# Patient Record
Sex: FEMALE | Race: WHITE | URBAN - METROPOLITAN AREA
[De-identification: names, ages, dates, MRNs, and addresses within clinical notes are randomized per-mention and may not be internally consistent; named-entity substitution may affect disease eponyms.]

---

## 2017-05-25 ENCOUNTER — APPOINTMENT (OUTPATIENT)
Dept: URBAN - METROPOLITAN AREA SURGERY 9 | Age: 62
Setting detail: DERMATOLOGY
End: 2017-05-25

## 2017-05-25 DIAGNOSIS — L21.8 OTHER SEBORRHEIC DERMATITIS: ICD-10-CM

## 2017-05-25 PROBLEM — E78.5 HYPERLIPIDEMIA, UNSPECIFIED: Status: ACTIVE | Noted: 2017-05-25

## 2017-05-25 PROBLEM — D48.5 NEOPLASM OF UNCERTAIN BEHAVIOR OF SKIN: Status: ACTIVE | Noted: 2017-05-25

## 2017-05-25 PROBLEM — I10 ESSENTIAL (PRIMARY) HYPERTENSION: Status: ACTIVE | Noted: 2017-05-25

## 2017-05-25 PROBLEM — K21.9 GASTRO-ESOPHAGEAL REFLUX DISEASE WITHOUT ESOPHAGITIS: Status: ACTIVE | Noted: 2017-05-25

## 2017-05-25 PROCEDURE — OTHER PRESCRIPTION: OTHER

## 2017-05-25 PROCEDURE — 99202 OFFICE O/P NEW SF 15 MIN: CPT | Mod: 25

## 2017-05-25 PROCEDURE — 88305 TISSUE EXAM BY PATHOLOGIST: CPT | Mod: TC

## 2017-05-25 PROCEDURE — OTHER COUNSELING: OTHER

## 2017-05-25 PROCEDURE — 11100: CPT

## 2017-05-25 PROCEDURE — OTHER TREATMENT REGIMEN: OTHER

## 2017-05-25 PROCEDURE — OTHER BIOPSY BY SHAVE METHOD: OTHER

## 2017-05-25 PROCEDURE — OTHER PATHOLOGY BILLING: OTHER

## 2017-05-25 PROCEDURE — OTHER MIPS QUALITY: OTHER

## 2017-05-25 RX ORDER — KETOCONAZOLE 20 MG/G
CREAM TOPICAL
Qty: 1 | Refills: 11 | Status: ERX | COMMUNITY
Start: 2017-05-25

## 2017-05-25 ASSESSMENT — LOCATION SIMPLE DESCRIPTION DERM
LOCATION SIMPLE: RIGHT EYEBROW
LOCATION SIMPLE: LEFT EYEBROW

## 2017-05-25 ASSESSMENT — LOCATION ZONE DERM: LOCATION ZONE: FACE

## 2017-05-25 ASSESSMENT — LOCATION DETAILED DESCRIPTION DERM
LOCATION DETAILED: LEFT CENTRAL EYEBROW
LOCATION DETAILED: RIGHT CENTRAL EYEBROW

## 2017-05-25 NOTE — PROCEDURE: TREATMENT REGIMEN
Initiate Treatment: Ketoconazole cream twice daily
Plan: If ketoconazole ineffective may consider a topical steroid to reduce the redness
Continue Regimen: Protopic as directed
Detail Level: Simple

## 2017-06-16 ENCOUNTER — APPOINTMENT (OUTPATIENT)
Dept: URBAN - METROPOLITAN AREA SURGERY 9 | Age: 62
Setting detail: DERMATOLOGY
End: 2017-06-16

## 2017-06-16 DIAGNOSIS — I78.8 OTHER DISEASES OF CAPILLARIES: ICD-10-CM

## 2017-06-16 DIAGNOSIS — Q826 OTHER SPECIFIED ANOMALIES OF SKIN: ICD-10-CM

## 2017-06-16 DIAGNOSIS — Q828 OTHER SPECIFIED ANOMALIES OF SKIN: ICD-10-CM

## 2017-06-16 DIAGNOSIS — Q819 OTHER SPECIFIED ANOMALIES OF SKIN: ICD-10-CM

## 2017-06-16 DIAGNOSIS — L23.1 ALLERGIC CONTACT DERMATITIS DUE TO ADHESIVES: ICD-10-CM

## 2017-06-16 DIAGNOSIS — L57.0 ACTINIC KERATOSIS: ICD-10-CM

## 2017-06-16 PROBLEM — Q82.8 OTHER SPECIFIED CONGENITAL MALFORMATIONS OF SKIN: Status: ACTIVE | Noted: 2017-06-16

## 2017-06-16 PROCEDURE — OTHER LIQUID NITROGEN: OTHER

## 2017-06-16 PROCEDURE — OTHER REASSURANCE: OTHER

## 2017-06-16 PROCEDURE — 17000 DESTRUCT PREMALG LESION: CPT

## 2017-06-16 PROCEDURE — OTHER COUNSELING: OTHER

## 2017-06-16 PROCEDURE — OTHER TREATMENT REGIMEN: OTHER

## 2017-06-16 PROCEDURE — 99213 OFFICE O/P EST LOW 20 MIN: CPT | Mod: 25

## 2017-06-16 ASSESSMENT — LOCATION ZONE DERM
LOCATION ZONE: ARM
LOCATION ZONE: TRUNK
LOCATION ZONE: FACE

## 2017-06-16 ASSESSMENT — LOCATION DETAILED DESCRIPTION DERM
LOCATION DETAILED: MIDDLE STERNUM
LOCATION DETAILED: LEFT PROXIMAL POSTERIOR UPPER ARM
LOCATION DETAILED: RIGHT PROXIMAL POSTERIOR UPPER ARM
LOCATION DETAILED: LEFT INFERIOR CENTRAL MALAR CHEEK

## 2017-06-16 ASSESSMENT — SEVERITY ASSESSMENT: SEVERITY: MILD

## 2017-06-16 ASSESSMENT — LOCATION SIMPLE DESCRIPTION DERM
LOCATION SIMPLE: LEFT POSTERIOR UPPER ARM
LOCATION SIMPLE: RIGHT POSTERIOR UPPER ARM
LOCATION SIMPLE: LEFT CHEEK
LOCATION SIMPLE: CHEST

## 2017-09-08 ENCOUNTER — APPOINTMENT (OUTPATIENT)
Dept: URBAN - METROPOLITAN AREA SURGERY 9 | Age: 62
Setting detail: DERMATOLOGY
End: 2017-09-08

## 2017-09-08 DIAGNOSIS — L91.0 HYPERTROPHIC SCAR: ICD-10-CM

## 2017-09-08 PROCEDURE — OTHER TREATMENT REGIMEN: OTHER

## 2017-09-08 PROCEDURE — 11900 INJECT SKIN LESIONS </W 7: CPT

## 2017-09-08 PROCEDURE — OTHER INTRALESIONAL KENALOG: OTHER

## 2017-09-08 PROCEDURE — OTHER COUNSELING: OTHER

## 2017-09-08 ASSESSMENT — LOCATION DETAILED DESCRIPTION DERM: LOCATION DETAILED: MIDDLE STERNUM

## 2017-09-08 ASSESSMENT — LOCATION ZONE DERM: LOCATION ZONE: TRUNK

## 2017-09-08 ASSESSMENT — LOCATION SIMPLE DESCRIPTION DERM: LOCATION SIMPLE: CHEST

## 2017-10-09 ENCOUNTER — APPOINTMENT (OUTPATIENT)
Dept: URBAN - METROPOLITAN AREA SURGERY 9 | Age: 62
Setting detail: DERMATOLOGY
End: 2017-10-11

## 2017-10-09 DIAGNOSIS — L91.0 HYPERTROPHIC SCAR: ICD-10-CM

## 2017-10-09 DIAGNOSIS — L82.0 INFLAMED SEBORRHEIC KERATOSIS: ICD-10-CM

## 2017-10-09 DIAGNOSIS — I78.8 OTHER DISEASES OF CAPILLARIES: ICD-10-CM

## 2017-10-09 PROCEDURE — OTHER REASSURANCE: OTHER

## 2017-10-09 PROCEDURE — OTHER LIQUID NITROGEN: OTHER

## 2017-10-09 PROCEDURE — OTHER OTHER: OTHER

## 2017-10-09 PROCEDURE — 11900 INJECT SKIN LESIONS </W 7: CPT | Mod: 59

## 2017-10-09 PROCEDURE — OTHER INTRALESIONAL KENALOG: OTHER

## 2017-10-09 PROCEDURE — OTHER TREATMENT REGIMEN: OTHER

## 2017-10-09 PROCEDURE — OTHER COUNSELING: OTHER

## 2017-10-09 PROCEDURE — 99213 OFFICE O/P EST LOW 20 MIN: CPT | Mod: 25

## 2017-10-09 PROCEDURE — 17110 DESTRUCT B9 LESION 1-14: CPT

## 2017-10-09 ASSESSMENT — LOCATION DETAILED DESCRIPTION DERM
LOCATION DETAILED: RIGHT MEDIAL SUPERIOR CHEST
LOCATION DETAILED: MIDDLE STERNUM

## 2017-10-09 ASSESSMENT — LOCATION SIMPLE DESCRIPTION DERM: LOCATION SIMPLE: CHEST

## 2017-10-09 ASSESSMENT — LOCATION ZONE DERM: LOCATION ZONE: TRUNK

## 2017-10-09 NOTE — PROCEDURE: OTHER
Detail Level: Zone
Other (Free Text): Lesion of concern.
Note Text (......Xxx Chief Complaint.): This diagnosis correlates with the

## 2017-10-09 NOTE — PROCEDURE: INTRALESIONAL KENALOG
- BP: (118-147)/(71-90) 118/78  - Vision blurring, no scotomata. Pre-E ROS negative.  - Will continue to monitor vitals q6h  - [facility] showed transaminitis (AST/ALT 81/73)  - Pre-E labs drawn - pending  - Magnesium sulfate started. Will assess for toxicity q4h.    
Include Z78.9 (Other Specified Conditions Influencing Health Status) As An Associated Diagnosis?: No
X Size Of Lesion In Cm (Optional): 0
Concentration Of Solution Injected (Mg/Ml): 5.0
Total Volume Injected (Ccs- Only Use Numbers And Decimals): 0.5
Administered By (Optional): Leatha
Treatment Number (Optional): 2
Detail Level: Detailed
Consent: The risks of atrophy were reviewed with the patient.
Medical Necessity Clause: This procedure was medically necessary because the lesions that were treated were:
Kenalog Preparation: Kenalog

## 2017-10-09 NOTE — PROCEDURE: REASSURANCE
Additional Notes (Optional): Telangiectasia adjacent to milia. No scaling or atypical features noted at this time. Will monitor in 6 weeks. \\nLesion of concern.
Detail Level: Simple

## 2017-10-09 NOTE — PROCEDURE: LIQUID NITROGEN
Consent: The patient's consent was obtained including but not limited to risks of crusting, scabbing, blistering, scarring, darker or lighter pigmentary change, recurrence, incomplete removal and infection.
Post-Care Instructions: I reviewed with the patient in detail post-care instructions. Patient is to wear sunprotection, and avoid picking at any of the treated lesions. Pt may apply Vaseline to crusted or scabbing areas.
Number Of Freeze-Thaw Cycles: 2 freeze-thaw cycles
Detail Level: Simple
Medical Necessity Clause: This procedure was medically necessary because the lesions that were treated were:
Medical Necessity Information: It is in your best interest to select a reason for this procedure from the list below. All of these items fulfill various CMS LCD requirements except the new and changing color options.
Include Z78.9 (Other Specified Conditions Influencing Health Status) As An Associated Diagnosis?: No
Duration Of Freeze Thaw-Cycle (Seconds): 5-10
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
## 2024-07-03 ENCOUNTER — TELEPHONE (OUTPATIENT)
Age: 69
End: 2024-07-03

## 2024-07-03 ENCOUNTER — OFFICE VISIT (OUTPATIENT)
Age: 69
End: 2024-07-03
Payer: COMMERCIAL

## 2024-07-03 VITALS
RESPIRATION RATE: 14 BRPM | HEART RATE: 75 BPM | WEIGHT: 270.8 LBS | DIASTOLIC BLOOD PRESSURE: 76 MMHG | HEIGHT: 63 IN | SYSTOLIC BLOOD PRESSURE: 131 MMHG | BODY MASS INDEX: 47.98 KG/M2

## 2024-07-03 DIAGNOSIS — K21.9 GASTRO-ESOPHAGEAL REFLUX DISEASE WITHOUT ESOPHAGITIS: ICD-10-CM

## 2024-07-03 DIAGNOSIS — R73.01 ELEVATED FASTING GLUCOSE: ICD-10-CM

## 2024-07-03 DIAGNOSIS — E21.0 PRIMARY HYPERPARATHYROIDISM (HCC): ICD-10-CM

## 2024-07-03 DIAGNOSIS — E55.9 VITAMIN D INSUFFICIENCY: ICD-10-CM

## 2024-07-03 DIAGNOSIS — I10 ESSENTIAL HYPERTENSION: Primary | Chronic | ICD-10-CM

## 2024-07-03 DIAGNOSIS — E78.2 MIXED DYSLIPIDEMIA: ICD-10-CM

## 2024-07-03 PROBLEM — Z98.890 HISTORY OF PARATHYROIDECTOMY: Status: ACTIVE | Noted: 2023-02-24

## 2024-07-03 PROBLEM — L76.82 INCISIONAL PAIN: Status: ACTIVE | Noted: 2023-05-15

## 2024-07-03 PROBLEM — J30.2 SEASONAL ALLERGIC RHINITIS: Status: ACTIVE | Noted: 2022-06-22

## 2024-07-03 PROBLEM — E66.01 MORBID (SEVERE) OBESITY DUE TO EXCESS CALORIES (HCC): Status: ACTIVE | Noted: 2022-04-21

## 2024-07-03 PROBLEM — T84.84XA PAIN IN BONE FIXATION DEVICE (HCC): Status: ACTIVE | Noted: 2020-10-23

## 2024-07-03 PROBLEM — K46.9 ABDOMINAL HERNIA WITHOUT OBSTRUCTION AND WITHOUT GANGRENE: Status: ACTIVE | Noted: 2024-04-09

## 2024-07-03 PROBLEM — F41.9 ANXIETY: Status: ACTIVE | Noted: 2023-02-24

## 2024-07-03 PROBLEM — Z90.89 HISTORY OF PARATHYROIDECTOMY: Status: ACTIVE | Noted: 2023-02-24

## 2024-07-03 PROBLEM — R20.3 SENSITIVE SKIN: Status: ACTIVE | Noted: 2022-08-08

## 2024-07-03 PROBLEM — K43.2 INCISIONAL HERNIA, WITHOUT OBSTRUCTION OR GANGRENE: Status: ACTIVE | Noted: 2024-01-08

## 2024-07-03 PROCEDURE — 3078F DIAST BP <80 MM HG: CPT | Performed by: FAMILY MEDICINE

## 2024-07-03 PROCEDURE — 3075F SYST BP GE 130 - 139MM HG: CPT | Performed by: FAMILY MEDICINE

## 2024-07-03 PROCEDURE — 99212 OFFICE O/P EST SF 10 MIN: CPT | Performed by: FAMILY MEDICINE

## 2024-07-03 PROCEDURE — 1123F ACP DISCUSS/DSCN MKR DOCD: CPT | Performed by: FAMILY MEDICINE

## 2024-07-03 PROCEDURE — 99204 OFFICE O/P NEW MOD 45 MIN: CPT | Performed by: FAMILY MEDICINE

## 2024-07-03 RX ORDER — ACETAMINOPHEN AND CODEINE PHOSPHATE 300; 30 MG/1; MG/1
1 TABLET ORAL EVERY 6 HOURS PRN
COMMUNITY

## 2024-07-03 RX ORDER — ACETAMINOPHEN AND CODEINE PHOSPHATE 300; 30 MG/1; MG/1
1 TABLET ORAL EVERY 4 HOURS PRN
COMMUNITY
End: 2024-07-03

## 2024-07-03 RX ORDER — ZOLPIDEM TARTRATE 5 MG/1
5 TABLET ORAL NIGHTLY PRN
COMMUNITY
Start: 2024-06-04 | End: 2024-09-02

## 2024-07-03 RX ORDER — LORATADINE 10 MG/1
10 CAPSULE, LIQUID FILLED ORAL PRN
COMMUNITY

## 2024-07-03 RX ORDER — DIPHENHYDRAMINE HCL 25 MG
25 CAPSULE ORAL NIGHTLY PRN
COMMUNITY
End: 2024-07-03

## 2024-07-03 RX ORDER — IRBESARTAN 150 MG/1
150 TABLET ORAL NIGHTLY
COMMUNITY

## 2024-07-03 RX ORDER — TACROLIMUS 1 MG/G
1 OINTMENT TOPICAL EVERY EVENING
COMMUNITY

## 2024-07-03 SDOH — ECONOMIC STABILITY: TRANSPORTATION INSECURITY
IN THE PAST 12 MONTHS, HAS THE LACK OF TRANSPORTATION KEPT YOU FROM MEDICAL APPOINTMENTS OR FROM GETTING MEDICATIONS?: NO

## 2024-07-03 SDOH — SOCIAL STABILITY: SOCIAL INSECURITY: WITHIN THE LAST YEAR, HAVE YOU BEEN AFRAID OF YOUR PARTNER OR EX-PARTNER?: NO

## 2024-07-03 SDOH — ECONOMIC STABILITY: FOOD INSECURITY: WITHIN THE PAST 12 MONTHS, YOU WORRIED THAT YOUR FOOD WOULD RUN OUT BEFORE YOU GOT MONEY TO BUY MORE.: NEVER TRUE

## 2024-07-03 SDOH — SOCIAL STABILITY: SOCIAL INSECURITY
WITHIN THE LAST YEAR, HAVE YOU BEEN KICKED, HIT, SLAPPED, OR OTHERWISE PHYSICALLY HURT BY YOUR PARTNER OR EX-PARTNER?: NO

## 2024-07-03 SDOH — ECONOMIC STABILITY: HOUSING INSECURITY: IN THE LAST 12 MONTHS, HOW MANY PLACES HAVE YOU LIVED?: 2

## 2024-07-03 SDOH — ECONOMIC STABILITY: INCOME INSECURITY: IN THE LAST 12 MONTHS, WAS THERE A TIME WHEN YOU WERE NOT ABLE TO PAY THE MORTGAGE OR RENT ON TIME?: NO

## 2024-07-03 SDOH — SOCIAL STABILITY: SOCIAL NETWORK
IN A TYPICAL WEEK, HOW MANY TIMES DO YOU TALK ON THE PHONE WITH FAMILY, FRIENDS, OR NEIGHBORS?: MORE THAN THREE TIMES A WEEK

## 2024-07-03 SDOH — HEALTH STABILITY: PHYSICAL HEALTH: ON AVERAGE, HOW MANY DAYS PER WEEK DO YOU ENGAGE IN MODERATE TO STRENUOUS EXERCISE (LIKE A BRISK WALK)?: 3 DAYS

## 2024-07-03 SDOH — ECONOMIC STABILITY: HOUSING INSECURITY
IN THE LAST 12 MONTHS, WAS THERE A TIME WHEN YOU DID NOT HAVE A STEADY PLACE TO SLEEP OR SLEPT IN A SHELTER (INCLUDING NOW)?: NO

## 2024-07-03 SDOH — HEALTH STABILITY: MENTAL HEALTH
STRESS IS WHEN SOMEONE FEELS TENSE, NERVOUS, ANXIOUS, OR CAN'T SLEEP AT NIGHT BECAUSE THEIR MIND IS TROUBLED. HOW STRESSED ARE YOU?: NOT AT ALL

## 2024-07-03 SDOH — ECONOMIC STABILITY: FOOD INSECURITY: WITHIN THE PAST 12 MONTHS, THE FOOD YOU BOUGHT JUST DIDN'T LAST AND YOU DIDN'T HAVE MONEY TO GET MORE.: NEVER TRUE

## 2024-07-03 SDOH — SOCIAL STABILITY: SOCIAL NETWORK
DO YOU BELONG TO ANY CLUBS OR ORGANIZATIONS SUCH AS CHURCH GROUPS UNIONS, FRATERNAL OR ATHLETIC GROUPS, OR SCHOOL GROUPS?: NO

## 2024-07-03 SDOH — SOCIAL STABILITY: SOCIAL INSECURITY: WITHIN THE LAST YEAR, HAVE YOU BEEN HUMILIATED OR EMOTIONALLY ABUSED IN OTHER WAYS BY YOUR PARTNER OR EX-PARTNER?: NO

## 2024-07-03 SDOH — SOCIAL STABILITY: SOCIAL NETWORK: ARE YOU MARRIED, WIDOWED, DIVORCED, SEPARATED, NEVER MARRIED, OR LIVING WITH A PARTNER?: MARRIED

## 2024-07-03 SDOH — SOCIAL STABILITY: SOCIAL NETWORK: HOW OFTEN DO YOU GET TOGETHER WITH FRIENDS OR RELATIVES?: NEVER

## 2024-07-03 SDOH — HEALTH STABILITY: MENTAL HEALTH: HOW MANY STANDARD DRINKS CONTAINING ALCOHOL DO YOU HAVE ON A TYPICAL DAY?: 1 OR 2

## 2024-07-03 SDOH — HEALTH STABILITY: MENTAL HEALTH: HOW OFTEN DO YOU HAVE A DRINK CONTAINING ALCOHOL?: 2-3 TIMES A WEEK

## 2024-07-03 SDOH — SOCIAL STABILITY: SOCIAL NETWORK: HOW OFTEN DO YOU ATTEND CHURCH OR RELIGIOUS SERVICES?: MORE THAN 4 TIMES PER YEAR

## 2024-07-03 SDOH — SOCIAL STABILITY: SOCIAL INSECURITY
WITHIN THE LAST YEAR, HAVE TO BEEN RAPED OR FORCED TO HAVE ANY KIND OF SEXUAL ACTIVITY BY YOUR PARTNER OR EX-PARTNER?: NO

## 2024-07-03 SDOH — HEALTH STABILITY: PHYSICAL HEALTH: ON AVERAGE, HOW MANY MINUTES DO YOU ENGAGE IN EXERCISE AT THIS LEVEL?: 10 MIN

## 2024-07-03 SDOH — SOCIAL STABILITY: SOCIAL NETWORK: HOW OFTEN DO YOU ATTENT MEETINGS OF THE CLUB OR ORGANIZATION YOU BELONG TO?: NEVER

## 2024-07-03 SDOH — ECONOMIC STABILITY: INCOME INSECURITY: HOW HARD IS IT FOR YOU TO PAY FOR THE VERY BASICS LIKE FOOD, HOUSING, MEDICAL CARE, AND HEATING?: NOT HARD AT ALL

## 2024-07-03 SDOH — ECONOMIC STABILITY: TRANSPORTATION INSECURITY
IN THE PAST 12 MONTHS, HAS LACK OF TRANSPORTATION KEPT YOU FROM MEETINGS, WORK, OR FROM GETTING THINGS NEEDED FOR DAILY LIVING?: NO

## 2024-07-03 ASSESSMENT — PATIENT HEALTH QUESTIONNAIRE - PHQ9
SUM OF ALL RESPONSES TO PHQ QUESTIONS 1-9: 0
1. LITTLE INTEREST OR PLEASURE IN DOING THINGS: NOT AT ALL
SUM OF ALL RESPONSES TO PHQ QUESTIONS 1-9: 0
2. FEELING DOWN, DEPRESSED OR HOPELESS: NOT AT ALL
SUM OF ALL RESPONSES TO PHQ QUESTIONS 1-9: 0
SUM OF ALL RESPONSES TO PHQ QUESTIONS 1-9: 0
SUM OF ALL RESPONSES TO PHQ9 QUESTIONS 1 & 2: 0

## 2024-07-03 NOTE — TELEPHONE ENCOUNTER
Patient called and wanted to know given her history being allergic to several things would she be a good candidate for a hernia mesh?

## 2024-07-03 NOTE — PATIENT INSTRUCTIONS
Lets address the issues with regards to your abdominal wall hernia.  Will [facility] back and review your cancer screening as well as some baseline blood work at your Medicare wellness exam.    It was nice seeing you today.  Remember some of the things we talked about.  Most importantly be gentle to yourself.      Remember the 4 components of wellness:  1.  Restoration: This includes time to restore ones energy.  This includes a minimum of 7-8 quality hours of sleep at night.  Passing out because you are exhausted is not followed by good sleep.  Falling asleep as part of preparing for sleep provides the best sleep.  Poor quality cannot be made up by an increase in quantity.  2.  Nutrition: Mediterranean diet or plant strong diet that involves as much food as you can that is fresh without being predigested to extend shelf life or flavor enhanced in general is the best.  My plate.gov is another resource.  Make sure you are getting an adequate amount of protein in your diet.  In addition sufficient enough water.  3.  Activity or Motion: You need a minimum of 150 to 180 minutes/week at target heart rate.  Maximum heart rate is 220 - age.  Target heart rate is 65 to 80% of maximum heart rate.  This is to maintain your present level of fitness.  To lose weight you need up to 300 minutes/week to maintain your metabolism if and negative caloric balance.  Counting your steps is great, but just activity is not as good as heart rate in the target range.  If there is a change in how you feel at a given heart rate that is a reason to be seen.  4.  Connection with others or purpose driven life: Being connected with other people and having a purpose extends your life expectancy and provides meaning to your life.  Helping others always provides benefit to the person providing that help.    These are the 4 major quadrants of wellness.  The Bull's-Eye however is Mindfulness.  This is where you make a point every day to look at where you

## 2024-07-03 NOTE — PROGRESS NOTES
Organizations: No     Attends Club or Organization Meetings: Never     Marital Status:    Intimate Partner Violence: Not At Risk (7/3/2024)    Humiliation, Afraid, Rape, and Kick questionnaire     Fear of Current or Ex-Partner: No     Emotionally Abused: No     Physically Abused: No     Sexually Abused: No   Housing Stability: Low Risk  (7/3/2024)    Housing Stability Vital Sign     Unable to Pay for Housing in the Last Year: No     Number of Places Lived in the Last Year: 2     Unstable Housing in the Last Year: No        PHYSICAL EXAM   /76   Pulse 75   Resp 14   Ht 1.6 m (5' 3\")   Wt 122.8 kg (270 lb 12.8 oz)   BMI 47.97 kg/m²    Physical Exam  Constitutional:       General: She is not in acute distress.     Appearance: Normal appearance. She is obese. She is not ill-appearing.   HENT:      Head: Normocephalic and atraumatic.      Nose: Nose normal. No congestion or rhinorrhea.      Mouth/Throat:      Mouth: Mucous membranes are dry.   Eyes:      General: No scleral icterus.     Extraocular Movements: Extraocular movements intact.      Conjunctiva/sclera: Conjunctivae normal.      Pupils: Pupils are equal, round, and reactive to light.   Pulmonary:      Effort: Pulmonary effort is normal.      Breath sounds: Normal breath sounds. No wheezing, rhonchi or rales.   Chest:      Chest wall: No tenderness.   Skin:     General: Skin is warm and dry.      Coloration: Skin is not jaundiced or pale.      Findings: No erythema or rash.   Neurological:      General: No focal deficit present.      Mental Status: She is alert and oriented to person, place, and time.   Psychiatric:         Mood and Affect: Mood normal.         Behavior: Behavior normal.         Thought Content: Thought content normal.         Judgment: Judgment normal.           Recent labwork:  No results found for any previous visit.        ASSESSMENT/PLAN      1. Essential hypertension  Assessment & Plan:  Blood pressure controlled today's

## 2024-07-15 PROBLEM — R73.01 ELEVATED FASTING GLUCOSE: Status: ACTIVE | Noted: 2024-07-15

## 2024-07-16 NOTE — ASSESSMENT & PLAN NOTE
Blood pressure controlled today's date we will consider adjustments based on further testing and lab work.

## 2024-09-09 SDOH — HEALTH STABILITY: PHYSICAL HEALTH
ON AVERAGE, HOW MANY DAYS PER WEEK DO YOU ENGAGE IN MODERATE TO STRENUOUS EXERCISE (LIKE A BRISK WALK)?: PATIENT DECLINED

## 2024-09-09 SDOH — HEALTH STABILITY: PHYSICAL HEALTH: ON AVERAGE, HOW MANY MINUTES DO YOU ENGAGE IN EXERCISE AT THIS LEVEL?: 0 MIN

## 2024-09-10 ENCOUNTER — OFFICE VISIT (OUTPATIENT)
Dept: PRIMARY CARE CLINIC | Age: 69
End: 2024-09-10

## 2024-09-10 VITALS
OXYGEN SATURATION: 99 % | SYSTOLIC BLOOD PRESSURE: 134 MMHG | BODY MASS INDEX: 48.37 KG/M2 | DIASTOLIC BLOOD PRESSURE: 74 MMHG | WEIGHT: 273 LBS | HEIGHT: 63 IN | HEART RATE: 86 BPM

## 2024-09-10 DIAGNOSIS — R73.9 HYPERGLYCEMIA: ICD-10-CM

## 2024-09-10 DIAGNOSIS — Z76.89 ENCOUNTER TO ESTABLISH CARE WITH NEW DOCTOR: Primary | ICD-10-CM

## 2024-09-10 DIAGNOSIS — E21.0 PRIMARY HYPERPARATHYROIDISM (HCC): ICD-10-CM

## 2024-09-10 DIAGNOSIS — E66.01 MORBID (SEVERE) OBESITY DUE TO EXCESS CALORIES (HCC): ICD-10-CM

## 2024-09-10 RX ORDER — ZOLPIDEM TARTRATE 5 MG/1
TABLET ORAL
COMMUNITY
Start: 2023-09-01

## 2024-10-01 ENCOUNTER — HOSPITAL ENCOUNTER (OUTPATIENT)
Age: 69
Setting detail: OUTPATIENT SURGERY
Discharge: HOME | End: 2024-10-01
Attending: SURGERY | Admitting: SURGERY
Payer: COMMERCIAL

## 2024-10-01 ENCOUNTER — ANESTHESIA EVENT (OUTPATIENT)
Dept: OPERATING ROOM | Age: 69
End: 2024-10-01
Payer: COMMERCIAL

## 2024-10-01 ENCOUNTER — ANESTHESIA (OUTPATIENT)
Dept: OPERATING ROOM | Age: 69
End: 2024-10-01
Payer: COMMERCIAL

## 2024-10-01 VITALS
RESPIRATION RATE: 25 BRPM | HEART RATE: 75 BPM | TEMPERATURE: 97.3 F | BODY MASS INDEX: 48.02 KG/M2 | OXYGEN SATURATION: 94 % | WEIGHT: 271 LBS | SYSTOLIC BLOOD PRESSURE: 166 MMHG | DIASTOLIC BLOOD PRESSURE: 79 MMHG | HEIGHT: 63 IN

## 2024-10-01 DIAGNOSIS — K43.2 INCISIONAL HERNIA, WITHOUT OBSTRUCTION OR GANGRENE: Primary | ICD-10-CM

## 2024-10-01 LAB
ANION GAP SERPL CALCULATED.3IONS-SCNC: 15 MMOL/L (ref 9–17)
BUN SERPL-MCNC: 13 MG/DL (ref 8–23)
BUN/CREAT SERPL: 14 (ref 9–20)
CALCIUM SERPL-MCNC: 8.8 MG/DL (ref 8.6–10.4)
CHLORIDE SERPL-SCNC: 106 MMOL/L (ref 98–107)
CO2 SERPL-SCNC: 20 MMOL/L (ref 20–31)
CREAT SERPL-MCNC: 0.9 MG/DL (ref 0.5–0.9)
EKG ATRIAL RATE: 78 BPM
EKG P AXIS: 90 DEGREES
EKG P-R INTERVAL: 192 MS
EKG Q-T INTERVAL: 384 MS
EKG QRS DURATION: 82 MS
EKG QTC CALCULATION (BAZETT): 437 MS
EKG R AXIS: 90 DEGREES
EKG T AXIS: 90 DEGREES
EKG VENTRICULAR RATE: 78 BPM
ERYTHROCYTE [DISTWIDTH] IN BLOOD BY AUTOMATED COUNT: 12.4 % (ref 11.8–14.4)
GFR, ESTIMATED: 70 ML/MIN/1.73M2
GLUCOSE SERPL-MCNC: 151 MG/DL (ref 70–99)
HCT VFR BLD AUTO: 43.6 % (ref 36.3–47.1)
HGB BLD-MCNC: 14.7 G/DL (ref 11.9–15.1)
MCH RBC QN AUTO: 29.7 PG (ref 25.2–33.5)
MCHC RBC AUTO-ENTMCNC: 33.7 G/DL (ref 28.4–34.8)
MCV RBC AUTO: 88.1 FL (ref 82.6–102.9)
NRBC BLD-RTO: 0 PER 100 WBC
PLATELET # BLD AUTO: 333 K/UL (ref 138–453)
PMV BLD AUTO: 9.6 FL (ref 8.1–13.5)
POTASSIUM SERPL-SCNC: 4.2 MMOL/L (ref 3.7–5.3)
RBC # BLD AUTO: 4.95 M/UL (ref 3.95–5.11)
SODIUM SERPL-SCNC: 141 MMOL/L (ref 135–144)
WBC OTHER # BLD: 9.3 K/UL (ref 3.5–11.3)

## 2024-10-01 PROCEDURE — 93005 ELECTROCARDIOGRAM TRACING: CPT

## 2024-10-01 PROCEDURE — 2500000003 HC RX 250 WO HCPCS: Performed by: NURSE ANESTHETIST, CERTIFIED REGISTERED

## 2024-10-01 PROCEDURE — 6370000000 HC RX 637 (ALT 250 FOR IP): Performed by: SURGERY

## 2024-10-01 PROCEDURE — 80048 BASIC METABOLIC PNL TOTAL CA: CPT

## 2024-10-01 PROCEDURE — 2500000003 HC RX 250 WO HCPCS: Performed by: SURGERY

## 2024-10-01 PROCEDURE — 2580000003 HC RX 258

## 2024-10-01 PROCEDURE — 2580000003 HC RX 258: Performed by: SURGERY

## 2024-10-01 PROCEDURE — 6360000002 HC RX W HCPCS: Performed by: ANESTHESIOLOGY

## 2024-10-01 PROCEDURE — 85027 COMPLETE CBC AUTOMATED: CPT

## 2024-10-01 PROCEDURE — 6360000002 HC RX W HCPCS: Performed by: SURGERY

## 2024-10-01 PROCEDURE — 6360000002 HC RX W HCPCS: Performed by: NURSE ANESTHETIST, CERTIFIED REGISTERED

## 2024-10-01 RX ORDER — EPHEDRINE SULFATE/0.9% NACL/PF 25 MG/5 ML
SYRINGE (ML) INTRAVENOUS
Status: DISCONTINUED | OUTPATIENT
Start: 2024-10-01 | End: 2024-10-01 | Stop reason: SDUPTHER

## 2024-10-01 RX ORDER — SODIUM CHLORIDE 9 MG/ML
INJECTION, SOLUTION INTRAVENOUS CONTINUOUS
Status: DISCONTINUED | OUTPATIENT
Start: 2024-10-01 | End: 2024-10-01 | Stop reason: HOSPADM

## 2024-10-01 RX ORDER — ONDANSETRON 2 MG/ML
4 INJECTION INTRAMUSCULAR; INTRAVENOUS
Status: DISCONTINUED | OUTPATIENT
Start: 2024-10-01 | End: 2024-10-01 | Stop reason: HOSPADM

## 2024-10-01 RX ORDER — PROCHLORPERAZINE EDISYLATE 5 MG/ML
5 INJECTION INTRAMUSCULAR; INTRAVENOUS
Status: DISCONTINUED | OUTPATIENT
Start: 2024-10-01 | End: 2024-10-01 | Stop reason: HOSPADM

## 2024-10-01 RX ORDER — ONDANSETRON 2 MG/ML
INJECTION INTRAMUSCULAR; INTRAVENOUS
Status: DISCONTINUED | OUTPATIENT
Start: 2024-10-01 | End: 2024-10-01 | Stop reason: SDUPTHER

## 2024-10-01 RX ORDER — LIDOCAINE HYDROCHLORIDE 10 MG/ML
1 INJECTION, SOLUTION EPIDURAL; INFILTRATION; INTRACAUDAL; PERINEURAL
Status: DISCONTINUED | OUTPATIENT
Start: 2024-10-02 | End: 2024-10-01 | Stop reason: HOSPADM

## 2024-10-01 RX ORDER — ACETAMINOPHEN AND CODEINE PHOSPHATE 300; 30 MG/1; MG/1
1 TABLET ORAL EVERY 6 HOURS PRN
Qty: 25 TABLET | Refills: 0 | Status: SHIPPED | OUTPATIENT
Start: 2024-10-01 | End: 2024-10-08

## 2024-10-01 RX ORDER — ROCURONIUM BROMIDE 10 MG/ML
INJECTION, SOLUTION INTRAVENOUS
Status: DISCONTINUED | OUTPATIENT
Start: 2024-10-01 | End: 2024-10-01 | Stop reason: SDUPTHER

## 2024-10-01 RX ORDER — DOCUSATE SODIUM 100 MG/1
100 CAPSULE, LIQUID FILLED ORAL 2 TIMES DAILY
Qty: 60 CAPSULE | Refills: 0 | Status: SHIPPED | OUTPATIENT
Start: 2024-10-01 | End: 2024-10-31

## 2024-10-01 RX ORDER — KETOROLAC TROMETHAMINE 30 MG/ML
INJECTION, SOLUTION INTRAMUSCULAR; INTRAVENOUS
Status: DISCONTINUED | OUTPATIENT
Start: 2024-10-01 | End: 2024-10-01 | Stop reason: SDUPTHER

## 2024-10-01 RX ORDER — MIDAZOLAM HYDROCHLORIDE 1 MG/ML
INJECTION INTRAMUSCULAR; INTRAVENOUS
Status: DISCONTINUED | OUTPATIENT
Start: 2024-10-01 | End: 2024-10-01 | Stop reason: SDUPTHER

## 2024-10-01 RX ORDER — SODIUM CHLORIDE, SODIUM LACTATE, POTASSIUM CHLORIDE, CALCIUM CHLORIDE 600; 310; 30; 20 MG/100ML; MG/100ML; MG/100ML; MG/100ML
INJECTION, SOLUTION INTRAVENOUS CONTINUOUS
Status: DISCONTINUED | OUTPATIENT
Start: 2024-10-01 | End: 2024-10-01 | Stop reason: HOSPADM

## 2024-10-01 RX ORDER — FENTANYL CITRATE 50 UG/ML
INJECTION, SOLUTION INTRAMUSCULAR; INTRAVENOUS
Status: DISCONTINUED | OUTPATIENT
Start: 2024-10-01 | End: 2024-10-01 | Stop reason: SDUPTHER

## 2024-10-01 RX ORDER — SODIUM CHLORIDE 0.9 % (FLUSH) 0.9 %
5-40 SYRINGE (ML) INJECTION PRN
Status: DISCONTINUED | OUTPATIENT
Start: 2024-10-01 | End: 2024-10-01 | Stop reason: HOSPADM

## 2024-10-01 RX ORDER — HYDROMORPHONE HYDROCHLORIDE 1 MG/ML
0.5 INJECTION, SOLUTION INTRAMUSCULAR; INTRAVENOUS; SUBCUTANEOUS EVERY 5 MIN PRN
Status: DISCONTINUED | OUTPATIENT
Start: 2024-10-01 | End: 2024-10-01 | Stop reason: HOSPADM

## 2024-10-01 RX ORDER — SODIUM CHLORIDE 9 MG/ML
INJECTION, SOLUTION INTRAVENOUS PRN
Status: DISCONTINUED | OUTPATIENT
Start: 2024-10-01 | End: 2024-10-01 | Stop reason: HOSPADM

## 2024-10-01 RX ORDER — SODIUM CHLORIDE 0.9 % (FLUSH) 0.9 %
5-40 SYRINGE (ML) INJECTION EVERY 12 HOURS SCHEDULED
Status: DISCONTINUED | OUTPATIENT
Start: 2024-10-01 | End: 2024-10-01 | Stop reason: HOSPADM

## 2024-10-01 RX ORDER — PROPOFOL 10 MG/ML
INJECTION, EMULSION INTRAVENOUS
Status: DISCONTINUED | OUTPATIENT
Start: 2024-10-01 | End: 2024-10-01 | Stop reason: SDUPTHER

## 2024-10-01 RX ORDER — HYDROMORPHONE HYDROCHLORIDE 1 MG/ML
0.25 INJECTION, SOLUTION INTRAMUSCULAR; INTRAVENOUS; SUBCUTANEOUS EVERY 5 MIN PRN
Status: DISCONTINUED | OUTPATIENT
Start: 2024-10-01 | End: 2024-10-01 | Stop reason: HOSPADM

## 2024-10-01 RX ORDER — ONDANSETRON 4 MG/1
4 TABLET, ORALLY DISINTEGRATING ORAL 3 TIMES DAILY PRN
Qty: 28 TABLET | Refills: 0 | Status: SHIPPED | OUTPATIENT
Start: 2024-10-01

## 2024-10-01 RX ORDER — DEXAMETHASONE SODIUM PHOSPHATE 10 MG/ML
INJECTION, SOLUTION INTRAMUSCULAR; INTRAVENOUS
Status: DISCONTINUED | OUTPATIENT
Start: 2024-10-01 | End: 2024-10-01 | Stop reason: SDUPTHER

## 2024-10-01 RX ORDER — CYCLOBENZAPRINE HCL 10 MG
5 TABLET ORAL ONCE
Status: COMPLETED | OUTPATIENT
Start: 2024-10-01 | End: 2024-10-01

## 2024-10-01 RX ORDER — LIDOCAINE HYDROCHLORIDE 20 MG/ML
INJECTION, SOLUTION EPIDURAL; INFILTRATION; INTRACAUDAL; PERINEURAL
Status: DISCONTINUED | OUTPATIENT
Start: 2024-10-01 | End: 2024-10-01 | Stop reason: SDUPTHER

## 2024-10-01 RX ORDER — HYDROMORPHONE HYDROCHLORIDE 2 MG/ML
INJECTION, SOLUTION INTRAMUSCULAR; INTRAVENOUS; SUBCUTANEOUS
Status: DISCONTINUED | OUTPATIENT
Start: 2024-10-01 | End: 2024-10-01 | Stop reason: SDUPTHER

## 2024-10-01 RX ORDER — NALOXONE HYDROCHLORIDE 0.4 MG/ML
INJECTION, SOLUTION INTRAMUSCULAR; INTRAVENOUS; SUBCUTANEOUS PRN
Status: DISCONTINUED | OUTPATIENT
Start: 2024-10-01 | End: 2024-10-01 | Stop reason: HOSPADM

## 2024-10-01 RX ADMIN — SODIUM CHLORIDE, POTASSIUM CHLORIDE, SODIUM LACTATE AND CALCIUM CHLORIDE: 600; 310; 30; 20 INJECTION, SOLUTION INTRAVENOUS at 06:55

## 2024-10-01 RX ADMIN — FENTANYL CITRATE 100 MCG: 50 INJECTION INTRAMUSCULAR; INTRAVENOUS at 07:35

## 2024-10-01 RX ADMIN — SODIUM CHLORIDE, POTASSIUM CHLORIDE, SODIUM LACTATE AND CALCIUM CHLORIDE: 600; 310; 30; 20 INJECTION, SOLUTION INTRAVENOUS at 07:08

## 2024-10-01 RX ADMIN — HYDROMORPHONE HYDROCHLORIDE 0.5 MG: 2 INJECTION, SOLUTION INTRAMUSCULAR; INTRAVENOUS; SUBCUTANEOUS at 09:07

## 2024-10-01 RX ADMIN — DEXAMETHASONE SODIUM PHOSPHATE 10 MG: 10 INJECTION INTRAMUSCULAR; INTRAVENOUS at 07:39

## 2024-10-01 RX ADMIN — HYDROMORPHONE HYDROCHLORIDE 0.5 MG: 1 INJECTION, SOLUTION INTRAMUSCULAR; INTRAVENOUS; SUBCUTANEOUS at 09:27

## 2024-10-01 RX ADMIN — EPHEDRINE SULFATE 5 MG: 5 INJECTION INTRAVENOUS at 08:19

## 2024-10-01 RX ADMIN — HYDROMORPHONE HYDROCHLORIDE 0.5 MG: 1 INJECTION, SOLUTION INTRAMUSCULAR; INTRAVENOUS; SUBCUTANEOUS at 09:38

## 2024-10-01 RX ADMIN — HYDROMORPHONE HYDROCHLORIDE 0.5 MG: 2 INJECTION, SOLUTION INTRAMUSCULAR; INTRAVENOUS; SUBCUTANEOUS at 09:02

## 2024-10-01 RX ADMIN — SODIUM CHLORIDE 3000 MG: 9 INJECTION, SOLUTION INTRAVENOUS at 07:40

## 2024-10-01 RX ADMIN — LIDOCAINE HYDROCHLORIDE 100 MG: 20 INJECTION, SOLUTION EPIDURAL; INFILTRATION; INTRACAUDAL; PERINEURAL at 07:35

## 2024-10-01 RX ADMIN — MIDAZOLAM 1 MG: 1 INJECTION INTRAMUSCULAR; INTRAVENOUS at 07:29

## 2024-10-01 RX ADMIN — PROPOFOL 200 MG: 10 INJECTION, EMULSION INTRAVENOUS at 07:35

## 2024-10-01 RX ADMIN — SODIUM CHLORIDE, POTASSIUM CHLORIDE, SODIUM LACTATE AND CALCIUM CHLORIDE: 600; 310; 30; 20 INJECTION, SOLUTION INTRAVENOUS at 08:21

## 2024-10-01 RX ADMIN — HYDROMORPHONE HYDROCHLORIDE 0.5 MG: 2 INJECTION, SOLUTION INTRAMUSCULAR; INTRAVENOUS; SUBCUTANEOUS at 09:12

## 2024-10-01 RX ADMIN — ONDANSETRON 4 MG: 2 INJECTION, SOLUTION INTRAMUSCULAR; INTRAVENOUS at 08:52

## 2024-10-01 RX ADMIN — ROCURONIUM BROMIDE 50 MG: 10 INJECTION, SOLUTION INTRAVENOUS at 07:35

## 2024-10-01 RX ADMIN — CYCLOBENZAPRINE HYDROCHLORIDE 5 MG: 10 TABLET, FILM COATED ORAL at 10:22

## 2024-10-01 RX ADMIN — SUGAMMADEX 400 MG: 100 INJECTION, SOLUTION INTRAVENOUS at 09:01

## 2024-10-01 RX ADMIN — ROCURONIUM BROMIDE 10 MG: 10 INJECTION, SOLUTION INTRAVENOUS at 08:32

## 2024-10-01 RX ADMIN — KETOROLAC TROMETHAMINE 30 MG: 30 INJECTION, SOLUTION INTRAMUSCULAR at 08:54

## 2024-10-01 RX ADMIN — MIDAZOLAM 1 MG: 1 INJECTION INTRAMUSCULAR; INTRAVENOUS at 07:32

## 2024-10-01 RX ADMIN — HYDROMORPHONE HYDROCHLORIDE 0.5 MG: 2 INJECTION, SOLUTION INTRAMUSCULAR; INTRAVENOUS; SUBCUTANEOUS at 09:16

## 2024-10-01 RX ADMIN — PROPOFOL 25 MCG/KG/MIN: 10 INJECTION, EMULSION INTRAVENOUS at 07:41

## 2024-10-01 ASSESSMENT — PAIN SCALES - GENERAL
PAINLEVEL_OUTOF10: 9
PAINLEVEL_OUTOF10: 5

## 2024-10-01 ASSESSMENT — ENCOUNTER SYMPTOMS: SHORTNESS OF BREATH: 0

## 2024-10-01 ASSESSMENT — PAIN DESCRIPTION - DESCRIPTORS
DESCRIPTORS: ACHING
DESCRIPTORS: CRAMPING
DESCRIPTORS: SHARP
DESCRIPTORS: PATIENT UNABLE TO DESCRIBE

## 2024-10-01 ASSESSMENT — PAIN - FUNCTIONAL ASSESSMENT
PAIN_FUNCTIONAL_ASSESSMENT: 0-10
PAIN_FUNCTIONAL_ASSESSMENT: 0-10

## 2024-10-01 ASSESSMENT — PAIN DESCRIPTION - LOCATION: LOCATION: ABDOMEN

## 2024-10-01 ASSESSMENT — PAIN DESCRIPTION - ORIENTATION: ORIENTATION: LOWER

## 2024-10-01 NOTE — BRIEF OP NOTE
Brief Postoperative Note      Patient: Mandie Bustamante  YOB: 1955  MRN: [id number]    Date of Procedure: 10/1/2024    Pre-Op Diagnosis Codes:      * Recurrent incisional hernia [K43.2]    Post-Op Diagnosis: Same       Procedure(s):  ROBOTIC RECURRENT INCISIONAL HERNIA REPAIR WITH MESH, INCARCERATED, 4.5 cm    Surgeon(s):  Gm Cummings MD    Assistant:  Resident: Hao Stewart MD    Anesthesia: General    Estimated Blood Loss (mL): less than 50     Complications: None    Specimens:   * No specimens in log *    Implants:  Implant Name Type Inv. Item Serial No.  Lot No. LRB No. Used Action   PATCH JASS JENA W4.3XL5.5IN UNCOATED MFIL PROPYLENE OVL SELF - [id number]  PATCH JASS M W4.3XL5.5IN UNCOATED MFIL PROPYLENE OVL SELF  BARD DAVOL-WD [id number] N/A 1 Implanted         Drains: * No LDAs found *    Findings:  Infection Present At Time Of Surgery (PATOS) (choose all levels that have infection present):  No infection present  Other Findings: Recurrent incisional hernia containing colon and small bowel    Electronically signed by Gm Cummings MD on 10/1/2024 at 8:53 AM

## 2024-10-01 NOTE — H&P
components found for: \"LDLCHOLESTEROL\", \"LDLCALC\"    (goal LDL is <100)   No results found for: \"AST\", \"ALT\", \"BUN\", \"CR\"  BP Readings from Last 3 Encounters:   09/10/24 134/74   07/03/24 131/76          (goal 120/80)    Past Medical History:   Diagnosis Date    Allergic rhinitis     Anemia     Arthritis     Autoimmune disorder (HCC)     Rheumatologic    Cataracts, bilateral     GERD (gastroesophageal reflux disease)     Headache     Hypercalcemia     Hyperparathyroidism (HCC)     Hypertension     Obesity     Osteoarthritis       Past Surgical History:   Procedure Laterality Date    APPENDECTOMY      BUNIONECTOMY Left 07/2016    Hardware removed Sept 2022    CHOLECYSTECTOMY      FOOT SURGERY Left 1989    cyst removal    FOOT SURGERY Left 03/2010    cyst removal cheilectomy    FOOT SURGERY Left 07/2017    nerve removal in surgical area    HERNIA REPAIR  08/2022    Umbilical    HYSTERECTOMY, TOTAL ABDOMINAL (CERVIX REMOVED)  09/1986    HYSTERECTOMY, VAGINAL  09/1986    PARATHYROID GLAND SURGERY  05/03/2023    Ectopic Parathyroid gland from sternum Mini sternotomy    PARATHYROIDECTOMY  05/2022    SHOULDER ARTHROSCOPY Right 08/2011    SHOULDER ARTHROSCOPY Right 04/2012    SHOULDER ARTHROSCOPY Left 05/2012    SHOULDER SURGERY Right 10/2000    arthroscopy/bone spur    TOTAL VAGINAL HYSTERECTOMY  1986    Partial 1981    UMBILICAL HERNIA REPAIR      UPPER GASTROINTESTINAL ENDOSCOPY  2014    URETHRA SURGERY  1986    Repair     Family History   Problem Relation Age of Onset    High Cholesterol Mother     Colon Cancer Mother     Osteoporosis Mother     Alzheimer's Disease Mother     Migraines Mother     Neuropathy Mother     Cancer Mother     Obesity Mother     High Cholesterol Father     High Blood Pressure Father     Heart Disease Father     Migraines Father     Diabetes Father     Arthritis Father     Hearing Loss Father     Stroke Father     Heart Attack Father     Rheum Arthritis Father     Migraines Sister     Diabetes

## 2024-10-01 NOTE — DISCHARGE INSTRUCTIONS
Patient Discharge Instructions  Discharge Date:  10/1/2024    Discharged To:  Home    Home with Home Health Care: No    RESUME ACTIVITY:      BATHING: May shower 10/2.  Wash gently with soap and water.  No creams/lotions/ointments over incisions.  Dry dressing as needed.    DRIVING: No driving on narcotics    WALKING:  Yes    STAIRS:  Yes    LIFTING: Less than 15 pounds until instructed otherwise    DIET: common adult    SPECIAL INSTRUCTIONS:     May use ice pack for pain/swelling    May use Motrin or Aleve for breakthrough pain    May take Milk of Magnesia as needed for constipation      Call [phone] for follow up appointment with Dr. Cummings in:  7-14 days

## 2024-10-01 NOTE — ANESTHESIA POSTPROCEDURE EVALUATION
Department of Anesthesiology  Postprocedure Note    Patient: Mandie Bustamante  MRN: [id number]  YOB: 1955  Date of evaluation: 10/1/2024    Procedure Summary       Date: 10/01/24 Room / Location: [address]    Anesthesia Start: 0729 Anesthesia Stop: 0917    Procedure: ROBOTIC RECURRENT INCISIONAL HERNIA REPAIR with mesh (Abdomen) Diagnosis:       Recurrent incisional hernia      (Recurrent incisional hernia [K43.2])    Surgeons: Gm Cummings MD Responsible Provider: Marlen Nicolas MD    Anesthesia Type: General ASA Status: 3            Anesthesia Type: General    Rosa Phase I: Rosa Score: 10    Rosa Phase II:      Anesthesia Post Evaluation    Airway patency: patent  Cardiovascular status: hemodynamically stable  Respiratory status: acceptable    No notable events documented.

## 2024-10-01 NOTE — OP NOTE
Operative Note      Patient: Mandie Bustamante  YOB: 1955  MRN: [id number]    Date of Procedure: 10/1/2024    Pre-Op Diagnosis Codes:      * Recurrent incisional hernia [K43.2]    Post-Op Diagnosis:  Recurrent incarcerated incisional hernia containing small bowel and transverse colon       Procedure: Robotic assisted lap scopic recurrent incarcerated incisional hernia containing small bowel and transverse colon with mesh, 4.5 cm    Surgeon(s):  Gm Cummings MD    Assistant:   Resident: Hao Stewart MD    Anesthesia: General    Estimated Blood Loss (mL): less than 50     Complications: None    Specimens:   * No specimens in log *    Implants:  Implant Name Type Inv. Item Serial No.  Lot No. LRB No. Used Action   PATCH JASS JEAN W4.3XL5.5IN UNCOATED MFIL PROPYLENE OVL SELF - [id number]  PATCH JASS JEAN W4.3XL5.5IN UNCOATED MFIL PROPYLENE OVL SELF  BARD DAVOL-WD [id number] N/A 1 Implanted         Drains: * No LDAs found *    Findings:  Infection Present At Time Of Surgery (PATOS) (choose all levels that have infection present):  No infection present  Other Findings: Recurrent incarcerated incisional hernia containing transverse colon and small bowel repaired with mesh    Indications for procedure: This is a very pleasant 69-year-old female with a history of multiple previous abdominal surgeries.  The patient had a previous laparoscopic cholecystectomy.  She subsidy developed an incisional hernia which was repaired primarily secondary to patient's refusal of mesh.  She developed a recurrence continue loops of small bowel and transverse colon.  The risks and benefits of robotic assisted laparoscopic recurrent incarcerated incisional hernia repair with possible use of mesh were discussed with the patient and verbal and written consent was obtained.  This includes the risk of bleeding, infection, bowel injury, bladder injury, intra-abdominal adhesions c    Detailed Description of Procedure:   After

## 2024-10-01 NOTE — ANESTHESIA PRE PROCEDURE
Department of Anesthesiology  Preprocedure Note       Name:  Mandie Bustamante   Age:  69 y.o.  :  1955                                          MRN:  [id number]         Date:  10/1/2024      Surgeon: Surgeon(s):  Gm Cummings MD    Procedure: Procedure(s):  ROBOTIC RECURRENT INCISIONAL HERNIA REPAIR POSSIBLE OPEN    Medications prior to admission:   Prior to Admission medications    Medication Sig Start Date End Date Taking? Authorizing Provider   zolpidem (AMBIEN) 5 MG tablet  23  Yes Shaina Cardozo MD   tacrolimus (PROTOPIC) 0.1 % ointment Apply 1 Application topically every evening   Yes ProviderShaina MD   loratadine (CLARITIN) 10 MG capsule Take 1 capsule by mouth as needed   Yes Shaina Cardozo MD   irbesartan (AVAPRO) 150 MG tablet Take 1 tablet by mouth nightly Takes 1/4 tablet 2-3 time a week.   Yes Shaina Cardozo MD   hydrocortisone 2.5 % ointment Apply 1 Application topically as needed   Yes Shaina Cardozo MD   acetaminophen-codeine (TYLENOL #3) 300-30 MG per tablet Take 1 tablet by mouth every 6 hours as needed for Pain.   Yes ProviderShaina MD       Current medications:    Current Facility-Administered Medications   Medication Dose Route Frequency Provider Last Rate Last Admin   • [START ON 10/2/2024] lidocaine PF 1 % injection 1 mL  1 mL IntraDERmal Once PRN Petrona Rod,        • 0.9 % sodium chloride infusion   IntraVENous Continuous Petrona Rod, DO       • lactated ringers IV soln infusion   IntraVENous Continuous Petrona Rod  mL/hr at 10/01/24 0712 NoRateChange at 10/01/24 0712   • sodium chloride flush 0.9 % injection 5-40 mL  5-40 mL IntraVENous 2 times per day Petrona Rod, DO       • sodium chloride flush 0.9 % injection 5-40 mL  5-40 mL IntraVENous PRN Petrona Rod DO       • 0.9 % sodium chloride infusion   IntraVENous PRN Petrona Rod DO       • ceFAZolin (ANCEF) 3000 mg in sodium chloride 0.9% 100 mL IVPB  3,000 mg

## 2024-10-10 ENCOUNTER — APPOINTMENT (OUTPATIENT)
Dept: CT IMAGING | Age: 69
End: 2024-10-10
Payer: COMMERCIAL

## 2024-10-10 ENCOUNTER — HOSPITAL ENCOUNTER (INPATIENT)
Age: 69
LOS: 29 days | Discharge: HOME | End: 2024-11-08
Attending: STUDENT IN AN ORGANIZED HEALTH CARE EDUCATION/TRAINING PROGRAM | Admitting: STUDENT IN AN ORGANIZED HEALTH CARE EDUCATION/TRAINING PROGRAM
Payer: COMMERCIAL

## 2024-10-10 ENCOUNTER — HOSPITAL ENCOUNTER (EMERGENCY)
Age: 69
Discharge: ANOTHER HOSPITAL | End: 2024-10-10
Attending: EMERGENCY MEDICINE
Payer: COMMERCIAL

## 2024-10-10 ENCOUNTER — APPOINTMENT (OUTPATIENT)
Dept: GENERAL RADIOLOGY | Age: 69
End: 2024-10-10
Payer: COMMERCIAL

## 2024-10-10 VITALS
TEMPERATURE: 98 F | BODY MASS INDEX: 47.84 KG/M2 | HEIGHT: 63 IN | SYSTOLIC BLOOD PRESSURE: 133 MMHG | HEART RATE: 77 BPM | RESPIRATION RATE: 21 BRPM | OXYGEN SATURATION: 96 % | DIASTOLIC BLOOD PRESSURE: 58 MMHG | WEIGHT: 270 LBS

## 2024-10-10 DIAGNOSIS — R60.9 EDEMA, UNSPECIFIED TYPE: Primary | ICD-10-CM

## 2024-10-10 DIAGNOSIS — E87.1 HYPONATREMIA: Primary | ICD-10-CM

## 2024-10-10 DIAGNOSIS — K63.2 COLOCUTANEOUS FISTULA: ICD-10-CM

## 2024-10-10 DIAGNOSIS — R53.1 GENERALIZED WEAKNESS: ICD-10-CM

## 2024-10-10 DIAGNOSIS — K65.1 INTRA-ABDOMINAL ABSCESS (HCC): ICD-10-CM

## 2024-10-10 PROBLEM — R79.89 ELEVATED BRAIN NATRIURETIC PEPTIDE (BNP) LEVEL: Status: ACTIVE | Noted: 2024-10-10

## 2024-10-10 PROBLEM — R73.03 PREDIABETES: Chronic | Status: ACTIVE | Noted: 2021-09-06

## 2024-10-10 PROBLEM — E87.6 HYPOKALEMIA: Status: ACTIVE | Noted: 2024-10-10

## 2024-10-10 PROBLEM — D72.829 LEUKOCYTOSIS: Status: ACTIVE | Noted: 2024-10-10

## 2024-10-10 PROBLEM — M19.071 DJD (DEGENERATIVE JOINT DISEASE), ANKLE AND FOOT, RIGHT: Status: ACTIVE | Noted: 2020-07-15

## 2024-10-10 LAB
ALBUMIN SERPL-MCNC: 3.1 G/DL (ref 3.5–5.2)
ALBUMIN/GLOB SERPL: 1 {RATIO} (ref 1–2.5)
ALP SERPL-CCNC: 113 U/L (ref 35–104)
ALT SERPL-CCNC: 25 U/L (ref 5–33)
ANION GAP SERPL CALCULATED.3IONS-SCNC: 12 MMOL/L (ref 9–17)
ANION GAP SERPL CALCULATED.3IONS-SCNC: 12 MMOL/L (ref 9–17)
ANION GAP SERPL CALCULATED.3IONS-SCNC: 15 MMOL/L (ref 9–17)
ANION GAP SERPL CALCULATED.3IONS-SCNC: 15 MMOL/L (ref 9–17)
AST SERPL-CCNC: 17 U/L
ATYPICAL LYMPHOCYTE ABSOLUTE COUNT: 0.22 K/UL
ATYPICAL LYMPHOCYTES: 1 %
BASOPHILS # BLD: 0 K/UL (ref 0–0.2)
BASOPHILS NFR BLD: 0 % (ref 0–2)
BILIRUB SERPL-MCNC: 0.6 MG/DL (ref 0.3–1.2)
BILIRUB UR QL STRIP: NEGATIVE
BNP SERPL-MCNC: 1017 PG/ML
BUN SERPL-MCNC: 15 MG/DL (ref 8–23)
BUN SERPL-MCNC: 16 MG/DL (ref 8–23)
BUN SERPL-MCNC: 17 MG/DL (ref 8–23)
BUN SERPL-MCNC: 19 MG/DL (ref 8–23)
BUN/CREAT SERPL: 25 (ref 9–20)
BUN/CREAT SERPL: 27 (ref 9–20)
CALCIUM SERPL-MCNC: 6.9 MG/DL (ref 8.6–10.4)
CALCIUM SERPL-MCNC: 7 MG/DL (ref 8.6–10.4)
CALCIUM SERPL-MCNC: 7.2 MG/DL (ref 8.6–10.4)
CALCIUM SERPL-MCNC: 7.2 MG/DL (ref 8.6–10.4)
CHLORIDE SERPL-SCNC: 70 MMOL/L (ref 98–107)
CHLORIDE SERPL-SCNC: 70 MMOL/L (ref 98–107)
CHLORIDE SERPL-SCNC: 72 MMOL/L (ref 98–107)
CHLORIDE SERPL-SCNC: 73 MMOL/L (ref 98–107)
CLARITY UR: CLEAR
CO2 SERPL-SCNC: 23 MMOL/L (ref 20–31)
CO2 SERPL-SCNC: 23 MMOL/L (ref 20–31)
CO2 SERPL-SCNC: 24 MMOL/L (ref 20–31)
CO2 SERPL-SCNC: 25 MMOL/L (ref 20–31)
COLOR UR: YELLOW
COMMENT: NORMAL
CREAT SERPL-MCNC: 0.6 MG/DL (ref 0.5–0.9)
CREAT SERPL-MCNC: 0.6 MG/DL (ref 0.5–0.9)
CREAT SERPL-MCNC: 0.7 MG/DL (ref 0.5–0.9)
CREAT SERPL-MCNC: 0.8 MG/DL (ref 0.5–0.9)
EKG ATRIAL RATE: 79 BPM
EKG P AXIS: 57 DEGREES
EKG P-R INTERVAL: 176 MS
EKG Q-T INTERVAL: 480 MS
EKG QRS DURATION: 104 MS
EKG QTC CALCULATION (BAZETT): 550 MS
EKG R AXIS: 19 DEGREES
EKG T AXIS: 31 DEGREES
EKG VENTRICULAR RATE: 79 BPM
EOSINOPHIL # BLD: 0 K/UL (ref 0–0.4)
EOSINOPHILS RELATIVE PERCENT: 0 % (ref 1–4)
ERYTHROCYTE [DISTWIDTH] IN BLOOD BY AUTOMATED COUNT: 12.9 % (ref 12.5–15.4)
GFR, ESTIMATED: 80 ML/MIN/1.73M2
GFR, ESTIMATED: >90 ML/MIN/1.73M2
GLUCOSE SERPL-MCNC: 130 MG/DL (ref 70–99)
GLUCOSE SERPL-MCNC: 138 MG/DL (ref 70–99)
GLUCOSE SERPL-MCNC: 159 MG/DL (ref 70–99)
GLUCOSE SERPL-MCNC: 162 MG/DL (ref 70–99)
GLUCOSE UR STRIP-MCNC: NEGATIVE MG/DL
HCT VFR BLD AUTO: 34.8 % (ref 36–46)
HGB BLD-MCNC: 12.5 G/DL (ref 12–16)
HGB UR QL STRIP.AUTO: NEGATIVE
KETONES UR STRIP-MCNC: NEGATIVE MG/DL
LACTATE BLDV-SCNC: 1.5 MMOL/L (ref 0.5–2.2)
LEUKOCYTE ESTERASE UR QL STRIP: NEGATIVE
LIPASE SERPL-CCNC: 46 U/L (ref 13–60)
LYMPHOCYTES NFR BLD: 1.1 K/UL (ref 1–4.8)
LYMPHOCYTES RELATIVE PERCENT: 5 % (ref 24–44)
MAGNESIUM SERPL-MCNC: 1.7 MG/DL (ref 1.6–2.6)
MAGNESIUM SERPL-MCNC: 1.7 MG/DL (ref 1.6–2.6)
MCH RBC QN AUTO: 29.2 PG (ref 26–34)
MCHC RBC AUTO-ENTMCNC: 35.8 G/DL (ref 31–37)
MCV RBC AUTO: 81.4 FL (ref 80–100)
METAMYELOCYTES ABSOLUTE COUNT: 0.22 K/UL
METAMYELOCYTES: 1 %
MONOCYTES NFR BLD: 1.75 K/UL (ref 0.1–0.8)
MONOCYTES NFR BLD: 8 % (ref 1–7)
MORPHOLOGY: ABNORMAL
MYELOCYTES ABSOLUTE COUNT: 0.22 K/UL
MYELOCYTES: 1 %
NEUTROPHILS NFR BLD: 84 % (ref 36–66)
NEUTS SEG NFR BLD: 18.39 K/UL (ref 1.8–7.7)
NITRITE UR QL STRIP: NEGATIVE
OSMOLALITY SERPL: 230 MOSM/KG (ref 275–295)
OSMOLALITY UR: 437 MOSM/KG (ref 80–1300)
PH UR STRIP: 6 [PH] (ref 5–8)
PLATELET # BLD AUTO: 427 K/UL (ref 140–450)
PMV BLD AUTO: 7.2 FL (ref 6–12)
POTASSIUM SERPL-SCNC: 2.6 MMOL/L (ref 3.7–5.3)
POTASSIUM SERPL-SCNC: 2.6 MMOL/L (ref 3.7–5.3)
POTASSIUM SERPL-SCNC: 2.8 MMOL/L (ref 3.7–5.3)
POTASSIUM SERPL-SCNC: 3.4 MMOL/L (ref 3.7–5.3)
PROT SERPL-MCNC: 6.1 G/DL (ref 6.4–8.3)
PROT UR STRIP-MCNC: NEGATIVE MG/DL
RBC # BLD AUTO: 4.28 M/UL (ref 4–5.2)
SODIUM SERPL-SCNC: 107 MMOL/L (ref 135–144)
SODIUM SERPL-SCNC: 108 MMOL/L (ref 135–144)
SODIUM SERPL-SCNC: 109 MMOL/L (ref 135–144)
SODIUM SERPL-SCNC: 110 MMOL/L (ref 135–144)
SODIUM UR-SCNC: 20 MMOL/L
SP GR UR STRIP: 1.01 (ref 1–1.03)
T4 FREE SERPL-MCNC: 1.7 NG/DL (ref 0.92–1.68)
TROPONIN I SERPL HS-MCNC: 13 NG/L (ref 0–14)
TSH SERPL DL<=0.05 MIU/L-ACNC: 1.79 UIU/ML (ref 0.3–5)
UROBILINOGEN UR STRIP-ACNC: NORMAL EU/DL (ref 0–1)
WBC OTHER # BLD: 21.9 K/UL (ref 3.5–11)

## 2024-10-10 PROCEDURE — 99223 1ST HOSP IP/OBS HIGH 75: CPT | Performed by: STUDENT IN AN ORGANIZED HEALTH CARE EDUCATION/TRAINING PROGRAM

## 2024-10-10 PROCEDURE — 96365 THER/PROPH/DIAG IV INF INIT: CPT

## 2024-10-10 PROCEDURE — 94761 N-INVAS EAR/PLS OXIMETRY MLT: CPT

## 2024-10-10 PROCEDURE — 71045 X-RAY EXAM CHEST 1 VIEW: CPT

## 2024-10-10 PROCEDURE — 93010 ELECTROCARDIOGRAM REPORT: CPT | Performed by: INTERNAL MEDICINE

## 2024-10-10 PROCEDURE — 85025 COMPLETE CBC W/AUTO DIFF WBC: CPT

## 2024-10-10 PROCEDURE — 83690 ASSAY OF LIPASE: CPT

## 2024-10-10 PROCEDURE — 83935 ASSAY OF URINE OSMOLALITY: CPT

## 2024-10-10 PROCEDURE — 96366 THER/PROPH/DIAG IV INF ADDON: CPT

## 2024-10-10 PROCEDURE — 93005 ELECTROCARDIOGRAM TRACING: CPT | Performed by: NURSE PRACTITIONER

## 2024-10-10 PROCEDURE — 6360000002 HC RX W HCPCS: Performed by: NURSE PRACTITIONER

## 2024-10-10 PROCEDURE — 36415 COLL VENOUS BLD VENIPUNCTURE: CPT

## 2024-10-10 PROCEDURE — 83880 ASSAY OF NATRIURETIC PEPTIDE: CPT

## 2024-10-10 PROCEDURE — 80053 COMPREHEN METABOLIC PANEL: CPT

## 2024-10-10 PROCEDURE — 84443 ASSAY THYROID STIM HORMONE: CPT

## 2024-10-10 PROCEDURE — 2700000000 HC OXYGEN THERAPY PER DAY

## 2024-10-10 PROCEDURE — 6370000000 HC RX 637 (ALT 250 FOR IP): Performed by: STUDENT IN AN ORGANIZED HEALTH CARE EDUCATION/TRAINING PROGRAM

## 2024-10-10 PROCEDURE — 83735 ASSAY OF MAGNESIUM: CPT

## 2024-10-10 PROCEDURE — 6360000002 HC RX W HCPCS: Performed by: STUDENT IN AN ORGANIZED HEALTH CARE EDUCATION/TRAINING PROGRAM

## 2024-10-10 PROCEDURE — 2500000003 HC RX 250 WO HCPCS: Performed by: INTERNAL MEDICINE

## 2024-10-10 PROCEDURE — 96368 THER/DIAG CONCURRENT INF: CPT

## 2024-10-10 PROCEDURE — 80048 BASIC METABOLIC PNL TOTAL CA: CPT

## 2024-10-10 PROCEDURE — 2000000000 HC ICU R&B

## 2024-10-10 PROCEDURE — 99285 EMERGENCY DEPT VISIT HI MDM: CPT

## 2024-10-10 PROCEDURE — 2580000003 HC RX 258: Performed by: INTERNAL MEDICINE

## 2024-10-10 PROCEDURE — 99223 1ST HOSP IP/OBS HIGH 75: CPT | Performed by: INTERNAL MEDICINE

## 2024-10-10 PROCEDURE — 2580000003 HC RX 258: Performed by: NURSE PRACTITIONER

## 2024-10-10 PROCEDURE — 2580000003 HC RX 258: Performed by: STUDENT IN AN ORGANIZED HEALTH CARE EDUCATION/TRAINING PROGRAM

## 2024-10-10 PROCEDURE — 87040 BLOOD CULTURE FOR BACTERIA: CPT

## 2024-10-10 PROCEDURE — 84484 ASSAY OF TROPONIN QUANT: CPT

## 2024-10-10 PROCEDURE — 84439 ASSAY OF FREE THYROXINE: CPT

## 2024-10-10 PROCEDURE — 83605 ASSAY OF LACTIC ACID: CPT

## 2024-10-10 PROCEDURE — 84300 ASSAY OF URINE SODIUM: CPT

## 2024-10-10 PROCEDURE — 82533 TOTAL CORTISOL: CPT

## 2024-10-10 PROCEDURE — 74176 CT ABD & PELVIS W/O CONTRAST: CPT

## 2024-10-10 PROCEDURE — 83930 ASSAY OF BLOOD OSMOLALITY: CPT

## 2024-10-10 PROCEDURE — 81003 URINALYSIS AUTO W/O SCOPE: CPT

## 2024-10-10 RX ORDER — ACETAMINOPHEN 325 MG/1
650 TABLET ORAL EVERY 6 HOURS PRN
Status: DISCONTINUED | OUTPATIENT
Start: 2024-10-10 | End: 2024-10-16

## 2024-10-10 RX ORDER — POLYETHYLENE GLYCOL 3350 17 G/17G
17 POWDER, FOR SOLUTION ORAL DAILY PRN
Status: DISCONTINUED | OUTPATIENT
Start: 2024-10-10 | End: 2024-10-16

## 2024-10-10 RX ORDER — DEXTROSE MONOHYDRATE AND SODIUM CHLORIDE 5; .45 G/100ML; G/100ML
INJECTION, SOLUTION INTRAVENOUS CONTINUOUS
Status: DISCONTINUED | OUTPATIENT
Start: 2024-10-10 | End: 2024-10-11

## 2024-10-10 RX ORDER — ONDANSETRON 4 MG/1
4 TABLET, ORALLY DISINTEGRATING ORAL EVERY 8 HOURS PRN
Status: DISCONTINUED | OUTPATIENT
Start: 2024-10-10 | End: 2024-10-16

## 2024-10-10 RX ORDER — ENOXAPARIN SODIUM 100 MG/ML
30 INJECTION SUBCUTANEOUS 2 TIMES DAILY
Status: DISCONTINUED | OUTPATIENT
Start: 2024-10-11 | End: 2024-10-20

## 2024-10-10 RX ORDER — ONDANSETRON 2 MG/ML
4 INJECTION INTRAMUSCULAR; INTRAVENOUS EVERY 6 HOURS PRN
Status: DISCONTINUED | OUTPATIENT
Start: 2024-10-10 | End: 2024-11-08 | Stop reason: HOSPADM

## 2024-10-10 RX ORDER — SODIUM CHLORIDE 0.9 % (FLUSH) 0.9 %
10 SYRINGE (ML) INJECTION PRN
Status: DISCONTINUED | OUTPATIENT
Start: 2024-10-10 | End: 2024-11-08 | Stop reason: HOSPADM

## 2024-10-10 RX ORDER — MAGNESIUM SULFATE 1 G/100ML
1000 INJECTION INTRAVENOUS PRN
Status: DISCONTINUED | OUTPATIENT
Start: 2024-10-10 | End: 2024-11-08 | Stop reason: HOSPADM

## 2024-10-10 RX ORDER — DEXTROSE MONOHYDRATE, SODIUM CHLORIDE, AND POTASSIUM CHLORIDE 50; 1.49; 4.5 G/1000ML; G/1000ML; G/1000ML
INJECTION, SOLUTION INTRAVENOUS CONTINUOUS
Status: DISCONTINUED | OUTPATIENT
Start: 2024-10-10 | End: 2024-10-10 | Stop reason: HOSPADM

## 2024-10-10 RX ORDER — POTASSIUM CHLORIDE 7.45 MG/ML
10 INJECTION INTRAVENOUS PRN
Status: DISCONTINUED | OUTPATIENT
Start: 2024-10-10 | End: 2024-11-08 | Stop reason: HOSPADM

## 2024-10-10 RX ORDER — ZOLPIDEM TARTRATE 5 MG/1
5 TABLET ORAL NIGHTLY PRN
Status: DISCONTINUED | OUTPATIENT
Start: 2024-10-10 | End: 2024-10-16

## 2024-10-10 RX ORDER — ENOXAPARIN SODIUM 100 MG/ML
40 INJECTION SUBCUTANEOUS DAILY
Status: DISCONTINUED | OUTPATIENT
Start: 2024-10-10 | End: 2024-10-10

## 2024-10-10 RX ORDER — POTASSIUM CHLORIDE 7.45 MG/ML
10 INJECTION INTRAVENOUS
Status: DISCONTINUED | OUTPATIENT
Start: 2024-10-10 | End: 2024-10-10 | Stop reason: HOSPADM

## 2024-10-10 RX ORDER — ACETAMINOPHEN 650 MG/1
650 SUPPOSITORY RECTAL EVERY 6 HOURS PRN
Status: DISCONTINUED | OUTPATIENT
Start: 2024-10-10 | End: 2024-10-16

## 2024-10-10 RX ORDER — SODIUM CHLORIDE 0.9 % (FLUSH) 0.9 %
5-40 SYRINGE (ML) INJECTION EVERY 12 HOURS SCHEDULED
Status: DISCONTINUED | OUTPATIENT
Start: 2024-10-10 | End: 2024-11-08 | Stop reason: HOSPADM

## 2024-10-10 RX ORDER — SODIUM CHLORIDE 9 MG/ML
INJECTION, SOLUTION INTRAVENOUS PRN
Status: DISCONTINUED | OUTPATIENT
Start: 2024-10-10 | End: 2024-11-08 | Stop reason: HOSPADM

## 2024-10-10 RX ORDER — POTASSIUM CHLORIDE 1500 MG/1
40 TABLET, EXTENDED RELEASE ORAL PRN
Status: DISCONTINUED | OUTPATIENT
Start: 2024-10-10 | End: 2024-11-08 | Stop reason: HOSPADM

## 2024-10-10 RX ADMIN — Medication 10 MEQ: at 17:14

## 2024-10-10 RX ADMIN — Medication 10 MEQ: at 16:08

## 2024-10-10 RX ADMIN — ZOLPIDEM TARTRATE 5 MG: 5 TABLET ORAL at 23:19

## 2024-10-10 RX ADMIN — SODIUM CHLORIDE, PRESERVATIVE FREE 10 ML: 5 INJECTION INTRAVENOUS at 19:57

## 2024-10-10 RX ADMIN — POTASSIUM CHLORIDE 10 MEQ: 7.46 INJECTION, SOLUTION INTRAVENOUS at 18:40

## 2024-10-10 RX ADMIN — POTASSIUM CHLORIDE 10 MEQ: 7.46 INJECTION, SOLUTION INTRAVENOUS at 19:55

## 2024-10-10 RX ADMIN — POTASSIUM CHLORIDE, DEXTROSE MONOHYDRATE AND SODIUM CHLORIDE: 150; 5; 450 INJECTION, SOLUTION INTRAVENOUS at 15:06

## 2024-10-10 RX ADMIN — PIPERACILLIN AND TAZOBACTAM 4500 MG: 4; .5 INJECTION, POWDER, FOR SOLUTION INTRAVENOUS at 23:36

## 2024-10-10 RX ADMIN — DEXTROSE AND SODIUM CHLORIDE: 5; 450 INJECTION, SOLUTION INTRAVENOUS at 19:47

## 2024-10-10 RX ADMIN — PIPERACILLIN AND TAZOBACTAM 4500 MG: 4; .5 INJECTION, POWDER, FOR SOLUTION INTRAVENOUS at 14:51

## 2024-10-10 RX ADMIN — VANCOMYCIN HYDROCHLORIDE 2500 MG: 1 INJECTION, POWDER, LYOPHILIZED, FOR SOLUTION INTRAVENOUS at 15:30

## 2024-10-10 RX ADMIN — POTASSIUM CHLORIDE 10 MEQ: 7.46 INJECTION, SOLUTION INTRAVENOUS at 22:38

## 2024-10-10 ASSESSMENT — PAIN DESCRIPTION - DESCRIPTORS: DESCRIPTORS: SORE;TENDER

## 2024-10-10 ASSESSMENT — PAIN DESCRIPTION - FREQUENCY: FREQUENCY: CONTINUOUS

## 2024-10-10 ASSESSMENT — PAIN DESCRIPTION - LOCATION
LOCATION: ABDOMEN
LOCATION: ABDOMEN

## 2024-10-10 ASSESSMENT — PAIN SCALES - GENERAL
PAINLEVEL_OUTOF10: 10
PAINLEVEL_OUTOF10: 8

## 2024-10-10 ASSESSMENT — PAIN DESCRIPTION - ONSET: ONSET: ON-GOING

## 2024-10-10 ASSESSMENT — PAIN DESCRIPTION - PAIN TYPE: TYPE: SURGICAL PAIN

## 2024-10-10 ASSESSMENT — PAIN - FUNCTIONAL ASSESSMENT: PAIN_FUNCTIONAL_ASSESSMENT: PREVENTS OR INTERFERES SOME ACTIVE ACTIVITIES AND ADLS

## 2024-10-10 NOTE — CONSULTS
Renal Consult Note    Patient :  Mandie Bustamante; 69 y.o. MRN# [id number]  Location:  [facility]  Attending:  Olivia garcia. providers found  Admit Date:  10/10/2024   Hospital Day: 0    Reason for Consult:     Asked by Dr Olivia garcia. providers found to see for SHAI/Elevated Creatinine.    History Obtained From:     Patient/chart/staff    History of Present Illness:     Mandie Bustamante; 69 y.o. female with past medical history as mentioned below.  Known history of morbid obesity, hypertension, history of hyperparathyroidism and hypercalcemia, status post parathyroidectomy of ectopic gland in May 2023.  She is also had multiple abdominal surgeries including cholecystectomy, hysterectomy, repair of incisional hernias in the past.  Her baseline creatinine is 0.7.  It seems patient was developing incarcerated incisional hernia.  She was seen by Dr. Cummings and underwent robotic assisted laparoscopic incisional hernia repair with mesh on 10/1/2024.  Postprocedure patient has not been feeling well.  She started noticing onset of abdominal pain  about 2-3 days ago.  She also noticed that her appetite was poor, she was having abdominal distention, diarrhea and dry mouth.  In the last day or so she started noticing her legs were swollen as well.  As symptoms continue to get worse abdominal pain was getting worse she came into the ER for evaluation.  Lab work on presentation showed a sodium of 109 which is a drop from 1 4110 days earlier.  She also had a potassium of 3.4, chloride of 70, bicarbonate of 24, creatinine of 0.8 and albumin of 3.1.  Her calcium was 7.2 as well.  CT scan of the abdominal was done which showed presence of fluid collection consistent with abscess along the anterior peritoneal wall, extending to the hernia site.  She also was in retention when she came in Bar was placed and 500 mL of urine was drained.  Nephrology has been consulted for hyponatremia.  At the time evaluation patient tells me she feels somewhat  pulses sluggish, no calf tenderness.    Labs:       Recent Labs     10/10/24  1146   WBC 21.9*   RBC 4.28   HGB 12.5   HCT 34.8*   MCV 81.4   MCH 29.2   MCHC 35.8   RDW 12.9      MPV 7.2      BMP:   Recent Labs     10/10/24  1146   *   K 3.4*   CL 70*   CO2 24   BUN 19   CREATININE 0.8   GLUCOSE 162*   CALCIUM 7.2*      Phosphorus:   No results for input(s): \"PHOS\" in the last 72 hours.  Magnesium:  No results for input(s): \"MG\" in the last 72 hours.  Albumin:  No results for input(s): \"LABALBU\" in the last 72 hours.  BNP:    No results found for: \"BNP\"  IDA:    No results found for: \"IDA\"  SPEP:No results found for: \"ALBCAL\", \"ALBPCT\", \"LABALPH\", \"A1PCT\", \"A2PCT\", \"LABBETA\", \"BETAPCT\", \"GAMGLOB\", \"GGPCT\", \"PATH\"  UPEP:   No results found for: \"LABPE\"  C3:   No results found for: \"C3\"  C4:   No results found for: \"C4\"  MPO ANCA:   No results found for: \"MPO\"  PR3 ANCA:   No results found for: \"PR3\"  Anti-GBM:   No results found for: \"GBMABIGG\"  Hep BsAg:       No results found for: \"HEPBSAG\"  Hep C AB:        No results found for: \"HEPCAB\"    Urinalysis/Chemistries:      Lab Results   Component Value Date/Time    NITRU NEGATIVE 10/10/2024 12:42 PM    COLORU Yellow 10/10/2024 12:42 PM    PHUR 6.0 10/10/2024 12:42 PM    LEUKOCYTESUR NEGATIVE 10/10/2024 12:42 PM    UROBILINOGEN Normal 10/10/2024 12:42 PM    BILIRUBINUR NEGATIVE 10/10/2024 12:42 PM    GLUCOSEU NEGATIVE 10/10/2024 12:42 PM    KETUA NEGATIVE 10/10/2024 12:42 PM     Urine Sodium:   No components found for: \"KOFFI\"  Urine Potassium:  No results found for: \"KUR\"  Urine Chloride:  No results found for: \"CLUR\"  Urine Osmolarity: No results found for: \"OSMOU\"  Urine Protein:   No results found for: \"TPU\"  Urine Creatinine:   No results found for: \"LABCREA\"  UPC:     Urine Eosinophils:  No components found for: \"UEOS\"    Radiology:     CXR:     Assessment:     1. Severe acute hypoosmolar hyponatremia likely from poor solute load and excessive free

## 2024-10-10 NOTE — ED NOTES
[facility] transport to [facility] ETA 30 minutes. Per [facility] may go to ED while awaiting inpatient room. [facility] s/w RN supervisor Juan A

## 2024-10-10 NOTE — ED PROVIDER NOTES
[facility] EMERGENCY DEPARTMENT  EMERGENCY DEPARTMENT ENCOUNTER      Pt Name: Mandie Bustamante  MRN: [id number]  Birthdate 1955  Date of evaluation: 10/10/2024  Provider: COSTA Vincent CNP  11:37 AM    CHIEF COMPLAINT       Chief Complaint   Patient presents with    Abdominal Pain     Recently hernia repair surgery. Having abdominal pain since surgery but getting worse     Shortness of Breath     SOB started a couple days after surgery. EMS gave dubneb treatment. Bilateral lower leg swelling          HISTORY OF PRESENT ILLNESS    Mandie Bustamante is a 69 y.o. female who presents to the emergency department for evaluation of shortness of breath and abdominal pain.  Patient states she had abdominal surgery, robotic hernia repair on 10/1 at [facility] with Dr. Cummings.  Ever since then, she has been in pain.  She has been drinking water but not eating.  She really has not been urinating much.  She reports fluid retention in the legs and she feels short of breath she is felt short of breath since she left but it is worsened over the past 2 days.  EMS did give DuoNeb and she states this did not help her.  She has no history of lung disease no history of CHF.  She is quite distended but abdominal incisions appear intact.  No fevers.  No diarrhea    HPI    Nursing Notes were reviewed.    REVIEW OF SYSTEMS       Review of Systems   All other systems reviewed and are negative.      Except as noted above the remainder of the review of systems was reviewed and negative.       PAST MEDICAL HISTORY     Past Medical History:   Diagnosis Date    Allergic rhinitis     Anemia     Arthritis     Autoimmune disorder (HCC)     Rheumatologic    Cataracts, bilateral     GERD (gastroesophageal reflux disease)     Headache     Hypercalcemia     Hyperparathyroidism (HCC)     Hypertension     Obesity     Osteoarthritis          SURGICAL HISTORY       Past Surgical History:   Procedure Laterality Date    APPENDECTOMY   Absolute 1.75 (*)     Metamyelocytes Absolute 0.22 (*)     Myelocytes Absolute 0.22 (*)     All other components within normal limits   COMPREHENSIVE METABOLIC PANEL - Abnormal; Notable for the following components:    Sodium 109 (*)     Potassium 3.4 (*)     Chloride 70 (*)     Glucose 162 (*)     Calcium 7.2 (*)     Total Protein 6.1 (*)     Albumin 3.1 (*)     Alkaline Phosphatase 113 (*)     All other components within normal limits   BRAIN NATRIURETIC PEPTIDE - Abnormal; Notable for the following components:    NT Pro-BNP 1,017 (*)     All other components within normal limits   CULTURE, BLOOD 2   CULTURE, BLOOD 1   LACTIC ACID   LIPASE   TROPONIN   URINALYSIS WITH REFLEX TO CULTURE   TSH   SODIUM, URINE, RANDOM   OSMOLALITY, URINE   OSMOLALITY   T4, FREE       All other labs were within normal range or not returned as of this dictation.    EMERGENCY DEPARTMENT COURSE and DIFFERENTIAL DIAGNOSIS/MDM:   Vitals:    Vitals:    10/10/24 1136 10/10/24 1140 10/10/24 1142 10/10/24 1202   BP:   (!) 165/65 (!) 161/78   Pulse: 80   79   Resp: 22   25   Temp: 98 °F (36.7 °C)      TempSrc: Oral      SpO2: 96%  97% 96%   Weight:  122.5 kg (270 lb)     Height:  1.6 m (5' 3\")             Medical Decision Making  Amount and/or Complexity of Data Reviewed  Labs: ordered. Decision-making details documented in ED Course.  Radiology: ordered.  ECG/medicine tests: ordered.      Patient presents for evaluation of shortness of breath and abdominal pain ever since having surgery on 10/1.  No improvement with DuoNeb.  She is not hypoxic she just reports that it is difficult to take deep breath then.   at bedside provides additional history states that she has difficulty with most narcotic medicines and she will get flushed and just generally does not feel well so she is not really been taking any pain medication since she got home but the pain is not controlled.  She has been drinking water but not eating she is urinating but not

## 2024-10-10 NOTE — ED PROVIDER NOTES
lead EKG interpreted by myself:  A 12 lead EKG done at 1146, interpreted by myself, showed a regular rhythm at a rate of 79bpm.  The CA interval was normal.  The QRS complex was normal.  There was no ST segment elevation or depression, T wave inversion not present.  QRS progression through precordial leads was abnormal.  Interpretation: Slightly abnormal EKG but there is a normal sinus rhythm, poor R wave progression, no ST elevation or depression and just a little bit of artifact.    Laboratory results showed leukocytosis and significant hyponatremia.  Found to have an intra-abdominal abscess on CT.  A lot of fecal stasis.  Some gas.  Clearly critically ill.  Contacted nephrology, trying to get in touch with general surgery, she is going to be admitted to the ICU for further evaluation and treatment    Critical Care: The high probability of sudden, clinically significant deterioration in the patient's condition required the highest level of my preparedness to intervene urgently.    The services I provided to this patient were to treat and/or prevent clinically significant deterioration. Services included the following: chart data review, reviewing nursing notes and/or old charts, documentation time, consultant collaboration regarding findings and treatment options, medication orders and management, direct patient care, vital sign assessments and ordering, interpreting and reviewing diagnostic studies/lab tests.    Aggregate critical care time includes only time during which I was engaged in work directly related to the patient's care, as described above, whether at the bedside or elsewhere in the Emergency Department.  It did not include time spent performing other reported procedures or the services of residents, students, nurses or physician assistants.    Critical Care:   40 minutes      (Please note that portions of this note were completed with a voice recognition program.  Efforts were made to edit the  dictations but occasionally words are mis-transcribed.)    Jose L Foster DO  Attending Emergency Medicine Physician        Jose L Foster, DO  10/10/24 [id number]

## 2024-10-11 ENCOUNTER — APPOINTMENT (OUTPATIENT)
Dept: GENERAL RADIOLOGY | Age: 69
End: 2024-10-11
Attending: STUDENT IN AN ORGANIZED HEALTH CARE EDUCATION/TRAINING PROGRAM
Payer: COMMERCIAL

## 2024-10-11 ENCOUNTER — APPOINTMENT (OUTPATIENT)
Age: 69
End: 2024-10-11
Attending: STUDENT IN AN ORGANIZED HEALTH CARE EDUCATION/TRAINING PROGRAM
Payer: COMMERCIAL

## 2024-10-11 LAB
ALBUMIN SERPL-MCNC: 2.8 G/DL (ref 3.5–5.2)
ALP SERPL-CCNC: 105 U/L (ref 35–104)
ALT SERPL-CCNC: 22 U/L (ref 5–33)
ANION GAP SERPL CALCULATED.3IONS-SCNC: 12 MMOL/L (ref 9–17)
ANION GAP SERPL CALCULATED.3IONS-SCNC: 13 MMOL/L (ref 9–17)
ANION GAP SERPL CALCULATED.3IONS-SCNC: 14 MMOL/L (ref 9–17)
ANION GAP SERPL CALCULATED.3IONS-SCNC: 15 MMOL/L (ref 9–17)
ANION GAP SERPL CALCULATED.3IONS-SCNC: 15 MMOL/L (ref 9–17)
AST SERPL-CCNC: 20 U/L
BASOPHILS # BLD: 0 K/UL (ref 0–0.2)
BASOPHILS NFR BLD: 0 %
BILIRUB SERPL-MCNC: 0.6 MG/DL (ref 0.3–1.2)
BUN SERPL-MCNC: 16 MG/DL (ref 8–23)
BUN/CREAT SERPL: 23 (ref 9–20)
CA-I BLD-SCNC: 0.83 MMOL/L (ref 1.15–1.33)
CALCIUM SERPL-MCNC: 6.9 MG/DL (ref 8.6–10.4)
CALCIUM SERPL-MCNC: 7 MG/DL (ref 8.6–10.4)
CALCIUM SERPL-MCNC: 7 MG/DL (ref 8.6–10.4)
CHLORIDE SERPL-SCNC: 70 MMOL/L (ref 98–107)
CHLORIDE SERPL-SCNC: 71 MMOL/L (ref 98–107)
CHLORIDE SERPL-SCNC: 73 MMOL/L (ref 98–107)
CHLORIDE SERPL-SCNC: 73 MMOL/L (ref 98–107)
CHLORIDE SERPL-SCNC: 74 MMOL/L (ref 98–107)
CHLORIDE SERPL-SCNC: 75 MMOL/L (ref 98–107)
CHLORIDE SERPL-SCNC: 75 MMOL/L (ref 98–107)
CHLORIDE SERPL-SCNC: 77 MMOL/L (ref 98–107)
CO2 SERPL-SCNC: 20 MMOL/L (ref 20–31)
CO2 SERPL-SCNC: 21 MMOL/L (ref 20–31)
CO2 SERPL-SCNC: 22 MMOL/L (ref 20–31)
CREAT SERPL-MCNC: 0.7 MG/DL (ref 0.5–0.9)
ECHO AO ROOT DIAM: 3 CM
ECHO AO ROOT INDEX: 1.32 CM/M2
ECHO AV AREA PEAK VELOCITY: 2.2 CM2
ECHO AV AREA VTI: 2.9 CM2
ECHO AV AREA/BSA PEAK VELOCITY: 1 CM2/M2
ECHO AV AREA/BSA VTI: 1.3 CM2/M2
ECHO AV MEAN GRADIENT: 19 MMHG
ECHO AV MEAN VELOCITY: 2 M/S
ECHO AV PEAK GRADIENT: 40 MMHG
ECHO AV PEAK VELOCITY: 3.2 M/S
ECHO AV VELOCITY RATIO: 0.59
ECHO AV VTI: 50.7 CM
ECHO BSA: 2.33 M2
ECHO LA DIAMETER INDEX: 1.75 CM/M2
ECHO LA DIAMETER: 4 CM
ECHO LA TO AORTIC ROOT RATIO: 1.33
ECHO LV EDV A2C: 71 ML
ECHO LV EDV A4C: 44 ML
ECHO LV EDV INDEX A4C: 19 ML/M2
ECHO LV EDV NDEX A2C: 31 ML/M2
ECHO LV EJECTION FRACTION A2C: 61 %
ECHO LV EJECTION FRACTION A4C: 55 %
ECHO LV EJECTION FRACTION BIPLANE: 58 % (ref 55–100)
ECHO LV ESV A2C: 28 ML
ECHO LV ESV A4C: 20 ML
ECHO LV ESV INDEX A2C: 12 ML/M2
ECHO LV ESV INDEX A4C: 9 ML/M2
ECHO LV FRACTIONAL SHORTENING: 26 % (ref 28–44)
ECHO LV INTERNAL DIMENSION DIASTOLE INDEX: 2.19 CM/M2
ECHO LV INTERNAL DIMENSION DIASTOLIC: 5 CM (ref 3.9–5.3)
ECHO LV INTERNAL DIMENSION SYSTOLIC INDEX: 1.62 CM/M2
ECHO LV INTERNAL DIMENSION SYSTOLIC: 3.7 CM
ECHO LV IVSD: 1.3 CM (ref 0.6–0.9)
ECHO LV MASS 2D: 247.6 G (ref 67–162)
ECHO LV MASS INDEX 2D: 108.6 G/M2 (ref 43–95)
ECHO LV POSTERIOR WALL DIASTOLIC: 1.2 CM (ref 0.6–0.9)
ECHO LV RELATIVE WALL THICKNESS RATIO: 0.48
ECHO LVOT AREA: 3.8 CM2
ECHO LVOT AV VTI INDEX: 0.76
ECHO LVOT DIAM: 2.2 CM
ECHO LVOT MEAN GRADIENT: 9 MMHG
ECHO LVOT PEAK GRADIENT: 14 MMHG
ECHO LVOT PEAK VELOCITY: 1.9 M/S
ECHO LVOT STROKE VOLUME INDEX: 64 ML/M2
ECHO LVOT SV: 145.9 ML
ECHO LVOT VTI: 38.4 CM
ECHO MV A VELOCITY: 0.87 M/S
ECHO MV E DECELERATION TIME (DT): 225 MS
ECHO MV E VELOCITY: 0.74 M/S
ECHO MV E/A RATIO: 0.85
EOSINOPHIL # BLD: 0 K/UL (ref 0–0.4)
EOSINOPHILS RELATIVE PERCENT: 0 % (ref 1–4)
ERYTHROCYTE [DISTWIDTH] IN BLOOD BY AUTOMATED COUNT: 11.6 % (ref 11.8–14.4)
GFR, ESTIMATED: >90 ML/MIN/1.73M2
GLUCOSE SERPL-MCNC: 130 MG/DL (ref 70–99)
GLUCOSE SERPL-MCNC: 166 MG/DL (ref 70–99)
GLUCOSE SERPL-MCNC: 168 MG/DL (ref 70–99)
HCT VFR BLD AUTO: 33.1 % (ref 36.3–47.1)
HGB BLD-MCNC: 12.4 G/DL (ref 11.9–15.1)
IMM GRANULOCYTES # BLD AUTO: 0.55 K/UL (ref 0–0.3)
IMM GRANULOCYTES NFR BLD: 3 %
INR PPP: 1.1
LACTATE BLDV-SCNC: 0.9 MMOL/L (ref 0.5–2.2)
LYMPHOCYTES NFR BLD: 1.1 K/UL (ref 1–4.8)
LYMPHOCYTES RELATIVE PERCENT: 6 % (ref 24–44)
MAGNESIUM SERPL-MCNC: 1.6 MG/DL (ref 1.6–2.6)
MAGNESIUM SERPL-MCNC: 1.8 MG/DL (ref 1.6–2.6)
MCH RBC QN AUTO: 29.8 PG (ref 25.2–33.5)
MCHC RBC AUTO-ENTMCNC: 37.5 G/DL (ref 28.4–34.8)
MCV RBC AUTO: 79.6 FL (ref 82.6–102.9)
MONOCYTES NFR BLD: 1.65 K/UL (ref 0.2–0.8)
MONOCYTES NFR BLD: 9 % (ref 1–7)
MORPHOLOGY: ABNORMAL
MORPHOLOGY: ABNORMAL
NEUTROPHILS NFR BLD: 82 % (ref 36–66)
NEUTS SEG NFR BLD: 15 K/UL (ref 1.8–7.7)
PLATELET # BLD AUTO: 379 K/UL (ref 138–453)
PMV BLD AUTO: 9 FL (ref 8.1–13.5)
POTASSIUM SERPL-SCNC: 2.9 MMOL/L (ref 3.7–5.3)
POTASSIUM SERPL-SCNC: 3 MMOL/L (ref 3.7–5.3)
POTASSIUM SERPL-SCNC: 3.2 MMOL/L (ref 3.7–5.3)
POTASSIUM SERPL-SCNC: 3.2 MMOL/L (ref 3.7–5.3)
POTASSIUM SERPL-SCNC: 3.3 MMOL/L (ref 3.7–5.3)
POTASSIUM SERPL-SCNC: 3.6 MMOL/L (ref 3.7–5.3)
PROT SERPL-MCNC: 5.7 G/DL (ref 6.4–8.3)
PROTHROMBIN TIME: 14.6 SEC (ref 11.5–14.2)
RBC # BLD AUTO: 4.16 M/UL (ref 3.95–5.11)
SODIUM SERPL-SCNC: 105 MMOL/L (ref 135–144)
SODIUM SERPL-SCNC: 106 MMOL/L (ref 135–144)
SODIUM SERPL-SCNC: 108 MMOL/L (ref 135–144)
SODIUM SERPL-SCNC: 108 MMOL/L (ref 135–144)
SODIUM SERPL-SCNC: 109 MMOL/L (ref 135–144)
SODIUM SERPL-SCNC: 110 MMOL/L (ref 135–144)
SODIUM SERPL-SCNC: 111 MMOL/L (ref 135–144)
WBC OTHER # BLD: 18.3 K/UL (ref 3.5–11.3)

## 2024-10-11 PROCEDURE — 80051 ELECTROLYTE PANEL: CPT

## 2024-10-11 PROCEDURE — 85025 COMPLETE CBC W/AUTO DIFF WBC: CPT

## 2024-10-11 PROCEDURE — 2700000000 HC OXYGEN THERAPY PER DAY

## 2024-10-11 PROCEDURE — 6360000002 HC RX W HCPCS: Performed by: NURSE PRACTITIONER

## 2024-10-11 PROCEDURE — 2580000003 HC RX 258

## 2024-10-11 PROCEDURE — 02HV33Z INSERTION OF INFUSION DEVICE INTO SUPERIOR VENA CAVA, PERCUTANEOUS APPROACH: ICD-10-PCS | Performed by: INTERNAL MEDICINE

## 2024-10-11 PROCEDURE — 83735 ASSAY OF MAGNESIUM: CPT

## 2024-10-11 PROCEDURE — 87205 SMEAR GRAM STAIN: CPT

## 2024-10-11 PROCEDURE — 94660 CPAP INITIATION&MGMT: CPT

## 2024-10-11 PROCEDURE — 99232 SBSQ HOSP IP/OBS MODERATE 35: CPT | Performed by: STUDENT IN AN ORGANIZED HEALTH CARE EDUCATION/TRAINING PROGRAM

## 2024-10-11 PROCEDURE — 80053 COMPREHEN METABOLIC PANEL: CPT

## 2024-10-11 PROCEDURE — 87075 CULTR BACTERIA EXCEPT BLOOD: CPT

## 2024-10-11 PROCEDURE — 85610 PROTHROMBIN TIME: CPT

## 2024-10-11 PROCEDURE — 2580000003 HC RX 258: Performed by: NURSE PRACTITIONER

## 2024-10-11 PROCEDURE — 94761 N-INVAS EAR/PLS OXIMETRY MLT: CPT

## 2024-10-11 PROCEDURE — 93306 TTE W/DOPPLER COMPLETE: CPT | Performed by: INTERNAL MEDICINE

## 2024-10-11 PROCEDURE — 80048 BASIC METABOLIC PNL TOTAL CA: CPT

## 2024-10-11 PROCEDURE — 2580000003 HC RX 258: Performed by: STUDENT IN AN ORGANIZED HEALTH CARE EDUCATION/TRAINING PROGRAM

## 2024-10-11 PROCEDURE — 87070 CULTURE OTHR SPECIMN AEROBIC: CPT

## 2024-10-11 PROCEDURE — 83605 ASSAY OF LACTIC ACID: CPT

## 2024-10-11 PROCEDURE — 0J983ZZ DRAINAGE OF ABDOMEN SUBCUTANEOUS TISSUE AND FASCIA, PERCUTANEOUS APPROACH: ICD-10-PCS | Performed by: SURGERY

## 2024-10-11 PROCEDURE — 6360000002 HC RX W HCPCS: Performed by: STUDENT IN AN ORGANIZED HEALTH CARE EDUCATION/TRAINING PROGRAM

## 2024-10-11 PROCEDURE — 2000000000 HC ICU R&B

## 2024-10-11 PROCEDURE — 6370000000 HC RX 637 (ALT 250 FOR IP): Performed by: STUDENT IN AN ORGANIZED HEALTH CARE EDUCATION/TRAINING PROGRAM

## 2024-10-11 PROCEDURE — 82330 ASSAY OF CALCIUM: CPT

## 2024-10-11 PROCEDURE — 36415 COLL VENOUS BLD VENIPUNCTURE: CPT

## 2024-10-11 PROCEDURE — 2500000003 HC RX 250 WO HCPCS

## 2024-10-11 PROCEDURE — 2580000003 HC RX 258: Performed by: INTERNAL MEDICINE

## 2024-10-11 PROCEDURE — 36592 COLLECT BLOOD FROM PICC: CPT

## 2024-10-11 PROCEDURE — 71045 X-RAY EXAM CHEST 1 VIEW: CPT

## 2024-10-11 PROCEDURE — 93306 TTE W/DOPPLER COMPLETE: CPT

## 2024-10-11 RX ORDER — 3% SODIUM CHLORIDE 3 G/100ML
30 INJECTION, SOLUTION INTRAVENOUS CONTINUOUS
Status: DISCONTINUED | OUTPATIENT
Start: 2024-10-11 | End: 2024-10-11

## 2024-10-11 RX ORDER — 3% SODIUM CHLORIDE 3 G/100ML
30 INJECTION, SOLUTION INTRAVENOUS CONTINUOUS
Status: DISPENSED | OUTPATIENT
Start: 2024-10-11 | End: 2024-10-11

## 2024-10-11 RX ORDER — LABETALOL HYDROCHLORIDE 5 MG/ML
10 INJECTION, SOLUTION INTRAVENOUS EVERY 6 HOURS PRN
Status: DISCONTINUED | OUTPATIENT
Start: 2024-10-11 | End: 2024-11-08 | Stop reason: HOSPADM

## 2024-10-11 RX ORDER — MORPHINE SULFATE 2 MG/ML
2 INJECTION, SOLUTION INTRAMUSCULAR; INTRAVENOUS ONCE
Status: COMPLETED | OUTPATIENT
Start: 2024-10-11 | End: 2024-10-11

## 2024-10-11 RX ORDER — LIDOCAINE HYDROCHLORIDE 10 MG/ML
5 INJECTION, SOLUTION EPIDURAL; INFILTRATION; INTRACAUDAL; PERINEURAL ONCE
Status: DISCONTINUED | OUTPATIENT
Start: 2024-10-11 | End: 2024-11-08 | Stop reason: HOSPADM

## 2024-10-11 RX ORDER — DEXTROSE MONOHYDRATE, SODIUM CHLORIDE, AND POTASSIUM CHLORIDE 50; 2.98; 4.5 G/1000ML; G/1000ML; G/1000ML
INJECTION, SOLUTION INTRAVENOUS CONTINUOUS
Status: DISCONTINUED | OUTPATIENT
Start: 2024-10-11 | End: 2024-10-13

## 2024-10-11 RX ORDER — DEXTROSE MONOHYDRATE AND SODIUM CHLORIDE 5; .45 G/100ML; G/100ML
INJECTION, SOLUTION INTRAVENOUS CONTINUOUS
Status: DISCONTINUED | OUTPATIENT
Start: 2024-10-11 | End: 2024-10-11

## 2024-10-11 RX ADMIN — ENOXAPARIN SODIUM 30 MG: 100 INJECTION SUBCUTANEOUS at 09:42

## 2024-10-11 RX ADMIN — SODIUM CHLORIDE 30 ML/HR: 3 INJECTION, SOLUTION INTRAVENOUS at 15:28

## 2024-10-11 RX ADMIN — ZOLPIDEM TARTRATE 5 MG: 5 TABLET ORAL at 23:18

## 2024-10-11 RX ADMIN — PANTOPRAZOLE SODIUM 40 MG: 40 INJECTION, POWDER, FOR SOLUTION INTRAVENOUS at 09:41

## 2024-10-11 RX ADMIN — POTASSIUM CHLORIDE 10 MEQ: 7.46 INJECTION, SOLUTION INTRAVENOUS at 09:00

## 2024-10-11 RX ADMIN — SODIUM CHLORIDE, PRESERVATIVE FREE 10 ML: 5 INJECTION INTRAVENOUS at 20:06

## 2024-10-11 RX ADMIN — PIPERACILLIN AND TAZOBACTAM 4500 MG: 4; .5 INJECTION, POWDER, LYOPHILIZED, FOR SOLUTION INTRAVENOUS at 18:43

## 2024-10-11 RX ADMIN — POTASSIUM CHLORIDE, DEXTROSE MONOHYDRATE AND SODIUM CHLORIDE: 300; 5; 450 INJECTION, SOLUTION INTRAVENOUS at 21:11

## 2024-10-11 RX ADMIN — POTASSIUM CHLORIDE 10 MEQ: 7.46 INJECTION, SOLUTION INTRAVENOUS at 12:54

## 2024-10-11 RX ADMIN — POTASSIUM CHLORIDE 10 MEQ: 7.46 INJECTION, SOLUTION INTRAVENOUS at 01:34

## 2024-10-11 RX ADMIN — POTASSIUM CHLORIDE 10 MEQ: 7.46 INJECTION, SOLUTION INTRAVENOUS at 16:53

## 2024-10-11 RX ADMIN — SODIUM CHLORIDE 30 ML/HR: 3 INJECTION, SOLUTION INTRAVENOUS at 18:46

## 2024-10-11 RX ADMIN — POTASSIUM CHLORIDE 10 MEQ: 7.46 INJECTION, SOLUTION INTRAVENOUS at 06:03

## 2024-10-11 RX ADMIN — POTASSIUM CHLORIDE 10 MEQ: 7.46 INJECTION, SOLUTION INTRAVENOUS at 14:01

## 2024-10-11 RX ADMIN — POTASSIUM CHLORIDE 10 MEQ: 7.46 INJECTION, SOLUTION INTRAVENOUS at 18:45

## 2024-10-11 RX ADMIN — POTASSIUM CHLORIDE 10 MEQ: 7.46 INJECTION, SOLUTION INTRAVENOUS at 20:10

## 2024-10-11 RX ADMIN — SODIUM CHLORIDE 30 ML/HR: 3 INJECTION, SOLUTION INTRAVENOUS at 01:49

## 2024-10-11 RX ADMIN — POTASSIUM CHLORIDE 10 MEQ: 7.46 INJECTION, SOLUTION INTRAVENOUS at 02:37

## 2024-10-11 RX ADMIN — POTASSIUM CHLORIDE 10 MEQ: 7.46 INJECTION, SOLUTION INTRAVENOUS at 04:28

## 2024-10-11 RX ADMIN — POTASSIUM CHLORIDE 10 MEQ: 7.46 INJECTION, SOLUTION INTRAVENOUS at 07:14

## 2024-10-11 RX ADMIN — MORPHINE SULFATE 2 MG: 2 INJECTION, SOLUTION INTRAMUSCULAR; INTRAVENOUS at 01:29

## 2024-10-11 RX ADMIN — ENOXAPARIN SODIUM 30 MG: 100 INJECTION SUBCUTANEOUS at 20:06

## 2024-10-11 RX ADMIN — SODIUM CHLORIDE, PRESERVATIVE FREE 10 ML: 5 INJECTION INTRAVENOUS at 09:39

## 2024-10-11 RX ADMIN — POTASSIUM CHLORIDE 10 MEQ: 7.46 INJECTION, SOLUTION INTRAVENOUS at 15:08

## 2024-10-11 RX ADMIN — SODIUM CHLORIDE 30 ML/HR: 3 INJECTION, SOLUTION INTRAVENOUS at 08:55

## 2024-10-11 RX ADMIN — LABETALOL HYDROCHLORIDE 10 MG: 5 INJECTION, SOLUTION INTRAVENOUS at 20:05

## 2024-10-11 RX ADMIN — POTASSIUM CHLORIDE 10 MEQ: 7.46 INJECTION, SOLUTION INTRAVENOUS at 00:20

## 2024-10-11 RX ADMIN — PIPERACILLIN AND TAZOBACTAM 4500 MG: 4; .5 INJECTION, POWDER, LYOPHILIZED, FOR SOLUTION INTRAVENOUS at 10:04

## 2024-10-11 ASSESSMENT — PAIN DESCRIPTION - LOCATION
LOCATION: ABDOMEN

## 2024-10-11 ASSESSMENT — PAIN SCALES - GENERAL
PAINLEVEL_OUTOF10: 4
PAINLEVEL_OUTOF10: 2
PAINLEVEL_OUTOF10: 8

## 2024-10-11 ASSESSMENT — PAIN DESCRIPTION - DESCRIPTORS: DESCRIPTORS: SORE;TENDER

## 2024-10-11 NOTE — CONSULTS
[facility]  General Surgery  Initial Consult      PATIENT NAME: Mandie Bustamante   YOB: 1955  10/11/2024  5:17 AM    ADMISSION DATE: 10/10/2024  6:11 PM      TODAY'S DATE: 10/11/2024    CHIEF COMPLAINT: Hyponatremia with abdominal air-fluid collection at the previous operative site      HISTORY OF PRESENT ILLNESS:  The patient is a 69 y.o. female with medical history of arthritis, autoimmune disorder, GERD, hyperparathyroidism status post parathyroid gland surgery, appendectomy, cholecystectomy, incisional hernia s/p robotic assisted laparoscopic mesh hernioplasty on 10/1/2024 who presents with complaint of abdominal pain, decreased appetite for past several days due to which she presented to ED.  No complaint of fever, chills, shortness of breath, chest pain.  Patient did endorse having bloating/abdominal distention however still passing flatus and motion.    Imaging study was done which showed air-fluid collection below the rectus muscle.  Hyponatremia, leukocytosis were also present.  General surgery was consulted for evaluation and management of abdominal abscess    Past Medical History:        Diagnosis Date    Allergic rhinitis     Anemia     Arthritis     Autoimmune disorder (HCC)     Rheumatologic    Cataracts, bilateral     GERD (gastroesophageal reflux disease)     Headache     Hypercalcemia     Hyperparathyroidism (HCC)     Hypertension     Obesity     Osteoarthritis        Past Surgical History:        Procedure Laterality Date    APPENDECTOMY      BUNIONECTOMY Left 07/2016    Hardware removed Sept 2022    CHOLECYSTECTOMY      FOOT SURGERY Left 1989    cyst removal    FOOT SURGERY Left 03/2010    cyst removal cheilectomy    FOOT SURGERY Left 07/2017    nerve removal in surgical area    HERNIA REPAIR  08/2022    Umbilical    HYSTERECTOMY, TOTAL ABDOMINAL (CERVIX REMOVED)  09/1986    HYSTERECTOMY, VAGINAL  09/1986    PARATHYROID GLAND SURGERY  05/03/2023    Ectopic Parathyroid  gland from sternum Mini sternotomy    PARATHYROIDECTOMY  05/2022    SHOULDER ARTHROSCOPY Right 08/2011    SHOULDER ARTHROSCOPY Right 04/2012    SHOULDER ARTHROSCOPY Left 05/2012    SHOULDER SURGERY Right 10/2000    arthroscopy/bone spur    TOTAL VAGINAL HYSTERECTOMY  1986    Partial 1981    UMBILICAL HERNIA REPAIR      UPPER GASTROINTESTINAL ENDOSCOPY  2014    URETHRA SURGERY  1986    Repair    VENTRAL HERNIA REPAIR N/A 10/1/2024    ROBOTIC RECURRENT INCISIONAL HERNIA REPAIR with mesh performed by Gm Cummings MD at [facility] OR       Medications Prior to Admission:   Medications Prior to Admission: docusate sodium (COLACE) 100 MG capsule, Take 1 capsule by mouth 2 times daily  ondansetron (ZOFRAN-ODT) 4 MG disintegrating tablet, Take 1 tablet by mouth 3 times daily as needed for Nausea or Vomiting  zolpidem (AMBIEN) 5 MG tablet,   tacrolimus (PROTOPIC) 0.1 % ointment, Apply 1 Application topically every evening  loratadine (CLARITIN) 10 MG capsule, Take 1 capsule by mouth as needed  irbesartan (AVAPRO) 150 MG tablet, Take 1 tablet by mouth nightly Takes 1/4 tablet 2-3 time a week.  hydrocortisone 2.5 % ointment, Apply 1 Application topically as needed    Allergies:  Latex, Fentanyl, Povidone iodine, Clindamycin, Sulfa antibiotics, Iodinated contrast media, Minocycline, Kenalog [triamcinolone], Oxycodone-acetaminophen, and Statins    Social History:   Social History     Socioeconomic History    Marital status:      Spouse name: Not on file    Number of children: Not on file    Years of education: Not on file    Highest education level: Not on file   Occupational History    Not on file   Tobacco Use    Smoking status: Never    Smokeless tobacco: Never   Vaping Use    Vaping status: Never Used   Substance and Sexual Activity    Alcohol use: Yes     Alcohol/week: 1.0 standard drink of alcohol     Types: 1 Shots of liquor per week     Comment: social    Drug use: Never    Sexual activity: Yes     Partners: Male

## 2024-10-11 NOTE — PROGRESS NOTES
Respiratory therapist determined bipap is contraindicated based on Patient's presentation (distention, tenderness, recent abdominal surgery) and imaging results. Writer agreeable to not place patient on bipap. Appropriate provider notified.     CT A+P reads:  \"IMPRESSION:  1. Large abscess with air-fluid collection seen along the peritoneum just  below the rectus muscle.  Additional infiltration with gas and fluid seen  extending in previous site of hernia suggesting recurrent hernia and or  phlegmon.\"

## 2024-10-11 NOTE — H&P
[facility]  Office: [phone]  Keenan Foster DO, Kevin Siegel DO, Carlos A Orta DO, Taye Morales DO, Maxx Madison MD, Meena Eckert MD, Efren Vo MD, Makayla Last MD,  Jerman Christian MD, Gina Bourgeois MD, Rashaad Aden DO, Gladys Fry MD,  Anu Murillo DO, Jennifer Blanca MD, Oral Duncan MD, Harlan Foster DO, Katya Simmons MD,  Filiberto Slade DO, Elizabeth Alves MD, Samantha Pinto MD, Arlene Bryant MD, Bandar Barrios MD,  Kulwant Rodrigez MD, Michelle Dennison MD, Jinny Steel MD, Juan Wolf DO, Corin Moran MD,  Benjie Alvarenga MD, Shirley Waterhouse, CNP,  Georgette Villarreal, CNP, Sheri Jones, CNP, eDmetri Toure, CNP,  Silvina Castano, DNP, Tatiana Mckeon, CNP, Angelina Harper, CNP, Ewelina Toribio, CNP, Alma Rosa Peng, CNP, Rashmi Mccartney, CNP, Nuno Zafar PA-C, Dominga Graham, CNS, Vaishnavi Rome, CNP, Nadiya Rick, CNP         [facility]   IN-PATIENT SERVICE   [facility]    HISTORY AND PHYSICAL EXAMINATION            Date:   10/10/2024  Patient name:  Mandie Bustamante  Date of admission:  10/10/2024  6:11 PM  MRN:   [id number]  Account:  [id number]  YOB: 1955  PCP:    None, None  Room:   2042/2042-01  Code Status:    Full Code      History Obtained From:     patient, electronic medical record    History of Present Illness:     Mandie Bustamante is a 69 y.o. Non- / non  female who presents with No chief complaint on file.   and is admitted to the hospital for the management of Hyponatremia.    Patient with past medical history of hypertension, osteoarthritis, GERD, hypercalcemia and hyperparathyroidism s/p parathyroidectomy, autoimmune disorder and morbid obesity, who recently underwent robotic assisted laparoscopic incisional hernia repair with mesh on 10/1/2024 with surgery, now presented from outlying facility for management of hyponatremia and abdominal abscess.    At [facility] patient presented

## 2024-10-11 NOTE — PROGRESS NOTES
End Of Shift Note  [facility]  Summary of shift: Transferred from [facility] to [facility]. Hyponatremic. Q2 hour electrolyte panel. Had D5 1/2 NS infusing. Nephrology notified of sodium maintaining < 110. Dr. Singh ordered hypertonic 3% saline 30ml/hr over 4 hours (120ml). After hypertonic infusion, sodium had gone up from 105 to 106, K 3.3, Cl 71. Dr. Cummings rounded at bedside and will perform bedside aspiration. Internal medicine had ordered bipap for suspected sleep apnea, however, RT determined it to be contraindicated due to Patient's presentation.    Vitals:    Vitals:    10/11/24 0400 10/11/24 0500 10/11/24 0528 10/11/24 0600   BP: (!) 156/86 (!) 158/79  (!) 147/80   Pulse: 77 76 78 79   Resp: 19 20 23 22   Temp: 96.8 °F (36 °C)      TempSrc: Temporal      SpO2: 95%  96%    Weight: 134 kg (295 lb 8 oz)      Height:            I&O:   Intake/Output Summary (Last 24 hours) at 10/11/2024 0734  Last data filed at 10/11/2024 0715  Gross per 24 hour   Intake 1499.47 ml   Output 375 ml   Net 1124.47 ml       Resp Status: 2LNC, new    Ventilator Settings:     / / /     Critical Care IV infusions:   sodium chloride      dextrose 5 % and 0.45 % NaCl Stopped (10/11/24 0144)        LDA:   Peripheral IV 10/10/24 Left Antecubital (Active)   Number of days: 0       Peripheral IV 10/10/24 Right Antecubital (Active)   Number of days: 0       Urinary Catheter 10/10/24 Bar (Active)   Number of days: 0       Incision 10/01/24 Abdomen Medial;Upper (Active)   Number of days: 9

## 2024-10-11 NOTE — FLOWSHEET NOTE
10/11/24 1807   Treatment Team Notification   Reason for Communication Critical results   Type of Critical Result Laboratory   Critical Lab Information    Person Result Received From lab   Critical Lab Result Type Electrolytes   Name of Team Member Notified Elzbieta Paris NP   Treatment Team Role Advanced Practice Nurse   Method of Communication Call   Response See orders

## 2024-10-11 NOTE — FLOWSHEET NOTE
10/11/24 1101   Treatment Team Notification   Reason for Communication Critical results   Type of Critical Result Laboratory   Critical Lab Information    Person Result Received From Lab   Critical Lab Result Type Electrolytes   Name of Team Member Notified Dr. Van   Treatment Team Role Consulting Provider   Method of Communication Face to face   Response In department   Notification Time 1101     Will hold off on further intervention at this time and repeat NA at noon.

## 2024-10-11 NOTE — PLAN OF CARE
Problem: Discharge Planning  Goal: Discharge to home or other facility with appropriate resources  Outcome: Progressing  Flowsheets (Taken 10/10/2024 2000)  Discharge to home or other facility with appropriate resources:   Identify barriers to discharge with patient and caregiver   Arrange for needed discharge resources and transportation as appropriate   Identify discharge learning needs (meds, wound care, etc)   Refer to discharge planning if patient needs post-hospital services based on physician order or complex needs related to functional status, cognitive ability or social support system     Problem: Skin/Tissue Integrity  Goal: Absence of new skin breakdown  Description: 1.  Monitor for areas of redness and/or skin breakdown  2.  Assess vascular access sites hourly  3.  Every 4-6 hours minimum:  Change oxygen saturation probe site  4.  Every 4-6 hours:  If on nasal continuous positive airway pressure, respiratory therapy assess nares and determine need for appliance change or resting period.  Outcome: Progressing     Problem: ABCDS Injury Assessment  Goal: Absence of physical injury  Outcome: Progressing  Flowsheets (Taken 10/11/2024 0441)  Absence of Physical Injury: Implement safety measures based on patient assessment     Problem: Safety - Adult  Goal: Free from fall injury  Outcome: Progressing  Flowsheets (Taken 10/11/2024 0441)  Free From Fall Injury:   Instruct family/caregiver on patient safety   Based on caregiver fall risk screen, instruct family/caregiver to ask for assistance with transferring infant if caregiver noted to have fall risk factors     Problem: Metabolic/Fluid and Electrolytes - Adult  Goal: Electrolytes maintained within normal limits  Outcome: Progressing  Flowsheets (Taken 10/10/2024 2000)  Electrolytes maintained within normal limits:   Administer electrolyte replacement as ordered   Monitor labs and assess patient for signs and symptoms of electrolyte imbalances   Monitor

## 2024-10-11 NOTE — PROGRESS NOTES
[facility]  Office: [phone]  Keenan Foster DO, Kevin Siegel DO, Carlos A Orta DO, Taye Morales DO, Maxx Madison MD, Meena Eckert MD, Efren Vo MD, Makayla Last MD,  Jerman Christian MD, Gina Bourgeois MD, Rashaad Aden DO, Gladys Fry MD,  Anu Murillo DO, Jennifer Blanca MD, Oral Duncan MD, Harlan Foster DO, Katya Simmons MD,  Filiberto Slade DO, Elizabeth Alves MD, Samantha Pinto MD, Arlene Bryant MD, Bandar Barrios MD,  Kulwant Rodrigez MD, Michelle Dennison MD, Jinny Steel MD, Juan Wolf DO, Corin Moran MD,  Benjie Alvarenga MD, Shirley Waterhouse, CNP,  Georgette Villarreal, CNP, Sheri Jones, CNP, Demetri Toure, CNP,  Silvina Castano, DNP, Tatiana Mckeon, CNP, Angelina Harper, CNP, Ewelina Toribio, CNP, Alma Rosa Peng, CNP, Rashmi Mccartney, CNP, Nuno Zafar PA-C, Dominga Graham, CNS, Vaishnavi Rome, CNP, Nadiya Rick, CNP         [facility]   IN-PATIENT SERVICE   [facility]    Progress Note    10/11/2024    6:08 PM    Name:   Mandie Bustamante  MRN:     [id number]     Acct:      [id number]   Room:   2042/2042-01   Day:  1  Admit Date:  10/10/2024  6:11 PM    PCP:   None, None  Code Status:  Full Code    Subjective:     Patient seen and examined this morning.  Remained afebrile, blood pressure on the higher side.  Lab work reviewed, sodium 106, potassium 3.3, nephrology on board.  Getting fluids as per their recommendations.  Glucose 130.  White count trending down 18.3, on IV Zosyn.  Hemoglobin stable.  Will be getting echocardiogram today.  Blood cultures negative so far.  General Surgery on board, as per them imaging are consistent with postoperative course after admission W-plasty.  No evidence of infection/abscess clinically.  They are planning to aspirate the fluid collection bedside this afternoon.  As part of his general surgery, patient does have abdominal distention at baseline.  General surgery okay with resuming  \"DDT1TUB\", \"HCO3\", \"NBEA\", \"PBEA\", \"BEART\", \"BE\", \"THGBART\", \"THB\", \"GFR9YGO\", \"KZCR7TVR\", \"F9FUREXI\", \"O2SAT\", \"FIO2\"  Lab Results   Component Value Date/Time    SPECIAL 5CC RIGHT ARM 10/10/2024 01:03 PM     Lab Results   Component Value Date/Time    CULTURE NO GROWTH 12 HOURS 10/10/2024 02:04 PM       Radiology:  CT ABDOMEN PELVIS WO CONTRAST Additional Contrast? None    Result Date: 10/10/2024  1. Large abscess with air-fluid collection seen along the peritoneum just below the rectus muscle.  Additional infiltration with gas and fluid seen extending in previous site of hernia suggesting recurrent hernia and or phlegmon.     XR CHEST PORTABLE    Result Date: 10/10/2024  Shallow lung volumes without acute consolidation.       Physical Examination:     General appearance:  alert, cooperative and no distress.  On supplemental oxygen.  Mental Status:  oriented to person, place and time and normal affect  Lungs:  clear to auscultation bilaterally, normal effort  Heart:  regular rate and rhythm, no murmur  Abdomen:  soft, tender, nondistended, normal bowel sounds, no masses, hepatomegaly, splenomegaly  Extremities:  no edema, redness, tenderness in the calves  Skin:  no gross lesions, rashes, induration    Assessment:     Hospital Problems             Last Modified POA    * (Principal) Hyponatremia 10/10/2024 Yes    Anxiety 10/10/2024 Yes    Gastro-esophageal reflux disease without esophagitis 10/10/2024 Yes    Essential hypertension (Chronic) 10/10/2024 Yes    Hypokalemia 10/10/2024 Yes    Elevated brain natriuretic peptide (BNP) level 10/10/2024 Yes    Leukocytosis 10/10/2024 Yes    Prediabetes (Chronic) 10/10/2024 Yes    Overview Signed 10/10/2024  8:47 PM by Gerardo Alvarez MD     Patient reportedly was in the prediabetic range according to labs in 2019 per chart review, however I am unable to see those labs.  But on recent blood work performed this month hemoglobin A1c was 6.3%.  Patient does complain of urinary

## 2024-10-11 NOTE — PROGRESS NOTES
Nephrology Progress Note      SUBJECTIVE      Patient seen and examined.  Bedside discussion with patient's  conducted as well.  Patient transferred from [facility] to [facility].  She was evaluated by Dr. Cabello over there for severe hyponatremia with a sodium level of 109.    Patient is status post robotic assisted laparoscopic incisional hernia repair with mesh on 10/1/2024 by Dr. Cummings.  She started noticing some abdominal pain 2 to 3 days prior to admission, had poor appetite, did feel like she was developing more abdominal distention and also got some diarrhea.  In an attempt to keep herself hydrated she started drinking a fair amount of free water.  Symptoms did not get better, she presented to the ER and had the following labs:  Sodium 109 which was down from 140 about 10 days earlier, potassium of 3.4, bicarb 24, creatinine 0.8 and an albumin of 3.1.  There was some issue with urinary retention, Bar was placed and we got about 500 mL of urine out.    Initial CT scan of the abdomen and pelvis showed a large air-fluid collection seen along midline consistent with an abscess measuring 13.8 x 7 cm  Urine studies showed urine sodium of 20, urine osmolarity of greater than 425.    Overnight following transfer her sodium dropped down to 105.  She was started on 3% saline by Dr. Singh and her last sodium level was 106 at around 7 AM today.  3% saline has been discontinued and we are repeating a set of electrolytes currently.    Additional history is noted positive for chronic hypertension, history of hyperparathyroidism and mild hypercalcemia status post parathyroidectomy of ectopic gland in May 2023.  She additionally has had history of cholecystectomy, hysterectomy, incisional hernia in the past and surgical repair of incarcerated incisional hernia as of 10/1/2024      OBJECTIVE      CURRENT TEMPERATURE:  Temp: 96.8 °F (36 °C)  MAXIMUM TEMPERATURE OVER 24HRS:  Temp (24hrs), Av.2

## 2024-10-11 NOTE — CARE COORDINATION
Case Management Assessment  Initial Evaluation    Date/Time of Evaluation: 10/11/2024 3:36 PM  Assessment Completed by: Olga Melendez    If patient is discharged prior to next notation, then this note serves as note for discharge by case management.    Patient Name: Mandie Bustamante                   YOB: 1955  Diagnosis: Hyponatremia [E87.1]                   Date / Time: 10/10/2024  6:11 PM    Patient Admission Status: Inpatient   Readmission Risk (Low < 19, Mod (19-27), High > 27): Readmission Risk Score: 13.8    Current PCP: None, None  PCP verified by CM? (P) No (Declining PCP list.  Working on PCP through [facility])    Chart Reviewed: Yes      History Provided by: (P) Significant Other  Patient Orientation: (P) Alert and Oriented    Patient Cognition: (P) Alert    Hospitalization in the last 30 days (Readmission):  No    If yes, Readmission Assessment in CM Navigator will be completed.    Advance Directives:      Code Status: Full Code   Patient's Primary Decision Maker is: (P) Legal Next of Kin      Discharge Planning:    Patient lives with: (P) Spouse/Significant Other Type of Home: (P) House  Primary Care Giver: (P) Self  Patient Support Systems include: (P) Spouse/Significant Other   Current Financial resources: (P) Medicare  Current community resources: (P) None  Current services prior to admission: (P) None            Current DME:              Type of Home Care services:  (P) None    ADLS  Prior functional level: (P) Independent in ADLs/IADLs  Current functional level: (P) Independent in ADLs/IADLs    PT AM-PAC:   /24  OT AM-PAC:   /24    Family can provide assistance at DC: (P) Yes  Would you like Case Management to discuss the discharge plan with any other family members/significant others, and if so, who? (P) Yes ()  Plans to Return to Present Housing: (P) Yes  Other Identified Issues/Barriers to RETURNING to current housing: medical complications  Potential Assistance needed at

## 2024-10-11 NOTE — OP NOTE
Operative Note      Patient: Mandie Bustamante  YOB: 1955  MRN: [id number]    Date of Procedure:  10/11/2024    Preoperative diagnosis: Abdominal wall seroma    Post-Op Diagnosis: Same       Procedure: Ultrasound-guided aspiration of abdominal wall fluid collection/seroma    Surgeon: Gm Cummings MD        Anesthesia: 5 mL 1% lidocaine plain    Estimated Blood Loss (mL): Minimal    Complications: None    Specimens:   * Cannot find log *    Implants:  * No surgical log found *      Drains:   Urinary Catheter 10/10/24 Bar (Active)   $ Urethral catheter insertion $ Not inserted for procedure 10/10/24 1249   Catheter Indications Need for fluid volume management of the critically ill patient in a critical care setting 10/11/24 1200   Site Assessment No urethral drainage 10/11/24 1200   Urine Color Yellow 10/11/24 1200   Urine Appearance Clear 10/11/24 1200   Collection Container Standard 10/11/24 1200   Catheter Best Practices  Catheter secured to thigh;Drainage tube clipped to bed;Tamper seal intact;Bag below bladder;Lack of dependent loop in tubing;Bag not on floor;Drainage bag less than half full 10/11/24 1200   Output (mL) 175 mL 10/11/24 0715       Findings:  Infection Present At Time Of Surgery (PATOS) (choose all levels that have infection present):  No infection present  Other Findings: Light serosanguineous fluid with no evidence of purulence clinically consistent with an expected abdominal wall seroma following laparoscopic hernia repair    Detailed Description of Procedure:   After verbal and written consent, the patient was positioned supine.  An ultrasound was used to identify the fluid collection which was just below the abdominal fascia.  A sterile prep and drape of her abdomen was performed.  Local anesthesia was infiltrated into the skin and subcutaneous tissue as a field block.  A guide needle was used to enter the fluid collection just below the fascia.  Aspiration of serous fluid to  lightly serosanguineous fluid was completed without complication.  The fluid was sent for aerobic and anaerobic cultures.  A moderate amount of air was also aspirated.  A dry dressing was applied.  The patient tolerated the procedure well and there were no immediate complications.    Electronically signed by Gm Cummings MD on 10/11/2024 at 4:31 PM

## 2024-10-11 NOTE — FLOWSHEET NOTE
10/11/24 0733   Treatment Team Notification   Reason for Communication Critical results   Type of Critical Result Laboratory   Critical Lab Information Na 106   Person Result Received From Lab   Critical Lab Result Type Electrolytes   Name of Team Member Notified Dr. Van   Treatment Team Role Consulting Provider   Method of Communication Secure Message   Notification Time 0735     Physician notified. Orders received for repeat 3% NS. Check lab at 0900.

## 2024-10-11 NOTE — PROGRESS NOTES
Physical Therapy  DATE: 10/11/2024    NAME: Mandie Bustamante  MRN: [id number]   : 1955    Patient not seen this date for Physical Therapy due to:      [] Cancel by RN or physician due to:    [] Hemodialysis    [x] Critical Lab Value Level   Sodium rechecked at 9:58am and continues to be critically low at 108.  PT continue to follow.     [] Blood transfusion in progress    [] Acute or unstable cardiovascular status   _MAP < 55 or more than >115  _HR < 40 or > 130    [] Acute or unstable pulmonary status   -FiO2 > 60%   _RR < 5 or >40    _O2 sats < 85%    [] Strict Bedrest    [] Off Unit for surgery or procedure    [] Off Unit for testing       [] Pending imaging to R/O fracture    [] Refusal by Patient      [] Other      [] PT being discontinued at this time. Patient independent. No further needs.     [] PT being discontinued at this time as the patient has been transferred to hospice care. No further needs.      Ruby Hannah, PT

## 2024-10-11 NOTE — PLAN OF CARE
Problem: Discharge Planning  Goal: Discharge to home or other facility with appropriate resources  10/11/2024 0938 by Moraima Bae RN  Outcome: Progressing  10/11/2024 0441 by Negra Luis RN  Outcome: Progressing  Flowsheets (Taken 10/10/2024 2000)  Discharge to home or other facility with appropriate resources:   Identify barriers to discharge with patient and caregiver   Arrange for needed discharge resources and transportation as appropriate   Identify discharge learning needs (meds, wound care, etc)   Refer to discharge planning if patient needs post-hospital services based on physician order or complex needs related to functional status, cognitive ability or social support system     Problem: Skin/Tissue Integrity  Goal: Absence of new skin breakdown  Description: 1.  Monitor for areas of redness and/or skin breakdown  2.  Assess vascular access sites hourly  3.  Every 4-6 hours minimum:  Change oxygen saturation probe site  4.  Every 4-6 hours:  If on nasal continuous positive airway pressure, respiratory therapy assess nares and determine need for appliance change or resting period.  10/11/2024 0938 by Moraima Bae RN  Outcome: Progressing  10/11/2024 0441 by Negra Luis RN  Outcome: Progressing     Problem: ABCDS Injury Assessment  Goal: Absence of physical injury  10/11/2024 0938 by Moraima Bae RN  Outcome: Progressing  10/11/2024 0441 by Negra Luis RN  Outcome: Progressing  Flowsheets (Taken 10/11/2024 0441)  Absence of Physical Injury: Implement safety measures based on patient assessment     Problem: Safety - Adult  Goal: Free from fall injury  10/11/2024 0938 by Moraima Bae RN  Outcome: Progressing  10/11/2024 0441 by Negra Luis RN  Outcome: Progressing  Flowsheets (Taken 10/11/2024 0441)  Free From Fall Injury:   Instruct family/caregiver on patient safety   Based on caregiver fall risk screen, instruct family/caregiver to ask for assistance

## 2024-10-11 NOTE — FLOWSHEET NOTE
10/11/24 0516   Treatment Team Notification   Reason for Communication Evaluate;Review case   Name of Team Member Notified Dr. Cummings   Treatment Team Role Consulting Provider   Method of Communication Face to face   Response At bedside   Notification Time 0516     Rounded with Dr. Cummings and surgical resident at bedside. Examined Patient's abdomen and will perform aspiration at bedside today.

## 2024-10-11 NOTE — PROGRESS NOTES
Physical Therapy  DATE: 10/11/2024    NAME: Mandie Bustamante  MRN: [id number]   : 1955    Patient not seen this date for Physical Therapy due to:      [] Cancel by RN or physician due to:    [] Hemodialysis    [x] Critical Lab Value Level   SODIUM 106    [] Blood transfusion in progress    [] Acute or unstable cardiovascular status   _MAP < 55 or more than >115  _HR < 40 or > 130    [] Acute or unstable pulmonary status   -FiO2 > 60%   _RR < 5 or >40    _O2 sats < 85%    [] Strict Bedrest    [] Off Unit for surgery or procedure    [] Off Unit for testing       [] Pending imaging to R/O fracture    [] Refusal by Patient      [x] Other  DOMINGO Prabhakar okayed patient for therapy. On therapist arrival, Patient lethargic and states \"Lucia just been confused and very tired\".  Sodium critically low at 106. Therapist attempted orientation questions and patient eyes closed during social functional history. Patient not appropriate for PT this morning.   PT continue to follow and will check back as able.  DOMINGO Prabhakar notified.     [] PT being discontinued at this time. Patient independent. No further needs.     [] PT being discontinued at this time as the patient has been transferred to hospice care. No further needs.      Ruby Hannah, PT

## 2024-10-11 NOTE — PROGRESS NOTES
Spoke to RN about placing patient on Bipap. At this time I don't feel comfortable follow this order based on Abdominal CT results and patient belly distension. Patient is on 2L Nasal cannula with O2 saturation at 95% without noticeable respiratory distress or periods of Sleep apnea. Will continue to monitor.

## 2024-10-11 NOTE — FLOWSHEET NOTE
10/11/24 1244   Treatment Team Notification   Reason for Communication Critical results   Type of Critical Result Laboratory   Critical Lab Information Na 108, K 2.9   Person Result Received From Lab   Critical Lab Result Type Electrolytes   Name of Team Member Notified Dr. Van   Treatment Team Role Consulting Provider   Method of Communication Secure Message   Notification Time 1244     Dr. Van notified of critical results. x6 doses of K to be given. Recheck in 2 hours.

## 2024-10-11 NOTE — PROGRESS NOTES
Dc instructions reviewed, questions answered.  Gtts returned and lens card given.  BGM 66 pt wanted OJ and muffin.  BGM now 74.  Pt declines any more meds or carbs at this time.     Occupational Therapy  [facility]  Occupational Therapy Not Seen Note    Patient not available for Occupational Therapy due to:    [] Testing:    [] Hemodialysis    [] Cancelled by RN:    []Refusal by Patient:    [] Surgery:     [] Intubation:     [] Pain Medication:    [] Sedation:     [] Spine Precautions :    [] Medical Instability:    [x] Other:HOLD eval as pt has Critical Lab Value Level :SODIUM 106/not therapeutic

## 2024-10-12 ENCOUNTER — APPOINTMENT (OUTPATIENT)
Dept: GENERAL RADIOLOGY | Age: 69
End: 2024-10-12
Attending: STUDENT IN AN ORGANIZED HEALTH CARE EDUCATION/TRAINING PROGRAM
Payer: COMMERCIAL

## 2024-10-12 LAB
ANION GAP SERPL CALCULATED.3IONS-SCNC: 11 MMOL/L (ref 9–17)
ANION GAP SERPL CALCULATED.3IONS-SCNC: 11 MMOL/L (ref 9–17)
ANION GAP SERPL CALCULATED.3IONS-SCNC: 12 MMOL/L (ref 9–17)
ANION GAP SERPL CALCULATED.3IONS-SCNC: 12 MMOL/L (ref 9–17)
ANION GAP SERPL CALCULATED.3IONS-SCNC: 13 MMOL/L (ref 9–17)
BASOPHILS # BLD: 0 K/UL (ref 0–0.2)
BASOPHILS NFR BLD: 0 %
BUN SERPL-MCNC: 12 MG/DL (ref 8–23)
BUN/CREAT SERPL: 17 (ref 9–20)
CA-I BLD-SCNC: 0.89 MMOL/L (ref 1.15–1.33)
CALCIUM SERPL-MCNC: 7 MG/DL (ref 8.6–10.4)
CHLORIDE SERPL-SCNC: 77 MMOL/L (ref 98–107)
CHLORIDE SERPL-SCNC: 78 MMOL/L (ref 98–107)
CHLORIDE SERPL-SCNC: 79 MMOL/L (ref 98–107)
CHLORIDE SERPL-SCNC: 82 MMOL/L (ref 98–107)
CO2 SERPL-SCNC: 22 MMOL/L (ref 20–31)
CO2 SERPL-SCNC: 23 MMOL/L (ref 20–31)
CO2 SERPL-SCNC: 24 MMOL/L (ref 20–31)
CORTIS SERPL-MCNC: 26.5 UG/DL (ref 2.5–19.5)
CORTISOL COLLECTION INFO: ABNORMAL
CREAT SERPL-MCNC: 0.7 MG/DL (ref 0.5–0.9)
CRP SERPL HS-MCNC: 126.8 MG/L (ref 0–5)
EOSINOPHIL # BLD: 0 K/UL (ref 0–0.4)
EOSINOPHILS RELATIVE PERCENT: 0 % (ref 1–4)
ERYTHROCYTE [DISTWIDTH] IN BLOOD BY AUTOMATED COUNT: 11.7 % (ref 11.8–14.4)
ERYTHROCYTE [SEDIMENTATION RATE] IN BLOOD BY PHOTOMETRIC METHOD: 27 MM/HR (ref 0–30)
GFR, ESTIMATED: >90 ML/MIN/1.73M2
GLUCOSE SERPL-MCNC: 145 MG/DL (ref 70–99)
HCT VFR BLD AUTO: 32.9 % (ref 36.3–47.1)
HGB BLD-MCNC: 12.1 G/DL (ref 11.9–15.1)
IMM GRANULOCYTES # BLD AUTO: 0.8 K/UL (ref 0–0.3)
IMM GRANULOCYTES NFR BLD: 4 %
LYMPHOCYTES NFR BLD: 0.6 K/UL (ref 1–4.8)
LYMPHOCYTES RELATIVE PERCENT: 3 % (ref 24–44)
MAGNESIUM SERPL-MCNC: 1.7 MG/DL (ref 1.6–2.6)
MCH RBC QN AUTO: 29.1 PG (ref 25.2–33.5)
MCHC RBC AUTO-ENTMCNC: 36.8 G/DL (ref 28.4–34.8)
MCV RBC AUTO: 79.1 FL (ref 82.6–102.9)
MONOCYTES NFR BLD: 13 % (ref 1–7)
MONOCYTES NFR BLD: 2.6 K/UL (ref 0.2–0.8)
MORPHOLOGY: ABNORMAL
MORPHOLOGY: ABNORMAL
NEUTROPHILS NFR BLD: 80 % (ref 36–66)
NEUTS SEG NFR BLD: 16 K/UL (ref 1.8–7.7)
PLATELET # BLD AUTO: 412 K/UL (ref 138–453)
PMV BLD AUTO: 8.7 FL (ref 8.1–13.5)
POTASSIUM SERPL-SCNC: 3.1 MMOL/L (ref 3.7–5.3)
POTASSIUM SERPL-SCNC: 3.3 MMOL/L (ref 3.7–5.3)
POTASSIUM SERPL-SCNC: 3.4 MMOL/L (ref 3.7–5.3)
POTASSIUM SERPL-SCNC: 3.5 MMOL/L (ref 3.7–5.3)
POTASSIUM SERPL-SCNC: 3.8 MMOL/L (ref 3.7–5.3)
RBC # BLD AUTO: 4.16 M/UL (ref 3.95–5.11)
SODIUM SERPL-SCNC: 110 MMOL/L (ref 135–144)
SODIUM SERPL-SCNC: 112 MMOL/L (ref 135–144)
SODIUM SERPL-SCNC: 113 MMOL/L (ref 135–144)
SODIUM SERPL-SCNC: 113 MMOL/L (ref 135–144)
SODIUM SERPL-SCNC: 114 MMOL/L (ref 135–144)
SODIUM SERPL-SCNC: 117 MMOL/L (ref 135–144)
WBC OTHER # BLD: 20 K/UL (ref 3.5–11.3)

## 2024-10-12 PROCEDURE — 99232 SBSQ HOSP IP/OBS MODERATE 35: CPT | Performed by: STUDENT IN AN ORGANIZED HEALTH CARE EDUCATION/TRAINING PROGRAM

## 2024-10-12 PROCEDURE — 6360000002 HC RX W HCPCS: Performed by: STUDENT IN AN ORGANIZED HEALTH CARE EDUCATION/TRAINING PROGRAM

## 2024-10-12 PROCEDURE — 6370000000 HC RX 637 (ALT 250 FOR IP)

## 2024-10-12 PROCEDURE — 6360000002 HC RX W HCPCS: Performed by: NURSE PRACTITIONER

## 2024-10-12 PROCEDURE — 86140 C-REACTIVE PROTEIN: CPT

## 2024-10-12 PROCEDURE — 2580000003 HC RX 258: Performed by: NURSE PRACTITIONER

## 2024-10-12 PROCEDURE — 80051 ELECTROLYTE PANEL: CPT

## 2024-10-12 PROCEDURE — 6360000002 HC RX W HCPCS: Performed by: INTERNAL MEDICINE

## 2024-10-12 PROCEDURE — 2580000003 HC RX 258: Performed by: STUDENT IN AN ORGANIZED HEALTH CARE EDUCATION/TRAINING PROGRAM

## 2024-10-12 PROCEDURE — 2000000000 HC ICU R&B

## 2024-10-12 PROCEDURE — 71045 X-RAY EXAM CHEST 1 VIEW: CPT

## 2024-10-12 PROCEDURE — 6370000000 HC RX 637 (ALT 250 FOR IP): Performed by: STUDENT IN AN ORGANIZED HEALTH CARE EDUCATION/TRAINING PROGRAM

## 2024-10-12 PROCEDURE — 85025 COMPLETE CBC W/AUTO DIFF WBC: CPT

## 2024-10-12 PROCEDURE — 6360000002 HC RX W HCPCS

## 2024-10-12 PROCEDURE — 83735 ASSAY OF MAGNESIUM: CPT

## 2024-10-12 PROCEDURE — 80048 BASIC METABOLIC PNL TOTAL CA: CPT

## 2024-10-12 PROCEDURE — 82330 ASSAY OF CALCIUM: CPT

## 2024-10-12 PROCEDURE — 85652 RBC SED RATE AUTOMATED: CPT

## 2024-10-12 PROCEDURE — 6370000000 HC RX 637 (ALT 250 FOR IP): Performed by: INTERNAL MEDICINE

## 2024-10-12 PROCEDURE — 2500000003 HC RX 250 WO HCPCS: Performed by: INTERNAL MEDICINE

## 2024-10-12 RX ORDER — CALCIUM GLUCONATE 20 MG/ML
2000 INJECTION, SOLUTION INTRAVENOUS ONCE
Status: COMPLETED | OUTPATIENT
Start: 2024-10-12 | End: 2024-10-12

## 2024-10-12 RX ORDER — DICYCLOMINE HYDROCHLORIDE 10 MG/1
20 CAPSULE ORAL
Status: DISCONTINUED | OUTPATIENT
Start: 2024-10-12 | End: 2024-10-17

## 2024-10-12 RX ORDER — POTASSIUM CHLORIDE 29.8 MG/ML
20 INJECTION INTRAVENOUS PRN
Status: DISCONTINUED | OUTPATIENT
Start: 2024-10-12 | End: 2024-11-08 | Stop reason: HOSPADM

## 2024-10-12 RX ORDER — DIPHENHYDRAMINE HYDROCHLORIDE 50 MG/ML
25 INJECTION INTRAMUSCULAR; INTRAVENOUS ONCE
Status: COMPLETED | OUTPATIENT
Start: 2024-10-12 | End: 2024-10-12

## 2024-10-12 RX ORDER — POTASSIUM CHLORIDE 29.8 MG/ML
20 INJECTION INTRAVENOUS ONCE
Status: DISCONTINUED | OUTPATIENT
Start: 2024-10-12 | End: 2024-10-12

## 2024-10-12 RX ORDER — POTASSIUM CHLORIDE 7.45 MG/ML
10 INJECTION INTRAVENOUS
Status: DISCONTINUED | OUTPATIENT
Start: 2024-10-12 | End: 2024-10-12

## 2024-10-12 RX ORDER — BISACODYL 10 MG
10 SUPPOSITORY, RECTAL RECTAL ONCE
Status: COMPLETED | OUTPATIENT
Start: 2024-10-12 | End: 2024-10-12

## 2024-10-12 RX ORDER — SODIUM CHLORIDE 1 G/1
2 TABLET ORAL ONCE
Status: COMPLETED | OUTPATIENT
Start: 2024-10-12 | End: 2024-10-12

## 2024-10-12 RX ORDER — FUROSEMIDE 10 MG/ML
20 INJECTION INTRAMUSCULAR; INTRAVENOUS ONCE
Status: COMPLETED | OUTPATIENT
Start: 2024-10-12 | End: 2024-10-12

## 2024-10-12 RX ORDER — CALCIUM GLUCONATE 94 MG/ML
2000 INJECTION, SOLUTION INTRAVENOUS ONCE
Status: DISCONTINUED | OUTPATIENT
Start: 2024-10-12 | End: 2024-10-12

## 2024-10-12 RX ORDER — FUROSEMIDE 10 MG/ML
40 INJECTION INTRAMUSCULAR; INTRAVENOUS ONCE
Status: COMPLETED | OUTPATIENT
Start: 2024-10-12 | End: 2024-10-12

## 2024-10-12 RX ADMIN — POTASSIUM CHLORIDE 10 MEQ: 7.46 INJECTION, SOLUTION INTRAVENOUS at 07:27

## 2024-10-12 RX ADMIN — ENOXAPARIN SODIUM 30 MG: 100 INJECTION SUBCUTANEOUS at 20:03

## 2024-10-12 RX ADMIN — POTASSIUM CHLORIDE 20 MEQ: 29.8 INJECTION, SOLUTION INTRAVENOUS at 15:42

## 2024-10-12 RX ADMIN — POTASSIUM CHLORIDE 20 MEQ: 29.8 INJECTION, SOLUTION INTRAVENOUS at 13:28

## 2024-10-12 RX ADMIN — ENOXAPARIN SODIUM 30 MG: 100 INJECTION SUBCUTANEOUS at 08:30

## 2024-10-12 RX ADMIN — POTASSIUM CHLORIDE, DEXTROSE MONOHYDRATE AND SODIUM CHLORIDE: 300; 5; 450 INJECTION, SOLUTION INTRAVENOUS at 18:21

## 2024-10-12 RX ADMIN — CALCIUM GLUCONATE 2000 MG: 20 INJECTION, SOLUTION INTRAVENOUS at 17:10

## 2024-10-12 RX ADMIN — BISACODYL 10 MG: 10 SUPPOSITORY RECTAL at 08:04

## 2024-10-12 RX ADMIN — SODIUM CHLORIDE, PRESERVATIVE FREE 10 ML: 5 INJECTION INTRAVENOUS at 08:31

## 2024-10-12 RX ADMIN — PIPERACILLIN AND TAZOBACTAM 4500 MG: 4; .5 INJECTION, POWDER, LYOPHILIZED, FOR SOLUTION INTRAVENOUS at 18:02

## 2024-10-12 RX ADMIN — PIPERACILLIN AND TAZOBACTAM 4500 MG: 4; .5 INJECTION, POWDER, LYOPHILIZED, FOR SOLUTION INTRAVENOUS at 10:48

## 2024-10-12 RX ADMIN — POTASSIUM CHLORIDE 10 MEQ: 7.46 INJECTION, SOLUTION INTRAVENOUS at 08:26

## 2024-10-12 RX ADMIN — PANTOPRAZOLE SODIUM 40 MG: 40 INJECTION, POWDER, FOR SOLUTION INTRAVENOUS at 08:30

## 2024-10-12 RX ADMIN — FUROSEMIDE 40 MG: 10 INJECTION, SOLUTION INTRAMUSCULAR; INTRAVENOUS at 02:55

## 2024-10-12 RX ADMIN — FUROSEMIDE 20 MG: 10 INJECTION, SOLUTION INTRAMUSCULAR; INTRAVENOUS at 21:40

## 2024-10-12 RX ADMIN — Medication 2 G: at 21:40

## 2024-10-12 RX ADMIN — DIPHENHYDRAMINE HYDROCHLORIDE 25 MG: 50 INJECTION INTRAMUSCULAR; INTRAVENOUS at 23:47

## 2024-10-12 RX ADMIN — DICYCLOMINE HYDROCHLORIDE 20 MG: 10 CAPSULE ORAL at 20:03

## 2024-10-12 RX ADMIN — POTASSIUM CHLORIDE 20 MEQ: 29.8 INJECTION, SOLUTION INTRAVENOUS at 11:48

## 2024-10-12 RX ADMIN — POTASSIUM CHLORIDE 10 MEQ: 7.46 INJECTION, SOLUTION INTRAVENOUS at 06:13

## 2024-10-12 RX ADMIN — PIPERACILLIN AND TAZOBACTAM 4500 MG: 4; .5 INJECTION, POWDER, LYOPHILIZED, FOR SOLUTION INTRAVENOUS at 02:02

## 2024-10-12 RX ADMIN — POTASSIUM CHLORIDE 10 MEQ: 7.46 INJECTION, SOLUTION INTRAVENOUS at 09:47

## 2024-10-12 ASSESSMENT — PAIN SCALES - GENERAL: PAINLEVEL_OUTOF10: 0

## 2024-10-12 NOTE — FLOWSHEET NOTE
10/12/24 0238   Treatment Team Notification   Reason for Communication Review case   Name of Team Member Notified Bello MARIE   Treatment Team Role Advanced Practice Nurse   Method of Communication Secure Message   Response No new orders     Chest Xray indicates that new PICC tip placed in the R atrium. House supervisor aware. Also Xray indicates possible ileus. NP aware. No new orders.

## 2024-10-12 NOTE — PLAN OF CARE
Patient complaining of extreme thirst, have ordered a 500cc fluid restriction. Na is correcting at an appropriate rate from 105 to 111 after being around 105-108 during the day today after IV Kcl, approximately 240cc of 3% saline and D5 1/2 NS to prevent overcorrection.     Now that Na is 111 will stop the 3% saline infusion and restart D5 1/2NS and add 40 mEq Kcl to the bag at 50cc/h.     If the sodium remains <113 on the next two measurements around 10pm and midnight then please give 40mg IV lasix.     Annemarie Reyes MD  [facility]

## 2024-10-12 NOTE — PROGRESS NOTES
End Of Shift Note  [facility]  Summary of shift: Pt had an uneventful night. Current Na 112. D5 1/2 NS with 40 meq K continues. One time dose of lasix given per nephrology. K 3.1 this morning. 40meq IV K ordered. Bag 1 out of 4 complete. Pt received PICC line but placement needs to be readjusted for use. Chest Xray showed possible ileus. Gen Surg aware and recommends suppository. One PRN dose of labetalol for HTN. Otherwise VSS. No needs expressed at time of writing.    Vitals:    Vitals:    10/12/24 0200 10/12/24 0400 10/12/24 0500 10/12/24 0600   BP: (!) 143/70 (!) 134/57 (!) 124/56 137/65   Pulse: 81 89 80 86   Resp: 19 20 16 23   Temp:  96.9 °F (36.1 °C)     TempSrc:  Temporal     SpO2: 95% 95% 96% 95%   Weight:  132 kg (291 lb)     Height:            I&O:   Intake/Output Summary (Last 24 hours) at 10/12/2024 0723  Last data filed at 10/12/2024 0602  Gross per 24 hour   Intake 1079.68 ml   Output 1750 ml   Net -670.32 ml       Resp Status: RA    Ventilator Settings:     / / /FiO2 : 30 %    Critical Care IV infusions:   dextrose 5% and 0.45% NaCl with KCl 40 mEq 50 mL/hr at 10/11/24 2111    sodium chloride          LDA:   PICC 10/11/24 Right Brachial (Active)   Number of days: 0       Peripheral IV 10/10/24 Right Antecubital (Active)   Number of days: 1       Urinary Catheter 10/10/24 Bar (Active)   Number of days: 1       Incision 10/01/24 Abdomen Medial;Upper (Active)   Number of days: 10

## 2024-10-12 NOTE — FLOWSHEET NOTE
10/11/24 2047   Treatment Team Notification   Reason for Communication Critical results   Type of Critical Result Laboratory   Critical Lab Information Na 111   Critical Lab Result Type Electrolytes   Name of Team Member Notified Elzbieta Paris NP   Treatment Team Role Advanced Practice Nurse   Method of Communication Call   Response See orders     Notified NP of critical sodium of 111. Orders to start D5 1/2 NS with 40 meq K @ 50ml/hr, recheck Na in 2 hours.

## 2024-10-12 NOTE — CONSULTS
Benjamin Almazan MD  Urology Consultation    Patient:  Mandie Bustamante  MRN: [id number]  YOB: 1955    CHIEF COMPLAINT: Urinary retention    HISTORY OF PRESENT ILLNESS:   The patient is a 69 y.o. female who presents with Abdominal pain and shortness of breath the patient is status post hernia repair the patient continued having pain after surgery it was admitted with the symptoms of shortness of breath lower leg swelling   The patient was found to be hyponatremic, the patient had recent robotic assisted laparoscopic incisional hernia repair with mesh  Patient's old records, notes and chart reviewed and summarized above.    Past Medical History:    Past Medical History:   Diagnosis Date    Allergic rhinitis     Anemia     Arthritis     Autoimmune disorder (HCC)     Rheumatologic    Cataracts, bilateral     GERD (gastroesophageal reflux disease)     Headache     Hypercalcemia     Hyperparathyroidism (HCC)     Hypertension     Obesity     Osteoarthritis        Past Surgical History:    Past Surgical History:   Procedure Laterality Date    APPENDECTOMY      BUNIONECTOMY Left 07/2016    Hardware removed Sept 2022    CHOLECYSTECTOMY      FOOT SURGERY Left 1989    cyst removal    FOOT SURGERY Left 03/2010    cyst removal cheilectomy    FOOT SURGERY Left 07/2017    nerve removal in surgical area    HERNIA REPAIR  08/2022    Umbilical    HYSTERECTOMY, TOTAL ABDOMINAL (CERVIX REMOVED)  09/1986    HYSTERECTOMY, VAGINAL  09/1986    PARATHYROID GLAND SURGERY  05/03/2023    Ectopic Parathyroid gland from sternum Mini sternotomy    PARATHYROIDECTOMY  05/2022    SHOULDER ARTHROSCOPY Right 08/2011    SHOULDER ARTHROSCOPY Right 04/2012    SHOULDER ARTHROSCOPY Left 05/2012    SHOULDER SURGERY Right 10/2000    arthroscopy/bone spur    TOTAL VAGINAL HYSTERECTOMY  1986    Partial 1981    UMBILICAL HERNIA REPAIR      UPPER GASTROINTESTINAL ENDOSCOPY  2014    URETHRA SURGERY  1986    Repair    VENTRAL HERNIA REPAIR N/A 10/1/2024

## 2024-10-12 NOTE — PROGRESS NOTES
Renal Progress Note    Patient :  Mandie Bustamante; 69 y.o. MRN# [id number]  Location:  2042/2042-01  Attending:  Gerardo Alvarez MD  Admit Date:  10/10/2024   Hospital Day: 2      Subjective:     Following for hyponatremia with sodium level 109 on admission and dropped 105, transferred from [place] to [place].  She is status post robotic laparoscopic incisional hernia repair and mesh on October 1.  After discharge from surgery, she started noticing abdominal pain with poor appetite along with abdominal distention and diarrhea.  In order to stay hydrated she kept drinking a fair amount of free water.    Yesterday patient received 3% saline which was discontinued early in the morning.  She was then started on D5 and a half with 40 mill equivalent potassium last night.  This morning she was given Lasix 40 mg IV x 1.  Lab work this morning: Sodium 112, potassium 3.1 which is being covered currently, chloride 77, CO2 23, BUN 12, creatinine 0.7.  Repeat sodium 113-114.  Urine output following the Lasix has been 1125.  She is on 500 cc fluid restriction in a 24-hour period  She has had to receive labetalol as needed for elevated blood pressures.  Patient was noted to have an ileus and given 1 suppository this morning that has helped with some of the abdominal discomfort  Patient is lethargic later this morning compared to earlier per nursing.  Patient has obstructive sleep apnea and is to be on BiPAP when sleeping however she has been refusing.      Outpatient Medications:     Medications Prior to Admission: docusate sodium (COLACE) 100 MG capsule, Take 1 capsule by mouth 2 times daily  ondansetron (ZOFRAN-ODT) 4 MG disintegrating tablet, Take 1 tablet by mouth 3 times daily as needed for Nausea or Vomiting  zolpidem (AMBIEN) 5 MG tablet,   tacrolimus (PROTOPIC) 0.1 % ointment, Apply 1 Application topically every evening  loratadine (CLARITIN) 10 MG capsule, Take 1 capsule by mouth as needed  irbesartan (AVAPRO) 150  sodium was 106 around 7:30 AM today.  2.  Mild hypokalemia : Likely from decreased oral intake, being replaced.  3.  Lower extremity edema likely secondary to hypoalbuminemia and third spacing  4.  History of parathyroidectomy secondary to hyperparathyroidism from an ectopic gland.  5.  Fluid collection/abscess noted along the peritoneum just below the incision site.  General surgery following.    Plan:   1.  Target sodium level to be 119-120 over the next 24 hours  2.  Check sodium levels every 3 hours.  Call if less than 112 or greater than 118  3.  Continue D5 and a half with 40 of KCl at 50 cc/h  4.  Continue to cover electrolytes/potassium  5.  Change PO fluid restriction to 1000 /24 hour perior  6.  Will discuss with     Nutrition   Please ensure that patient is on a renal diet/TF. Avoid nephrotoxic drugs/contrast exposure.    We will continue to follow along with you.     Yadi Bergeron CNP

## 2024-10-12 NOTE — PROGRESS NOTES
End Of Shift Note  [facility]  Summary of shift: Patient had an eventful shift. Continuing our electrolyte checks and replacement, patient also had bedside aspiration of abdominal fluid that was sent to lab. Patient was hypertensive and prn BP medications ordered. Discussed with physician multiple times throughout day about electrolytes, fluid status and vitals. Current plan is for the 109 NA to check electrolytes at 8 pm (after she received 30 ml's of 3% NACL) If less than 110 continue 3% at 30 ml/hr if 110 leave fluids off if greater than 110 give D5 1/2 NS at 50 ml/hr. Patient was approved for some clear liquids but only 500 ml's per 24 hours. Patient did wear bipap for 1 hour but then refused to continue. Patient's IV access is poor and she has been a difficult stick and requires at least 2 IV's and frequent lab draws so a PICC line was ordered. Bedside shift report completed and both RN and patient updated on this plan.     Vitals:    Vitals:    10/11/24 1700 10/11/24 1800 10/11/24 1821 10/11/24 1900   BP: (!) 174/73 (!) 143/72  (!) 167/70   Pulse: 82 86 88 80   Resp: 22 21 25 18   Temp:       TempSrc:       SpO2: 98% 97% 98%    Weight:       Height:            I&O:   Intake/Output Summary (Last 24 hours) at 10/11/2024 2003  Last data filed at 10/11/2024 1821  Gross per 24 hour   Intake 2454.15 ml   Output 1000 ml   Net 1454.15 ml       Resp Status: 2L NC    Ventilator Settings:     / / /FiO2 : 30 %    Critical Care IV infusions:   sodium chloride      dextrose 5 % and 0.45 % NaCl Stopped (10/11/24 0144)        LDA:   Peripheral IV 10/10/24 Left Antecubital (Active)   Number of days: 1       Peripheral IV 10/10/24 Right Antecubital (Active)   Number of days: 1       Urinary Catheter 10/10/24 Bar (Active)   Number of days: 1       Incision 10/01/24 Abdomen Medial;Upper (Active)   Number of days: 10

## 2024-10-12 NOTE — FLOWSHEET NOTE
10/12/24 0000   Treatment Team Notification   Reason for Communication Critical results   Type of Critical Result Laboratory   Critical Lab Information Na 110   Critical Lab Result Type Electrolytes   Name of Team Member Notified Elzbieta Paris NP   Treatment Team Role Advanced Practice Nurse   Method of Communication Call   Response See orders     Spoke with Nephology NP regarding Na 110. Orders to follow recommendation per Dr. Reyes as follows:    Continue D5 1/2 NS with 40mEq K. On next Na check if Na is <113, give 40mg IV lasix. If Na is >113 or <106, notify provider..

## 2024-10-12 NOTE — PROGRESS NOTES
End Of Shift Note  [facility]  Summary of shift: Patient continues on D5 1/2 NS with 40 MEQ K however rate was just decreased from 50ml/hr to 10 ml/hr per nephrology. NA has been 114 for the last 3 electrolyte checks. Patient did receive a suppository and had 2 loose watery stools. Patient is lethargic and intermittently confused conversations. Patient's spouse at bedside throughout most of the shift. All questions answered.     Vitals:    Vitals:    10/12/24 1500 10/12/24 1600 10/12/24 1700 10/12/24 1800   BP: 132/82 (!) 145/64 (!) 140/71 (!) 142/82   Pulse: 94 92 95 98   Resp: 29 28 24 23   Temp:  96.8 °F (36 °C)     TempSrc:  Temporal     SpO2: 97% 98% 97% 97%   Weight:       Height:            I&O:   Intake/Output Summary (Last 24 hours) at 10/12/2024 1825  Last data filed at 10/12/2024 1809  Gross per 24 hour   Intake 2652.52 ml   Output 3175 ml   Net -522.48 ml       Resp Status: RA    Ventilator Settings:     / / /FiO2 : 30 %    Critical Care IV infusions:   dextrose 5% and 0.45% NaCl with KCl 40 mEq 10 mL/hr at 10/12/24 1821    sodium chloride          LDA:   PICC 10/11/24 Right Brachial (Active)   Number of days: 0       Peripheral IV 10/10/24 Right Antecubital (Active)   Number of days: 1       Urinary Catheter 10/10/24 Bar (Active)   Number of days: 2       Incision 10/01/24 Abdomen Medial;Upper (Active)   Number of days: 11

## 2024-10-12 NOTE — PROGRESS NOTES
[facility]  General Surgery  Consult Progress Note    Service date: 10/12/2024, 5:56 AM  Room : 2042/2042-01   Name: Mandie Bustamante  MRN: [id number]    Length of stay: 2 day(s)    OR date: 10/1/2024: Robotic assisted laparoscopic mesh hernioplasty    POD #11      Subjective   Interval update:   -Overall, patient is doing well  -No significant overnight issues noted.  -Abdominal pain well controlled  -Endorses not having bowel movement.  -Patient is currently kept n.p.o.  -Patient reports no nausea/ vomiting, fever/chills, chest pain, shortness of breath, or other symptoms.    Ultrasound-guided bedside aspiration of seroma done on 10/12/2024.  Specimen sent for microbiological analysis         Objective   Physical exam: BP (!) 124/56   Pulse 80   Temp 96.9 °F (36.1 °C) (Temporal)   Resp 16   Ht 1.6 m (5' 2.99\")   Wt 132 kg (291 lb)   SpO2 96%   BMI 51.56 kg/m²     General: Patient is resting comfortably on exam, not in any distress and is pleasant and cooperative.   Neuro: alert and oriented x3   Abdomen: soft, appropriately tender , non distended. Incision: Clean, dry, intact. No erythema noted.    Chest: Non-labored, symmetrical respirations.   CVS: Pulse RRR       Date 10/12/24 0000 - 10/12/24 2359   Shift 4269-2133 2191-4587 5314-7437 24 Hour Total   INTAKE   Shift Total(mL/kg)       OUTPUT   Urine(mL/kg/hr) 1300   1300   Shift Total(mL/kg) 1300(9.8)   1300(9.8)   Weight (kg) 132 132 132 132         Medications:    Current Facility-Administered Medications   Medication Dose Route Frequency Provider Last Rate Last Admin    lidocaine PF 1 % injection 5 mL  5 mL Tracheal Tube Once Gm Cummings MD        labetalol (NORMODYNE;TRANDATE) injection 10 mg  10 mg IntraVENous Q6H PRN Gerardo Alvarez MD   10 mg at 10/11/24 2005    dextrose 5 % and 0.45 % NaCl with KCl 40 mEq infusion   IntraVENous Continuous Elzbieta Paris APRN - NP 50 mL/hr at 10/11/24 2111 New Bag at 10/11/24 2111    sodium

## 2024-10-12 NOTE — FLOWSHEET NOTE
10/12/24 1310   Treatment Team Notification   Reason for Communication Review case   Name of Team Member Notified Dr. Alvarez   Treatment Team Role Attending Provider   Method of Communication Face to face   Response At bedside     Rounded with Dr. Alvarez. Updated on patient status and current plan of care. Diet orders updated, medications reviewed, family at bedside and all questions answered.

## 2024-10-12 NOTE — PROCEDURES
PROCEDURE NOTE  Date: 10/11/2024   Name: Mandie Bustamante  YOB: 1955    Procedures  Picc placement note:  Dynamic Access RN procedure    Consent signed and obtained by proceduralist, from Mandie Bustamante patient/DPOA. See consent form in paper chart.    Prescribed IV Therapy = 3% saline, Zosyn and D5 1/2  Peripheral ultrasound assessment done. Plan for right brachial vein insertion.   CVR measurement = 28 % (Linear CVR is preferred to be less than 45%).  Product type: Bard 5 fr triple lumen Power PICC.  History/Labs/Allergies Reviewed  Placed By: Marek Wheeler RN (Dynamic Access)  Time out Performed using Two Identifiers  Lot # [id number]  Expiration date = 3/31/25  Trimmed at 43 cm total  External catheter length 0 cm  Number of attempts 1  Special equipment used- Bard 3cg tip confirmation system, ultrasound, and micro-introducer (MST) technique   Catheter securement = adhesive 3M securement device  Dressing applied= Tegaderm CHG  Lidocaine administered intradermally conc.1%, approx 1 ml (Lidocaine Lot# - [id number] and Exp date - 7/30/26 )    Omi RN aware picc placed with ECG technology and is confirmed in the distal 1/3 SVC. Picc is immediately released for use. Rn aware new iv tubing required.         PICC education:     [ X ] Discussed with patient/Family or POA prior to procedure.  Risks and Benefits along with reason for procedure were discussed and teaching was reinforced with an education handout on line  insertion. [facility] [place] Catheter Associated Blood Stream Infections and [facility] [id number] REV. 7/13 Nursing and Booklet left at bedside or in chart. Patient (Family or POA) acknowledged understanding of information taught and agreed to procedure.      [  ] Was not discussed with patient/family or POA due to pts medical status at time of procedure. pts family or POA not available to discuss line education. [facility] [place] [facility] Catheter Associated Blood Stream Infections and [facility] [id number] REV. 7/13 Nursing and Booklet left at

## 2024-10-12 NOTE — PROGRESS NOTES
[facility]  Office: [phone]  Keenan Foster DO, Kevin Siegel DO, Carlos A Orta DO, Taye Morales DO, Maxx Madison MD, Meena Eckert MD, Efren Vo MD, Makayla Last MD,  Jerman Christian MD, Gina Bourgeois MD, Rashaad Aden DO, Gladys Fry MD,  Anu Murillo DO, Jennifer Blanca MD, Oral Duncan MD, Harlan Foster DO, Katya Simmons MD,  Filiberto Slade DO, Elizabeth Alves MD, Samantha Pinto MD, Arlene Bryant MD, Bandar Barrios MD,  Kulwant Rodrigez MD, Michelle Dennison MD, Jinny Steel MD, Juan Wolf DO, Corin Moran MD,  Benjie Alvarenga MD, Shirley Waterhouse, CNP,  Georgette Villarreal, CNP, Sheri Jones, CNP, Demetri Toure, CNP,  Silvina Castano, DNP, Tatiana Mckeon, CNP, Angelina Harper, CNP, Ewelina Toribio, CNP, Alma Rosa Peng, CNP, Rashmi Mccartney, CNP, Nuno Zafar PA-C, Dominga Graham, CNS, Vaishnavi Rome, CNP, Nadiya Rick, CNP         [facility]   IN-PATIENT SERVICE   [facility]    Progress Note    10/12/2024    1:06 PM    Name:   Mandie Bustamante  MRN:     [id number]     Acct:      [id number]   Room:   2042/2042-01   Day:  2  Admit Date:  10/10/2024  6:11 PM    PCP:   None, None  Code Status:  Full Code    Subjective:     Patient seen and examined this morning.   bedside.  Remained afebrile, blood pressure on the higher side, currently off oxygen.  Overnight she briefly tried BiPAP.  Patient is very lethargic this morning.  Mentating okay.    Lab work reviewed, sodium slowly trending up, 114 this morning, she received 3% normal saline as per nephro recommendation.  Currently on dextrose and half-normal with KCl.  Potassium 3.4, getting replaced.    Patient got PICC line placed last evening.  Initially the tip was in the right atrium, repeat chest x-ray done this morning showed appropriate position.    General surgery, urology and nephrology on board.  She has a Bar catheter, urine output okay.    Medications:     Allergies:

## 2024-10-12 NOTE — CONSULTS
PICC LINE    DYNAMIC ACCESS    CALLED TO FACILITY AFTER A CHEST XRAY WAS PERFORMED ON PATIENT AND SHOWS EXISTING PICC LINE IN RIGHT ATRIUM PER READ OF THE XRAY.    PICC WITHREW 3CM, AND STAFF INFORMED TO OBTAIN NEW XRAY FOR PLACEMENT VERIFICATION.    ILIA BIGGS RN, [facility]

## 2024-10-13 LAB
ANION GAP SERPL CALCULATED.3IONS-SCNC: 11 MMOL/L (ref 9–17)
ANION GAP SERPL CALCULATED.3IONS-SCNC: 11 MMOL/L (ref 9–17)
ANION GAP SERPL CALCULATED.3IONS-SCNC: 12 MMOL/L (ref 9–17)
ANION GAP SERPL CALCULATED.3IONS-SCNC: 13 MMOL/L (ref 9–17)
ANION GAP SERPL CALCULATED.3IONS-SCNC: 15 MMOL/L (ref 9–17)
BASOPHILS # BLD: 0.24 K/UL (ref 0–0.2)
BASOPHILS NFR BLD: 1 % (ref 0–2)
BUN SERPL-MCNC: 11 MG/DL (ref 8–23)
BUN/CREAT SERPL: 16 (ref 9–20)
CA-I BLD-SCNC: 0.97 MMOL/L (ref 1.15–1.33)
CALCIUM SERPL-MCNC: 7.3 MG/DL (ref 8.6–10.4)
CHLORIDE SERPL-SCNC: 80 MMOL/L (ref 98–107)
CHLORIDE SERPL-SCNC: 81 MMOL/L (ref 98–107)
CHLORIDE SERPL-SCNC: 82 MMOL/L (ref 98–107)
CHLORIDE SERPL-SCNC: 82 MMOL/L (ref 98–107)
CHLORIDE SERPL-SCNC: 83 MMOL/L (ref 98–107)
CHLORIDE SERPL-SCNC: 84 MMOL/L (ref 98–107)
CHLORIDE SERPL-SCNC: 84 MMOL/L (ref 98–107)
CHLORIDE SERPL-SCNC: 85 MMOL/L (ref 98–107)
CO2 SERPL-SCNC: 21 MMOL/L (ref 20–31)
CO2 SERPL-SCNC: 22 MMOL/L (ref 20–31)
CO2 SERPL-SCNC: 22 MMOL/L (ref 20–31)
CO2 SERPL-SCNC: 23 MMOL/L (ref 20–31)
CO2 SERPL-SCNC: 24 MMOL/L (ref 20–31)
CO2 SERPL-SCNC: 24 MMOL/L (ref 20–31)
CREAT SERPL-MCNC: 0.7 MG/DL (ref 0.5–0.9)
EOSINOPHIL # BLD: 0 K/UL (ref 0–0.44)
EOSINOPHILS RELATIVE PERCENT: 0 % (ref 1–4)
ERYTHROCYTE [DISTWIDTH] IN BLOOD BY AUTOMATED COUNT: 11.9 % (ref 11.8–14.4)
GFR, ESTIMATED: >90 ML/MIN/1.73M2
GLUCOSE BLD-MCNC: 142 MG/DL (ref 65–105)
GLUCOSE SERPL-MCNC: 130 MG/DL (ref 70–99)
HCT VFR BLD AUTO: 31.5 % (ref 36.3–47.1)
HGB BLD-MCNC: 11.7 G/DL (ref 11.9–15.1)
IMM GRANULOCYTES # BLD AUTO: 1.89 K/UL (ref 0–0.3)
IMM GRANULOCYTES NFR BLD: 8 %
LYMPHOCYTES NFR BLD: 1.42 K/UL (ref 1.1–3.7)
LYMPHOCYTES RELATIVE PERCENT: 6 % (ref 24–43)
MCH RBC QN AUTO: 29.8 PG (ref 25.2–33.5)
MCHC RBC AUTO-ENTMCNC: 37.1 G/DL (ref 28.4–34.8)
MCV RBC AUTO: 80.2 FL (ref 82.6–102.9)
MICROORGANISM SPEC CULT: NORMAL
MICROORGANISM SPEC CULT: NORMAL
MONOCYTES NFR BLD: 11 % (ref 3–12)
MONOCYTES NFR BLD: 2.6 K/UL (ref 0.1–1.2)
MORPHOLOGY: ABNORMAL
MORPHOLOGY: ABNORMAL
NEUTROPHILS NFR BLD: 74 % (ref 36–65)
NEUTS SEG NFR BLD: 17.45 K/UL (ref 1.5–8.1)
NRBC BLD-RTO: 0 PER 100 WBC
PLATELET # BLD AUTO: 381 K/UL (ref 138–453)
PMV BLD AUTO: 8.8 FL (ref 8.1–13.5)
POTASSIUM SERPL-SCNC: 3.1 MMOL/L (ref 3.7–5.3)
POTASSIUM SERPL-SCNC: 3.4 MMOL/L (ref 3.7–5.3)
POTASSIUM SERPL-SCNC: 3.5 MMOL/L (ref 3.7–5.3)
POTASSIUM SERPL-SCNC: 3.5 MMOL/L (ref 3.7–5.3)
POTASSIUM SERPL-SCNC: 3.7 MMOL/L (ref 3.7–5.3)
POTASSIUM SERPL-SCNC: 3.7 MMOL/L (ref 3.7–5.3)
POTASSIUM SERPL-SCNC: 4.3 MMOL/L (ref 3.7–5.3)
POTASSIUM SERPL-SCNC: 4.3 MMOL/L (ref 3.7–5.3)
RBC # BLD AUTO: 3.93 M/UL (ref 3.95–5.11)
SERVICE CMNT-IMP: NORMAL
SERVICE CMNT-IMP: NORMAL
SODIUM SERPL-SCNC: 115 MMOL/L (ref 135–144)
SODIUM SERPL-SCNC: 116 MMOL/L (ref 135–144)
SODIUM SERPL-SCNC: 116 MMOL/L (ref 135–144)
SODIUM SERPL-SCNC: 117 MMOL/L (ref 135–144)
SODIUM SERPL-SCNC: 117 MMOL/L (ref 135–144)
SODIUM SERPL-SCNC: 119 MMOL/L (ref 135–144)
SODIUM SERPL-SCNC: 120 MMOL/L (ref 135–144)
SODIUM SERPL-SCNC: 121 MMOL/L (ref 135–144)
SPECIMEN DESCRIPTION: NORMAL
SPECIMEN DESCRIPTION: NORMAL
WBC OTHER # BLD: 23.6 K/UL (ref 3.5–11.3)

## 2024-10-13 PROCEDURE — 82330 ASSAY OF CALCIUM: CPT

## 2024-10-13 PROCEDURE — 80048 BASIC METABOLIC PNL TOTAL CA: CPT

## 2024-10-13 PROCEDURE — 94761 N-INVAS EAR/PLS OXIMETRY MLT: CPT

## 2024-10-13 PROCEDURE — 2700000000 HC OXYGEN THERAPY PER DAY

## 2024-10-13 PROCEDURE — 2580000003 HC RX 258: Performed by: STUDENT IN AN ORGANIZED HEALTH CARE EDUCATION/TRAINING PROGRAM

## 2024-10-13 PROCEDURE — 6360000002 HC RX W HCPCS: Performed by: STUDENT IN AN ORGANIZED HEALTH CARE EDUCATION/TRAINING PROGRAM

## 2024-10-13 PROCEDURE — 80051 ELECTROLYTE PANEL: CPT

## 2024-10-13 PROCEDURE — 6360000002 HC RX W HCPCS: Performed by: NURSE PRACTITIONER

## 2024-10-13 PROCEDURE — 2580000003 HC RX 258: Performed by: NURSE PRACTITIONER

## 2024-10-13 PROCEDURE — 6370000000 HC RX 637 (ALT 250 FOR IP): Performed by: STUDENT IN AN ORGANIZED HEALTH CARE EDUCATION/TRAINING PROGRAM

## 2024-10-13 PROCEDURE — 85025 COMPLETE CBC W/AUTO DIFF WBC: CPT

## 2024-10-13 PROCEDURE — 99232 SBSQ HOSP IP/OBS MODERATE 35: CPT | Performed by: STUDENT IN AN ORGANIZED HEALTH CARE EDUCATION/TRAINING PROGRAM

## 2024-10-13 PROCEDURE — 2000000000 HC ICU R&B

## 2024-10-13 PROCEDURE — 82947 ASSAY GLUCOSE BLOOD QUANT: CPT

## 2024-10-13 RX ORDER — CYCLOBENZAPRINE HCL 10 MG
10 TABLET ORAL 3 TIMES DAILY PRN
Status: DISCONTINUED | OUTPATIENT
Start: 2024-10-13 | End: 2024-10-14

## 2024-10-13 RX ORDER — BENZOCAINE/MENTHOL 6 MG-10 MG
LOZENGE MUCOUS MEMBRANE 2 TIMES DAILY
Status: DISCONTINUED | OUTPATIENT
Start: 2024-10-13 | End: 2024-11-08 | Stop reason: HOSPADM

## 2024-10-13 RX ORDER — FUROSEMIDE 10 MG/ML
20 INJECTION INTRAMUSCULAR; INTRAVENOUS ONCE
Status: COMPLETED | OUTPATIENT
Start: 2024-10-13 | End: 2024-10-13

## 2024-10-13 RX ORDER — DIPHENHYDRAMINE HYDROCHLORIDE 50 MG/ML
25 INJECTION INTRAMUSCULAR; INTRAVENOUS ONCE
Status: COMPLETED | OUTPATIENT
Start: 2024-10-13 | End: 2024-10-13

## 2024-10-13 RX ORDER — FUROSEMIDE 10 MG/ML
20 INJECTION INTRAMUSCULAR; INTRAVENOUS ONCE
Status: DISCONTINUED | OUTPATIENT
Start: 2024-10-13 | End: 2024-10-13

## 2024-10-13 RX ADMIN — PANTOPRAZOLE SODIUM 40 MG: 40 INJECTION, POWDER, FOR SOLUTION INTRAVENOUS at 09:49

## 2024-10-13 RX ADMIN — ENOXAPARIN SODIUM 30 MG: 100 INJECTION SUBCUTANEOUS at 09:49

## 2024-10-13 RX ADMIN — FUROSEMIDE 20 MG: 10 INJECTION, SOLUTION INTRAMUSCULAR; INTRAVENOUS at 13:58

## 2024-10-13 RX ADMIN — CYCLOBENZAPRINE 10 MG: 10 TABLET, FILM COATED ORAL at 19:27

## 2024-10-13 RX ADMIN — POTASSIUM CHLORIDE 20 MEQ: 29.8 INJECTION, SOLUTION INTRAVENOUS at 05:22

## 2024-10-13 RX ADMIN — PIPERACILLIN AND TAZOBACTAM 4500 MG: 4; .5 INJECTION, POWDER, LYOPHILIZED, FOR SOLUTION INTRAVENOUS at 17:43

## 2024-10-13 RX ADMIN — PIPERACILLIN AND TAZOBACTAM 4500 MG: 4; .5 INJECTION, POWDER, LYOPHILIZED, FOR SOLUTION INTRAVENOUS at 02:09

## 2024-10-13 RX ADMIN — PIPERACILLIN AND TAZOBACTAM 4500 MG: 4; .5 INJECTION, POWDER, LYOPHILIZED, FOR SOLUTION INTRAVENOUS at 09:54

## 2024-10-13 RX ADMIN — SODIUM CHLORIDE, PRESERVATIVE FREE 10 ML: 5 INJECTION INTRAVENOUS at 21:06

## 2024-10-13 RX ADMIN — DIPHENHYDRAMINE HYDROCHLORIDE 25 MG: 50 INJECTION, SOLUTION INTRAMUSCULAR; INTRAVENOUS at 21:05

## 2024-10-13 RX ADMIN — POTASSIUM CHLORIDE 20 MEQ: 29.8 INJECTION, SOLUTION INTRAVENOUS at 03:16

## 2024-10-13 RX ADMIN — DICYCLOMINE HYDROCHLORIDE 20 MG: 10 CAPSULE ORAL at 07:20

## 2024-10-13 RX ADMIN — SODIUM CHLORIDE, PRESERVATIVE FREE 10 ML: 5 INJECTION INTRAVENOUS at 09:50

## 2024-10-13 ASSESSMENT — PAIN SCALES - GENERAL
PAINLEVEL_OUTOF10: 0
PAINLEVEL_OUTOF10: 10

## 2024-10-13 ASSESSMENT — PAIN - FUNCTIONAL ASSESSMENT: PAIN_FUNCTIONAL_ASSESSMENT: PREVENTS OR INTERFERES SOME ACTIVE ACTIVITIES AND ADLS

## 2024-10-13 ASSESSMENT — PAIN DESCRIPTION - LOCATION: LOCATION: ABDOMEN

## 2024-10-13 ASSESSMENT — PAIN DESCRIPTION - ORIENTATION: ORIENTATION: ANTERIOR;MID

## 2024-10-13 NOTE — FLOWSHEET NOTE
10/12/24 3853   Treatment Team Notification   Reason for Communication Critical results   Type of Critical Result Laboratory   Critical Lab Information Na 117   Critical Lab Result Type Electrolytes   Name of Team Member Notified Dr. Jackson   Treatment Team Role Consulting Provider   Method of Communication Secure Message   Response No new orders     Notified MD of Na 117. No new orders.

## 2024-10-13 NOTE — PLAN OF CARE
Problem: Discharge Planning  Goal: Discharge to home or other facility with appropriate resources  Outcome: Progressing     Problem: Skin/Tissue Integrity  Goal: Absence of new skin breakdown  Description: 1.  Monitor for areas of redness and/or skin breakdown  2.  Assess vascular access sites hourly  3.  Every 4-6 hours minimum:  Change oxygen saturation probe site  4.  Every 4-6 hours:  If on nasal continuous positive airway pressure, respiratory therapy assess nares and determine need for appliance change or resting period.  Outcome: Progressing     Problem: ABCDS Injury Assessment  Goal: Absence of physical injury  Outcome: Progressing     Problem: Safety - Adult  Goal: Free from fall injury  Outcome: Progressing     Problem: Metabolic/Fluid and Electrolytes - Adult  Goal: Electrolytes maintained within normal limits  Outcome: Progressing     Problem: Metabolic/Fluid and Electrolytes - Adult  Goal: Hemodynamic stability and optimal renal function maintained  Outcome: Progressing     Problem: Metabolic/Fluid and Electrolytes - Adult  Goal: Glucose maintained within prescribed range  Outcome: Progressing     Problem: Neurosensory - Adult  Goal: Achieves stable or improved neurological status  Outcome: Progressing     Problem: Neurosensory - Adult  Goal: Absence of seizures  Outcome: Progressing     Problem: Neurosensory - Adult  Goal: Remains free of injury related to seizures activity  Outcome: Progressing     Problem: Cardiovascular - Adult  Goal: Maintains optimal cardiac output and hemodynamic stability  Outcome: Progressing     Problem: Cardiovascular - Adult  Goal: Absence of cardiac dysrhythmias or at baseline  Outcome: Progressing     Problem: Respiratory - Adult  Goal: Achieves optimal ventilation and oxygenation  10/13/2024 0734 by Ligia Brown RN  Outcome: Progressing     Problem: Gastrointestinal - Adult  Goal: Maintains or returns to baseline bowel function  Outcome: Progressing     Problem:

## 2024-10-13 NOTE — PROGRESS NOTES
Benjamin Almazan MD   Urology Progress Note            Subjective:   follow-up  urinary retention    Patient Vitals for the past 24 hrs:   BP Temp Temp src Pulse Resp SpO2 Weight   10/12/24 1800 (!) 142/82 -- -- 98 23 97 % --   10/12/24 1700 (!) 140/71 -- -- 95 24 97 % --   10/12/24 1600 (!) 145/64 96.8 °F (36 °C) Temporal 92 28 98 % --   10/12/24 1500 132/82 -- -- 94 29 97 % --   10/12/24 1400 128/81 -- -- 90 19 96 % --   10/12/24 1300 (!) 106/58 -- -- 88 24 98 % --   10/12/24 1200 (!) 140/64 96.9 °F (36.1 °C) Temporal 83 23 96 % --   10/12/24 1100 (!) 160/83 -- -- 86 18 96 % --   10/12/24 1000 (!) 158/93 -- -- 90 26 97 % --   10/12/24 0900 (!) 140/80 -- -- 90 26 96 % --   10/12/24 0800 124/61 96.9 °F (36.1 °C) Temporal 89 22 96 % --   10/12/24 0700 (!) 155/82 -- -- 93 22 96 % --   10/12/24 0600 137/65 -- -- 86 23 95 % --   10/12/24 0500 (!) 124/56 -- -- 80 16 96 % --   10/12/24 0400 (!) 134/57 96.9 °F (36.1 °C) Temporal 89 20 95 % 132 kg (291 lb)   10/12/24 0200 (!) 143/70 -- -- 81 19 95 % --   10/12/24 0100 (!) 127/59 -- -- 75 16 98 % --   10/12/24 0000 (!) 120/54 97 °F (36.1 °C) Temporal 75 16 97 % --   10/11/24 2350 -- -- -- 78 24 98 % --   10/11/24 2300 (!) 179/68 -- -- 80 21 94 % --   10/11/24 2200 (!) 177/61 -- -- 81 21 94 % --   10/11/24 2100 (!) 144/74 -- -- 87 23 96 % --       Intake/Output Summary (Last 24 hours) at 10/12/2024 2014  Last data filed at 10/12/2024 1809  Gross per 24 hour   Intake 2652.52 ml   Output 3175 ml   Net -522.48 ml       Recent Labs     10/10/24  1146 10/11/24  0548 10/12/24  1338   WBC 21.9* 18.3* 20.0*   HGB 12.5 12.4 12.1   HCT 34.8* 33.1* 32.9*   MCV 81.4 79.6* 79.1*    379 412     Recent Labs     10/11/24  0218 10/11/24  0432 10/11/24  0548 10/11/24  0648 10/12/24  0500 10/12/24  0833 10/12/24  1053 10/12/24  1338 10/12/24  1700   *   < > 105*   < > 112*   < > 114* 114* 114*   K 3.0*   < > 3.2*   < > 3.1*   < > 3.4* 3.3* 3.8   CL 70*    < > 71*   < > 77*   < > 78* 79* 79*   CO2 21   < > 21   < > 23   < > 23 24 22   BUN 16  --  16  --  12  --   --   --   --    CREATININE 0.7  --  0.7  --  0.7  --   --   --   --     < > = values in this interval not displayed.       Recent Labs     10/10/24  1242   COLORU Yellow   PHUR 6.0   LEUKOCYTESUR NEGATIVE   UROBILINOGEN Normal   BILIRUBINUR NEGATIVE       Additional Lab/culture results:    Physical Exam:  discussed with the patient the status of the bladder and the suburethral sling void trial in monitoring postvoid residual prior to discharge    Interval Imaging Findings:    Impression:    Patient Active Problem List   Diagnosis    Abdominal hernia without obstruction and without gangrene    Anxiety    BMI 45.0-49.9, adult    Bursitis of right shoulder    Gastro-esophageal reflux disease without esophagitis    History of parathyroidectomy    Essential hypertension    Incisional hernia, without obstruction or gangrene    Incisional pain    Mixed dyslipidemia    Morbid (severe) obesity due to excess calories    Other abnormal glucose    Pain in bone fixation device (HCC)    Primary hyperparathyroidism (HCC)    Primary insomnia    Right shoulder pain    Seasonal allergic rhinitis    Sensitive skin    Vitamin D insufficiency    Elevated fasting glucose    Hyponatremia    Hypokalemia    Elevated brain natriuretic peptide (BNP) level    Leukocytosis    DJD (degenerative joint disease), ankle and foot, right    Prediabetes    Intra-abdominal abscess (HCC)       Plan:  maintain indwelling Bar    Benjamin Almazan MD  8:14 PM 10/12/2024

## 2024-10-13 NOTE — PROGRESS NOTES
End Of Shift Note  [facility]  Summary of shift: Patients NA level is slowly increasing. We are still checking q3h. Currently maintenance fluids have been discontinued per nephrology. Patient is still on intermittent IV antibiotics. Up to chair with richmond lift today for several hours. Patient has complained of abdominal cramping but states the bentyl makes her short of breath and refuses to take it. Notified primary and surgery that patient is complaining of abdominal cramping and that the bentyl does not work for her and they agreed to try a muscle relaxer. Awaiting pharmacy to verify so we can administer.  at bedside for most of day and all of his questions were answered.     Vitals:    Vitals:    10/13/24 1600 10/13/24 1632 10/13/24 1700 10/13/24 1800   BP: 132/63  (!) 147/69 (!) 146/68   Pulse: 84 95 90 94   Resp: 17 25 25 22   Temp: 96.9 °F (36.1 °C)      TempSrc: Temporal      SpO2: 97% 96% 96% 96%   Weight:       Height:            I&O:   Intake/Output Summary (Last 24 hours) at 10/13/2024 1837  Last data filed at 10/13/2024 1836  Gross per 24 hour   Intake 1421.22 ml   Output 2600 ml   Net -1178.78 ml       Resp Status: RA    Ventilator Settings:     / / /FiO2 : 30 %    Critical Care IV infusions:   sodium chloride          LDA:   PICC 10/11/24 Right Brachial (Active)   Number of days: 1       Urinary Catheter 10/10/24 Bar (Active)   Number of days: 3       Incision 10/01/24 Abdomen Medial;Upper (Active)   Number of days: 12

## 2024-10-13 NOTE — PROGRESS NOTES
[facility]  Office: [phone]  Keenan Foster DO, Kevin Siegel DO, Carlos A Orta DO, Taye Morales DO, Maxx Madison MD, Meena Eckert MD, Efren Vo MD, Makayla Last MD,  Jerman Christian MD, Gina Bourgeois MD, Rashaad Aden DO, Gladys Fry MD,  Anu Murillo DO, Jennifer Blanca MD, Oral Duncan MD, Harlan Foster DO, Katya Simmons MD,  Filiberto Slade DO, Elizabeth Alves MD, Samantha Pinto MD, Arlene Bryant MD, Bandar Barrios MD,  Kulwant Rodrigez MD, Michelle Dennison MD, Jinny Steel MD, Juan Wolf DO, Corin Moran MD,  Benjie Alvarenga MD, Shirley Waterhouse, CNP,  Georgette Villarreal, CNP, Sheri Jones, CNP, Demetri Toure, CNP,  Silvina Castano, DNP, Tatiana Mckeon, CNP, Angelina Harper, CNP, Ewelina Toribio, CNP, Alma Rosa Peng, CNP, Rashmi Mccartney, CNP, Nuno Zafar PA-C, Dominga Graham, CNS, Vaishnavi Rome, CNP, Nadiya Rick, CNP         [facility]   IN-PATIENT SERVICE   [facility]    Progress Note    10/13/2024    7:06 PM    Name:   Mandie Bustamante  MRN:     [id number]     Acct:      [id number]   Room:   2030/2030-01   Day:  3  Admit Date:  10/10/2024  6:11 PM    PCP:   None, None  Code Status:  Full Code    Subjective:     Patient seen and examined this morning.  She is getting bath by nursing staff.  Was moved to a different room as the previous room did not have a patient's lift.  Remained afebrile, vitals within normal range.  Lab work reviewed, sodium slowly trending up 116 this morning, nephrology on board.  White count trending up 23.6, patient on Zosyn.  General surgery, urology and nephrology on board.  She has a Bar catheter, urine output okay.    Medications:     Allergies:    Allergies   Allergen Reactions    Latex Itching     Other reaction(s): Other (See Comments)   sensitivity   Added based on information entered during case entry, please review and add reactions, type, and severity as needed   Added based on information

## 2024-10-13 NOTE — PROGRESS NOTES
Benjamin Almazan MD   Urology Progress Note            Subjective: Follow-up urinary retention    Patient Vitals for the past 24 hrs:   BP Temp Temp src Pulse Resp SpO2 Weight   10/13/24 0700 122/79 -- -- 96 19 -- --   10/13/24 0600 139/67 -- -- 96 22 97 % --   10/13/24 0500 119/65 -- -- 95 17 97 % --   10/13/24 0430 -- -- -- 90 17 98 % --   10/13/24 0400 (!) 141/63 97.8 °F (36.6 °C) Oral 96 23 96 % 129.6 kg (285 lb 12.8 oz)   10/13/24 0300 131/67 -- -- 95 22 95 % --   10/13/24 0200 (!) 145/68 -- -- 91 20 96 % --   10/13/24 0100 131/62 -- -- 93 17 96 % --   10/13/24 0000 125/69 98.2 °F (36.8 °C) Oral 95 18 97 % --   10/12/24 2300 109/65 -- -- 100 24 95 % --   10/12/24 2200 109/66 -- -- 97 -- 95 % --   10/12/24 2100 130/72 -- -- 95 22 97 % --   10/12/24 2000 (!) 106/90 96.8 °F (36 °C) Temporal 95 18 97 % --   10/12/24 1900 (!) 152/74 -- -- 97 25 96 % --   10/12/24 1800 (!) 142/82 -- -- 98 23 97 % --   10/12/24 1700 (!) 140/71 -- -- 95 24 97 % --   10/12/24 1600 (!) 145/64 96.8 °F (36 °C) Temporal 92 28 98 % --   10/12/24 1500 132/82 -- -- 94 29 97 % --   10/12/24 1400 128/81 -- -- 90 19 96 % --   10/12/24 1300 (!) 106/58 -- -- 88 24 98 % --   10/12/24 1200 (!) 140/64 96.9 °F (36.1 °C) Temporal 83 23 96 % --   10/12/24 1100 (!) 160/83 -- -- 86 18 96 % --   10/12/24 1000 (!) 158/93 -- -- 90 26 97 % --   10/12/24 0900 (!) 140/80 -- -- 90 26 96 % --   10/12/24 0800 124/61 96.9 °F (36.1 °C) Temporal 89 22 96 % --       Intake/Output Summary (Last 24 hours) at 10/13/2024 0740  Last data filed at 10/13/2024 0600  Gross per 24 hour   Intake 2767.52 ml   Output 2975 ml   Net -207.48 ml       Recent Labs     10/11/24  0548 10/12/24  1338 10/13/24  0500   WBC 18.3* 20.0* 23.6*   HGB 12.4 12.1 11.7*   HCT 33.1* 32.9* 31.5*   MCV 79.6* 79.1* 80.2*    412 381     Recent Labs     10/11/24  0548 10/11/24  0648 10/12/24  0500 10/12/24  0833 10/12/24  2300 10/13/24  0200 10/13/24  0500   *   <

## 2024-10-13 NOTE — PROGRESS NOTES
[facility]  General Surgery  Consult Progress Note    Service date: 10/13/2024, 5:48 AM  Room : 2030/2030-01   Name: Mandie Bustamante  MRN: [id number]    Length of stay: 3 day(s)    OR date: 10/1/2024: Robotic assisted laparoscopic mesh hernioplasty    POD #12      Subjective   Interval update:   -Overall, patient is doing well  -No significant overnight issues noted.  -Abdominal pain well controlled  -Had 2 bowel movements yesterday after suppository  -Patient is currently kept n.p.o.  -Patient reports no nausea/ vomiting, fever/chills, chest pain, shortness of breath, or other symptoms.    No growth in anaerobic or aerobic cultures sent from abdominal wall aspiration    WBC uptrending.  Patient on Zosyn.         Objective   Physical exam: /67   Pulse 90   Temp 97.8 °F (36.6 °C) (Oral)   Resp 17   Ht 1.6 m (5' 2.99\")   Wt 129.6 kg (285 lb 12.8 oz)   SpO2 98%   BMI 50.64 kg/m²     General: Patient is resting comfortably on exam, not in any distress and is pleasant and cooperative.   Neuro: alert and oriented x3   Abdomen: soft, appropriately tender , non distended. Incision: Clean, dry, intact. No erythema noted.    Chest: Non-labored, symmetrical respirations.   CVS: Pulse RRR               Medications:    Current Facility-Administered Medications   Medication Dose Route Frequency Provider Last Rate Last Admin    potassium chloride 20 mEq/50 mL IVPB (Central Line)  20 mEq IntraVENous PRN Yadi Bergeron APN 50 mL/hr at 10/13/24 0522 20 mEq at 10/13/24 0522    dicyclomine (BENTYL) capsule 20 mg  20 mg Oral 4x Daily AC & HS Gerardo Alvarez MD   20 mg at 10/12/24 2003    lidocaine PF 1 % injection 5 mL  5 mL Tracheal Tube Once Gm Cummings MD        labetalol (NORMODYNE;TRANDATE) injection 10 mg  10 mg IntraVENous Q6H PRN Gerardo Alvarez MD   10 mg at 10/11/24 2005    dextrose 5 % and 0.45 % NaCl with KCl 40 mEq infusion   IntraVENous Continuous Florian Jackson MD 10 mL/hr at 10/12/24      10/11/24  0548 10/12/24  1338 10/13/24  0500   WBC 18.3* 20.0* 23.6*   HGB 12.4 12.1 11.7*    412 381     Chemistry:   Recent Labs     10/10/24  1146 10/10/24  1456 10/11/24  0019 10/11/24  0218 10/11/24  0432 10/11/24  0548 10/11/24  0648 10/12/24  0500 10/12/24  0833 10/12/24  2300 10/13/24  0200 10/13/24  0500   *   < > 106* 105*   < > 105*   < > 112*   < > 117* 116* 116*   K 3.4*   < > 3.0* 3.0*   < > 3.2*   < > 3.1*   < > 3.4* 3.5* 3.7   CL 70*   < > 71* 70*   < > 71*   < > 77*   < > 82* 82* 82*   CO2 24   < > 21 21   < > 21   < > 23   < > 22 23 23   GLUCOSE 162*   < > 168* 166*  --  130*  --  145*  --   --   --  130*   BUN 19   < > 16 16  --  16  --  12  --   --   --  11   CREATININE 0.8   < > 0.7 0.7  --  0.7  --  0.7  --   --   --  0.7   MG  --    < > 1.8 1.6  --   --   --  1.7  --   --   --   --    ANIONGAP 15   < > 14 14   < > 13   < > 12   < > 13 11 11   LABGLOM 80   < > >90 >90  --  >90  --  >90  --   --   --  >90   CALCIUM 7.2*   < > 6.9* 7.0*  --  7.0*  --  7.0*  --   --   --  7.3*   PROBNP 1,017*  --   --   --   --   --   --   --   --   --   --   --    TROPHS 13  --   --   --   --   --   --   --   --   --   --   --     < > = values in this interval not displayed.     Hepatic:   Recent Labs     10/10/24  1146 10/11/24  0548   AST 17 20   ALT 25 22   ALKPHOS 113* 105*   BILITOT 0.6 0.6     Coagulation:   Recent Labs     10/11/24  0548   INR 1.1         Assessment   69-year-old female who is POD #11 after robotic assisted laparoscopic mesh hernioplasty for incisional hernia came to hospital with hyponatremia and seroma at the operative site.  Bedside ultrasound-guided aspiration of fluid was done on 10/12/2024.  No growth on culture report from aspirate of the abdominal wall fluid.  Serum sodium levels improving today.    Plan: As Discussed with attending  Neuro/Pain: Continue multimodal pain regime  Correction of hyponatremia per primary team  Encourage incentive spirometer 10 times an

## 2024-10-13 NOTE — PLAN OF CARE
Problem: Respiratory - Adult  Goal: Achieves optimal ventilation and oxygenation  10/12/2024 2002 by Vaishnavi Rendon RCP  Outcome: Progressing         NONINVASIVE VENTILATION    PROVIDE OPTIMAL VENTILATION/ACCEPTABLE SPO2   IMPLEMENT NONINVASIVE VENTILATION PROTOCOL   MAINTAIN ACCEPTABLE SPO2   ASSESS SKIN INTEGRITY/BREAKDOWN SCORE   PATIENT EDUCATION AS NEEDED   BIPAP AS NEEDED

## 2024-10-13 NOTE — FLOWSHEET NOTE
10/12/24 2052   Treatment Team Notification   Reason for Communication Critical results   Type of Critical Result Laboratory   Critical Lab Information Na 113   Critical Lab Result Type Electrolytes   Name of Team Member Notified Dr. Jackson   Treatment Team Role Consulting Provider   Method of Communication Secure Message   Response See orders     Notified MD of Na of 113. New orders for Sodium Chloride tablets 2g and 20mg Lasix IV.

## 2024-10-13 NOTE — PROGRESS NOTES
End Of Shift Note  [facility]  Summary of shift: Pt had an uneventful night. Na this morning 116. D5 1/2 NS with 40 mEq K, abx, and PRN K replacement infusing. Pt received sodium chloride tablets and was unable to tolerate them. Pt vomited and showed signs of aspiration. Pt was able to recover within 5 minutes. RT assessed pt with no concerns. Due to episode of vomiting pt received benadryl IV instead of Ambien for sleep. Pt was NPO for a few hours overnight due to the episode. Currently pt is on a clear liquid diet per Gen Surg. Pt remains intermittently confused. No needs expressed at time of writing.    Vitals:    Vitals:    10/13/24 0400 10/13/24 0430 10/13/24 0500 10/13/24 0600   BP: (!) 141/63  119/65 139/67   Pulse: 96 90 95 96   Resp: 23 17 17 22   Temp: 97.8 °F (36.6 °C)      TempSrc: Oral      SpO2: 96% 98% 97% 97%   Weight: 129.6 kg (285 lb 12.8 oz)      Height:            I&O:   Intake/Output Summary (Last 24 hours) at 10/13/2024 0606  Last data filed at 10/13/2024 0400  Gross per 24 hour   Intake 2527.52 ml   Output 2975 ml   Net -447.48 ml       Resp Status: RA    Ventilator Settings:     / / /FiO2 : 30 %    Critical Care IV infusions:   dextrose 5% and 0.45% NaCl with KCl 40 mEq 10 mL/hr at 10/12/24 1821    sodium chloride          LDA:   PICC 10/11/24 Right Brachial (Active)   Number of days: 1       Peripheral IV 10/10/24 Right Antecubital (Active)   Number of days: 2       Urinary Catheter 10/10/24 Bar (Active)   Number of days: 2       Incision 10/01/24 Abdomen Medial;Upper (Active)   Number of days: 11

## 2024-10-13 NOTE — FLOWSHEET NOTE
10/12/24 3882   Treatment Team Notification   Reason for Communication Review case   Name of Team Member Notified [facility]   Treatment Team Role Advanced Practice Nurse   Method of Communication Secure Message   Response See orders     Notified NP of pt's recent episode of vomiting after taking sodium chloride tablets. Signs of aspiration. RT at bedside to evaluate (see note from RT). Orders for NPO until the morning.     Pt requests Ambien. Orders for Benadryl 25mg IV instead due to recent episode of aspiration.

## 2024-10-13 NOTE — PROGRESS NOTES
Renal Progress Note    Patient :  Mandie Bustamante; 69 y.o. MRN# [id number]  Location:  2030/2030-01  Attending:  Gerardo Alvarez MD  Admit Date:  10/10/2024   Hospital Day: 3      Subjective:     Following for hyponatremia with sodium level 109 on admission and dropped 105, transferred from [place] to [place].  She is status post robotic laparoscopic incisional hernia repair and mesh on October 1.  After discharge from surgery, she started noticing abdominal pain with poor appetite along with abdominal distention and diarrhea.  In order to stay hydrated she kept drinking a fair amount of free water.  Initially on admission patient was placed on 3% saline and also given 1 dose of Lasix 40 mg IV.    She has been on D5 and a half with 40 mill equivalent potassium for 24 hours.  Initially was at 50 cc however this was decreased to 10 cc/h when sodium jumped from 113 to 117.  Orders for 2 g salt tablet was also given however patient unable to keep down and vomited.  She then was given Lasix 20 mg IV x 1  Lab work this morning: Sodium 117, potassium 3.7 chloride 82, CO2 23, BUN 11, creatinine 0.7.  Urine output 2975.  She has minimal positive fluid balance   She is on 1000 cc fluid restriction in a 24-hour period  Patient is on nasal cannula.  She wakens easily.   at bedside  Complaining of abdominal cramping      Outpatient Medications:     Medications Prior to Admission: docusate sodium (COLACE) 100 MG capsule, Take 1 capsule by mouth 2 times daily  ondansetron (ZOFRAN-ODT) 4 MG disintegrating tablet, Take 1 tablet by mouth 3 times daily as needed for Nausea or Vomiting  zolpidem (AMBIEN) 5 MG tablet,   tacrolimus (PROTOPIC) 0.1 % ointment, Apply 1 Application topically every evening  loratadine (CLARITIN) 10 MG capsule, Take 1 capsule by mouth as needed  irbesartan (AVAPRO) 150 MG tablet, Take 1 tablet by mouth nightly Takes 1/4 tablet 2-3 time a week.  hydrocortisone 2.5 % ointment, Apply 1 Application

## 2024-10-13 NOTE — PROGRESS NOTES
Pt adamantly refusing to wear Bipap tonight. RN (Esau) aware. Pt was having some aspirating issues earlier from a salt pill so RT and RN were hesitant on placing Bipap on pt also.

## 2024-10-14 ENCOUNTER — APPOINTMENT (OUTPATIENT)
Dept: GENERAL RADIOLOGY | Age: 69
End: 2024-10-14
Attending: STUDENT IN AN ORGANIZED HEALTH CARE EDUCATION/TRAINING PROGRAM
Payer: COMMERCIAL

## 2024-10-14 LAB
ANION GAP SERPL CALCULATED.3IONS-SCNC: 11 MMOL/L (ref 9–17)
ANION GAP SERPL CALCULATED.3IONS-SCNC: 11 MMOL/L (ref 9–17)
ANION GAP SERPL CALCULATED.3IONS-SCNC: 12 MMOL/L (ref 9–17)
ANION GAP SERPL CALCULATED.3IONS-SCNC: 14 MMOL/L (ref 9–17)
ANION GAP SERPL CALCULATED.3IONS-SCNC: 14 MMOL/L (ref 9–17)
ANION GAP SERPL CALCULATED.3IONS-SCNC: 15 MMOL/L (ref 9–17)
BASOPHILS # BLD: 0.26 K/UL (ref 0–0.2)
BASOPHILS NFR BLD: 1 % (ref 0–2)
BUN SERPL-MCNC: 10 MG/DL (ref 8–23)
BUN/CREAT SERPL: 14 (ref 9–20)
CALCIUM SERPL-MCNC: 7.4 MG/DL (ref 8.6–10.4)
CHLORIDE SERPL-SCNC: 85 MMOL/L (ref 98–107)
CHLORIDE SERPL-SCNC: 85 MMOL/L (ref 98–107)
CHLORIDE SERPL-SCNC: 86 MMOL/L (ref 98–107)
CHLORIDE SERPL-SCNC: 87 MMOL/L (ref 98–107)
CO2 SERPL-SCNC: 23 MMOL/L (ref 20–31)
CO2 SERPL-SCNC: 23 MMOL/L (ref 20–31)
CO2 SERPL-SCNC: 24 MMOL/L (ref 20–31)
CO2 SERPL-SCNC: 25 MMOL/L (ref 20–31)
CREAT SERPL-MCNC: 0.7 MG/DL (ref 0.5–0.9)
EOSINOPHIL # BLD: 0 K/UL (ref 0–0.44)
EOSINOPHILS RELATIVE PERCENT: 0 % (ref 1–4)
ERYTHROCYTE [DISTWIDTH] IN BLOOD BY AUTOMATED COUNT: 12.1 % (ref 11.8–14.4)
GFR, ESTIMATED: >90 ML/MIN/1.73M2
GLUCOSE SERPL-MCNC: 133 MG/DL (ref 70–99)
HCT VFR BLD AUTO: 32.5 % (ref 36.3–47.1)
HGB BLD-MCNC: 12 G/DL (ref 11.9–15.1)
IMM GRANULOCYTES # BLD AUTO: 2.86 K/UL (ref 0–0.3)
IMM GRANULOCYTES NFR BLD: 11 %
LYMPHOCYTES NFR BLD: 1.3 K/UL (ref 1.1–3.7)
LYMPHOCYTES RELATIVE PERCENT: 5 % (ref 24–43)
MCH RBC QN AUTO: 30.2 PG (ref 25.2–33.5)
MCHC RBC AUTO-ENTMCNC: 36.9 G/DL (ref 28.4–34.8)
MCV RBC AUTO: 81.9 FL (ref 82.6–102.9)
MONOCYTES NFR BLD: 12 % (ref 3–12)
MONOCYTES NFR BLD: 3.12 K/UL (ref 0.1–1.2)
MORPHOLOGY: ABNORMAL
MORPHOLOGY: ABNORMAL
NEUTROPHILS NFR BLD: 71 % (ref 36–65)
NEUTS SEG NFR BLD: 18.46 K/UL (ref 1.5–8.1)
NRBC BLD-RTO: 0 PER 100 WBC
PLATELET # BLD AUTO: 382 K/UL (ref 138–453)
PMV BLD AUTO: 8.7 FL (ref 8.1–13.5)
POTASSIUM SERPL-SCNC: 3.5 MMOL/L (ref 3.7–5.3)
POTASSIUM SERPL-SCNC: 3.8 MMOL/L (ref 3.7–5.3)
POTASSIUM SERPL-SCNC: 4 MMOL/L (ref 3.7–5.3)
POTASSIUM SERPL-SCNC: 4.1 MMOL/L (ref 3.7–5.3)
RBC # BLD AUTO: 3.97 M/UL (ref 3.95–5.11)
SODIUM SERPL-SCNC: 120 MMOL/L (ref 135–144)
SODIUM SERPL-SCNC: 121 MMOL/L (ref 135–144)
SODIUM SERPL-SCNC: 121 MMOL/L (ref 135–144)
SODIUM SERPL-SCNC: 123 MMOL/L (ref 135–144)
SODIUM SERPL-SCNC: 124 MMOL/L (ref 135–144)
SODIUM SERPL-SCNC: 126 MMOL/L (ref 135–144)
WBC OTHER # BLD: 26 K/UL (ref 3.5–11.3)

## 2024-10-14 PROCEDURE — 97530 THERAPEUTIC ACTIVITIES: CPT

## 2024-10-14 PROCEDURE — 97535 SELF CARE MNGMENT TRAINING: CPT

## 2024-10-14 PROCEDURE — 6370000000 HC RX 637 (ALT 250 FOR IP): Performed by: NURSE PRACTITIONER

## 2024-10-14 PROCEDURE — 97167 OT EVAL HIGH COMPLEX 60 MIN: CPT

## 2024-10-14 PROCEDURE — 80051 ELECTROLYTE PANEL: CPT

## 2024-10-14 PROCEDURE — 2580000003 HC RX 258: Performed by: NURSE PRACTITIONER

## 2024-10-14 PROCEDURE — 99232 SBSQ HOSP IP/OBS MODERATE 35: CPT | Performed by: STUDENT IN AN ORGANIZED HEALTH CARE EDUCATION/TRAINING PROGRAM

## 2024-10-14 PROCEDURE — 97163 PT EVAL HIGH COMPLEX 45 MIN: CPT

## 2024-10-14 PROCEDURE — 94761 N-INVAS EAR/PLS OXIMETRY MLT: CPT

## 2024-10-14 PROCEDURE — 6360000002 HC RX W HCPCS: Performed by: STUDENT IN AN ORGANIZED HEALTH CARE EDUCATION/TRAINING PROGRAM

## 2024-10-14 PROCEDURE — 36415 COLL VENOUS BLD VENIPUNCTURE: CPT

## 2024-10-14 PROCEDURE — 74250 X-RAY XM SM INT 1CNTRST STD: CPT

## 2024-10-14 PROCEDURE — 85025 COMPLETE CBC W/AUTO DIFF WBC: CPT

## 2024-10-14 PROCEDURE — 6360000004 HC RX CONTRAST MEDICATION: Performed by: SURGERY

## 2024-10-14 PROCEDURE — 6360000002 HC RX W HCPCS: Performed by: INTERNAL MEDICINE

## 2024-10-14 PROCEDURE — 2580000003 HC RX 258: Performed by: STUDENT IN AN ORGANIZED HEALTH CARE EDUCATION/TRAINING PROGRAM

## 2024-10-14 PROCEDURE — 74018 RADEX ABDOMEN 1 VIEW: CPT

## 2024-10-14 PROCEDURE — 6360000002 HC RX W HCPCS: Performed by: NURSE PRACTITIONER

## 2024-10-14 PROCEDURE — 71045 X-RAY EXAM CHEST 1 VIEW: CPT

## 2024-10-14 PROCEDURE — 6370000000 HC RX 637 (ALT 250 FOR IP): Performed by: STUDENT IN AN ORGANIZED HEALTH CARE EDUCATION/TRAINING PROGRAM

## 2024-10-14 PROCEDURE — 80048 BASIC METABOLIC PNL TOTAL CA: CPT

## 2024-10-14 PROCEDURE — 2000000000 HC ICU R&B

## 2024-10-14 RX ORDER — FUROSEMIDE 10 MG/ML
20 INJECTION INTRAMUSCULAR; INTRAVENOUS 2 TIMES DAILY
Status: DISCONTINUED | OUTPATIENT
Start: 2024-10-14 | End: 2024-10-17

## 2024-10-14 RX ORDER — DIATRIZOATE MEGLUMINE AND DIATRIZOATE SODIUM 660; 100 MG/ML; MG/ML
240 SOLUTION ORAL; RECTAL
Status: DISCONTINUED | OUTPATIENT
Start: 2024-10-14 | End: 2024-10-16

## 2024-10-14 RX ORDER — BISACODYL 10 MG
10 SUPPOSITORY, RECTAL RECTAL ONCE
Status: DISCONTINUED | OUTPATIENT
Start: 2024-10-14 | End: 2024-10-16

## 2024-10-14 RX ADMIN — POTASSIUM CHLORIDE 20 MEQ: 29.8 INJECTION, SOLUTION INTRAVENOUS at 03:14

## 2024-10-14 RX ADMIN — SODIUM CHLORIDE, PRESERVATIVE FREE 10 ML: 5 INJECTION INTRAVENOUS at 20:32

## 2024-10-14 RX ADMIN — PIPERACILLIN AND TAZOBACTAM 4500 MG: 4; .5 INJECTION, POWDER, LYOPHILIZED, FOR SOLUTION INTRAVENOUS at 09:41

## 2024-10-14 RX ADMIN — FUROSEMIDE 20 MG: 10 INJECTION, SOLUTION INTRAMUSCULAR; INTRAVENOUS at 11:10

## 2024-10-14 RX ADMIN — ENOXAPARIN SODIUM 30 MG: 100 INJECTION SUBCUTANEOUS at 20:30

## 2024-10-14 RX ADMIN — HYDROCORTISONE: 1 CREAM TOPICAL at 08:51

## 2024-10-14 RX ADMIN — ZOLPIDEM TARTRATE 5 MG: 5 TABLET ORAL at 20:55

## 2024-10-14 RX ADMIN — PIPERACILLIN AND TAZOBACTAM 4500 MG: 4; .5 INJECTION, POWDER, LYOPHILIZED, FOR SOLUTION INTRAVENOUS at 02:09

## 2024-10-14 RX ADMIN — POTASSIUM CHLORIDE 20 MEQ: 29.8 INJECTION, SOLUTION INTRAVENOUS at 02:09

## 2024-10-14 RX ADMIN — PANTOPRAZOLE SODIUM 40 MG: 40 INJECTION, POWDER, FOR SOLUTION INTRAVENOUS at 08:46

## 2024-10-14 RX ADMIN — LABETALOL HYDROCHLORIDE 10 MG: 5 INJECTION, SOLUTION INTRAVENOUS at 18:47

## 2024-10-14 RX ADMIN — ENOXAPARIN SODIUM 30 MG: 100 INJECTION SUBCUTANEOUS at 08:51

## 2024-10-14 RX ADMIN — PIPERACILLIN AND TAZOBACTAM 4500 MG: 4; .5 INJECTION, POWDER, LYOPHILIZED, FOR SOLUTION INTRAVENOUS at 18:37

## 2024-10-14 RX ADMIN — SODIUM CHLORIDE, PRESERVATIVE FREE 10 ML: 5 INJECTION INTRAVENOUS at 08:48

## 2024-10-14 RX ADMIN — DIATRIZOATE MEGLUMINE AND DIATRIZOATE SODIUM 240 ML: 660; 100 LIQUID ORAL; RECTAL at 12:32

## 2024-10-14 RX ADMIN — ZOLPIDEM TARTRATE 5 MG: 5 TABLET ORAL at 03:13

## 2024-10-14 RX ADMIN — FUROSEMIDE 20 MG: 10 INJECTION, SOLUTION INTRAMUSCULAR; INTRAVENOUS at 18:31

## 2024-10-14 RX ADMIN — ACETAMINOPHEN 650 MG: 325 TABLET ORAL at 18:31

## 2024-10-14 ASSESSMENT — PAIN SCALES - GENERAL
PAINLEVEL_OUTOF10: 5
PAINLEVEL_OUTOF10: 5
PAINLEVEL_OUTOF10: 2
PAINLEVEL_OUTOF10: 2
PAINLEVEL_OUTOF10: 7
PAINLEVEL_OUTOF10: 5

## 2024-10-14 ASSESSMENT — PAIN DESCRIPTION - DESCRIPTORS: DESCRIPTORS: ACHING

## 2024-10-14 ASSESSMENT — PAIN DESCRIPTION - ORIENTATION: ORIENTATION: LOWER

## 2024-10-14 ASSESSMENT — PAIN DESCRIPTION - LOCATION: LOCATION: ABDOMEN

## 2024-10-14 NOTE — CARE COORDINATION
DC Planning    CHART REVIEW     Pt , Lasix 20 IV BID, had had US guided abd wall seroma drained 10/11, cont on Zosyn WBC 26. CLD. Pt is very weak PT recs snf.

## 2024-10-14 NOTE — PROGRESS NOTES
[facility]  General Surgery  Consult Progress Note    Service date: 10/14/2024, 5:45 AM  Room : 2030/2030-01   Name: Mandie Bustamante  MRN: [id number]    Length of stay: 4 day(s)    OR date: 10/1/2024: Robotic assisted laparoscopic mesh hernioplasty    POD #13      Subjective   Interval update:   -Overall, patient is doing well  -No significant overnight issues noted.  -Abdominal pain well controlled with meds  -Had 2 bowel movements 2 days ago after suppository  -Patient is currently tolerating CLD.  -Patient reports no nausea/ vomiting, fever/chills, chest pain, shortness of breath, or other symptoms.    No growth in anaerobic or aerobic cultures sent from abdominal wall aspiration    WBC uptrending.  Patient on Zosyn.         Objective   Physical exam: /60   Pulse 96   Temp 96.8 °F (36 °C) (Temporal)   Resp 23   Ht 1.6 m (5' 2.99\")   Wt 129 kg (284 lb 4.8 oz)   SpO2 95%   BMI 50.37 kg/m²     General: Patient is resting comfortably on exam, not in any distress and is pleasant and cooperative.   Neuro: alert and oriented x3   Abdomen: soft, appropriately tender , non distended. Incision: Clean, dry, intact. No erythema noted.    Chest: Non-labored, symmetrical respirations.   CVS: Pulse RRR       Date 10/14/24 0000 - 10/14/24 2359   Shift 8046-0620 9492-7967 2292-0092 24 Hour Total   INTAKE   Shift Total(mL/kg)       OUTPUT   Urine(mL/kg/hr) 600   600   Shift Total(mL/kg) 600(4.7)   600(4.7)   Weight (kg) 129 129 129 129           Medications:    Current Facility-Administered Medications   Medication Dose Route Frequency Provider Last Rate Last Admin    bisacodyl (DULCOLAX) suppository 10 mg  10 mg Rectal Once Hao Stewart MD        polyethyl glycol-propyl glycol 0.4-0.3 % (SYSTANE) ophthalmic solution 1 drop  1 drop Both Eyes PRN Gerardo Alvarez MD        hydrocortisone 1 % cream   Topical BID Rashmi Rodriguez, APRN - CNP        tiZANidine (ZANAFLEX) tablet 4 mg  4 mg Oral Once  Cummings  Neuro/Pain: Continue multimodal pain regime  XR kub ordered for assessment of ileus. CT abdomen and pelvis was done recently.  CXR ordered to rule out pneumonia/atelectasis  Correction of hyponatremia per primary team  Encourage incentive spirometer 10 times an hour every hour while awake.  Continue clear liquid diet.  If patient tolerates clear liquid diet and continues to have flatus or passage of bowel movement, can advance to regular diet.  Will give suppository today  Okay to ambulate and work with PT OT from general surgery standpoint.  Okay to shower  General Surgery will follow.  Dispo: Pending correction of hyponatremia,       --------------------------------------------------------------------------------  Hao Stewart MD  PGY-4 Resident, General Surgery  Dated: 10/14/2024, 5:45 AM     Attending Physician Statement  I have discussed the case, including pertinent history and exam findings with the resident. I agree with the assessment, plan and orders as documented by the resident.        Patient seen and examined.  Agree with the above.  KUB ordered this morning.  I think it would be of more value to perform a small-bowel follow-through if she has evidence of an ileus on x-ray rather than a CT scan.  This could be both diagnostic and therapeutic.  She does need aggressive pulmonary toilet as I do think she has developing a significant respiratory process as well.  Ileus could be related to her electrolyte imbalances as well  as her immobility.

## 2024-10-14 NOTE — PROGRESS NOTES
End Of Shift Note  [facility]  Summary of shift: Pt had an uneventful night. Na this morning 121. PRN K replacement complete and current K is 4.1. Upwards trend in WBCs - currently 26.0. Pt received flexeril and had an associated reaction - itching and redness of limbs. Flexeril has been discontinued and listed as an allergy. Pt had a plan for CT today however that has been changed to a CXR and KUB per Gen Surg for evaluation of progression of ileus and to rule out possible atelectasis/pneumonia. No needs expressed at time of writing.    Vitals:    Vitals:    10/14/24 0300 10/14/24 0400 10/14/24 0500 10/14/24 0600   BP: (!) 149/77 131/60 (!) 142/74 (!) 150/79   Pulse: 90 96 92 94   Resp: 20 23 21 24   Temp:  96.8 °F (36 °C)     TempSrc:  Temporal     SpO2: 95% 95% 95% 94%   Weight:  129 kg (284 lb 4.8 oz)     Height:            I&O:   Intake/Output Summary (Last 24 hours) at 10/14/2024 0617  Last data filed at 10/14/2024 0400  Gross per 24 hour   Intake 1181.22 ml   Output 2500 ml   Net -1318.78 ml       Resp Status: RA    Ventilator Settings:     / / /FiO2 : 30 %    Critical Care IV infusions:   sodium chloride          LDA:   PICC 10/11/24 Right Brachial (Active)   Number of days: 2       Urinary Catheter 10/10/24 Bar (Active)   Number of days: 3       Incision 10/01/24 Abdomen Medial;Upper (Active)   Number of days: 12

## 2024-10-14 NOTE — PROGRESS NOTES
Occupational Therapy  Facility/Department: [facility]  Occupational Therapy Initial Assessment    Name: Mandie Bustamante  : 1955  MRN: [id number]  Date of Service: 10/14/2024    DOMINGO Cabezas reports patient is medically stable for therapy treatment this date.    Chart reviewed prior to treatment and patient is agreeable for therapy.  All lines intact and patient positioned comfortably at end of treatment.  All patient needs addressed prior to ending therapy session.      Discharge Recommendations:  Patient would benefit from continued therapy after discharge  Pt currently functioning below baseline.  Recommend daily inpatient skilled therapy at time of discharge to maximize long term outcomes and prevent re-admission. Please refer to AM-PAC score for current level of function.    OT Equipment Recommendations  Equipment Needed: Yes (CTA)       Patient Diagnosis(es): The encounter diagnosis was Edema, unspecified type.  Past Medical History:  has a past medical history of Allergic rhinitis, Anemia, Arthritis, Autoimmune disorder (HCC), Cataracts, bilateral, GERD (gastroesophageal reflux disease), Headache, Hypercalcemia, Hyperparathyroidism (HCC), Hypertension, Obesity, and Osteoarthritis.  Past Surgical History:  has a past surgical history that includes Parathyroid gland surgery (2023); Total vaginal hysterectomy (); Appendectomy; Urethra surgery (); Foot surgery (Left, ); shoulder surgery (Right, 10/2000); Foot surgery (Left, 2010); Shoulder arthroscopy (Right, 2011); Shoulder arthroscopy (Right, 2012); Shoulder arthroscopy (Left, 2012); Cholecystectomy; Bunionectomy (Left, 2016); Foot surgery (Left, 2017); parathyroidectomy (2022); hernia repair (2022); Umbilical hernia repair; Hysterectomy, total abdominal (1986); Hysterectomy, vaginal (1986); Upper gastrointestinal endoscopy (); and ventral hernia repair (N/A, 10/1/2024).     PER H&P: Mandie Bustamante is

## 2024-10-14 NOTE — PROGRESS NOTES
Benjamin Almazan MD   Urology Progress Note            Subjective: Follow-up urinary retention hypotonic bladder    Patient Vitals for the past 24 hrs:   BP Temp Temp src Pulse Resp SpO2 Weight   10/14/24 0600 (!) 150/79 -- -- 94 24 94 % --   10/14/24 0500 (!) 142/74 -- -- 92 21 95 % --   10/14/24 0400 131/60 96.8 °F (36 °C) Temporal 96 23 95 % 129 kg (284 lb 4.8 oz)   10/14/24 0300 (!) 149/77 -- -- 90 20 95 % --   10/14/24 0200 (!) 155/81 -- -- 92 23 96 % --   10/14/24 0100 (!) 161/87 -- -- 90 25 96 % --   10/14/24 0026 -- -- -- 92 27 95 % --   10/14/24 0000 (!) 140/69 96.8 °F (36 °C) Temporal 93 21 96 % --   10/13/24 2300 126/72 -- -- 93 25 96 % --   10/13/24 2200 (!) 153/74 -- -- 89 25 96 % --   10/13/24 2100 (!) 155/56 -- -- 91 30 97 % --   10/13/24 2000 (!) 147/68 97.3 °F (36.3 °C) Temporal 89 26 97 % --   10/13/24 1800 (!) 146/68 -- -- 94 22 96 % --   10/13/24 1700 (!) 147/69 -- -- 90 25 96 % --   10/13/24 1632 -- -- -- 95 25 96 % --   10/13/24 1600 132/63 96.9 °F (36.1 °C) Temporal 84 17 97 % --   10/13/24 1500 (!) 154/74 -- -- 87 28 97 % --   10/13/24 1400 (!) 152/63 -- -- 88 27 96 % --   10/13/24 1300 (!) 144/72 -- -- 89 28 97 % --   10/13/24 1200 (!) 151/68 97.5 °F (36.4 °C) Temporal 87 30 96 % --   10/13/24 1100 (!) 141/69 -- -- 93 26 97 % --   10/13/24 1000 134/67 -- -- 91 18 96 % --   10/13/24 0900 (!) 150/55 -- -- 92 23 97 % --   10/13/24 0800 (!) 144/64 96.8 °F (36 °C) Temporal 90 23 96 % --   10/13/24 0700 122/79 -- -- 96 19 -- --       Intake/Output Summary (Last 24 hours) at 10/14/2024 0650  Last data filed at 10/14/2024 0400  Gross per 24 hour   Intake 1301.22 ml   Output 2500 ml   Net -1198.78 ml       Recent Labs     10/12/24  1338 10/13/24  0500 10/14/24  0500   WBC 20.0* 23.6* 26.0*   HGB 12.1 11.7* 12.0   HCT 32.9* 31.5* 32.5*   MCV 79.1* 80.2* 81.9*    381 382     Recent Labs     10/12/24  0500 10/12/24  0833 10/13/24  0500 10/13/24  0749 10/13/24  3178

## 2024-10-14 NOTE — FLOWSHEET NOTE
10/13/24 2136   Treatment Team Notification   Reason for Communication Review case   Name of Team Member Notified Dr. Jackson   Treatment Team Role Consulting Provider   Method of Communication Secure Message   Response See orders     Notified MD of Na at 121. Orders to notify if Na is <118 or >124.

## 2024-10-14 NOTE — PROGRESS NOTES
End Of Shift Note  [facility]  Summary of shift: Small bowel follow through completed. KUB and CXR show dilated colon loops. Na trending up; was 120, 123 and 124 throughout day. K stable at 4.0, 3.8, 3.8. Cont to monitor lytes Q4 per nephro. Pt very weak, unable to stand with PT/OT despite being independent at home. Recommending SNF.     Vitals:    Vitals:    10/14/24 1500 10/14/24 1600 10/14/24 1700 10/14/24 1800   BP:  (!) 168/70 (!) 170/78 (!) 175/88   Pulse: 99 (!) 102 (!) 103 (!) 108   Resp: 30 30 (!) 38 (!) 31   Temp:  99 °F (37.2 °C)     TempSrc:  Oral     SpO2: 95% 96% 96% 96%   Weight:       Height:            I&O:   Intake/Output Summary (Last 24 hours) at 10/14/2024 1808  Last data filed at 10/14/2024 1457  Gross per 24 hour   Intake 419.98 ml   Output 2200 ml   Net -1780.02 ml       Resp Status: RA    Ventilator Settings:     / / /FiO2 : 30 %    Critical Care IV infusions:   sodium chloride          LDA:   PICC 10/11/24 Right Brachial (Active)   Number of days: 2       Urinary Catheter 10/10/24 Bar (Active)   Number of days: 4       Incision 10/01/24 Abdomen Medial;Upper (Active)   Number of days: 13

## 2024-10-14 NOTE — PROGRESS NOTES
(05/03/2023); Total vaginal hysterectomy (1986); Appendectomy; Urethra surgery (1986); Foot surgery (Left, 1989); shoulder surgery (Right, 10/2000); Foot surgery (Left, 03/2010); Shoulder arthroscopy (Right, 08/2011); Shoulder arthroscopy (Right, 04/2012); Shoulder arthroscopy (Left, 05/2012); Cholecystectomy; Bunionectomy (Left, 07/2016); Foot surgery (Left, 07/2017); parathyroidectomy (05/2022); hernia repair (08/2022); Umbilical hernia repair; Hysterectomy, total abdominal (09/1986); Hysterectomy, vaginal (09/1986); Upper gastrointestinal endoscopy (2014); and ventral hernia repair (N/A, 10/1/2024).    Assessment  Body Structures, Functions, Activity Limitations Requiring Skilled Therapeutic Intervention: Decreased functional mobility ;Decreased ADL status;Decreased strength;Decreased safe awareness;Decreased endurance;Decreased high-level IADLs;Decreased coordination;Increased pain;Decreased posture;Decreased ROM;Decreased balance;Decreased cognition  Assessment: Pt tolerated PT eval poor.  Pt currently presenting w/ deficits in strength, ROM, sitting balance, endurance, posture, and mobility.  At this time pt requires skilled 2 person assist for safe bed mobility and is not appropriate for OOB activity given current deficits.  Pt is functioning below baseline and would benefit from continued skilled PT to address deficits in order to maximize independence w/ functional mobility, prevent sedentary complications, and return to PLOF as able.  Therapy Prognosis: Fair  Decision Making: High Complexity  Requires PT Follow-Up: Yes  Activity Tolerance  Activity Tolerance: Patient limited by endurance;Treatment limited secondary to decreased cognition;Other (comment)  Activity Tolerance Comments: Pt demonstrating self-limiting behaviors throughout session requiring max encouragement for active participation throughout session.    Plan  Physical Therapy Plan  General Plan: 5-7 times per week  Current Treatment  General AROM: Grossly decreased; minimal ankle DF/PF, Knee flex 0-30 degrees, hip flex limited by body habitus  AROM RUE (degrees)  RUE AROM : WFL  RUE General AROM: See OT assessment for detail  AROM LUE (degrees)  LUE AROM : WFL  LUE General AROM: See OT assessment for detail  Strength RLE  Strength RLE: Exception  Comment: Grossly decreased, non-functional  Strength LLE  Strength LLE: Exception  Comment: Grossly decreased, non-functional  Strength RUE  Comment: See OT assessment for detail  Strength LUE  Comment: See OT assessment for detail          Bed mobility  Rolling to Left: Maximum assistance;2 Person assistance  Rolling to Right: Maximum assistance;2 Person assistance  Supine to Sit: Maximum assistance;2 Person assistance  Sit to Supine: Maximum assistance;Dependent/Total;2 Person assistance  Scooting: Dependent/Total;2 Person assistance  Bed Mobility Comments: Pt w/ significant difficulty throughout bed mobility this date requiring increased time and effort to perform tasks throughout.  Max verbal and tactile cueing required for initiation, progression, and sequencing of BLE & trunk throughout w/ poor return demo.  HOB elevated & pt using bedrails for UE assist throughout.  Once seated at EOB, pt unable to maintain static sitting position w/o MaxA provided from writer despite encouraging pt to use bedrails for UE support.    Transfers  Comment: Not safe nor appropriate to attempt transfers or OOB mobility this date given high levels of assist required for bed mobility.  Pt also w/ poor tolerance to activity this date.          Balance  Posture: Poor (sitting)  Sitting - Static: Poor  Comments: BENNETT standing balance this date          AM-PAC - Mobility  AM-PAC Basic Mobility - Inpatient   AM-PAC Inpatient Mobility Raw Score : 6  AM-PAC Inpatient T-Scale Score : 23.55  Mobility Inpatient CMS 0-100% Score: 100  Mobility Inpatient CMS G-Code Modifier : CN      Goals  Short Term Goals  Time Frame for Short

## 2024-10-14 NOTE — PROGRESS NOTES
Nephrology Progress Note      SUBJECTIVE       Pt was seen and examined. No acute issues overnite. Stable hemodynamics .   Patient is awake, alert.  Discussed with patient's  at bedside.  Sodium level this morning is 120.  Last 24 hours GAUTAM 1300 and, 2500 out.    Brief history:  Patient is status post robotic assisted laparoscopic incisional hernia repair with mesh on 10/1/2024 by Dr. Cummings.  She started noticing some abdominal pain 2 to 3 days prior to admission, had poor appetite, did feel like she was developing more abdominal distention and also got some diarrhea.  In an attempt to keep herself hydrated she started drinking a fair amount of free water.  Symptoms did not get better, she presented to the ER and had the following labs:  Sodium 109 which was down from 140 about 10 days earlier, potassium of 3.4, bicarb 24, creatinine 0.8 and an albumin of 3.1.  There was some issue with urinary retention, Bar was placed and we got about 500 mL of urine out.     Initial CT scan of the abdomen and pelvis showed a large air-fluid collection seen along midline consistent with an abscess measuring 13.8 x 7 cm  Urine studies showed urine sodium of 20, urine osmolarity of greater than 425.   Additional history is noted positive for chronic hypertension, history of hyperparathyroidism and mild hypercalcemia status post parathyroidectomy of ectopic gland in May 2023.  She additionally has had history of cholecystectomy, hysterectomy, incisional hernia in the past and surgical repair of incarcerated incisional hernia as of 10/1/2024    OBJECTIVE      CURRENT TEMPERATURE:  Temp: 99 °F (37.2 °C)  MAXIMUM TEMPERATURE OVER 24HRS:  Temp (24hrs), Av.4 °F (36.3 °C), Min:96.8 °F (36 °C), Max:99 °F (37.2 °C)    CURRENT RESPIRATORY RATE:  Respirations: 23  CURRENT PULSE:  Pulse: 94  CURRENT BLOOD PRESSURE:  BP: (!) 159/72  24HR BLOOD PRESSURE RANGE:  Systolic (24hrs), Av , Min:126 , Max:161   ; Diastolic (24hrs),

## 2024-10-14 NOTE — FLOWSHEET NOTE
10/13/24 1946   Treatment Team Notification   Reason for Communication Review case   Name of Team Member Notified Bib NP   Treatment Team Role Advanced Practice Nurse   Method of Communication Secure Message   Response No new orders     Notified NP that pt is refusing CT scan at this time. Educated pt on importance of imaging and plan of care. Pt states, \"I do not want to do it tonight, can we wait until the morning\".

## 2024-10-14 NOTE — PLAN OF CARE
Problem: Discharge Planning  Goal: Discharge to home or other facility with appropriate resources  Outcome: Progressing     Problem: Skin/Tissue Integrity  Goal: Absence of new skin breakdown  Description: 1.  Monitor for areas of redness and/or skin breakdown  2.  Assess vascular access sites hourly  3.  Every 4-6 hours minimum:  Change oxygen saturation probe site  4.  Every 4-6 hours:  If on nasal continuous positive airway pressure, respiratory therapy assess nares and determine need for appliance change or resting period.  Outcome: Progressing     Problem: ABCDS Injury Assessment  Goal: Absence of physical injury  Outcome: Progressing     Problem: Safety - Adult  Goal: Free from fall injury  Outcome: Progressing     Problem: Metabolic/Fluid and Electrolytes - Adult  Goal: Electrolytes maintained within normal limits  Outcome: Progressing  Goal: Hemodynamic stability and optimal renal function maintained  Outcome: Progressing  Goal: Glucose maintained within prescribed range  Outcome: Progressing     Problem: Neurosensory - Adult  Goal: Achieves stable or improved neurological status  Outcome: Progressing  Goal: Absence of seizures  Outcome: Progressing  Goal: Remains free of injury related to seizures activity  Outcome: Progressing     Problem: Cardiovascular - Adult  Goal: Maintains optimal cardiac output and hemodynamic stability  Outcome: Progressing  Goal: Absence of cardiac dysrhythmias or at baseline  Outcome: Progressing     Problem: Respiratory - Adult  Goal: Achieves optimal ventilation and oxygenation  Outcome: Progressing     Problem: Gastrointestinal - Adult  Goal: Maintains or returns to baseline bowel function  Outcome: Progressing  Goal: Maintains adequate nutritional intake  Outcome: Progressing     Problem: Genitourinary - Adult  Goal: Urinary catheter remains patent  Outcome: Progressing     Problem: Skin/Tissue Integrity - Adult  Goal: Skin integrity remains intact  Outcome:  Progressing  Goal: Incisions, wounds, or drain sites healing without S/S of infection  Outcome: Progressing     Problem: Hematologic - Adult  Goal: Maintains hematologic stability  Outcome: Progressing     Problem: Musculoskeletal - Adult  Goal: Return mobility to safest level of function  Outcome: Progressing     Problem: Nutrition Deficit:  Goal: Optimize nutritional status  Outcome: Progressing     Problem: Pain  Goal: Verbalizes/displays adequate comfort level or baseline comfort level  Outcome: Progressing     Problem: Chronic Conditions and Co-morbidities  Goal: Patient's chronic conditions and co-morbidity symptoms are monitored and maintained or improved  Outcome: Progressing

## 2024-10-15 LAB
ANION GAP SERPL CALCULATED.3IONS-SCNC: 14 MMOL/L (ref 9–17)
ANION GAP SERPL CALCULATED.3IONS-SCNC: 16 MMOL/L (ref 9–17)
ANION GAP SERPL CALCULATED.3IONS-SCNC: 16 MMOL/L (ref 9–17)
BUN SERPL-MCNC: 17 MG/DL (ref 8–23)
BUN/CREAT SERPL: 19 (ref 9–20)
CALCIUM SERPL-MCNC: 8.1 MG/DL (ref 8.6–10.4)
CHLORIDE SERPL-SCNC: 85 MMOL/L (ref 98–107)
CHLORIDE SERPL-SCNC: 86 MMOL/L (ref 98–107)
CHLORIDE SERPL-SCNC: 87 MMOL/L (ref 98–107)
CO2 SERPL-SCNC: 23 MMOL/L (ref 20–31)
CO2 SERPL-SCNC: 23 MMOL/L (ref 20–31)
CO2 SERPL-SCNC: 24 MMOL/L (ref 20–31)
CREAT SERPL-MCNC: 0.9 MG/DL (ref 0.5–0.9)
GFR, ESTIMATED: 70 ML/MIN/1.73M2
GLUCOSE SERPL-MCNC: 141 MG/DL (ref 70–99)
MAGNESIUM SERPL-MCNC: 1.8 MG/DL (ref 1.6–2.6)
MICROORGANISM SPEC CULT: NORMAL
MICROORGANISM SPEC CULT: NORMAL
POTASSIUM SERPL-SCNC: 3 MMOL/L (ref 3.7–5.3)
POTASSIUM SERPL-SCNC: 3.7 MMOL/L (ref 3.7–5.3)
POTASSIUM SERPL-SCNC: 3.7 MMOL/L (ref 3.7–5.3)
SERVICE CMNT-IMP: NORMAL
SERVICE CMNT-IMP: NORMAL
SODIUM SERPL-SCNC: 123 MMOL/L (ref 135–144)
SODIUM SERPL-SCNC: 125 MMOL/L (ref 135–144)
SODIUM SERPL-SCNC: 126 MMOL/L (ref 135–144)
SPECIMEN DESCRIPTION: NORMAL
SPECIMEN DESCRIPTION: NORMAL

## 2024-10-15 PROCEDURE — 97112 NEUROMUSCULAR REEDUCATION: CPT

## 2024-10-15 PROCEDURE — 83735 ASSAY OF MAGNESIUM: CPT

## 2024-10-15 PROCEDURE — 6360000002 HC RX W HCPCS: Performed by: STUDENT IN AN ORGANIZED HEALTH CARE EDUCATION/TRAINING PROGRAM

## 2024-10-15 PROCEDURE — 94761 N-INVAS EAR/PLS OXIMETRY MLT: CPT

## 2024-10-15 PROCEDURE — 6370000000 HC RX 637 (ALT 250 FOR IP): Performed by: NURSE PRACTITIONER

## 2024-10-15 PROCEDURE — 6370000000 HC RX 637 (ALT 250 FOR IP): Performed by: STUDENT IN AN ORGANIZED HEALTH CARE EDUCATION/TRAINING PROGRAM

## 2024-10-15 PROCEDURE — 6370000000 HC RX 637 (ALT 250 FOR IP)

## 2024-10-15 PROCEDURE — 80048 BASIC METABOLIC PNL TOTAL CA: CPT

## 2024-10-15 PROCEDURE — 6360000002 HC RX W HCPCS: Performed by: NURSE PRACTITIONER

## 2024-10-15 PROCEDURE — 2060000000 HC ICU INTERMEDIATE R&B

## 2024-10-15 PROCEDURE — 2580000003 HC RX 258: Performed by: NURSE PRACTITIONER

## 2024-10-15 PROCEDURE — 2500000003 HC RX 250 WO HCPCS: Performed by: STUDENT IN AN ORGANIZED HEALTH CARE EDUCATION/TRAINING PROGRAM

## 2024-10-15 PROCEDURE — 6360000002 HC RX W HCPCS: Performed by: INTERNAL MEDICINE

## 2024-10-15 PROCEDURE — 2580000003 HC RX 258: Performed by: STUDENT IN AN ORGANIZED HEALTH CARE EDUCATION/TRAINING PROGRAM

## 2024-10-15 PROCEDURE — 99232 SBSQ HOSP IP/OBS MODERATE 35: CPT | Performed by: INTERNAL MEDICINE

## 2024-10-15 PROCEDURE — 80051 ELECTROLYTE PANEL: CPT

## 2024-10-15 PROCEDURE — 2700000000 HC OXYGEN THERAPY PER DAY

## 2024-10-15 PROCEDURE — 97530 THERAPEUTIC ACTIVITIES: CPT

## 2024-10-15 PROCEDURE — 97110 THERAPEUTIC EXERCISES: CPT

## 2024-10-15 RX ORDER — ACETAMINOPHEN AND CODEINE PHOSPHATE 300; 30 MG/1; MG/1
1 TABLET ORAL EVERY 8 HOURS PRN
Status: ON HOLD | COMMUNITY
End: 2024-11-07 | Stop reason: HOSPADM

## 2024-10-15 RX ORDER — POLYETHYLENE GLYCOL 400 AND PROPYLENE GLYCOL 4; 3 MG/ML; MG/ML
1 SOLUTION/ DROPS OPHTHALMIC PRN
COMMUNITY

## 2024-10-15 RX ORDER — PROMETHAZINE HYDROCHLORIDE 25 MG/1
25 TABLET ORAL EVERY 6 HOURS PRN
Status: ON HOLD | COMMUNITY
End: 2024-10-15 | Stop reason: ALTCHOICE

## 2024-10-15 RX ORDER — POTASSIUM CHLORIDE 1500 MG/1
20 TABLET, EXTENDED RELEASE ORAL ONCE
Status: COMPLETED | OUTPATIENT
Start: 2024-10-15 | End: 2024-10-15

## 2024-10-15 RX ORDER — CYCLOBENZAPRINE HCL 5 MG
5 TABLET ORAL 3 TIMES DAILY PRN
Status: ON HOLD | COMMUNITY
End: 2024-10-15 | Stop reason: ALTCHOICE

## 2024-10-15 RX ADMIN — SODIUM CHLORIDE, PRESERVATIVE FREE 10 ML: 5 INJECTION INTRAVENOUS at 09:52

## 2024-10-15 RX ADMIN — PIPERACILLIN AND TAZOBACTAM 4500 MG: 4; .5 INJECTION, POWDER, LYOPHILIZED, FOR SOLUTION INTRAVENOUS at 18:04

## 2024-10-15 RX ADMIN — ENOXAPARIN SODIUM 30 MG: 100 INJECTION SUBCUTANEOUS at 19:45

## 2024-10-15 RX ADMIN — ALTEPLASE 1 MG: 2.2 INJECTION, POWDER, LYOPHILIZED, FOR SOLUTION INTRAVENOUS at 05:28

## 2024-10-15 RX ADMIN — FUROSEMIDE 20 MG: 10 INJECTION, SOLUTION INTRAMUSCULAR; INTRAVENOUS at 17:55

## 2024-10-15 RX ADMIN — ENOXAPARIN SODIUM 30 MG: 100 INJECTION SUBCUTANEOUS at 09:12

## 2024-10-15 RX ADMIN — ACETAMINOPHEN 650 MG: 325 TABLET ORAL at 02:07

## 2024-10-15 RX ADMIN — ALTEPLASE 1 MG: 2.2 INJECTION, POWDER, LYOPHILIZED, FOR SOLUTION INTRAVENOUS at 04:40

## 2024-10-15 RX ADMIN — PIPERACILLIN AND TAZOBACTAM 4500 MG: 4; .5 INJECTION, POWDER, LYOPHILIZED, FOR SOLUTION INTRAVENOUS at 01:48

## 2024-10-15 RX ADMIN — FUROSEMIDE 20 MG: 10 INJECTION, SOLUTION INTRAMUSCULAR; INTRAVENOUS at 09:12

## 2024-10-15 RX ADMIN — ANTI-FUNGAL POWDER MICONAZOLE NITRATE TALC FREE: 1.42 POWDER TOPICAL at 09:52

## 2024-10-15 RX ADMIN — POTASSIUM CHLORIDE 40 MEQ: 1500 TABLET, EXTENDED RELEASE ORAL at 22:21

## 2024-10-15 RX ADMIN — ZOLPIDEM TARTRATE 5 MG: 5 TABLET ORAL at 21:02

## 2024-10-15 RX ADMIN — ACETAMINOPHEN 650 MG: 325 TABLET ORAL at 19:40

## 2024-10-15 RX ADMIN — PIPERACILLIN AND TAZOBACTAM 4500 MG: 4; .5 INJECTION, POWDER, LYOPHILIZED, FOR SOLUTION INTRAVENOUS at 09:51

## 2024-10-15 RX ADMIN — ALTEPLASE 1 MG: 2.2 INJECTION, POWDER, LYOPHILIZED, FOR SOLUTION INTRAVENOUS at 04:10

## 2024-10-15 RX ADMIN — SODIUM CHLORIDE, PRESERVATIVE FREE 10 ML: 5 INJECTION INTRAVENOUS at 21:03

## 2024-10-15 RX ADMIN — PANTOPRAZOLE SODIUM 40 MG: 40 INJECTION, POWDER, FOR SOLUTION INTRAVENOUS at 09:12

## 2024-10-15 RX ADMIN — SODIUM CHLORIDE, PRESERVATIVE FREE 10 ML: 5 INJECTION INTRAVENOUS at 22:21

## 2024-10-15 RX ADMIN — POTASSIUM CHLORIDE 20 MEQ: 1500 TABLET, EXTENDED RELEASE ORAL at 22:21

## 2024-10-15 ASSESSMENT — PAIN SCALES - GENERAL
PAINLEVEL_OUTOF10: 7
PAINLEVEL_OUTOF10: 5
PAINLEVEL_OUTOF10: 4
PAINLEVEL_OUTOF10: 2
PAINLEVEL_OUTOF10: 7

## 2024-10-15 ASSESSMENT — PAIN DESCRIPTION - LOCATION
LOCATION: ARM;SHOULDER
LOCATION: ABDOMEN

## 2024-10-15 ASSESSMENT — PAIN DESCRIPTION - ONSET
ONSET: ON-GOING
ONSET: PROGRESSIVE

## 2024-10-15 ASSESSMENT — PAIN DESCRIPTION - PAIN TYPE
TYPE: ACUTE PAIN
TYPE: SURGICAL PAIN

## 2024-10-15 ASSESSMENT — PAIN - FUNCTIONAL ASSESSMENT
PAIN_FUNCTIONAL_ASSESSMENT: PREVENTS OR INTERFERES SOME ACTIVE ACTIVITIES AND ADLS
PAIN_FUNCTIONAL_ASSESSMENT: PREVENTS OR INTERFERES SOME ACTIVE ACTIVITIES AND ADLS

## 2024-10-15 ASSESSMENT — PAIN DESCRIPTION - FREQUENCY
FREQUENCY: INTERMITTENT
FREQUENCY: CONTINUOUS

## 2024-10-15 ASSESSMENT — PAIN DESCRIPTION - DESCRIPTORS
DESCRIPTORS: ACHING;SORE
DESCRIPTORS: ACHING;SORE

## 2024-10-15 ASSESSMENT — PAIN DESCRIPTION - ORIENTATION
ORIENTATION: RIGHT;LEFT
ORIENTATION: MID

## 2024-10-15 NOTE — PROGRESS NOTES
Occupational Therapy  Facility/Department: [facility]  Rehabilitation Occupational Therapy Daily Treatment Note    Date: 10/15/24  Patient Name: Mandie Bustamante       Room:   MRN: [id number]  Account: [id number]   : 1955  (69 y.o.) Gender: female                    Past Medical History:  has a past medical history of Allergic rhinitis, Anemia, Arthritis, Autoimmune disorder (HCC), Cataracts, bilateral, GERD (gastroesophageal reflux disease), Headache, Hypercalcemia, Hyperparathyroidism (HCC), Hypertension, Obesity, and Osteoarthritis.  Past Surgical History:   has a past surgical history that includes Parathyroid gland surgery (2023); Total vaginal hysterectomy (); Appendectomy; Urethra surgery (); Foot surgery (Left, ); shoulder surgery (Right, 10/2000); Foot surgery (Left, 2010); Shoulder arthroscopy (Right, 2011); Shoulder arthroscopy (Right, 2012); Shoulder arthroscopy (Left, 2012); Cholecystectomy; Bunionectomy (Left, 2016); Foot surgery (Left, 2017); parathyroidectomy (2022); hernia repair (2022); Umbilical hernia repair; Hysterectomy, total abdominal (1986); Hysterectomy, vaginal (1986); Upper gastrointestinal endoscopy (); and ventral hernia repair (N/A, 10/1/2024).    Restrictions  Restrictions/Precautions: General Precautions, Fall Risk, Up Ad Josseline  Other position/activity restrictions: Up w/ assist, RUE PICC, simons cath, telemetry, 2L O2, cont SPO2 monitor, BP cuff, MARCIA LIFT  Required Braces or Orthoses?: No    Subjective  Subjective: Pt in bed sleeping with spouse present. RN (Alma Rosa) stated pt was appropriate for therapy. Pt sleepy most of session and passivley participated.  Restrictions/Precautions: General Precautions;Fall Risk;Up Ad Josseline             Objective     Cognition  Overall Cognitive Status: Exceptions  Arousal/Alertness: Delayed responses to stimuli  Following Commands: Follows one step commands with repetition;Follows one

## 2024-10-15 NOTE — PROGRESS NOTES
[facility]  Office: [phone]  Keenan Foster DO, Kevin Siegel DO, Carlos A Orta DO, Taye Morales DO, Maxx Madison MD, Meena Eckert MD, Efren Vo MD, Makayla Last MD,  Jerman Christian MD, Gina Bourgeois MD, Gladys Fry MD,  Anu Murillo DO, Jennifer Blanca MD, Oral Duncan MD, Harlan Foster DO, Katya Simmons MD,  Filiberto Slade DO, Elizabeth Alves MD, Samantha Pinto MD, Arlene Bryant MD, Bandar Barrios MD,  Kulwant Rodrigez MD, Michelle Dennison MD, Jinny Steel MD, Kathia Vargas MD, Gerardo Alvarez MD, Richard Johnson MD, Demetri Ibarra DO, Benjie Alvarenga MD, Shirley Waterhouse, CNP,  Georgette Villarreal, CNP, Demetri Toure, CNP,  Silvina Castano, CARINA, Tatiana Mckeon, CNP, Angelina Harper, CNP, Alma Rosa Peng, CNP, Rashmi Mccartney, CNP, Kimberley Grant PALiamC, IMAN ArmentaC, Stephanie Kaminski, CNP, Tono Yee, CNP,  Chrissie Schwartz, CNP, Sheri Jones, CNP,  Nadiya Rick, CNP, Cindi Lam, CNP         [facility]   IN-PATIENT SERVICE   [facility]    Progress Note    10/15/2024    1:55 PM    Name:   Mandie Bustamante  MRN:     [id number]     Acct:      [id number]   Room:   2030/2030-01  IP Day:  5  Admit Date:  10/10/2024  6:11 PM    PCP:   Kota Sandhu DO  Code Status:  Full Code    Subjective:     C/C: abd pain  Interval History Status: improved.     Does feel somewhat better today  Gets occasional abd cramp  Denies cp/sob/n/v  No stool or flatus today but did have small amount of stool yesterday    Brief History:     Per my partner:  \"Mandie Bustamante is a 69 y.o. Non- / non  female who presents with No chief complaint on file.   and is admitted to the hospital for the management of Hyponatremia.     recently underwent robotic assisted laparoscopic incisional hernia repair with mesh on 10/1/2024 with surgery, now presented from outlying facility for management of hyponatremia and abdominal abscess.     At [facility] patient

## 2024-10-15 NOTE — PROGRESS NOTES
End Of Shift Note  [facility]  Summary of shift: Pt. Continues to experience discomfort in the abdominal area. She has requested Tylenol with little relief, however refuses additional pain medications. Na continues to trend upward with the most recent result 125. Pt. Currently resting but hopeful to get moving with PT today.    Vitals:    Vitals:    10/15/24 0300 10/15/24 0400 10/15/24 0448 10/15/24 0500   BP: 130/77 121/67  (!) 141/73   Pulse: 100 100 98 97   Resp: 28 29 25 25   Temp:  96.9 °F (36.1 °C)     TempSrc:  Temporal     SpO2: 95% 92% 93% 93%   Weight:  126.4 kg (278 lb 11.2 oz)     Height:            I&O:   Intake/Output Summary (Last 24 hours) at 10/15/2024 0524  Last data filed at 10/15/2024 0400  Gross per 24 hour   Intake 908.03 ml   Output 1900 ml   Net -991.97 ml       Resp Status: 2LNC    Critical Care IV infusions:   sodium chloride          LDA:   PICC 10/11/24 Right Brachial (Active)   Number of days: 3       Urinary Catheter 10/10/24 Bar (Active)   Number of days: 4       Incision 10/01/24 Abdomen Medial;Upper (Active)   Number of days: 13

## 2024-10-15 NOTE — FLOWSHEET NOTE
10/14/24 2229   Treatment Team Notification   Reason for Communication Evaluate   Name of Team Member Notified Dr. Roach   Treatment Team Role Consulting Provider   Method of Communication Call   Response No new orders   Notification Time 2227     Contacted provider to notify of Na 126, as orders indicate to notify for Na above 125.  Provider is aware of results and did not place any additional orders at this time.

## 2024-10-15 NOTE — PROGRESS NOTES
Nephrology Progress Note      SUBJECTIVE    Patient was seen and examined.  Patient was in her recliner.  She was alert and awake.  Patient states that she is feeling much better.  She had a bowel movement yesterday and it was all watery.  Patient remains on clear liquids and just consuming small amount of broth and ice chips.  In the last 24-hour her total intake was 1200 mL and output was 1925.  Sodium is 125 this morning.  Increase in WBC count noted up to 26,000.  Patient is afebrile    Brief history:  Patient is status post robotic assisted laparoscopic incisional hernia repair with mesh on 10/1/2024 by Dr. Cummings.  She started noticing some abdominal pain 2 to 3 days prior to admission, had poor appetite, did feel like she was developing more abdominal distention and also got some diarrhea.  In an attempt to keep herself hydrated she started drinking a fair amount of free water.  Symptoms did not get better, she presented to the ER and had the following labs:  Sodium 109 which was down from 140 about 10 days earlier, potassium of 3.4, bicarb 24, creatinine 0.8 and an albumin of 3.1.  There was some issue with urinary retention, Bar was placed and we got about 500 mL of urine out.     Initial CT scan of the abdomen and pelvis showed a large air-fluid collection seen along midline consistent with an abscess measuring 13.8 x 7 cm  Urine studies showed urine sodium of 20, urine osmolarity of greater than 425.   Additional history is noted positive for chronic hypertension, history of hyperparathyroidism and mild hypercalcemia status post parathyroidectomy of ectopic gland in May 2023.  She additionally has had history of cholecystectomy, hysterectomy, incisional hernia in the past and surgical repair of incarcerated incisional hernia as of 10/1/2024    OBJECTIVE      CURRENT TEMPERATURE:  Temp: 97.3 °F (36.3 °C)  MAXIMUM TEMPERATURE OVER 24HRS:  Temp (24hrs), Av.3 °F (36.3 °C), Min:96.8 °F (36 °C), Max:99 °F  aspirated from intra-abdominal fluid collection shows no neutrophils no organisms and no growth      ASSESSMENT    1.  Hypoosmolar hyponatremia likely multifactorial from a combination of poor solute load, increased free water intake and increased ADH secondary to nausea and pain.  Sodium was 109 on admission, dropped down to 105 and sodium corrected now up to 125.  Patient is on Lasix 10 mg twice daily.  She received her morning and has a good urine output.  2.  Hypokalemia: Corrected   3.  Poor solute intake, patient is still on clear liquids   4.  History of parathyroidectomy secondary to hyperparathyroidism from an ectopic gland.  5.  Fluid collection/abscess noted along the peritoneum just below the incision site.  General surgery following    PLAN      1.  Continue Lasix 20 mg IV every 12 hours   2.  Advance diet if it is okay with primary service  3.  Please call with sodium results.  Please do not hesitate to call with questions.    This note is created with the assistance of a speech-recognition program. While intending to generate a document that actually reflects the content of the visit, no guarantees can be provided that every mistake has been identified and corrected by editing    Electronically signed by Florian Jackson MD on 10/15/2024 at 1:35 PM

## 2024-10-15 NOTE — PROGRESS NOTES
10/13/24  1959   POCGLU 142*       I/O (24Hr):    Intake/Output Summary (Last 24 hours) at 10/14/2024 2046  Last data filed at 10/14/2024 2032  Gross per 24 hour   Intake 768.03 ml   Output 2200 ml   Net -1431.97 ml       Labs:  Hematology:  Recent Labs     10/12/24  1053 10/12/24  1338 10/13/24  0500 10/14/24  0500   WBC  --  20.0* 23.6* 26.0*   RBC  --  4.16 3.93* 3.97   HGB  --  12.1 11.7* 12.0   HCT  --  32.9* 31.5* 32.5*   MCV  --  79.1* 80.2* 81.9*   MCH  --  29.1 29.8 30.2   MCHC  --  36.8* 37.1* 36.9*   RDW  --  11.7* 11.9 12.1   PLT  --  412 381 382   MPV  --  8.7 8.8 8.7   SEDRATE  --  27  --   --    .8*  --   --   --      Chemistry:  Recent Labs     10/12/24  0500 10/12/24  0833 10/13/24  0500 10/13/24  0749 10/14/24  0500 10/14/24  0810 10/14/24  1220 10/14/24  1617   *   < > 116*   < > 121* 120* 123* 124*   K 3.1*   < > 3.7   < > 4.1 4.0 3.8 3.8   CL 77*   < > 82*   < > 86* 85* 86* 86*   CO2 23   < > 23   < > 23 24 23 24   GLUCOSE 145*  --  130*  --  133*  --   --   --    BUN 12  --  11  --  10  --   --   --    CREATININE 0.7  --  0.7  --  0.7  --   --   --    MG 1.7  --   --   --   --   --   --   --    ANIONGAP 12   < > 11   < > 12 11 14 14   LABGLOM >90  --  >90  --  >90  --   --   --    CALCIUM 7.0*  --  7.3*  --  7.4*  --   --   --     < > = values in this interval not displayed.     Recent Labs     10/13/24  1958   POCGLU 142*     ABG:No results found for: \"POCPH\", \"PHART\", \"PH\", \"POCPCO2\", \"KCT4DAV\", \"PCO2\", \"POCPO2\", \"PO2ART\", \"PO2\", \"POCHCO3\", \"HCZ6HPT\", \"HCO3\", \"NBEA\", \"PBEA\", \"BEART\", \"BE\", \"THGBART\", \"THB\", \"DAB9EGM\", \"TJGO6HFM\", \"E8TOJCYB\", \"O2SAT\", \"FIO2\"  Lab Results   Component Value Date/Time    SPECIAL Site: Abdomen 10/11/2024 05:49 PM     Lab Results   Component Value Date/Time    CULTURE NO GROWTH 3 DAYS 10/11/2024 05:49 PM       Radiology:  CT ABDOMEN PELVIS WO CONTRAST Additional Contrast? None    Result Date: 10/10/2024  1. Large abscess with air-fluid collection  seen along the peritoneum just below the rectus muscle.  Additional infiltration with gas and fluid seen extending in previous site of hernia suggesting recurrent hernia and or phlegmon.     XR CHEST PORTABLE    Result Date: 10/10/2024  Shallow lung volumes without acute consolidation.       Physical Examination:     General appearance:  alert, cooperative and no distress.  Bar catheter in  Mental Status:  oriented to person, place and time and normal affect  Lungs:  clear to auscultation bilaterally, normal effort  Heart:  regular rate and rhythm, no murmur  Abdomen:  soft, tender, nondistended, normal bowel sounds, no masses, hepatomegaly, splenomegaly  Extremities:  no edema, redness, tenderness in the calves  Skin:  no gross lesions, rashes, induration    Assessment:     Hospital Problems             Last Modified POA    * (Principal) Hyponatremia 10/10/2024 Yes    Anxiety 10/10/2024 Yes    Gastro-esophageal reflux disease without esophagitis 10/10/2024 Yes    Essential hypertension (Chronic) 10/10/2024 Yes    Hypokalemia 10/10/2024 Yes    Elevated brain natriuretic peptide (BNP) level 10/10/2024 Yes    Leukocytosis 10/10/2024 Yes    Prediabetes (Chronic) 10/10/2024 Yes    Overview Signed 10/10/2024  8:47 PM by Gerardo Alvarez MD     Patient reportedly was in the prediabetic range according to labs in 2019 per chart review, however I am unable to see those labs.  But on recent blood work performed this month hemoglobin A1c was 6.3%.  Patient does complain of urinary frequency, urinalysis performed same time was negative for infection or signs of stone so could likely be from her hyperglycemia or potentially hypercalcemia which each were discussed in depth with the patient.  We also discussed cushionoid appearance and how that could relate to hyperglycemia as well.  Patient states we could possibly investigate this at later date.         Intra-abdominal abscess (HCC) 10/10/2024 Yes       Plan:     Ac hypoxic

## 2024-10-15 NOTE — PROGRESS NOTES
Benjamin Almazan MD   Urology Progress Note            Subjective: Follow-up urinary retention    Patient Vitals for the past 24 hrs:   BP Temp Temp src Pulse Resp SpO2 Weight   10/15/24 0600 -- -- -- 98 24 -- --   10/15/24 0500 (!) 141/73 -- -- 97 25 93 % --   10/15/24 0448 -- -- -- 98 25 93 % --   10/15/24 0400 121/67 96.9 °F (36.1 °C) Temporal 100 29 92 % 126.4 kg (278 lb 11.2 oz)   10/15/24 0300 130/77 -- -- 100 28 95 % --   10/15/24 0200 127/79 -- -- (!) 102 28 96 % --   10/15/24 0100 (!) 144/92 -- -- (!) 101 19 96 % --   10/15/24 0000 (!) 151/92 96.8 °F (36 °C) Temporal 99 28 97 % --   10/14/24 2300 120/63 -- -- 99 23 96 % --   10/14/24 2200 (!) 150/84 -- -- (!) 104 30 96 % --   10/14/24 2100 (!) 142/95 -- -- (!) 101 22 97 % --   10/14/24 2025 (!) 144/82 96.8 °F (36 °C) Temporal 94 (!) 32 96 % --   10/14/24 2000 -- -- -- 95 (!) 33 97 % --   10/14/24 1900 129/69 -- -- 89 23 98 % --   10/14/24 1800 (!) 175/88 -- -- (!) 108 (!) 31 96 % --   10/14/24 1700 (!) 170/78 -- -- (!) 103 (!) 38 96 % --   10/14/24 1600 (!) 168/70 99 °F (37.2 °C) Oral (!) 102 30 96 % --   10/14/24 1500 -- -- -- 99 30 95 % --   10/14/24 1400 (!) 149/96 -- -- 100 25 94 % --   10/14/24 1307 (!) 159/82 -- -- 96 27 -- --   10/14/24 1300 (!) 164/75 -- -- 95 28 95 % --   10/14/24 1200 127/64 96.8 °F (36 °C) Temporal 92 20 94 % --   10/14/24 1110 135/69 -- -- -- -- -- --   10/14/24 1100 135/69 -- -- 95 20 94 % --   10/14/24 1000 (!) 160/74 -- -- 98 23 95 % --   10/14/24 0900 (!) 159/72 -- -- 94 23 94 % --   10/14/24 0800 (!) 157/85 99 °F (37.2 °C) Oral 91 23 94 % --   10/14/24 0700 (!) 144/82 -- -- 94 24 94 % --       Intake/Output Summary (Last 24 hours) at 10/15/2024 0625  Last data filed at 10/15/2024 0400  Gross per 24 hour   Intake 908.03 ml   Output 1900 ml   Net -991.97 ml       Recent Labs     10/12/24  1338 10/13/24  0500 10/14/24  0500   WBC 20.0* 23.6* 26.0*   HGB 12.1 11.7* 12.0   HCT 32.9* 31.5* 32.5*   MCV

## 2024-10-15 NOTE — PROGRESS NOTES
Physical Therapy  Facility/Department: [facility]  Daily Treatment Note  NAME: Mandie Bustamante  : 1955  MRN: [id number]    Date of Service: 10/15/2024    Discharge Recommendations:  Patient would benefit from continued therapy after discharge    Pt currently functioning below baseline.  Recommend daily inpatient skilled therapy at time of discharge to maximize long term outcomes and prevent re-admission. Please refer to AM-PAC score for current level of function.     Patient Diagnosis(es): The encounter diagnosis was Edema, unspecified type.    Assessment  Assessment: Patient rolled with MAX x 2 A for repositioning for maxi richmond lift then transferred to recliner. Once in the recliner performed core activiation activity with MOD-MAX x 2 A complete. Performed seated NILS LE A/AROM x 10 reps. Patient is below her baseline and would benefit from continued skilled PT services.  Activity Tolerance: Patient limited by fatigue;Patient limited by endurance;Treatment limited secondary to decreased cognition    Plan  Physical Therapy Plan  General Plan: 5-7 times per week  Current Treatment Recommendations: Strengthening;ROM;Balance training;Functional mobility training;Endurance training;Neuromuscular re-education;Home exercise program;Positioning;Pain management;Patient/Caregiver education & training;Safety education & training;Therapeutic activities;Co-Treatment    Restrictions  Restrictions/Precautions  Restrictions/Precautions: General Precautions, Fall Risk, Up Ad Josseline  Required Braces or Orthoses?: No  Position Activity Restriction  Other position/activity restrictions: Up w/ assist, RUE PICC, simons cath, telemetry, 2L O2, cont SPO2 monitor, BP cuff, RICHMOND LIFT     Subjective   Subjective  Subjective: Patient heavily sleeping upon arrival and unable to keep eyes open. Did wake up more throughout out the session. RN reported patient medically stable for PT treatment.  Orientation  Overall Orientation Status: Within  your arms?: Total  How much help is needed walking in hospital room?: Total  How much help is needed climbing 3-5 steps with a railing?: Total  AM-PAC Inpatient Mobility Raw Score : 6  AM-PAC Inpatient T-Scale Score : 23.55  Mobility Inpatient CMS 0-100% Score: 100  Mobility Inpatient CMS G-Code Modifier : CN         Therapy Time   Individual Concurrent Group Co-treatment   Time In       0849   Time Out       0928   Minutes       39       Co-treatment with OT warranted secondary to decreased safety and independence requiring 2 skilled therapy professionals to address individual discipline's goals. PT addressing pre gait trunk strengthening, weight shifting prior to transfers, transfer training, and postural control in sitting.     Shandra Gresham, PTA

## 2024-10-15 NOTE — PLAN OF CARE
Problem: Discharge Planning  Goal: Discharge to home or other facility with appropriate resources  10/15/2024 1049 by Alma Rosa Pinon RN  Outcome: Progressing  Flowsheets (Taken 10/15/2024 0800)  Discharge to home or other facility with appropriate resources: Identify barriers to discharge with patient and caregiver  10/15/2024 0320 by Chely Harkins RN  Outcome: Progressing  Flowsheets (Taken 10/14/2024 2000)  Discharge to home or other facility with appropriate resources:   Identify barriers to discharge with patient and caregiver   Arrange for needed discharge resources and transportation as appropriate   Identify discharge learning needs (meds, wound care, etc)   Refer to discharge planning if patient needs post-hospital services based on physician order or complex needs related to functional status, cognitive ability or social support system     Problem: Skin/Tissue Integrity  Goal: Absence of new skin breakdown  Description: 1.  Monitor for areas of redness and/or skin breakdown  2.  Assess vascular access sites hourly  3.  Every 4-6 hours minimum:  Change oxygen saturation probe site  4.  Every 4-6 hours:  If on nasal continuous positive airway pressure, respiratory therapy assess nares and determine need for appliance change or resting period.  10/15/2024 1049 by Alma Rosa Pinon RN  Outcome: Progressing  10/15/2024 0320 by Chely Harkins RN  Outcome: Progressing     Problem: ABCDS Injury Assessment  Goal: Absence of physical injury  10/15/2024 1049 by Alma Rosa Pinon RN  Outcome: Progressing  10/15/2024 0320 by Chely Harkins RN  Outcome: Progressing     Problem: Safety - Adult  Goal: Free from fall injury  10/15/2024 1049 by Alma Rosa Pinon RN  Outcome: Progressing  10/15/2024 0320 by Chely Harkins RN  Outcome: Progressing     Problem: Metabolic/Fluid and Electrolytes - Adult  Goal: Electrolytes maintained within normal limits  10/15/2024 1049 by Alma Rosa Pinon RN  Outcome:  evaluate response   Implement non-pharmacological measures as appropriate and evaluate response   Consider cultural and social influences on pain and pain management   Notify Licensed Independent Practitioner if interventions unsuccessful or patient reports new pain     Problem: Chronic Conditions and Co-morbidities  Goal: Patient's chronic conditions and co-morbidity symptoms are monitored and maintained or improved  10/15/2024 1049 by Alma Rosa Pinon, RN  Outcome: Progressing  Flowsheets (Taken 10/15/2024 0800)  Care Plan - Patient's Chronic Conditions and Co-Morbidity Symptoms are Monitored and Maintained or Improved: Monitor and assess patient's chronic conditions and comorbid symptoms for stability, deterioration, or improvement  10/15/2024 0320 by Chely Harkins, RN  Outcome: Progressing  Flowsheets (Taken 10/14/2024 2000)  Care Plan - Patient's Chronic Conditions and Co-Morbidity Symptoms are Monitored and Maintained or Improved:   Monitor and assess patient's chronic conditions and comorbid symptoms for stability, deterioration, or improvement   Collaborate with multidisciplinary team to address chronic and comorbid conditions and prevent exacerbation or deterioration   Update acute care plan with appropriate goals if chronic or comorbid symptoms are exacerbated and prevent overall improvement and discharge

## 2024-10-15 NOTE — PLAN OF CARE
Problem: Discharge Planning  Goal: Discharge to home or other facility with appropriate resources  Outcome: Progressing  Flowsheets (Taken 10/14/2024 2000)  Discharge to home or other facility with appropriate resources:   Identify barriers to discharge with patient and caregiver   Arrange for needed discharge resources and transportation as appropriate   Identify discharge learning needs (meds, wound care, etc)   Refer to discharge planning if patient needs post-hospital services based on physician order or complex needs related to functional status, cognitive ability or social support system     Problem: Skin/Tissue Integrity  Goal: Absence of new skin breakdown  Description: 1.  Monitor for areas of redness and/or skin breakdown  2.  Assess vascular access sites hourly  3.  Every 4-6 hours minimum:  Change oxygen saturation probe site  4.  Every 4-6 hours:  If on nasal continuous positive airway pressure, respiratory therapy assess nares and determine need for appliance change or resting period.  Outcome: Progressing     Problem: ABCDS Injury Assessment  Goal: Absence of physical injury  Outcome: Progressing     Problem: Safety - Adult  Goal: Free from fall injury  Outcome: Progressing     Problem: Metabolic/Fluid and Electrolytes - Adult  Goal: Electrolytes maintained within normal limits  Outcome: Progressing  Flowsheets (Taken 10/14/2024 2000)  Electrolytes maintained within normal limits:   Monitor labs and assess patient for signs and symptoms of electrolyte imbalances   Administer electrolyte replacement as ordered   Monitor response to electrolyte replacements, including repeat lab results as appropriate   Fluid restriction as ordered   Instruct patient on fluid and nutrition restrictions as appropriate  Goal: Hemodynamic stability and optimal renal function maintained  Outcome: Progressing  Flowsheets (Taken 10/14/2024 2000)  Hemodynamic stability and optimal renal function maintained:   Monitor labs  function  Outcome: Progressing  Flowsheets (Taken 10/14/2024 2000)  Return Mobility to Safest Level of Function:   Assess patient stability and activity tolerance for standing, transferring and ambulating with or without assistive devices   Assist with transfers and ambulation using safe patient handling equipment as needed   Ensure adequate protection for wounds/incisions during mobilization   Obtain physical therapy/occupational therapy consults as needed   Apply continuous passive motion per provider or physical therapy orders to increase flexion toward goal   Instruct patient/family in ordered activity level     Problem: Nutrition Deficit:  Goal: Optimize nutritional status  Outcome: Progressing     Problem: Pain  Goal: Verbalizes/displays adequate comfort level or baseline comfort level  Outcome: Progressing  Flowsheets (Taken 10/14/2024 2000)  Verbalizes/displays adequate comfort level or baseline comfort level:   Encourage patient to monitor pain and request assistance   Assess pain using appropriate pain scale   Administer analgesics based on type and severity of pain and evaluate response   Implement non-pharmacological measures as appropriate and evaluate response   Consider cultural and social influences on pain and pain management   Notify Licensed Independent Practitioner if interventions unsuccessful or patient reports new pain     Problem: Chronic Conditions and Co-morbidities  Goal: Patient's chronic conditions and co-morbidity symptoms are monitored and maintained or improved  Outcome: Progressing  Flowsheets (Taken 10/14/2024 2000)  Care Plan - Patient's Chronic Conditions and Co-Morbidity Symptoms are Monitored and Maintained or Improved:   Monitor and assess patient's chronic conditions and comorbid symptoms for stability, deterioration, or improvement   Collaborate with multidisciplinary team to address chronic and comorbid conditions and prevent exacerbation or deterioration   Update acute care

## 2024-10-15 NOTE — FLOWSHEET NOTE
End Of Shift Note  [facility]  Summary of shift: Patient had uneventful, continue with clear liquids, did have 2 watery loose BM's. She did at one point get up to BSC with 2 assist + marlyn Steady and did well with it. Needs much encouragement.    Vitals:    Vitals:    10/15/24 1135 10/15/24 1600 10/15/24 1800 10/15/24 1900   BP: (!) 151/72 (!) 142/102 (!) 152/112 (!) 152/80   Pulse:  99 99 99   Resp:  28 26 26   Temp: 97.3 °F (36.3 °C) 97.1 °F (36.2 °C)     TempSrc: Temporal Temporal     SpO2:  98% 99% 96%   Weight:       Height:            I&O:   Intake/Output Summary (Last 24 hours) at 10/15/2024 1956  Last data filed at 10/15/2024 1800  Gross per 24 hour   Intake 950 ml   Output 1000 ml   Net -50 ml       Resp Status: 2L    Ventilator Settings:     / / /FiO2 : 30 %    Critical Care IV infusions:   sodium chloride          LDA:   PICC 10/11/24 Right Brachial (Active)   Number of days: 3       Urinary Catheter 10/10/24 Bar (Active)   Number of days: 5       Incision 10/01/24 Abdomen Medial;Upper (Active)   Number of days: 14

## 2024-10-15 NOTE — FLOWSHEET NOTE
10/15/24 0039   Treatment Team Notification   Reason for Communication Evaluate   Name of Team Member Notified CARMEN Rick   Treatment Team Role Advanced Practice Nurse   Method of Communication Secure Message   Response Waiting for response   Notification Time 0038     Notified provider of  difficulty flushing and drawing of all 3 ports of pt. PICC line  Orders placed orders for 1 mg. Cathflow per lumen.

## 2024-10-15 NOTE — PROGRESS NOTES
[facility]  General Surgery  Consult Progress Note    Service date: 10/15/2024, 7:35 AM  Room : 2030/2030-01   Name: Mandie Bustamante  MRN: [id number]    Length of stay: 5 day(s)    OR date: 10/1/2024: Robotic assisted laparoscopic mesh hernioplasty    POD #14      Subjective   Interval update:   -Patient not very participatory on exam this morning  -No significant overnight issues noted.  -Abdominal pain well controlled with meds  -Had 1 BM recorded overnight  -Abdomen is still distended and firm  -Patient is currently tolerating CLD.  -Patient reports no nausea/ vomiting, fever/chills, chest pain, shortness of breath, or other symptoms.    No growth in anaerobic or aerobic cultures sent from abdominal wall aspiration    WBC uptrending.  Patient on Zosyn.         Objective   Physical exam: BP (!) 163/81   Pulse 98   Temp 96.9 °F (36.1 °C) (Temporal)   Resp 26   Ht 1.6 m (5' 2.99\")   Wt 126.4 kg (278 lb 11.2 oz)   SpO2 93%   BMI 49.38 kg/m²     General: Patient is resting comfortably on exam, not in any distress and is pleasant and cooperative.   Neuro: alert and oriented x3   Abdomen: soft, appropriately tender , non distended. Incision: Clean, dry, intact. No erythema noted.    Chest: Non-labored, symmetrical respirations.   CVS: Pulse RRR       Date 10/15/24 0000 - 10/15/24 2359   Shift 2093-0555 5020-6102 9704-4677 24 Hour Total   INTAKE   P.O.(mL/kg/hr) 100   100   IV Piggyback(mL/kg) 100(0.8)   100(0.8)   Shift Total(mL/kg) 200(1.6)   200(1.6)   OUTPUT   Urine(mL/kg/hr) 250   250   Shift Total(mL/kg) 250(2)   250(2)   Weight (kg) 126.4 126.4 126.4 126.4           Medications:    Current Facility-Administered Medications   Medication Dose Route Frequency Provider Last Rate Last Admin    bisacodyl (DULCOLAX) suppository 10 mg  10 mg Rectal Once Hao Stewart MD        furosemide (LASIX) injection 20 mg  20 mg IntraVENous BID Artie Van MD   20 mg at 10/14/24 1831    diatrizoate

## 2024-10-15 NOTE — PROGRESS NOTES
Spiritual Health History and Assessment/Progress Note  [facility]    (P) Initial Encounter,  ,  ,      Name: Mandie Bustamante MRN: [id number]    Age: 69 y.o.     Sex: female   Language: English   Yazdanism: Denominational   Hyponatremia     Date: 10/15/2024            Total Time Calculated: (P) 10 min              Spiritual Assessment began in [facility]        Referral/Consult From: (P) Rounding   Encounter Overview/Reason: (P) Initial Encounter  Service Provided For: (P) Patient    Elaine, Belief, Meaning:   Patient unable to assess at this time  Family/Friends identify as spiritual, are connected with a elaine tradition or spiritual practice, and have beliefs or practices that help with coping during difficult times      Importance and Influence:  Patient unable to assess at this time  Family/Friends have spiritual/personal beliefs that influence decisions regarding the patient's health    Community:  Family/Friends feel well-supported. Support system includes: Spouse/Partner, Children, Friends, and Extended family    Assessment and Plan of Care:   Family/Friends Interventions include: Affirmed coping skills/support systems  Family/Friends Plan of Care: Spiritual Care available upon further referral    Electronically signed by Chaplain Benson Jr on 10/15/2024 at 9:43 AM     10/15/24 0939   Encounter Summary   Encounter Overview/Reason Initial Encounter   Encounter Code  Assessment by  services   Service Provided For Patient   Referral/Consult From [facility]   Support System Spouse   Last Encounter  10/15/24   Complexity of Encounter Moderate   Begin Time 0920   End Time  0930   Total Time Calculated 10 min   Spiritual/Emotional needs   Type Spiritual Support   Assessment/Intervention/Outcome   Assessment Unable to assess  (Patient is with Pt/Ot, spoke briefly to spouse)   Intervention Discussed relationship with God;Sustaining Presence/Ministry of presence   Outcome Engaged in

## 2024-10-15 NOTE — PROGRESS NOTES
[facility] Pharmacy Service   Medication Review    The patient's list of current home medications has been reviewed.     Source(s) of information: spoke to patient and sure scripts     Based on information provided by the above source(s), I have updated the patient's home med list as described below.       I changed or updated the following medications on the patient's home medication list:  Removed cyclobenzaprine (FLEXERIL) 5 MG tablet  promethazine (PHENERGAN) 25 MG tablet  docusate sodium (COLACE) 100 MG capsule  ondansetron (ZOFRAN-ODT) 4 MG disintegrating tablet  hydrocortisone 2.5 % ointment     Added acetaminophen-codeine (TYLENOL #3) 300-30 MG per tablet  polyethyl glycol-propyl glycol 0.4-0.3 % (SYSTANE) 0.4-0.3 % ophthalmic solution     Adjusted   zolpidem (AMBIEN) 5 MG tablet  tacrolimus (PROTOPIC) 0.1 % ointment  irbesartan (AVAPRO) 150 MG tablet     Other Notes None            Please feel free to call me with any questions about this encounter. Thank you.    This note will be reviewed and co-signed by the [facility] Pharmacist. The pharmacist will review inpatient orders and contact the physician about any discrepancies.    Sean Fong, pharmacy technician  [facility]itions [facility] Pharmacy Service  Phone:  [phone]  Fax: [phone]      Electronically signed by Sean Fong on 10/15/2024 at 5:12 PM     Prior to Admission medications    Medication Sig   acetaminophen-codeine (TYLENOL #3) 300-30 MG per tablet Take 1 tablet by mouth every 8 hours as needed for Pain. Max Daily Amount: 3 tablets   polyethyl glycol-propyl glycol 0.4-0.3 % (SYSTANE) 0.4-0.3 % ophthalmic solution Place 1 drop into both eyes as needed for Dry Eyes   zolpidem (AMBIEN) 5 MG tablet Take 1 tablet by mouth nightly as needed for Sleep.   tacrolimus (PROTOPIC) 0.1 % ointment Apply 1 Application topically every evening To Eye brows   loratadine (CLARITIN) 10 MG capsule Take 1 capsule by mouth as needed

## 2024-10-16 ENCOUNTER — ANESTHESIA EVENT (OUTPATIENT)
Dept: OPERATING ROOM | Age: 69
End: 2024-10-16
Payer: COMMERCIAL

## 2024-10-16 ENCOUNTER — APPOINTMENT (OUTPATIENT)
Dept: GENERAL RADIOLOGY | Age: 69
End: 2024-10-16
Attending: STUDENT IN AN ORGANIZED HEALTH CARE EDUCATION/TRAINING PROGRAM
Payer: COMMERCIAL

## 2024-10-16 ENCOUNTER — ANESTHESIA (OUTPATIENT)
Dept: OPERATING ROOM | Age: 69
End: 2024-10-16
Payer: COMMERCIAL

## 2024-10-16 ENCOUNTER — APPOINTMENT (OUTPATIENT)
Dept: CT IMAGING | Age: 69
End: 2024-10-16
Attending: STUDENT IN AN ORGANIZED HEALTH CARE EDUCATION/TRAINING PROGRAM
Payer: COMMERCIAL

## 2024-10-16 LAB
ANION GAP SERPL CALCULATED.3IONS-SCNC: 12 MMOL/L (ref 9–17)
ANION GAP SERPL CALCULATED.3IONS-SCNC: 14 MMOL/L (ref 9–17)
ANION GAP SERPL CALCULATED.3IONS-SCNC: 15 MMOL/L (ref 9–17)
BASOPHILS # BLD: 0.27 K/UL (ref 0–0.2)
BASOPHILS NFR BLD: 1 % (ref 0–2)
BUN SERPL-MCNC: 20 MG/DL (ref 8–23)
BUN/CREAT SERPL: 25 (ref 9–20)
CA-I BLD-SCNC: 0.96 MMOL/L (ref 1.15–1.33)
CALCIUM SERPL-MCNC: 7.4 MG/DL (ref 8.6–10.4)
CHLORIDE SERPL-SCNC: 84 MMOL/L (ref 98–107)
CHLORIDE SERPL-SCNC: 87 MMOL/L (ref 98–107)
CHLORIDE SERPL-SCNC: 88 MMOL/L (ref 98–107)
CO2 BLD CALC-SCNC: 25 MMOL/L (ref 22–30)
CO2 SERPL-SCNC: 24 MMOL/L (ref 20–31)
CREAT SERPL-MCNC: 0.8 MG/DL (ref 0.5–0.9)
EOSINOPHIL # BLD: 0 K/UL (ref 0–0.44)
EOSINOPHILS RELATIVE PERCENT: 0 % (ref 1–4)
ERYTHROCYTE [DISTWIDTH] IN BLOOD BY AUTOMATED COUNT: 12.8 % (ref 11.8–14.4)
ERYTHROCYTE [DISTWIDTH] IN BLOOD BY AUTOMATED COUNT: 13 % (ref 11.8–14.4)
FIO2: 50
GFR, ESTIMATED: 80 ML/MIN/1.73M2
GLUCOSE BLD-MCNC: 161 MG/DL (ref 65–105)
GLUCOSE SERPL-MCNC: 147 MG/DL (ref 70–99)
HCT VFR BLD AUTO: 28.8 % (ref 36.3–47.1)
HCT VFR BLD AUTO: 33.4 % (ref 36.3–47.1)
HGB BLD-MCNC: 10.6 G/DL (ref 11.9–15.1)
HGB BLD-MCNC: 11.8 G/DL (ref 11.9–15.1)
IMM GRANULOCYTES # BLD AUTO: 1.37 K/UL (ref 0–0.3)
IMM GRANULOCYTES NFR BLD: 5 %
LYMPHOCYTES NFR BLD: 0.82 K/UL (ref 1.1–3.7)
LYMPHOCYTES RELATIVE PERCENT: 3 % (ref 24–43)
MAGNESIUM SERPL-MCNC: 1.8 MG/DL (ref 1.6–2.6)
MCH RBC QN AUTO: 29.8 PG (ref 25.2–33.5)
MCH RBC QN AUTO: 31.3 PG (ref 25.2–33.5)
MCHC RBC AUTO-ENTMCNC: 35.3 G/DL (ref 28.4–34.8)
MCHC RBC AUTO-ENTMCNC: 36.8 G/DL (ref 28.4–34.8)
MCV RBC AUTO: 84.3 FL (ref 82.6–102.9)
MCV RBC AUTO: 85 FL (ref 82.6–102.9)
MICROORGANISM SPEC CULT: NORMAL
MICROORGANISM/AGENT SPEC: NORMAL
MICROORGANISM/AGENT SPEC: NORMAL
MODE: ABNORMAL
MONOCYTES NFR BLD: 1.64 K/UL (ref 0.1–1.2)
MONOCYTES NFR BLD: 6 % (ref 3–12)
MORPHOLOGY: ABNORMAL
MORPHOLOGY: ABNORMAL
NEUTROPHILS NFR BLD: 85 % (ref 36–65)
NEUTS SEG NFR BLD: 23.3 K/UL (ref 1.5–8.1)
NRBC BLD-RTO: 0 PER 100 WBC
NRBC BLD-RTO: 0 PER 100 WBC
PHOSPHATE SERPL-MCNC: 3.2 MG/DL (ref 2.6–4.5)
PLATELET # BLD AUTO: 374 K/UL (ref 138–453)
PLATELET # BLD AUTO: 418 K/UL (ref 138–453)
PMV BLD AUTO: 8.8 FL (ref 8.1–13.5)
PMV BLD AUTO: 8.9 FL (ref 8.1–13.5)
POC HCO3: 25.1 MMOL/L (ref 21–28)
POC O2 SATURATION: 99.5 % (ref 94–98)
POC PCO2: 35.3 MM HG (ref 35–48)
POC PH: 7.46 (ref 7.35–7.45)
POC PO2: 154 MM HG (ref 83–108)
POSITIVE BASE EXCESS, ART: 1.4 MMOL/L (ref 0–3)
POTASSIUM SERPL-SCNC: 3.6 MMOL/L (ref 3.7–5.3)
POTASSIUM SERPL-SCNC: 3.6 MMOL/L (ref 3.7–5.3)
POTASSIUM SERPL-SCNC: 3.9 MMOL/L (ref 3.7–5.3)
RBC # BLD AUTO: 3.39 M/UL (ref 3.95–5.11)
RBC # BLD AUTO: 3.96 M/UL (ref 3.95–5.11)
SERVICE CMNT-IMP: NORMAL
SODIUM SERPL-SCNC: 123 MMOL/L (ref 135–144)
SODIUM SERPL-SCNC: 124 MMOL/L (ref 135–144)
SODIUM SERPL-SCNC: 125 MMOL/L (ref 135–144)
SPECIMEN DESCRIPTION: NORMAL
WBC OTHER # BLD: 27.4 K/UL (ref 3.5–11.3)
WBC OTHER # BLD: 32.4 K/UL (ref 3.5–11.3)

## 2024-10-16 PROCEDURE — 94761 N-INVAS EAR/PLS OXIMETRY MLT: CPT

## 2024-10-16 PROCEDURE — 6360000002 HC RX W HCPCS

## 2024-10-16 PROCEDURE — 87040 BLOOD CULTURE FOR BACTERIA: CPT

## 2024-10-16 PROCEDURE — 6360000002 HC RX W HCPCS: Performed by: INTERNAL MEDICINE

## 2024-10-16 PROCEDURE — 2060000000 HC ICU INTERMEDIATE R&B

## 2024-10-16 PROCEDURE — 3600000013 HC SURGERY LEVEL 3 ADDTL 15MIN: Performed by: SURGERY

## 2024-10-16 PROCEDURE — 85027 COMPLETE CBC AUTOMATED: CPT

## 2024-10-16 PROCEDURE — 82374 ASSAY BLOOD CARBON DIOXIDE: CPT

## 2024-10-16 PROCEDURE — 74018 RADEX ABDOMEN 1 VIEW: CPT

## 2024-10-16 PROCEDURE — 82330 ASSAY OF CALCIUM: CPT

## 2024-10-16 PROCEDURE — 2580000003 HC RX 258: Performed by: NURSE PRACTITIONER

## 2024-10-16 PROCEDURE — 97112 NEUROMUSCULAR REEDUCATION: CPT

## 2024-10-16 PROCEDURE — 36600 WITHDRAWAL OF ARTERIAL BLOOD: CPT

## 2024-10-16 PROCEDURE — 71045 X-RAY EXAM CHEST 1 VIEW: CPT

## 2024-10-16 PROCEDURE — 99232 SBSQ HOSP IP/OBS MODERATE 35: CPT | Performed by: INTERNAL MEDICINE

## 2024-10-16 PROCEDURE — 0WPF4JZ REMOVAL OF SYNTHETIC SUBSTITUTE FROM ABDOMINAL WALL, PERCUTANEOUS ENDOSCOPIC APPROACH: ICD-10-PCS | Performed by: SURGERY

## 2024-10-16 PROCEDURE — 83721 ASSAY OF BLOOD LIPOPROTEIN: CPT

## 2024-10-16 PROCEDURE — 84478 ASSAY OF TRIGLYCERIDES: CPT

## 2024-10-16 PROCEDURE — 2500000003 HC RX 250 WO HCPCS: Performed by: SURGERY

## 2024-10-16 PROCEDURE — 2500000003 HC RX 250 WO HCPCS: Performed by: NURSE ANESTHETIST, CERTIFIED REGISTERED

## 2024-10-16 PROCEDURE — 6370000000 HC RX 637 (ALT 250 FOR IP): Performed by: NURSE PRACTITIONER

## 2024-10-16 PROCEDURE — 2580000003 HC RX 258: Performed by: STUDENT IN AN ORGANIZED HEALTH CARE EDUCATION/TRAINING PROGRAM

## 2024-10-16 PROCEDURE — 94002 VENT MGMT INPAT INIT DAY: CPT

## 2024-10-16 PROCEDURE — 80051 ELECTROLYTE PANEL: CPT

## 2024-10-16 PROCEDURE — 3700000001 HC ADD 15 MINUTES (ANESTHESIA): Performed by: SURGERY

## 2024-10-16 PROCEDURE — 83735 ASSAY OF MAGNESIUM: CPT

## 2024-10-16 PROCEDURE — 2700000000 HC OXYGEN THERAPY PER DAY

## 2024-10-16 PROCEDURE — 3700000000 HC ANESTHESIA ATTENDED CARE: Performed by: SURGERY

## 2024-10-16 PROCEDURE — 6360000002 HC RX W HCPCS: Performed by: NURSE PRACTITIONER

## 2024-10-16 PROCEDURE — 80048 BASIC METABOLIC PNL TOTAL CA: CPT

## 2024-10-16 PROCEDURE — 2580000003 HC RX 258

## 2024-10-16 PROCEDURE — 2709999900 HC NON-CHARGEABLE SUPPLY: Performed by: SURGERY

## 2024-10-16 PROCEDURE — 82947 ASSAY GLUCOSE BLOOD QUANT: CPT

## 2024-10-16 PROCEDURE — 3600000003 HC SURGERY LEVEL 3 BASE: Performed by: SURGERY

## 2024-10-16 PROCEDURE — 6360000002 HC RX W HCPCS: Performed by: NURSE ANESTHETIST, CERTIFIED REGISTERED

## 2024-10-16 PROCEDURE — 36415 COLL VENOUS BLD VENIPUNCTURE: CPT

## 2024-10-16 PROCEDURE — 84100 ASSAY OF PHOSPHORUS: CPT

## 2024-10-16 PROCEDURE — 74176 CT ABD & PELVIS W/O CONTRAST: CPT

## 2024-10-16 PROCEDURE — 6360000002 HC RX W HCPCS: Performed by: STUDENT IN AN ORGANIZED HEALTH CARE EDUCATION/TRAINING PROGRAM

## 2024-10-16 PROCEDURE — 85025 COMPLETE CBC W/AUTO DIFF WBC: CPT

## 2024-10-16 PROCEDURE — 82803 BLOOD GASES ANY COMBINATION: CPT

## 2024-10-16 PROCEDURE — 97530 THERAPEUTIC ACTIVITIES: CPT

## 2024-10-16 RX ORDER — LIDOCAINE HYDROCHLORIDE 20 MG/ML
INJECTION, SOLUTION EPIDURAL; INFILTRATION; INTRACAUDAL; PERINEURAL
Status: DISCONTINUED | OUTPATIENT
Start: 2024-10-16 | End: 2024-10-16 | Stop reason: SDUPTHER

## 2024-10-16 RX ORDER — POTASSIUM CHLORIDE 29.8 MG/ML
20 INJECTION INTRAVENOUS ONCE
Status: COMPLETED | OUTPATIENT
Start: 2024-10-16 | End: 2024-10-16

## 2024-10-16 RX ORDER — INSULIN LISPRO 100 [IU]/ML
0-16 INJECTION, SOLUTION INTRAVENOUS; SUBCUTANEOUS
Status: DISCONTINUED | OUTPATIENT
Start: 2024-10-16 | End: 2024-10-19

## 2024-10-16 RX ORDER — METOCLOPRAMIDE HYDROCHLORIDE 5 MG/ML
10 INJECTION INTRAMUSCULAR; INTRAVENOUS
Status: DISCONTINUED | OUTPATIENT
Start: 2024-10-16 | End: 2024-10-16 | Stop reason: HOSPADM

## 2024-10-16 RX ORDER — SODIUM CHLORIDE, SODIUM LACTATE, POTASSIUM CHLORIDE, CALCIUM CHLORIDE 600; 310; 30; 20 MG/100ML; MG/100ML; MG/100ML; MG/100ML
INJECTION, SOLUTION INTRAVENOUS CONTINUOUS
Status: DISCONTINUED | OUTPATIENT
Start: 2024-10-16 | End: 2024-10-17

## 2024-10-16 RX ORDER — NALOXONE HYDROCHLORIDE 0.4 MG/ML
INJECTION, SOLUTION INTRAMUSCULAR; INTRAVENOUS; SUBCUTANEOUS PRN
Status: DISCONTINUED | OUTPATIENT
Start: 2024-10-16 | End: 2024-10-16 | Stop reason: HOSPADM

## 2024-10-16 RX ORDER — POTASSIUM CHLORIDE 1500 MG/1
40 TABLET, EXTENDED RELEASE ORAL ONCE
Status: DISCONTINUED | OUTPATIENT
Start: 2024-10-16 | End: 2024-10-17

## 2024-10-16 RX ORDER — CHLORHEXIDINE GLUCONATE ORAL RINSE 1.2 MG/ML
15 SOLUTION DENTAL
Status: DISCONTINUED | OUTPATIENT
Start: 2024-10-16 | End: 2024-10-17

## 2024-10-16 RX ORDER — FENTANYL CITRATE 50 UG/ML
25 INJECTION, SOLUTION INTRAMUSCULAR; INTRAVENOUS EVERY 5 MIN PRN
Status: DISCONTINUED | OUTPATIENT
Start: 2024-10-16 | End: 2024-10-16 | Stop reason: HOSPADM

## 2024-10-16 RX ORDER — PROPOFOL 10 MG/ML
INJECTION, EMULSION INTRAVENOUS
Status: DISCONTINUED | OUTPATIENT
Start: 2024-10-16 | End: 2024-10-16 | Stop reason: SDUPTHER

## 2024-10-16 RX ORDER — POTASSIUM CHLORIDE 7.45 MG/ML
10 INJECTION INTRAVENOUS
Status: DISPENSED | OUTPATIENT
Start: 2024-10-16 | End: 2024-10-16

## 2024-10-16 RX ORDER — ETOMIDATE 2 MG/ML
INJECTION INTRAVENOUS
Status: DISCONTINUED | OUTPATIENT
Start: 2024-10-16 | End: 2024-10-16 | Stop reason: SDUPTHER

## 2024-10-16 RX ORDER — SODIUM CHLORIDE 0.9 % (FLUSH) 0.9 %
5-40 SYRINGE (ML) INJECTION EVERY 12 HOURS SCHEDULED
Status: DISCONTINUED | OUTPATIENT
Start: 2024-10-16 | End: 2024-10-16 | Stop reason: HOSPADM

## 2024-10-16 RX ORDER — ACETAMINOPHEN 325 MG/1
650 TABLET ORAL EVERY 6 HOURS PRN
Status: DISCONTINUED | OUTPATIENT
Start: 2024-10-16 | End: 2024-10-19

## 2024-10-16 RX ORDER — SODIUM CHLORIDE 0.9 % (FLUSH) 0.9 %
5-40 SYRINGE (ML) INJECTION PRN
Status: DISCONTINUED | OUTPATIENT
Start: 2024-10-16 | End: 2024-10-16 | Stop reason: HOSPADM

## 2024-10-16 RX ORDER — DIPHENHYDRAMINE HYDROCHLORIDE 50 MG/ML
12.5 INJECTION INTRAMUSCULAR; INTRAVENOUS
Status: DISCONTINUED | OUTPATIENT
Start: 2024-10-16 | End: 2024-10-16 | Stop reason: HOSPADM

## 2024-10-16 RX ORDER — HYDROMORPHONE HYDROCHLORIDE 1 MG/ML
0.5 INJECTION, SOLUTION INTRAMUSCULAR; INTRAVENOUS; SUBCUTANEOUS EVERY 5 MIN PRN
Status: DISCONTINUED | OUTPATIENT
Start: 2024-10-16 | End: 2024-10-16 | Stop reason: HOSPADM

## 2024-10-16 RX ORDER — SODIUM CHLORIDE 9 MG/ML
INJECTION, SOLUTION INTRAVENOUS ONCE
Status: COMPLETED | OUTPATIENT
Start: 2024-10-17 | End: 2024-10-17

## 2024-10-16 RX ORDER — DEXTROSE MONOHYDRATE 100 MG/ML
INJECTION, SOLUTION INTRAVENOUS CONTINUOUS PRN
Status: DISCONTINUED | OUTPATIENT
Start: 2024-10-16 | End: 2024-11-08 | Stop reason: HOSPADM

## 2024-10-16 RX ORDER — SUCCINYLCHOLINE/SOD CL,ISO/PF 100 MG/5ML
SYRINGE (ML) INTRAVENOUS
Status: DISCONTINUED | OUTPATIENT
Start: 2024-10-16 | End: 2024-10-16 | Stop reason: SDUPTHER

## 2024-10-16 RX ORDER — ROCURONIUM BROMIDE 10 MG/ML
INJECTION, SOLUTION INTRAVENOUS
Status: DISCONTINUED | OUTPATIENT
Start: 2024-10-16 | End: 2024-10-16 | Stop reason: SDUPTHER

## 2024-10-16 RX ORDER — PROPOFOL 10 MG/ML
5-50 INJECTION, EMULSION INTRAVENOUS CONTINUOUS
Status: DISCONTINUED | OUTPATIENT
Start: 2024-10-16 | End: 2024-10-17

## 2024-10-16 RX ORDER — PROCHLORPERAZINE EDISYLATE 5 MG/ML
10 INJECTION INTRAMUSCULAR; INTRAVENOUS
Status: DISCONTINUED | OUTPATIENT
Start: 2024-10-16 | End: 2024-10-16 | Stop reason: HOSPADM

## 2024-10-16 RX ORDER — HYDROMORPHONE HYDROCHLORIDE 2 MG/ML
INJECTION, SOLUTION INTRAMUSCULAR; INTRAVENOUS; SUBCUTANEOUS
Status: DISCONTINUED | OUTPATIENT
Start: 2024-10-16 | End: 2024-10-16 | Stop reason: SDUPTHER

## 2024-10-16 RX ORDER — ACETAMINOPHEN 650 MG/1
650 SUPPOSITORY RECTAL EVERY 6 HOURS PRN
Status: DISCONTINUED | OUTPATIENT
Start: 2024-10-16 | End: 2024-10-19

## 2024-10-16 RX ORDER — SODIUM CHLORIDE 9 MG/ML
INJECTION, SOLUTION INTRAVENOUS PRN
Status: DISCONTINUED | OUTPATIENT
Start: 2024-10-16 | End: 2024-10-16 | Stop reason: HOSPADM

## 2024-10-16 RX ADMIN — POTASSIUM CHLORIDE 10 MEQ: 7.46 INJECTION, SOLUTION INTRAVENOUS at 11:29

## 2024-10-16 RX ADMIN — ENOXAPARIN SODIUM 30 MG: 100 INJECTION SUBCUTANEOUS at 21:31

## 2024-10-16 RX ADMIN — Medication 100 MG: at 16:32

## 2024-10-16 RX ADMIN — ANTI-FUNGAL POWDER MICONAZOLE NITRATE TALC FREE: 1.42 POWDER TOPICAL at 21:31

## 2024-10-16 RX ADMIN — ETOMIDATE 10 MG: 40 INJECTION, SOLUTION INTRAVENOUS at 16:30

## 2024-10-16 RX ADMIN — FUROSEMIDE 20 MG: 10 INJECTION, SOLUTION INTRAMUSCULAR; INTRAVENOUS at 19:01

## 2024-10-16 RX ADMIN — ROCURONIUM BROMIDE 50 MG: 10 INJECTION, SOLUTION INTRAVENOUS at 16:44

## 2024-10-16 RX ADMIN — PANTOPRAZOLE SODIUM 40 MG: 40 INJECTION, POWDER, FOR SOLUTION INTRAVENOUS at 11:26

## 2024-10-16 RX ADMIN — LIDOCAINE HYDROCHLORIDE 80 MG: 20 INJECTION, SOLUTION EPIDURAL; INFILTRATION; INTRACAUDAL; PERINEURAL at 16:30

## 2024-10-16 RX ADMIN — PROPOFOL 50 MCG/KG/MIN: 10 INJECTION, EMULSION INTRAVENOUS at 17:30

## 2024-10-16 RX ADMIN — SODIUM CHLORIDE, PRESERVATIVE FREE 10 ML: 5 INJECTION INTRAVENOUS at 21:30

## 2024-10-16 RX ADMIN — PIPERACILLIN AND TAZOBACTAM 4500 MG: 4; .5 INJECTION, POWDER, LYOPHILIZED, FOR SOLUTION INTRAVENOUS at 18:58

## 2024-10-16 RX ADMIN — POTASSIUM CHLORIDE 20 MEQ: 29.8 INJECTION, SOLUTION INTRAVENOUS at 15:42

## 2024-10-16 RX ADMIN — PROPOFOL 30 MG: 10 INJECTION, EMULSION INTRAVENOUS at 16:30

## 2024-10-16 RX ADMIN — SODIUM CHLORIDE, POTASSIUM CHLORIDE, SODIUM LACTATE AND CALCIUM CHLORIDE: 600; 310; 30; 20 INJECTION, SOLUTION INTRAVENOUS at 19:03

## 2024-10-16 RX ADMIN — SODIUM CHLORIDE: 9 INJECTION, SOLUTION INTRAVENOUS at 16:23

## 2024-10-16 RX ADMIN — HYDROMORPHONE HYDROCHLORIDE 0.4 MG: 2 INJECTION, SOLUTION INTRAMUSCULAR; INTRAVENOUS; SUBCUTANEOUS at 16:46

## 2024-10-16 RX ADMIN — ENOXAPARIN SODIUM 30 MG: 100 INJECTION SUBCUTANEOUS at 11:26

## 2024-10-16 RX ADMIN — PROPOFOL 45 MCG/KG/MIN: 10 INJECTION, EMULSION INTRAVENOUS at 21:36

## 2024-10-16 RX ADMIN — PIPERACILLIN AND TAZOBACTAM 4500 MG: 4; .5 INJECTION, POWDER, LYOPHILIZED, FOR SOLUTION INTRAVENOUS at 11:37

## 2024-10-16 RX ADMIN — POTASSIUM CHLORIDE 10 MEQ: 7.46 INJECTION, SOLUTION INTRAVENOUS at 14:22

## 2024-10-16 RX ADMIN — PIPERACILLIN AND TAZOBACTAM 4500 MG: 4; .5 INJECTION, POWDER, LYOPHILIZED, FOR SOLUTION INTRAVENOUS at 02:02

## 2024-10-16 RX ADMIN — FUROSEMIDE 20 MG: 10 INJECTION, SOLUTION INTRAMUSCULAR; INTRAVENOUS at 11:26

## 2024-10-16 RX ADMIN — PROPOFOL 50 MCG/KG/MIN: 10 INJECTION, EMULSION INTRAVENOUS at 18:58

## 2024-10-16 RX ADMIN — ACETAMINOPHEN 650 MG: 325 TABLET ORAL at 02:13

## 2024-10-16 ASSESSMENT — PULMONARY FUNCTION TESTS
PIF_VALUE: 26
PIF_VALUE: 26
PIF_VALUE: 28
PIF_VALUE: 26
PIF_VALUE: 28
PIF_VALUE: 25
PIF_VALUE: 25
PIF_VALUE: 26
PIF_VALUE: 25
PIF_VALUE: 25
PIF_VALUE: 26
PIF_VALUE: 28
PIF_VALUE: 27
PIF_VALUE: 26

## 2024-10-16 ASSESSMENT — PAIN DESCRIPTION - FREQUENCY: FREQUENCY: INTERMITTENT

## 2024-10-16 ASSESSMENT — PAIN SCALES - GENERAL
PAINLEVEL_OUTOF10: 10
PAINLEVEL_OUTOF10: 0
PAINLEVEL_OUTOF10: 0

## 2024-10-16 ASSESSMENT — PAIN DESCRIPTION - DESCRIPTORS: DESCRIPTORS: ACHING;DISCOMFORT

## 2024-10-16 ASSESSMENT — PAIN DESCRIPTION - PAIN TYPE: TYPE: ACUTE PAIN

## 2024-10-16 ASSESSMENT — PAIN - FUNCTIONAL ASSESSMENT: PAIN_FUNCTIONAL_ASSESSMENT: ACTIVITIES ARE NOT PREVENTED

## 2024-10-16 ASSESSMENT — ENCOUNTER SYMPTOMS: SHORTNESS OF BREATH: 0

## 2024-10-16 ASSESSMENT — PAIN DESCRIPTION - ONSET: ONSET: SUDDEN

## 2024-10-16 ASSESSMENT — PAIN DESCRIPTION - LOCATION: LOCATION: ABDOMEN

## 2024-10-16 ASSESSMENT — PAIN DESCRIPTION - ORIENTATION: ORIENTATION: MID

## 2024-10-16 NOTE — FLOWSHEET NOTE
10/16/24 0245   Treatment Team Notification   Reason for Communication Evaluate   Name of Team Member Notified CARMEN Paris   Treatment Team Role Advanced Practice Nurse   Method of Communication Call   Response No new orders   Notification Time 0245     Notified NP of decrease in Na per nephrology note. Current Na is 123.   NP did not place any additional orders at this time.

## 2024-10-16 NOTE — PROGRESS NOTES
Comprehensive Nutrition Assessment    Type and Reason for Visit:  NPO/Clear Liquid    Nutrition Recommendations/Plan:   NPO diet  Monitor nutrition progression, GI function and labs     Malnutrition Assessment:  Malnutrition Status:  At risk for malnutrition (Comment) (NPO/CLD 5 days) (10/16/24 1530)        Nutrition Assessment:    Patient is status post robotic assisted lap mesh hernioplasty on 10/1/24. Patient admitted on 10/10 for hyponatermia, diarrhea, abdominal pain,  abdominal distention and poor appetite. Patient had ultrasound guided aspiration of abdominal wall fluid collection seroma and aspiration of air (10/11). The next day the diet was advanced to Regular texture but down graded the same day to NPO (10/12). Diet advanced to Clear liquid on 10/13 which continued until today (10/16) when patient was made NPO for abnormal CT scan. CT scan showed interval increase in size of air-fluid/air-contrast level seen within the  midline with gas extending into the subcutaneous soft tissues along with  inflammatory changes concerning for abscess. Patient will be going back to the OR tonight at 4 pm. Advance diet when appropriate. Monitor nutrition progression, GI function and labs.    Nutrition Related Findings:    Edema: 2 pitting BUE, +3 pitting BLE. S/P lap mesh hernioplasty (10/1); US guided aspiration of abdominal wall fluid and air (10/11). Wound Type: Surgical Incision       Current Nutrition Intake & Therapies:    Average Meal Intake: NPO  Average Supplements Intake: NPO  Diet NPO    Anthropometric Measures:  Height: 160 cm (5' 3\")  Ideal Body Weight (IBW): 115 lbs (52 kg)       Current Body Weight: 128 kg (282 lb 3 oz), 245.4 % IBW. Weight Source: Bed Scale  Current BMI (kg/m2): 50                          BMI Categories: Obese Class 3 (BMI 40.0 or greater)    Estimated Daily Nutrient Needs:  Energy Requirements Based On: Kcal/kg  Weight Used for Energy Requirements: Current  Energy (kcal/day): 0851-8004

## 2024-10-16 NOTE — PLAN OF CARE
orders   Initiate isolation precautions as appropriate   Initiate pressure ulcer prevention bundle as indicated  Taken 10/15/2024 1434 by Alma Rosa Pinon RN  Incisions, Wounds, or Drain Sites Healing Without Sign and Symptoms of Infection: ADMISSION and DAILY: Assess and document risk factors for pressure ulcer development     Problem: Hematologic - Adult  Goal: Maintains hematologic stability  Outcome: Progressing  Flowsheets (Taken 10/15/2024 2000)  Maintains hematologic stability:   Assess for signs and symptoms of bleeding or hemorrhage   Monitor labs for bleeding or clotting disorders   Administer blood products/factors as ordered     Problem: Musculoskeletal - Adult  Goal: Return mobility to safest level of function  Outcome: Progressing  Flowsheets (Taken 10/15/2024 2000)  Return Mobility to Safest Level of Function:   Assess patient stability and activity tolerance for standing, transferring and ambulating with or without assistive devices   Assist with transfers and ambulation using safe patient handling equipment as needed   Ensure adequate protection for wounds/incisions during mobilization   Obtain physical therapy/occupational therapy consults as needed   Apply continuous passive motion per provider or physical therapy orders to increase flexion toward goal   Instruct patient/family in ordered activity level     Problem: Nutrition Deficit:  Goal: Optimize nutritional status  Outcome: Progressing     Problem: Pain  Goal: Verbalizes/displays adequate comfort level or baseline comfort level  Outcome: Progressing  Flowsheets (Taken 10/16/2024 0000)  Verbalizes/displays adequate comfort level or baseline comfort level:   Encourage patient to monitor pain and request assistance   Assess pain using appropriate pain scale   Administer analgesics based on type and severity of pain and evaluate response   Implement non-pharmacological measures as appropriate and evaluate response   Consider cultural and social

## 2024-10-16 NOTE — FLOWSHEET NOTE
10/15/24 2054   Treatment Team Notification   Reason for Communication Evaluate   Name of Team Member Notified CARMEN Paris   Treatment Team Role Advanced Practice Nurse   Method of Communication Call   Response See orders   Notification Time 2050     Reached out to provider regarding K level of 3.0.  Provider requested STAT Mag labs to ensure Mag. Level was within normal range.  Mag. 1.8. NP placed orders for 60 mEq replacement PO. One time PO dose of 20 mEq ordered in addition to 40 mEq replacement orders. NP stated that no further notification is needed if K increases.

## 2024-10-16 NOTE — FLOWSHEET NOTE
10/16/24 0551   Treatment Team Notification   Reason for Communication Critical results   Type of Critical Result Laboratory   Critical Lab Information WBC 32.4   Person Result Received From lab   Critical Lab Result Type White blood count   Name of Team Member Notified FREDDIE Lam   Treatment Team Role Advanced Practice Nurse   Method of Communication Secure Message   Response Waiting for response   Notification Time 0551     Notifed NP of increasing WBC count.  No new orders placed at this time- NP would like to defer to day shift.

## 2024-10-16 NOTE — PROGRESS NOTES
End Of Shift Note  [facility]  Summary of shift: Pt reported poor rest and continued pain. Na continues to improve. K dropped to 3.0. 60 mEq PO replacement given to address the decrease. WBC increased with morning labs.    Vitals:    Vitals:    10/16/24 0200 10/16/24 0300 10/16/24 0400 10/16/24 0547   BP:   (!) 150/88    Pulse:   (!) 102    Resp:   20    Temp:   97.5 °F (36.4 °C)    TempSrc:   Temporal    SpO2: 95% 94% 94%    Weight:    128 kg (282 lb 1.6 oz)   Height:            I&O:   Intake/Output Summary (Last 24 hours) at 10/16/2024 0746  Last data filed at 10/16/2024 0625  Gross per 24 hour   Intake 1119.8 ml   Output 850 ml   Net 269.8 ml       Resp Status: 2LNC for comfort    Ventilator Settings:     / / /FiO2 : 30 %    Critical Care IV infusions:   sodium chloride          LDA:   PICC 10/11/24 Right Brachial (Active)   Number of days: 4       Urinary Catheter 10/10/24 Bar (Active)   Number of days: 5       Incision 10/01/24 Abdomen Medial;Upper (Active)   Number of days: 14

## 2024-10-16 NOTE — PROGRESS NOTES
Nephrology Progress Note      SUBJECTIVE    Patient was seen and examined.  Patient is lying in bed with her family in the room she was alert and awake.  She does self has some abdominal cramping that is intermittent today she has had a small amount of oral intake.  She did have oral potassium yesterday.  CT scan performed and etiology and general surgery.  She still complains of significant lower extremity swelling.    She has every 6 hour electrolytes ordered with 9:09 AM labs today showing sodium 125 with potassium 3.6 chloride 87 and CO2 24.  Her sodium is up from 123 last checked at 1:51 AM.  We will be changing the electrolyte checks every 8 hours.  We will get a basic metabolic panel in the morning tomorrow as well.  She continues on IV Lasix twice daily. CBC shows white blood cells 32.4 with hemoglobin 11.8 and platelets 418.    Brief history:  Patient is status post robotic assisted laparoscopic incisional hernia repair with mesh on 10/1/2024 by Dr. Cummings.  She started noticing some abdominal pain 2 to 3 days prior to admission, had poor appetite, did feel like she was developing more abdominal distention and also got some diarrhea.  In an attempt to keep herself hydrated she started drinking a fair amount of free water.  Symptoms did not get better, she presented to the ER and had the following labs:  Sodium 109 which was down from 140 about 10 days earlier, potassium of 3.4, bicarb 24, creatinine 0.8 and an albumin of 3.1.  There was some issue with urinary retention, Bar was placed and we got about 500 mL of urine out.     Initial CT scan of the abdomen and pelvis showed a large air-fluid collection seen along midline consistent with an abscess measuring 13.8 x 7 cm  Urine studies showed urine sodium of 20, urine osmolarity of greater than 425.   Additional history is noted positive for chronic hypertension, history of hyperparathyroidism and mild hypercalcemia status post parathyroidectomy of ectopic  values in this interval not displayed.      MAGNESIUM:   Recent Labs     10/15/24  2015   MG 1.8       URINALYSIS:  U/A:   Lab Results   Component Value Date/Time    NITRU NEGATIVE 10/10/2024 12:42 PM    COLORU Yellow 10/10/2024 12:42 PM    PHUR 6.0 10/10/2024 12:42 PM    LEUKOCYTESUR NEGATIVE 10/10/2024 12:42 PM    UROBILINOGEN Normal 10/10/2024 12:42 PM    BILIRUBINUR NEGATIVE 10/10/2024 12:42 PM    GLUCOSEU NEGATIVE 10/10/2024 12:42 PM    KETUA NEGATIVE 10/10/2024 12:42 PM     Fluid culture as aspirated from intra-abdominal fluid collection shows no neutrophils no organisms and no growth      ASSESSMENT    1.  Hypoosmolar hyponatremia likely multifactorial from a combination of poor solute load, increased free water intake and increased ADH secondary to nausea and pain.  Sodium was 109 on admission, dropped down to 105 and sodium corrected now up to 125.  The patient does have volume overload so she is on Lasix grams IV twice daily   2.  Hypokalemia: Improved to 3.6 but still needs further supplementation, today  3.  Poor solute intake, patient is still on clear liquids   4.  History of parathyroidectomy secondary to hyperparathyroidism from an ectopic gland.  5.  Significant leukocytosis with CT results pending  6.  With the patient on the Lasix    PLAN      1.  Continue Lasix 20 mg IV every 12 hours   2.  Supplement potassium IV  3.  Check electrolytes every 8 hours instead of every 6 hours  4.  Check basic metabolic panel in the a.m.  Please do not hesitate to call with questions.    This note is created with the assistance of a speech-recognition program. While intending to generate a document that actually reflects the content of the visit, no guarantees can be provided that every mistake has been identified and corrected by editing    Electronically signed by Ty Roach MD on 10/16/2024 at 10:28 AM

## 2024-10-16 NOTE — PROGRESS NOTES
Benjamin Almazan MD   Urology Progress Note            Subjective: Follow-up urinary retention neurogenic bladder    Patient Vitals for the past 24 hrs:   BP Temp Temp src Pulse Resp SpO2 Weight   10/16/24 0547 -- -- -- -- -- -- 128 kg (282 lb 1.6 oz)   10/16/24 0400 (!) 150/88 97.5 °F (36.4 °C) Temporal (!) 102 20 94 % --   10/16/24 0300 -- -- -- -- -- 94 % --   10/16/24 0200 -- -- -- -- -- 95 % --   10/16/24 0000 132/87 96.9 °F (36.1 °C) Temporal (!) 106 21 95 % --   10/15/24 2000 (!) 153/76 97.5 °F (36.4 °C) Temporal (!) 102 30 95 % --   10/15/24 1900 (!) 152/80 -- -- 99 26 96 % --   10/15/24 1800 (!) 152/112 -- -- 99 26 99 % --   10/15/24 1600 (!) 142/102 97.1 °F (36.2 °C) Temporal 99 28 98 % --   10/15/24 1135 (!) 151/72 97.3 °F (36.3 °C) Temporal -- -- -- --   10/15/24 0800 (!) 161/107 96.8 °F (36 °C) Temporal -- -- -- --       Intake/Output Summary (Last 24 hours) at 10/16/2024 0704  Last data filed at 10/16/2024 0625  Gross per 24 hour   Intake 1219.8 ml   Output 850 ml   Net 369.8 ml       Recent Labs     10/14/24  0500 10/16/24  0445   WBC 26.0* 32.4*   HGB 12.0 11.8*   HCT 32.5* 33.4*   MCV 81.9* 84.3    418     Recent Labs     10/14/24  0500 10/14/24  0810 10/15/24  0418 10/15/24  2015 10/16/24  0151   *   < > 125* 123* 123*   K 4.1   < > 3.7 3.0* 3.6*   CL 86*   < > 86* 85* 84*   CO2 23   < > 23 24 24   BUN 10  --  17  --   --    CREATININE 0.7  --  0.9  --   --     < > = values in this interval not displayed.       No results for input(s): \"COLORU\", \"PHUR\", \"LABCAST\", \"WBCUA\", \"RBCUA\", \"MUCUS\", \"TRICHOMONAS\", \"YEAST\", \"BACTERIA\", \"CLARITYU\", \"SPECGRAV\", \"LEUKOCYTESUR\", \"UROBILINOGEN\", \"BILIRUBINUR\", \"BLOODU\" in the last 72 hours.    Invalid input(s): \"NITRATE\", \"GLUCOSEUKETONESUAMORPHOUS\"    Additional Lab/culture results:    Physical Exam: Patient in bed,  portable  abdominal imaging being obtained  at this time   Significant abdominal distention  Interval

## 2024-10-16 NOTE — PLAN OF CARE
Problem: Discharge Planning  Goal: Discharge to home or other facility with appropriate resources  10/16/2024 1434 by Kira Alvarado RN  Outcome: [facility] Not Progressing  10/16/2024 0357 by Chely Harkins RN  Outcome: Progressing  Flowsheets (Taken 10/15/2024 2000)  Discharge to home or other facility with appropriate resources:   Identify barriers to discharge with patient and caregiver   Arrange for needed discharge resources and transportation as appropriate   Identify discharge learning needs (meds, wound care, etc)   Refer to discharge planning if patient needs post-hospital services based on physician order or complex needs related to functional status, cognitive ability or social support system     Problem: Skin/Tissue Integrity  Goal: Absence of new skin breakdown  Description: 1.  Monitor for areas of redness and/or skin breakdown  2.  Assess vascular access sites hourly  3.  Every 4-6 hours minimum:  Change oxygen saturation probe site  4.  Every 4-6 hours:  If on nasal continuous positive airway pressure, respiratory therapy assess nares and determine need for appliance change or resting period.  10/16/2024 1434 by Kira Alvarado RN  Outcome: /[facility] Not Progressing  10/16/2024 0357 by Chely Harkins RN  Outcome: Progressing     Problem: ABCDS Injury Assessment  Goal: Absence of physical injury  10/16/2024 1434 by Kira Alvarado RN  Outcome: [facility] Not Progressing  10/16/2024 0357 by Chely Harkins RN  Outcome: Progressing  Flowsheets (Taken 10/15/2024 1434 by Alma Rosa Pinon, RN)  Absence of Physical Injury: Implement safety measures based on patient assessment     Problem: Safety - Adult  Goal: Free from fall injury  10/16/2024 1434 by Kira Alvarado RN  Outcome: /[facility] Not Progressing  10/16/2024 0357 by Chely Harkins, RN  Outcome: Progressing  Flowsheets (Taken 10/15/2024 1434 by Alma Rosa Pinon RN)  Free From Fall Injury: Instruct family/caregiver on patient safety

## 2024-10-16 NOTE — ANESTHESIA POSTPROCEDURE EVALUATION
Department of Anesthesiology  Postprocedure Note    Patient: Mandie Bustamante  MRN: [id number]  YOB: 1955  Date of evaluation: 10/16/2024    Procedure Summary       Date: 10/16/24 Room / Location: [address]    Anesthesia Start: 1623 Anesthesia Stop: 1740    Procedure: DIAGNOSTIC LAPAROSCOPY EXPLORATORY LAPAROTOMY EXPLANTATION OF MESH AND WOUND VAC APPLICATION (Abdomen) Diagnosis:       Abdominal pain, unspecified abdominal location      (Abdominal pain, unspecified abdominal location [R10.9])    Surgeons: Gm Cummings MD Responsible Provider: Mariela Hein MD    Anesthesia Type: General ASA Status: 3            Anesthesia Type: General    Rosa Phase I:      Rosa Phase II:      Anesthesia Post Evaluation    Patient location during evaluation: ICU  Patient participation: complete - patient cannot participate  Level of consciousness: sedated and ventilated  Airway patency: patent  Nausea & Vomiting: no nausea and no vomiting  Cardiovascular status: blood pressure returned to baseline  Respiratory status: ventilator  Hydration status: stable  Pain management: adequate    No notable events documented.

## 2024-10-16 NOTE — ANESTHESIA PRE PROCEDURE
04:45 AM    MCV 84.3 10/16/2024 04:45 AM    RDW 12.8 10/16/2024 04:45 AM     10/16/2024 04:45 AM       CMP:   Lab Results   Component Value Date/Time     10/16/2024 09:09 AM    K 3.6 10/16/2024 09:09 AM    CL 87 10/16/2024 09:09 AM    CO2 24 10/16/2024 09:09 AM    BUN 17 10/15/2024 04:18 AM    CREATININE 0.9 10/15/2024 04:18 AM    LABGLOM 70 10/15/2024 04:18 AM    GLUCOSE 141 10/15/2024 04:18 AM    CALCIUM 8.1 10/15/2024 04:18 AM    BILITOT 0.6 10/11/2024 05:48 AM    ALKPHOS 105 10/11/2024 05:48 AM    AST 20 10/11/2024 05:48 AM    ALT 22 10/11/2024 05:48 AM       POC Tests:   Recent Labs     10/13/24  1959   POCGLU 142*       Coags:   Lab Results   Component Value Date/Time    PROTIME 14.6 10/11/2024 05:48 AM    INR 1.1 10/11/2024 05:48 AM       HCG (If Applicable): No results found for: \"PREGTESTUR\", \"PREGSERUM\", \"HCG\", \"HCGQUANT\"     ABGs: No results found for: \"PHART\", \"PO2ART\", \"XJK2INT\", \"MBD8VDH\", \"BEART\", \"G1TSJDVD\"     Type & Screen (If Applicable):  No results found for: \"ABORH\", \"LABANTI\"    Drug/Infectious Status (If Applicable):  No results found for: \"HIV\", \"HEPCAB\"    COVID-19 Screening (If Applicable): No results found for: \"COVID19\"        Anesthesia Evaluation  Patient summary reviewed   no history of anesthetic complications:   Airway: Mallampati: I  TM distance: >3 FB   Neck ROM: full  Mouth opening: > = 3 FB   Dental:          Pulmonary:       (-) shortness of breath                           Cardiovascular:    (+) hypertension:    (-) dysrhythmias          Echocardiogram reviewed               ROS comment: Echo 10/2024:  ·  Left Ventricle: Normal left ventricular systolic function with a visually estimated EF of 55 - 60%. EF by 2D Simpsons Biplane is 58%. Left ventricle size is normal. Mildly increased wall thickness. Normal wall motion. Abnormal diastolic function.  ·  Aortic Valve: Not well visualized.  Probable mild LVOT obstruction  ·  Pericardium: No pericardial effusion.  ·

## 2024-10-16 NOTE — PROGRESS NOTES
Physical Therapy  Facility/Department: [facility] CVICU  Daily Treatment Note  NAME: Mandie Bustamante  : 1955  MRN: [id number]    Date of Service: 10/16/2024    Discharge Recommendations:  Patient would benefit from continued therapy after discharge    Pt currently functioning below baseline.  Recommend daily inpatient skilled therapy at time of discharge to maximize long term outcomes and prevent re-admission. Please refer to AM-PAC score for current level of function.     Patient Diagnosis(es): The encounter diagnosis was Edema, unspecified type.    Assessment  Assessment: Patient rolled with MAX x 2 A for repositioning for maxi richmond lift then transferred to recliner. Once in the recliner performed core activiation activity with MOD x 2 A complete. Attempted x 2 reps STS from recliner in marlyn stedy with MAX x 2 A but limited by width of marlyn stedy therefore unable to fully stand up. Patient possibly needs re-eval for transfer with RW. Patient is below her baseline and would benefit from continued skilled PT services.  Activity Tolerance: Patient limited by fatigue;Patient limited by pain;Patient limited by endurance;Treatment limited secondary to decreased cognition;Treatment limited secondary to medical complications    Plan  Physical Therapy Plan  General Plan: 5-7 times per week  Current Treatment Recommendations: Strengthening;ROM;Balance training;Functional mobility training;Endurance training;Neuromuscular re-education;Home exercise program;Positioning;Pain management;Patient/Caregiver education & training;Safety education & training;Therapeutic activities;Co-Treatment    Restrictions  Restrictions/Precautions  Restrictions/Precautions: General Precautions, Up as Tolerated, Fall Risk  Required Braces or Orthoses?: No  Position Activity Restriction  Other position/activity restrictions: Up w/ assist, RUE PICC, simons cath, telemetry, 2L O2, cont SPO2 monitor, BP cuff, RICHMOND LIFT     Subjective  on your back to sitting on the side of a flat bed without using bedrails?: Total  How much help is needed moving to and from a bed to a chair?: Total  How much help is needed standing up from a chair using your arms?: Total  How much help is needed walking in hospital room?: Total  How much help is needed climbing 3-5 steps with a railing?: Total  AM-PAC Inpatient Mobility Raw Score : 6  AM-PAC Inpatient T-Scale Score : 23.55  Mobility Inpatient CMS 0-100% Score: 100  Mobility Inpatient CMS G-Code Modifier : CN         Therapy Time   Individual Concurrent Group Co-treatment   Time In 1048         Time Out 1113         Minutes 25             Co-treatment with OT warranted secondary to decreased safety and independence requiring 2 skilled therapy professionals to address individual discipline's goals. PT addressing pre gait trunk strengthening, weight shifting prior to transfers, transfer training, and postural control in sitting.     Shandra Gresham, PTA

## 2024-10-16 NOTE — OP NOTE
Operative Note      Patient: Mandie Bustamante  YOB: 1955  MRN: [id number]    Date of Procedure: 10/16/2024    Pre-Op Diagnosis Codes:      * Abdominal pain, unspecified abdominal location [R10.9]    Post-Op Diagnosis:  Massive colonic distention with multiple serosal tears secondary to distention       Procedure(s):  DIAGNOSTIC LAPAROSCOPY EXPLORATORY LAPAROTOMY EXPLANTATION OF MESH AND WOUND VAC APPLICATION    Surgeon(s):  Gm Cummings MD Kim, Tricia M, DO    Assistant:   First Assistant: Patricio Fong  Resident: Hao Stewart MD    Anesthesia: General    Estimated Blood Loss (mL): less than 100     Complications: None    Specimens:   * No specimens in log *    Implants:  * No implants in log *      Drains:   Urinary Catheter 10/10/24 Bar (Active)   $ Urethral catheter insertion $ Not inserted for procedure 10/10/24 1249   Catheter Indications Need for fluid volume management of the critically ill patient in a critical care setting 10/16/24 0800   Site Assessment No urethral drainage 10/16/24 0800   Urine Color Yellow 10/16/24 0800   Urine Appearance Clear 10/16/24 0800   Urine Odor Other (Comment) 10/16/24 0400   Collection Container Standard 10/16/24 0800   Securement Method Securing device (Describe) 10/16/24 0800   Catheter Care  Soap and water 10/16/24 0045   Catheter Best Practices  Drainage tube clipped to bed;Catheter secured to thigh;Tamper seal intact;Bag below bladder;Bag not on floor;Drainage bag less than half full;Lack of dependent loop in tubing 10/16/24 0800   Status Patent;Draining 10/16/24 0800   Output (mL) 550 mL 10/16/24 0045       Findings:  Infection Present At Time Of Surgery (PATOS) (choose all levels that have infection present):  - Organ Space infection (below fascia) present as evidenced by phlegmon  Other Findings: Extensive bowel distention with inflammatory changes adjacent to the mesh, CT finding of fluid collection represented massively distended colon

## 2024-10-16 NOTE — PROGRESS NOTES
[facility]  General Surgery  Consult Progress Note    Service date: 10/16/2024, 8:40 AM  Room : 2030/2030-01   Name: Mandie Bustamante  MRN: [id number]    Length of stay: 6 day(s)    OR date: 10/1/2024: Robotic assisted laparoscopic mesh hernioplasty    POD #15      Subjective   Interval update:   -Patient not very participatory on exam this morning.  -Increase in white count again noted overnight, 32.4 from 26, 23.6., on antibiotics, afebrile  -Borderline tachycardia over past 24hrs.   -Remains hyponatremic with decrease from 125 to 123  -No other significant overnight issues noted.  -Abdominal pain well controlled   -No erythema, drainage, or fluctuance noted around the surgical incisions, which appear to be healing well with a small amount of surrounding old bruising.   -Had 1 BM recorded over last 24 hours  -Abdomen is still distended and firm, no significant change from previous day  -Patient is currently tolerating CLD, will assess today  -Patient reports no nausea/ vomiting, fever/chills, chest pain, shortness of breath, or other symptoms.    No growth in anaerobic or aerobic cultures sent from abdominal wall aspiration         Objective   Physical exam: BP (!) 150/88   Pulse (!) 102   Temp 97.1 °F (36.2 °C) (Temporal)   Resp 18   Ht 1.6 m (5' 2.99\")   Wt 128 kg (282 lb 1.6 oz)   SpO2 94%   BMI 49.98 kg/m²     General: Patient is resting comfortably on exam, not in any distress and is pleasant and cooperative.   Neuro: alert and oriented x3   Abdomen: mildly firm, non-tender , mildly distended. Incision: Clean, dry, intact. No erythema noted. Lola-incisional healing ecchymosis.    Chest: Non-labored, symmetrical respirations.   CVS: Pulse RRR       Date 10/16/24 0000 - 10/16/24 2359   Shift 7361-6241 5824-2573 0516-6213 24 Hour Total   INTAKE   P.O.(mL/kg/hr) 180(0.2)   180   IV Piggyback(mL/kg) 100(0.8)   100(0.8)   Shift Total(mL/kg) 280(2.2)   280(2.2)   OUTPUT   Urine(mL/kg/hr) 550(0.5)     --   --  1.8  --    ANIONGAP 12   < > 16 14 15   LABGLOM >90  --  70  --   --    CALCIUM 7.4*  --  8.1*  --   --     < > = values in this interval not displayed.     Hepatic:   No results for input(s): \"AST\", \"ALT\", \"ALKPHOS\", \"BILITOT\", \"BILIDIR\" in the last 72 hours.    Invalid input(s): \"ALB\"    Coagulation:   No results for input(s): \"APTT\", \"INR\" in the last 72 hours.    Invalid input(s): \"PROT\"        Assessment   69-year-old female who is POD #15 after robotic assisted laparoscopic mesh hernioplasty for incisional hernia came to hospital with hyponatremia and seroma at the operative site with ileus and leukocytosis of unknown etiology       Plan: As Discussed with attending, Dr. Cummings  Neuro/Pain: Continue multimodal pain regime  Last small bowel follow through consistent with ileus   Correction of hyponatremia per primary team  WBC continues to rise without known source or cause, while afebrile, on IV ABx  Encourage incentive spirometer 10 times an hour every hour while awake.  Not very participatory on morning exam  Continue clear liquid diet.  If patient tolerates clear liquid diet and continues to have flatus or passage of bowel movement, can advance to regular diet.  Will continue with suppository today, possible laxative from above  Okay to ambulate and work with PT OT from general surgery standpoint.  Okay to shower  General Surgery will follow.  Dispo: Pending correction of hyponatremia and leukocytosis        --------------------------------------------------------------------------------  Luis Gabriel, DO  PGY2 Resident, General Surgery  Dated: 10/16/2024, 8:40 AM

## 2024-10-16 NOTE — PROGRESS NOTES
Surgery:  CT reviewed.  None of this patient's postoperative clinical picture seems to really make sense currently.  She has a persistent massively dilated colon.  Her white blood cell count is also up this morning.  She has no fevers and has no peritoneal signs.  I think that what they are seeing on the CAT scan may actually be her colon which is adherent to her abdominal wall rather than a fluid collection, however certainly to rule this out, the options include percutaneous drainage/aspiration which I fear if it is her colon may result in inadvertent fistula or acute surgical abdomen, a persistent abdominal wall seroma, and potential disruption of the mesh.  I think that proceeding with a diagnostic laparoscopy and possible exploratory laparotomy with indicated procedures of the most appropriate course of action to address this issue.  I discussed with the patient and family the risks and benefits of this procedure.  The patient and family agree.  We are proceeding to the operating room for exploration.

## 2024-10-16 NOTE — PROGRESS NOTES
[facility]  Office: [phone]  Keenan Foster DO, Kevin Siegel DO, Carlos A Orta DO, Taye Morales DO, Maxx Madison MD, Meena Eckert MD, Efren Vo MD, Makayla Last MD,  Jerman Christian MD, Gina Bourgeois MD, Gladys Fry MD,  Anu Murillo DO, Jennifer Blanca MD, Oral Duncan MD, Harlan Foster DO, Katya Simmons MD,  Filiberto Slade DO, Elizabeth Alves MD, Samantha Pinto MD, Arlene Bryant MD, Bandar Barrios MD,  Kulwant Rodrigez MD, Michelle Dennison MD, Jinny Steel MD, Kathia Vargas MD, Gerardo Alvarez MD, Richard Johnson MD, Demetri Ibarra DO, Benjie Alvarenga MD, Shirley Waterhouse, CNP,  Georgette Villarreal CNP, Demetri Toure, CNP,  Silvina Castano, CARINA, Tatiana Mckeon, CNP, Angelina Harper, CNP, Alma Rosa Peng, CNP, Rashmi Mccartney, CNP, Kimberley Grant PALiamC, IMAN ArmentaC, Stephanie Kaminski, CNP, Tono Yee, CNP,  Chrissie Schwartz, CNP, Sheri Jones, CNP,  Nadiya Rick, CNP, Cindi Lam, CNP         [facility]   IN-PATIENT SERVICE   [facility]    Progress Note    10/16/2024    8:13 AM    Name:   Mandie Bustamante  MRN:     [id number]     Acct:      [id number]   Room:   2030/2030-01  IP Day:  6  Admit Date:  10/10/2024  6:11 PM    PCP:   Kota Sandhu DO  Code Status:  Full Code    Subjective:     C/C: abd pain  Interval History Status: improved.     Does feel somewhat better today  Gets occasional abd cramp  Denies cp/sob/n/v      Brief History:     Per my partner:  \"Mandie Bustamante is a 69 y.o. Non- / non  female who presents with No chief complaint on file.   and is admitted to the hospital for the management of Hyponatremia.     recently underwent robotic assisted laparoscopic incisional hernia repair with mesh on 10/1/2024 with surgery, now presented from outlying facility for management of hyponatremia and abdominal abscess.     At [facility] patient presented with abdominal pain and not feeling well along with poor  oriented to person, place and time and normal affect  Lungs:  clear to auscultation bilaterally, normal effort  Heart:  regular rate and rhythm, positive murmur  Abdomen:  soft, mildly diffusely ttp, nondistended, normal bowel sounds, no masses, hepatomegaly, splenomegaly; obese  Extremities:  no redness, tenderness in the calves; 2+ ble edema  Skin:  no gross lesions, rashes, induration    Assessment:        Hospital Problems             Last Modified POA    * (Principal) Hyponatremia 10/10/2024 Yes    Intra-abdominal abscess (HCC) 10/10/2024 Yes    Anxiety 10/10/2024 Yes    Gastro-esophageal reflux disease without esophagitis 10/10/2024 Yes    Essential hypertension (Chronic) 10/10/2024 Yes    Hypokalemia 10/10/2024 Yes    Elevated brain natriuretic peptide (BNP) level 10/10/2024 Yes    Leukocytosis 10/10/2024 Yes    Prediabetes (Chronic) 10/10/2024 Yes    Overview Signed 10/10/2024  8:47 PM by Gerardo Alvarez MD     Patient reportedly was in the prediabetic range according to labs in 2019 per chart review, however I am unable to see those labs.  But on recent blood work performed this month hemoglobin A1c was 6.3%.  Patient does complain of urinary frequency, urinalysis performed same time was negative for infection or signs of stone so could likely be from her hyperglycemia or potentially hypercalcemia which each were discussed in depth with the patient.  We also discussed cushionoid appearance and how that could relate to hyperglycemia as well.  Patient states we could possibly investigate this at later date.            Plan:        Given rise in wbc, check ct abdomen today  Monitor na levels  Keep on zosyn postop with persistent leukocytosis and intraabd abscess  On clears per surgery  Pt/ot/mobilize    Taye WALLACE Blood, DO  10/16/2024  8:13 AM

## 2024-10-16 NOTE — PROGRESS NOTES
STSs from recliner using nasra stedy. Even with MAX A x2 pt was only able to lift bottom a few inches off chair and nasra stedy is too narrow for pt to clear hips. Pt having trouble staying awake and on task. Per RN and pt sodium is low. Pt req'd constant verbal/tactile and hand over hand cues for all movements.   Neuromuscular Education  Neuromuscular education: Yes  NDT Treatment: Sitting;Standing     Assessment  Assessment  Assessment: Pt tolerated session poorly due to fatigue, global weakness, decreased activity tolerance and cognitive deficits. Pt DEP x2 using richmond lift to transfer pt from bed to recliner and to position pt properly in center of recliner. Attempted STSs from recliner using NASRA STEDY and even with MAX A x2 pt only able to lift bottom up off seat a few inches and nasra stedy is too narrow for pt to clear hips. Pt is slow to process/follow commands due to fatigue and cognitive deficits requiring constant cues for all movements. Skilled OT indicated to increase safety and IND with all functional tasks to ensure a safe return to PLOF.  Activity Tolerance: Patient limited by fatigue;Patient limited by pain;Patient limited by endurance;Treatment limited secondary to decreased cognition;Treatment limited secondary to medical complications  Discharge Recommendations: Patient would benefit from continued therapy after discharge  Safety Devices  Safety Devices in place: Yes  Type of devices: All fall risk precautions in place;Left in chair;Nurse notified;Call light within reach;Patient at risk for falls    Patient Education  Education  Education Given To: Family  Education Provided: Fall Prevention Strategies;Transfer Training;Energy Conservation;Cognition;Safety;Equipment  Education Method: Verbal;Teach Back  Barriers to Learning: Cognition  Education Outcome: Continued education needed    Plan  Occupational Therapy Plan  Times Per Week: 4-5x/wk 1x/day as pia  Current Treatment Recommendations:  safety/scanning, fall prevention, functional mobility for ADL transfers, ability to sequence and follow directions, bed mobility tech, and functional UE strength.         Esme Valverde JOJO

## 2024-10-17 ENCOUNTER — APPOINTMENT (OUTPATIENT)
Dept: GENERAL RADIOLOGY | Age: 69
End: 2024-10-17
Attending: STUDENT IN AN ORGANIZED HEALTH CARE EDUCATION/TRAINING PROGRAM
Payer: COMMERCIAL

## 2024-10-17 LAB
ANION GAP SERPL CALCULATED.3IONS-SCNC: 11 MMOL/L (ref 9–17)
ANION GAP SERPL CALCULATED.3IONS-SCNC: 12 MMOL/L (ref 9–17)
ANION GAP SERPL CALCULATED.3IONS-SCNC: 12 MMOL/L (ref 9–17)
BASOPHILS # BLD: 0.31 K/UL (ref 0–0.2)
BASOPHILS NFR BLD: 1 % (ref 0–2)
BUN SERPL-MCNC: 14 MG/DL (ref 8–23)
BUN SERPL-MCNC: 16 MG/DL (ref 8–23)
BUN/CREAT SERPL: 20 (ref 9–20)
BUN/CREAT SERPL: 23 (ref 9–20)
CA-I BLD-SCNC: 0.98 MMOL/L (ref 1.15–1.33)
CALCIUM SERPL-MCNC: 7.2 MG/DL (ref 8.6–10.4)
CALCIUM SERPL-MCNC: 7.3 MG/DL (ref 8.6–10.4)
CHLORIDE SERPL-SCNC: 94 MMOL/L (ref 98–107)
CHLORIDE SERPL-SCNC: 95 MMOL/L (ref 98–107)
CHLORIDE SERPL-SCNC: 98 MMOL/L (ref 98–107)
CO2 SERPL-SCNC: 24 MMOL/L (ref 20–31)
CO2 SERPL-SCNC: 25 MMOL/L (ref 20–31)
CO2 SERPL-SCNC: 25 MMOL/L (ref 20–31)
CREAT SERPL-MCNC: 0.7 MG/DL (ref 0.5–0.9)
CREAT SERPL-MCNC: 0.7 MG/DL (ref 0.5–0.9)
EOSINOPHIL # BLD: 0 K/UL (ref 0–0.44)
EOSINOPHILS RELATIVE PERCENT: 0 % (ref 1–4)
ERYTHROCYTE [DISTWIDTH] IN BLOOD BY AUTOMATED COUNT: 12.7 % (ref 11.8–14.4)
FIO2: 40
GFR, ESTIMATED: >90 ML/MIN/1.73M2
GFR, ESTIMATED: >90 ML/MIN/1.73M2
GLUCOSE BLD-MCNC: 107 MG/DL (ref 65–105)
GLUCOSE BLD-MCNC: 127 MG/DL (ref 65–105)
GLUCOSE BLD-MCNC: 149 MG/DL (ref 65–105)
GLUCOSE BLD-MCNC: 97 MG/DL (ref 65–105)
GLUCOSE SERPL-MCNC: 122 MG/DL (ref 70–99)
GLUCOSE SERPL-MCNC: 182 MG/DL (ref 70–99)
HCT VFR BLD AUTO: 27.7 % (ref 36.3–47.1)
HGB BLD-MCNC: 9.7 G/DL (ref 11.9–15.1)
IMM GRANULOCYTES # BLD AUTO: 1.57 K/UL (ref 0–0.3)
IMM GRANULOCYTES NFR BLD: 5 %
LDLC SERPL DIRECT ASSAY-MCNC: 54 MG/DL
LYMPHOCYTES NFR BLD: 0.94 K/UL (ref 1.1–3.7)
LYMPHOCYTES RELATIVE PERCENT: 3 % (ref 24–43)
MAGNESIUM SERPL-MCNC: 1.8 MG/DL (ref 1.6–2.6)
MAGNESIUM SERPL-MCNC: 2 MG/DL (ref 1.6–2.6)
MCH RBC QN AUTO: 29.8 PG (ref 25.2–33.5)
MCHC RBC AUTO-ENTMCNC: 35 G/DL (ref 28.4–34.8)
MCV RBC AUTO: 85 FL (ref 82.6–102.9)
MODE: AC
MONOCYTES NFR BLD: 2.19 K/UL (ref 0.1–1.2)
MONOCYTES NFR BLD: 7 % (ref 3–12)
MORPHOLOGY: ABNORMAL
NEUTROPHILS NFR BLD: 84 % (ref 36–65)
NEUTS SEG NFR BLD: 26.29 K/UL (ref 1.5–8.1)
NRBC BLD-RTO: 0 PER 100 WBC
O2 DELIVERY DEVICE: ABNORMAL
PHOSPHATE SERPL-MCNC: 3.3 MG/DL (ref 2.6–4.5)
PLATELET # BLD AUTO: 348 K/UL (ref 138–453)
PMV BLD AUTO: 8.7 FL (ref 8.1–13.5)
POC HCO3: 24.7 MMOL/L (ref 21–28)
POC O2 SATURATION: 99 % (ref 94–98)
POC PCO2: 32.6 MM HG (ref 35–48)
POC PH: 7.49 (ref 7.35–7.45)
POC PO2: 118.1 MM HG (ref 83–108)
POSITIVE BASE EXCESS, ART: 1.6 MMOL/L (ref 0–3)
POTASSIUM SERPL-SCNC: 3.1 MMOL/L (ref 3.7–5.3)
POTASSIUM SERPL-SCNC: 3.3 MMOL/L (ref 3.7–5.3)
POTASSIUM SERPL-SCNC: 3.7 MMOL/L (ref 3.7–5.3)
RBC # BLD AUTO: 3.26 M/UL (ref 3.95–5.11)
SODIUM SERPL-SCNC: 131 MMOL/L (ref 135–144)
SODIUM SERPL-SCNC: 132 MMOL/L (ref 135–144)
SODIUM SERPL-SCNC: 133 MMOL/L (ref 135–144)
TRIGL SERPL-MCNC: 807 MG/DL
WBC OTHER # BLD: 31.3 K/UL (ref 3.5–11.3)

## 2024-10-17 PROCEDURE — 6360000002 HC RX W HCPCS

## 2024-10-17 PROCEDURE — 71045 X-RAY EXAM CHEST 1 VIEW: CPT

## 2024-10-17 PROCEDURE — 6370000000 HC RX 637 (ALT 250 FOR IP): Performed by: INTERNAL MEDICINE

## 2024-10-17 PROCEDURE — 2580000003 HC RX 258: Performed by: INTERNAL MEDICINE

## 2024-10-17 PROCEDURE — 2000000000 HC ICU R&B

## 2024-10-17 PROCEDURE — 82947 ASSAY GLUCOSE BLOOD QUANT: CPT

## 2024-10-17 PROCEDURE — 94003 VENT MGMT INPAT SUBQ DAY: CPT

## 2024-10-17 PROCEDURE — 6370000000 HC RX 637 (ALT 250 FOR IP)

## 2024-10-17 PROCEDURE — 82803 BLOOD GASES ANY COMBINATION: CPT

## 2024-10-17 PROCEDURE — 36600 WITHDRAWAL OF ARTERIAL BLOOD: CPT

## 2024-10-17 PROCEDURE — 82330 ASSAY OF CALCIUM: CPT

## 2024-10-17 PROCEDURE — 84100 ASSAY OF PHOSPHORUS: CPT

## 2024-10-17 PROCEDURE — 2500000003 HC RX 250 WO HCPCS: Performed by: INTERNAL MEDICINE

## 2024-10-17 PROCEDURE — 2580000003 HC RX 258

## 2024-10-17 PROCEDURE — 6360000002 HC RX W HCPCS: Performed by: INTERNAL MEDICINE

## 2024-10-17 PROCEDURE — 5A1955Z RESPIRATORY VENTILATION, GREATER THAN 96 CONSECUTIVE HOURS: ICD-10-PCS | Performed by: INTERNAL MEDICINE

## 2024-10-17 PROCEDURE — 80048 BASIC METABOLIC PNL TOTAL CA: CPT

## 2024-10-17 PROCEDURE — 83735 ASSAY OF MAGNESIUM: CPT

## 2024-10-17 PROCEDURE — 94761 N-INVAS EAR/PLS OXIMETRY MLT: CPT

## 2024-10-17 PROCEDURE — 85025 COMPLETE CBC W/AUTO DIFF WBC: CPT

## 2024-10-17 PROCEDURE — 2700000000 HC OXYGEN THERAPY PER DAY

## 2024-10-17 PROCEDURE — 80051 ELECTROLYTE PANEL: CPT

## 2024-10-17 PROCEDURE — 99232 SBSQ HOSP IP/OBS MODERATE 35: CPT | Performed by: INTERNAL MEDICINE

## 2024-10-17 RX ORDER — CHLORHEXIDINE GLUCONATE ORAL RINSE 1.2 MG/ML
15 SOLUTION DENTAL 2 TIMES DAILY
Status: DISCONTINUED | OUTPATIENT
Start: 2024-10-17 | End: 2024-10-24

## 2024-10-17 RX ORDER — SODIUM CHLORIDE, SODIUM LACTATE, POTASSIUM CHLORIDE, CALCIUM CHLORIDE 600; 310; 30; 20 MG/100ML; MG/100ML; MG/100ML; MG/100ML
INJECTION, SOLUTION INTRAVENOUS CONTINUOUS
Status: DISCONTINUED | OUTPATIENT
Start: 2024-10-17 | End: 2024-10-20

## 2024-10-17 RX ORDER — NOREPINEPHRINE BITARTRATE 0.06 MG/ML
1-100 INJECTION, SOLUTION INTRAVENOUS CONTINUOUS
Status: DISCONTINUED | OUTPATIENT
Start: 2024-10-17 | End: 2024-10-30

## 2024-10-17 RX ORDER — MIDAZOLAM HYDROCHLORIDE 1 MG/ML
1 INJECTION, SOLUTION INTRAMUSCULAR; INTRAVENOUS EVERY 30 MIN PRN
Status: DISCONTINUED | OUTPATIENT
Start: 2024-10-17 | End: 2024-11-08 | Stop reason: HOSPADM

## 2024-10-17 RX ORDER — PROPOFOL 10 MG/ML
5-50 INJECTION, EMULSION INTRAVENOUS CONTINUOUS
Status: DISCONTINUED | OUTPATIENT
Start: 2024-10-17 | End: 2024-10-24

## 2024-10-17 RX ORDER — METRONIDAZOLE 500 MG/100ML
500 INJECTION, SOLUTION INTRAVENOUS EVERY 8 HOURS
Status: DISCONTINUED | OUTPATIENT
Start: 2024-10-17 | End: 2024-10-26

## 2024-10-17 RX ADMIN — CHLORHEXIDINE GLUCONATE 15 ML: 1.2 RINSE ORAL at 09:37

## 2024-10-17 RX ADMIN — PROPOFOL 40 MCG/KG/MIN: 10 INJECTION, EMULSION INTRAVENOUS at 00:52

## 2024-10-17 RX ADMIN — POTASSIUM CHLORIDE 20 MEQ: 29.8 INJECTION, SOLUTION INTRAVENOUS at 13:38

## 2024-10-17 RX ADMIN — ENOXAPARIN SODIUM 30 MG: 100 INJECTION SUBCUTANEOUS at 21:28

## 2024-10-17 RX ADMIN — METRONIDAZOLE 500 MG: 500 INJECTION, SOLUTION INTRAVENOUS at 20:33

## 2024-10-17 RX ADMIN — PROPOFOL 40 MCG/KG/MIN: 10 INJECTION, EMULSION INTRAVENOUS at 07:29

## 2024-10-17 RX ADMIN — MIDAZOLAM 1 MG: 1 INJECTION INTRAMUSCULAR; INTRAVENOUS at 18:32

## 2024-10-17 RX ADMIN — CEFEPIME 2000 MG: 2 INJECTION, POWDER, FOR SOLUTION INTRAVENOUS at 16:13

## 2024-10-17 RX ADMIN — PROPOFOL 40 MCG/KG/MIN: 10 INJECTION, EMULSION INTRAVENOUS at 04:50

## 2024-10-17 RX ADMIN — ENOXAPARIN SODIUM 30 MG: 100 INJECTION SUBCUTANEOUS at 08:50

## 2024-10-17 RX ADMIN — SODIUM CHLORIDE: 9 INJECTION, SOLUTION INTRAVENOUS at 00:00

## 2024-10-17 RX ADMIN — HYDROCORTISONE: 1 CREAM TOPICAL at 09:38

## 2024-10-17 RX ADMIN — HYDROMORPHONE HYDROCHLORIDE 0.5 MG: 1 INJECTION, SOLUTION INTRAMUSCULAR; INTRAVENOUS; SUBCUTANEOUS at 03:24

## 2024-10-17 RX ADMIN — SODIUM CHLORIDE, POTASSIUM CHLORIDE, SODIUM LACTATE AND CALCIUM CHLORIDE: 600; 310; 30; 20 INJECTION, SOLUTION INTRAVENOUS at 21:45

## 2024-10-17 RX ADMIN — Medication 2 MG/HR: at 10:51

## 2024-10-17 RX ADMIN — ANTI-FUNGAL POWDER MICONAZOLE NITRATE TALC FREE: 1.42 POWDER TOPICAL at 09:39

## 2024-10-17 RX ADMIN — HYDROMORPHONE HYDROCHLORIDE 0.5 MG: 1 INJECTION, SOLUTION INTRAMUSCULAR; INTRAVENOUS; SUBCUTANEOUS at 15:18

## 2024-10-17 RX ADMIN — FUROSEMIDE 20 MG: 10 INJECTION, SOLUTION INTRAMUSCULAR; INTRAVENOUS at 08:50

## 2024-10-17 RX ADMIN — HYDROCORTISONE: 1 CREAM TOPICAL at 21:53

## 2024-10-17 RX ADMIN — NOREPINEPHRINE BITARTRATE 5 MCG/MIN: 0.06 INJECTION, SOLUTION INTRAVENOUS at 01:57

## 2024-10-17 RX ADMIN — SODIUM CHLORIDE, POTASSIUM CHLORIDE, SODIUM LACTATE AND CALCIUM CHLORIDE: 600; 310; 30; 20 INJECTION, SOLUTION INTRAVENOUS at 09:40

## 2024-10-17 RX ADMIN — MIDAZOLAM 1 MG: 1 INJECTION INTRAMUSCULAR; INTRAVENOUS at 13:34

## 2024-10-17 RX ADMIN — PIPERACILLIN AND TAZOBACTAM 4500 MG: 4; .5 INJECTION, POWDER, LYOPHILIZED, FOR SOLUTION INTRAVENOUS at 01:58

## 2024-10-17 RX ADMIN — CEFEPIME 2000 MG: 2 INJECTION, POWDER, FOR SOLUTION INTRAVENOUS at 23:54

## 2024-10-17 RX ADMIN — POTASSIUM CHLORIDE 20 MEQ: 29.8 INJECTION, SOLUTION INTRAVENOUS at 22:46

## 2024-10-17 RX ADMIN — ANTI-FUNGAL POWDER MICONAZOLE NITRATE TALC FREE: 1.42 POWDER TOPICAL at 21:28

## 2024-10-17 RX ADMIN — SODIUM CHLORIDE, POTASSIUM CHLORIDE, SODIUM LACTATE AND CALCIUM CHLORIDE: 600; 310; 30; 20 INJECTION, SOLUTION INTRAVENOUS at 01:43

## 2024-10-17 RX ADMIN — PANTOPRAZOLE SODIUM 40 MG: 40 INJECTION, POWDER, FOR SOLUTION INTRAVENOUS at 09:37

## 2024-10-17 RX ADMIN — PROPOFOL 40 MCG/KG/MIN: 10 INJECTION, EMULSION INTRAVENOUS at 10:41

## 2024-10-17 RX ADMIN — METRONIDAZOLE 500 MG: 500 INJECTION, SOLUTION INTRAVENOUS at 11:58

## 2024-10-17 RX ADMIN — POTASSIUM CHLORIDE 20 MEQ: 29.8 INJECTION, SOLUTION INTRAVENOUS at 21:36

## 2024-10-17 RX ADMIN — CEFEPIME 2000 MG: 2 INJECTION, POWDER, FOR SOLUTION INTRAVENOUS at 08:48

## 2024-10-17 RX ADMIN — INSULIN LISPRO 4 UNITS: 100 INJECTION, SOLUTION INTRAVENOUS; SUBCUTANEOUS at 06:19

## 2024-10-17 RX ADMIN — SODIUM CHLORIDE, PRESERVATIVE FREE 10 ML: 5 INJECTION INTRAVENOUS at 08:50

## 2024-10-17 RX ADMIN — POTASSIUM CHLORIDE 20 MEQ: 29.8 INJECTION, SOLUTION INTRAVENOUS at 15:10

## 2024-10-17 ASSESSMENT — PULMONARY FUNCTION TESTS
PIF_VALUE: 27
PIF_VALUE: 21
PIF_VALUE: 26
PIF_VALUE: 25
PIF_VALUE: 22
PIF_VALUE: 26
PIF_VALUE: 28
PIF_VALUE: 25
PIF_VALUE: 26
PIF_VALUE: 25
PIF_VALUE: 28
PIF_VALUE: 25
PIF_VALUE: 26
PIF_VALUE: 27
PIF_VALUE: 26
PIF_VALUE: 25
PIF_VALUE: 29
PIF_VALUE: 16
PIF_VALUE: 16
PIF_VALUE: 27
PIF_VALUE: 26
PIF_VALUE: 17
PIF_VALUE: 19
PIF_VALUE: 25
PIF_VALUE: 18
PIF_VALUE: 25
PIF_VALUE: 23
PIF_VALUE: 26
PIF_VALUE: 26
PIF_VALUE: 25
PIF_VALUE: 27
PIF_VALUE: 26
PIF_VALUE: 26
PIF_VALUE: 25
PIF_VALUE: 26
PIF_VALUE: 25
PIF_VALUE: 16
PIF_VALUE: 25
PIF_VALUE: 26
PIF_VALUE: 26
PIF_VALUE: 24
PIF_VALUE: 25
PIF_VALUE: 26
PIF_VALUE: 26
PIF_VALUE: 25
PIF_VALUE: 26
PIF_VALUE: 18

## 2024-10-17 ASSESSMENT — PAIN SCALES - GENERAL
PAINLEVEL_OUTOF10: 0
PAINLEVEL_OUTOF10: 0
PAINLEVEL_OUTOF10: 7

## 2024-10-17 NOTE — PROGRESS NOTES
Per Dr. Cummings, plan to return to OR tomorrow at 1500. Also spoke with  and updated him on the plan as well.

## 2024-10-17 NOTE — PROGRESS NOTES
Occupational Therapy  DATE: 10/17/2024    NAME: Mandie Bustamante  MRN: [id number]   : 1955    Patient not seen this date for Occupational Therapy due to:      [] Cancel by RN or physician due to:    [] Hemodialysis    [] Critical Lab Value Level     [] Blood transfusion in progress    [] Acute or unstable cardiovascular status   _MAP < 55 or more than >115  _HR < 40 or > 130    [] Acute or unstable pulmonary status   -FiO2 > 60%   _RR < 5 or >40    _O2 sats < 85%    [] Strict Bedrest    [] Off Unit for surgery or procedure    [] Off Unit for testing       [] Pending imaging to R/O fracture    [] Refusal by Patient      [x] Other: Patient intubated/sedated. Patient not medically appropriate for therapy this date. DOMINGO Myers reports plan is for patient to return to OR on Friday. OT will continue to follow.        [] OT being discontinued at this time. Patient independent. No further needs.     [] OT being discontinued at this time as the patient has been transferred to hospice care. No further needs.      Shelly Vargas, OT

## 2024-10-17 NOTE — PROGRESS NOTES
Nephrology Progress Note      SUBJECTIVE    Patient was seen and examined.  Events from last 24 hours noted.  Patient has CT scan followed by exploratory laparotomy.  CT scan and operative findings noted.  Now patient is on ventilator with 30% FiO2.  She is maintaining her blood pressure without any pressors.  She continues to make good urine output in the last 24 hours she put out 2.5 L  Sodium is now up to 131.  Patient is receiving lactated Ringer's at 50 mL/h  She is not on any diuretics at this point.  Her renal functions remained stable    Brief history:  Patient is status post robotic assisted laparoscopic incisional hernia repair with mesh on 10/1/2024 by Dr. Cummings.  She started noticing some abdominal pain 2 to 3 days prior to admission, had poor appetite, did feel like she was developing more abdominal distention and also got some diarrhea.  In an attempt to keep herself hydrated she started drinking a fair amount of free water.  Symptoms did not get better, she presented to the ER and had the following labs:  Sodium 109 which was down from 140 about 10 days earlier, potassium of 3.4, bicarb 24, creatinine 0.8 and an albumin of 3.1.  There was some issue with urinary retention, Bar was placed and we got about 500 mL of urine out.     Initial CT scan of the abdomen and pelvis showed a large air-fluid collection seen along midline consistent with an abscess measuring 13.8 x 7 cm  Urine studies showed urine sodium of 20, urine osmolarity of greater than 425.   Additional history is noted positive for chronic hypertension, history of hyperparathyroidism and mild hypercalcemia status post parathyroidectomy of ectopic gland in May 2023.  She additionally has had history of cholecystectomy, hysterectomy, incisional hernia in the past and surgical repair of incarcerated incisional hernia as of 10/1/2024    OBJECTIVE      CURRENT TEMPERATURE:  Temp: 97.5 °F (36.4 °C)  MAXIMUM TEMPERATURE OVER 24HRS:  Temp

## 2024-10-17 NOTE — PROGRESS NOTES
End Of Shift Note  [facility]  Summary of shift: Patient started shift with increased abd distention and increased white count. Intermed ordered CT abd. Results relayed to gen surgery who then scheduled ex lap at 1600. Pt back to unit intubated. Abd open with wound vac. Pt to remain intubated for 2 days when sx is planning to return to OR. Critical care consult placed and Dr. Lyn updated on pt case. Orders to keep propofol and prn dilaudid, morning chest xray and morning ABGs. Sodiums still being monitored q8 and nephro would like called with each result.     Vitals:    Vitals:    10/16/24 1759 10/16/24 1908 10/16/24 1909 10/16/24 1924   BP:    101/60   Pulse: 86 80  78   Resp: 19 20  20   Temp:       TempSrc:       SpO2:  99%     Weight:   127.9 kg (282 lb)    Height:   1.6 m (5' 3\")         I&O:   Intake/Output Summary (Last 24 hours) at 10/16/2024 2004  Last data filed at 10/16/2024 0625  Gross per 24 hour   Intake 479.8 ml   Output 550 ml   Net -70.2 ml       Resp Status: Vented    Ventilator Settings:  Vent Mode: AC/PRVC Resp Rate (Set): 20 bpm/Vt (Set, mL): 500 mL/ /FiO2 : 40 %    Critical Care IV infusions:   dextrose      propofol 50 mcg/kg/min (10/16/24 1858)    lactated ringers IV soln 120 mL/hr at 10/16/24 1903    sodium chloride 50 mL/hr at 10/16/24 1623        LDA:   PICC 10/11/24 Right Brachial (Active)   Number of days: 4       Urinary Catheter 10/10/24 Bar (Active)   Number of days: 6       ETT  (Active)   Number of days: 0       Incision 10/01/24 Abdomen Medial;Upper (Active)   Number of days: 15       Incision 10/16/24 Abdomen Lower;Medial (Active)   Number of days: 0

## 2024-10-17 NOTE — PROGRESS NOTES
Benjamin Almazan MD   Urology Progress Note            Subjective:  follow-up urinary retention    Patient Vitals for the past 24 hrs:   BP Temp Temp src Pulse Resp SpO2 Height Weight   10/17/24 0343 -- -- -- 65 20 -- -- --   10/17/24 0338 -- -- -- 66 20 100 % 1.6 m (5' 3\") 127.9 kg (282 lb)   10/17/24 0330 (!) 109/52 -- -- 68 20 -- -- --   10/17/24 0315 (!) 109/53 -- -- 71 -- 97 % -- --   10/17/24 0245 125/64 -- -- 71 26 98 % -- --   10/17/24 0230 107/63 -- -- 71 23 100 % -- --   10/17/24 0215 112/61 -- -- 70 23 100 % -- --   10/17/24 0210 -- -- -- 64 17 98 % -- --   10/17/24 0200 (!) 94/54 -- -- 71 20 99 % -- --   10/17/24 0145 (!) 92/52 -- -- 67 20 100 % -- --   10/17/24 0130 (!) 88/49 -- -- 69 20 98 % -- --   10/17/24 0115 (!) 89/50 -- -- 70 20 98 % -- --   10/17/24 0100 (!) 92/51 -- -- 70 20 98 % -- --   10/17/24 0030 (!) 94/50 -- -- 70 20 98 % -- --   10/17/24 0015 (!) 92/50 -- -- 71 20 98 % -- --   10/17/24 0000 (!) 93/51 96.8 °F (36 °C) Temporal 71 20 98 % -- --   10/16/24 2345 (!) 93/52 -- -- 74 20 98 % -- --   10/16/24 2330 (!) 95/50 -- -- 73 20 98 % -- --   10/16/24 2315 (!) 94/51 -- -- 73 20 98 % -- --   10/16/24 2303 -- -- -- 72 20 98 % 1.6 m (5' 3\") 127.9 kg (282 lb)   10/16/24 2300 (!) 95/50 -- -- 72 20 99 % -- --   10/16/24 2245 (!) 95/51 -- -- 73 20 98 % -- --   10/16/24 2230 (!) 95/52 -- -- 72 20 98 % -- --   10/16/24 2215 (!) 94/51 -- -- 72 20 98 % -- --   10/16/24 2200 (!) 93/51 -- -- 72 20 99 % -- --   10/16/24 2157 (!) 93/51 -- -- 71 20 99 % -- --   10/16/24 2145 (!) 96/51 -- -- 70 20 100 % -- --   10/16/24 2130 (!) 101/54 -- -- 71 20 98 % -- --   10/16/24 2115 (!) 99/57 -- -- 70 20 98 % -- --   10/16/24 2100 (!) 82/59 -- -- 69 20 97 % -- --   10/16/24 2000 (!) 99/57 96.8 °F (36 °C) Temporal 76 20 97 % -- --   10/16/24 1924 101/60 -- -- 78 20 -- -- --   10/16/24 1909 -- -- -- -- -- -- 1.6 m (5' 3\") 127.9 kg (282 lb)   10/16/24 1908 -- -- -- 80 20 99 % -- --

## 2024-10-17 NOTE — PLAN OF CARE
Problem: Discharge Planning  Goal: Discharge to home or other facility with appropriate resources  10/16/2024 2018 by Kira Alvarado RN  Outcome: Progressing  10/16/2024 1434 by Kira Alvarado RN  Outcome: HH/HSPC Not Progressing     Problem: Skin/Tissue Integrity  Goal: Absence of new skin breakdown  Description: 1.  Monitor for areas of redness and/or skin breakdown  2.  Assess vascular access sites hourly  3.  Every 4-6 hours minimum:  Change oxygen saturation probe site  4.  Every 4-6 hours:  If on nasal continuous positive airway pressure, respiratory therapy assess nares and determine need for appliance change or resting period.  10/16/2024 2018 by Kira Alvarado RN  Outcome: Progressing  10/16/2024 1434 by Kira Alvarado RN  Outcome: HH/HSPC Not Progressing     Problem: ABCDS Injury Assessment  Goal: Absence of physical injury  10/16/2024 2018 by Kira Alvarado RN  Outcome: Progressing  10/16/2024 1434 by Kira Alvarado RN  Outcome: HH/HSPC Not Progressing     Problem: Safety - Adult  Goal: Free from fall injury  10/16/2024 2018 by Kira Alvarado RN  Outcome: Progressing  10/16/2024 1434 by Kira Alvarado RN  Outcome: HH/HSPC Not Progressing     Problem: Metabolic/Fluid and Electrolytes - Adult  Goal: Electrolytes maintained within normal limits  10/16/2024 2018 by Kira Alvarado RN  Outcome: Progressing  10/16/2024 1434 by Kira Alvarado RN  Outcome: HH/HSPC Not Progressing  Goal: Hemodynamic stability and optimal renal function maintained  10/16/2024 2018 by Kira Alvarado RN  Outcome: Progressing  10/16/2024 1434 by Kira Alvarado RN  Outcome: HH/HSPC Not Progressing  Goal: Glucose maintained within prescribed range  10/16/2024 2018 by Kira Alvarado RN  Outcome: Progressing  10/16/2024 1434 by Kira Alvarado RN  Outcome: HH/HSPC Not Progressing     Problem: Neurosensory - Adult  Goal: Achieves stable or improved neurological status  10/16/2024 2018 by Jenny

## 2024-10-17 NOTE — FLOWSHEET NOTE
10/17/24 0500   Rhythm Interpretation   Pulse 68   Cardiac Monitor   Telemetry Box Number Hardwire 2030   Telemetry Monitor Alarm Parameters Preset   Anti-Embolism   Anti-Embolism Intervention Medication  (lovenox)   Anti-Embolism Devices Pneumatic compression devices, below knee   Treatment Team Notification   Reason for Communication Critical results   Type of Critical Result Laboratory   Critical Lab Information   (WBC)   Person Result Received From lab   Critical Lab Result Type White blood count   Name of Team Member Notified Dr Cummings   Treatment Team Role Consulting Provider   Method of Communication Face to face   Response No new orders     Reported WBC, no new orders

## 2024-10-17 NOTE — PROGRESS NOTES
Physical Therapy  DATE: 10/17/2024    NAME: Mandie Bustamante  MRN: [id number]   : 1955    Patient not seen this date for Physical Therapy due to:      [] Cancel by RN or physician due to:    [] Hemodialysis    [] Critical Lab Value Level     [] Blood transfusion in progress    [] Acute or unstable cardiovascular status   _MAP < 55 or more than >115  _HR < 40 or > 130    [] Acute or unstable pulmonary status   -FiO2 > 60%   _RR < 5 or >40    _O2 sats < 85%    [] Strict Bedrest    [] Off Unit for surgery or procedure    [] Off Unit for testing       [] Pending imaging to R/O fracture    [] Refusal by Patient      [x] Other Patient intubated. Patient not medically appropriate for therapy this date. DOMINGO Myers reports plan is for patient to return to OR on Friday.  PT continue to follow.      [] PT being discontinued at this time. Patient independent. No further needs.     [] PT being discontinued at this time as the patient has been transferred to hospice care. No further needs.      Ruby Hannah, PT

## 2024-10-17 NOTE — PROGRESS NOTES
[facility]  General Surgery  Consult Progress Note    Service date: 10/17/2024, 6:57 AM  Room : 2030/2030-01   Name: Mandie Bustamante  MRN: [id number]    Length of stay: 7 day(s)    OR dates:   10/1/2024: Robotic assisted laparoscopic mesh hernioplasty  10/17/2024: Diagnostic laparoscopy with conversion to exploratory laparotomy and explantation of mesh with wound VAC application and abdominal wash out.     POD #16 and POD# 1  Procedure(s):  DIAGNOSTIC LAPAROSCOPY EXPLORATORY LAPAROTOMY EXPLANTATION OF MESH AND WOUND VAC APPLICATION    Subjective     Patient seen at bedside, still intubated and sedated.  Patient requiring 5 Levophed for pressure support at this time but otherwise is vital signs stable with minimal vent settings.  Patient has wound VAC in place with good seal.  Abdomen is soft on examination today prior.  Patient not responsive well on sedation    Objective   Physical exam: BP (!) 111/54   Pulse 69   Temp 98.8 °F (37.1 °C) (Axillary)   Resp 16   Ht 1.6 m (5' 3\")   Wt 122.5 kg (270 lb)   SpO2 100%   BMI 47.83 kg/m²     General: Intubated and sedated.    Neuro: Sedated   Abdomen: Soft, no grimace to palpation, wound VAC in place   Chest: Non-labored, symmetrical respirations.   CVS: Pulse RRR, on 5 of Levophed       Date 10/17/24 0000 - 10/17/24 2359   Shift 8076-3151 2732-0405 4050-7523 24 Hour Total   INTAKE   I.V.(mL/kg) 2055.7(16.8)   2055.7(16.8)   IV Piggyback(mL/kg) 631.8(5.2)   631.8(5.2)   Shift Total(mL/kg) 2687.5(21.9)   2687.5(21.9)   OUTPUT   Urine(mL/kg/hr) 1000   1000   Shift Total(mL/kg) 1000(8.2)   1000(8.2)   Weight (kg) 122.5 122.5 122.5 122.5           Medications:    Current Facility-Administered Medications   Medication Dose Route Frequency Provider Last Rate Last Admin    norepinephrine (LEVOPHED) 16 mg in sodium chloride 0.9 % 250 mL infusion  1-100 mcg/min IntraVENous Continuous Kwame Lyn MD 4.7 mL/hr at 10/17/24 0610 5 mcg/min at 10/17/24 0610

## 2024-10-17 NOTE — PROGRESS NOTES
End Of Shift Note  [facility]  Summary of shift: Uneventful shift. Goal to keep patient comfortable & vitals within specified limits until return to surgery tomorrow (@ 1500 per Dr. Cummings). Pt switched from prop to versed gtt for sedation d/t elevated triglycerides. Able to wean levo as tolerated. Plan to start TPN tomorrow per dietician. Cont minimal vent settings. Cont IV diureses. Replaced K per protocol. Plan pending surgery findings tomorrow.     Vitals:    Vitals:    10/17/24 1500 10/17/24 1600 10/17/24 1700 10/17/24 1800   BP: (!) 112/54 (!) 103/54 (!) 111/49 106/68   Pulse: 75 79 80 83   Resp: 19 18 18 22   Temp:  98.5 °F (36.9 °C)     TempSrc:  Axillary     SpO2: 99% 99% 97% 95%   Weight:       Height:            I&O:   Intake/Output Summary (Last 24 hours) at 10/17/2024 1833  Last data filed at 10/17/2024 1830  Gross per 24 hour   Intake 3892.98 ml   Output 3825 ml   Net 67.98 ml       Resp Status: 30% fio2, 14 RR, PEEP 8    Ventilator Settings:  Vent Mode: AC/PRVC Resp Rate (Set): 14 bpm/Vt (Set, mL): 500 mL/ /FiO2 : 30 %    Critical Care IV infusions:   norepinephrine 3 mcg/min (10/17/24 1049)    propofol Stopped (10/17/24 1652)    midazolam 8 mg/hr (10/17/24 1827)    lactated ringers IV soln 50 mL/hr at 10/17/24 0940    dextrose      sodium chloride 50 mL/hr at 10/16/24 1623        LDA:   PICC 10/11/24 Right Brachial (Active)   Number of days: 5       Urinary Catheter 10/10/24 Bar (Active)   Number of days: 7       ETT  (Active)   Number of days: 1       Incision 10/01/24 Abdomen Medial;Upper (Active)   Number of days: 16       Incision 10/16/24 Abdomen Lower;Medial (Active)   Number of days: 1

## 2024-10-17 NOTE — PROGRESS NOTES
@[facility] [email or web address]@    [facility]   IN-PATIENT SERVICE   [facility]    Progress Note    10/17/2024    7:50 AM    Name:   Mandie Bustamante  MRN:     [id number]     Acct:      [id number]   Room:   2030/2030-01   Day:  7  Admit Date:  10/10/2024  6:11 PM    PCP:   No primary care provider on file.  Code Status:  Full Code    Subjective:     C/C: Abdominal pain  Interval History Status: not changed.     Given continued leukocytosis a CT abdomen/pelvis was ordered yesterday which revealed increased air-fluid level concerning for an abscess. General surgery took her for an ex-lap. During surgery massive colonic distention was found with multiple serosal tears and the surgeon left her open with a wound vac placement due to the distension. She is now intubated and sedated with plans to take her back to surgery tomorrow.     Brief History:     10/1 - OP hernia repair  10/10 - to ED for abdominal pain, Na 109, fluid collection found on CT abdomen/pelvis  10/11 - surgeon performed bedside US guided aspiration of fluid collection  10/16 - CT abdomen/pelvis revealed increased air-fluid level, surgery took patient to OR for ex-lap, patient left open with wound vac due to distention, remains intubated after surgery, plans to take back to OR Friday    Review of Systems:     Review of Systems   Unable to perform ROS: Intubated       Medications:     Allergies:    Allergies   Allergen Reactions    Latex Itching     Other reaction(s): Other (See Comments)   sensitivity   Added based on information entered during case entry, please review and add reactions, type, and severity as needed   Added based on information entered during case entry, please review and add reactions, type, and severity as needed    Added based on information entered during case entry, please review and add reactions, type, and severity as needed    Fentanyl Shortness Of Breath     PT STATES\" DIDN'T GIVE HER PAIN RELIEF\"    Povidone Iodine  28.8* 27.7*   MCV 84.3 85.0 85.0   MCH 29.8 31.3 29.8   MCHC 35.3* 36.8* 35.0*   RDW 12.8 13.0 12.7    374 348   MPV 8.8 8.9 8.7     Chemistry:  Recent Labs     10/15/24  0418 10/15/24  2015 10/16/24  0151 10/16/24  0909 10/16/24  1842 10/17/24  0450   * 123*   < > 125* 124* 132*   K 3.7 3.0*   < > 3.6* 3.9 3.7   CL 86* 85*   < > 87* 88* 95*   CO2 23 24   < > 24 24 25   GLUCOSE 141*  --   --   --  147* 182*   BUN 17  --   --   --  20 16   CREATININE 0.9  --   --   --  0.8 0.7   MG  --  1.8  --   --  1.8 2.0   ANIONGAP 16 14   < > 14 12 12   LABGLOM 70  --   --   --  80 >90   CALCIUM 8.1*  --   --   --  7.4* 7.3*   PHOS  --   --   --   --  3.2 3.3    < > = values in this interval not displayed.     Recent Labs     10/16/24  1842 10/16/24  2027   TRIG 807*  --    POCGLU  --  161*     ABG:  Lab Results   Component Value Date/Time    POCPH 7.488 10/17/2024 03:49 AM    POCPCO2 32.6 10/17/2024 03:49 AM    POCPO2 118.1 10/17/2024 03:49 AM    POCHCO3 24.7 10/17/2024 03:49 AM    PBEA 1.6 10/17/2024 03:49 AM    VUOK3JCD 99.0 10/17/2024 03:49 AM    FIO2 40.0 10/17/2024 03:49 AM     Lab Results   Component Value Date/Time    SPECIAL Site: Blood 10/16/2024 09:48 AM     Lab Results   Component Value Date/Time    CULTURE NO GROWTH 12 HOURS 10/16/2024 09:48 AM       Radiology:  XR CHEST PORTABLE    Result Date: 10/17/2024  1. Endotracheal tube and enteric tube in satisfactory position. 2. Small left effusion and dense atelectasis left base similar to prior.     XR ABDOMEN FOR NG/OG/NE TUBE PLACEMENT    Result Date: 10/16/2024  Enteric tube is appropriately positioned.     XR CHEST PORTABLE    Result Date: 10/16/2024  Left basilar atelectasis or developing infiltrate and small left pleural effusion.     CT ABDOMEN PELVIS WO CONTRAST Additional Contrast? Radiologist Recommendation    Result Date: 10/16/2024  1. Interval increase in size of air-fluid/air-contrast level seen within the midline with gas extending into the

## 2024-10-17 NOTE — FLOWSHEET NOTE
10/17/24 0136   Treatment Team Notification   Reason for Communication Abnormal vitals  (BP remains low)   Name of Team Member Notified Dr Lyn   Treatment Team Role Consulting Provider   Method of Communication Page   Response See orders   RASS   Hair Agitation Sedation Scale (RASS) -1     Dr Lyn notified BP continues to remain low, sedation titrated and bolus given with no change, new orders received

## 2024-10-17 NOTE — PLAN OF CARE
Problem: Discharge Planning  Goal: Discharge to home or other facility with appropriate resources  10/17/2024 0013 by Amparo Guzman RN  Outcome: Progressing  10/16/2024 2018 by Kira Alvarado RN  Outcome: Progressing  Flowsheets (Taken 10/16/2024 2000 by Amparo Guzman, RN)  Discharge to home or other facility with appropriate resources:   Identify barriers to discharge with patient and caregiver   Arrange for needed discharge resources and transportation as appropriate   Identify discharge learning needs (meds, wound care, etc)   Refer to discharge planning if patient needs post-hospital services based on physician order or complex needs related to functional status, cognitive ability or social support system  10/16/2024 1434 by Kira Alvarado RN  Outcome: HH/HSPC Not Progressing     Problem: Skin/Tissue Integrity  Goal: Absence of new skin breakdown  Description: 1.  Monitor for areas of redness and/or skin breakdown  2.  Assess vascular access sites hourly  3.  Every 4-6 hours minimum:  Change oxygen saturation probe site  4.  Every 4-6 hours:  If on nasal continuous positive airway pressure, respiratory therapy assess nares and determine need for appliance change or resting period.  10/17/2024 0013 by Amparo Guzman RN  Outcome: Progressing  10/16/2024 2018 by Kira Alvarado RN  Outcome: Progressing  10/16/2024 1434 by Kira Alvarado RN  Outcome: /HSPC Not Progressing     Problem: ABCDS Injury Assessment  Goal: Absence of physical injury  10/17/2024 0013 by Amparo Guzman RN  Outcome: Progressing  10/16/2024 2018 by Kira Alvarado RN  Outcome: Progressing  Flowsheets (Taken 10/16/2024 2000 by Amparo Guzman, RN)  Absence of Physical Injury: Implement safety measures based on patient assessment  10/16/2024 1434 by Kira Alvarado RN  Outcome: HH/HSPC Not Progressing     Problem: Safety - Adult  Goal: Free from fall injury  10/17/2024 0013 by Amparo Guzman

## 2024-10-17 NOTE — PROGRESS NOTES
Comprehensive Nutrition Assessment    Type and Reason for Visit:  Reassess    Nutrition Recommendations/Plan:   Will start parenteral nutrition Friday, will be cyclic MWF for fluid shortage  Will monitor/follow nutrition status closely  Monitor diet progression, POC, weight, labs.      Malnutrition Assessment:  Malnutrition Status:  Moderate malnutrition (10/17/24 1147)    Context:  Acute Illness     Findings of the 6 clinical characteristics of malnutrition:  Energy Intake:  50% or less of estimated energy requirements for 5 or more days  Weight Loss:  Unable to assess     Body Fat Loss:  Unable to assess     Muscle Mass Loss:  Unable to assess    Fluid Accumulation:  Moderate to Severe     Strength:       Nutrition Assessment:    Patient continues on CVICU, intubated and sedated. RN reports they will turn off propofol as TG are 807.  at bedside, unsure of weight loss, reports patient has only had liquids since admitted, x 6 days ago. Consult for parenteral nutrition. RN reports patient will go back to surgery tomorrow as surgeon was unable to close, patient has wound vac in place. Due to TPN shortage, we are providing parenteral nutrition on Monday, Wednesdays and Fridays. Will write order for PN tomorrow. Pt has PICC in place. NGT to suction in place. Labs, meds, PMH reviewed.    Nutrition Related Findings:    LBM 10/15, +2 UE and +3 LE edema; OR on 10/1 and 10/17. Meds include lasix, LR @ 50 ml/hr, versed, levo, propofol is running at 29.4 ml/hr per MAR providing 776 calories. Na 132, glucose 182 Wound Type: Surgical Incision       Current Nutrition Intake & Therapies:    Average Meal Intake: NPO  Average Supplements Intake: NPO  Diet NPO Exceptions are: Sips of Water with Meds    Anthropometric Measures:  Height: 160 cm (5' 3\")  Ideal Body Weight (IBW): 115 lbs (52 kg)       Current Body Weight: 122.5 kg (270 lb), 245.4 % IBW. Weight Source: Bed Scale  Current BMI (kg/m2): 47.8

## 2024-10-17 NOTE — PROGRESS NOTES
Physician Progress Note      PATIENT:               JOSE PERRY  CSN #:                  [id number]  :                       1955  ADMIT DATE:       10/10/2024 6:11 PM  DISCH DATE:  RESPONDING  PROVIDER #:        Taye Morales DO          QUERY TEXT:    Pt admitted with Intra-abdominal abscess. Pt noted to have WBC   21.9,18.3,20,23.6, lactic 1.5, ,103,106, no fever. If possible, please   document in the progress notes and discharge summary if you are evaluating and   /or treating any of the following:    The medical record reflects the following:  Risk Factors: 69 yr old, HTN,  Clinical Indicators:  WBC 21.9,18.3,20,23.6, lactic 1.5, ,103,106, no   fever, intra- abdominal abscess  Treatment: IV ATBs, lab monitoring, imaging,  Options provided:  -- Sepsis, present on admission  -- intra-abdominal abscess without Sepsis  -- Other - I will add my own diagnosis  -- Disagree - Not applicable / Not valid  -- Disagree - Clinically unable to determine / Unknown  -- Refer to Clinical Documentation Reviewer    PROVIDER RESPONSE TEXT:    This patient has sepsis which was present on admission.    Query created by: Elzbieta Kelly on 10/16/2024 9:12 AM      Electronically signed by:  Taye Morales DO 10/17/2024 2:03 PM

## 2024-10-17 NOTE — FLOWSHEET NOTE
10/16/24 8334   Treatment Team Notification   Reason for Communication Abnormal vitals  (Low BP)   Name of Team Member Notified Dr Lyn   Treatment Team Role Consulting Provider   Method of Communication Page   Response See orders     Notified of Low BP, decreased sedation, BP remained low, orders received

## 2024-10-17 NOTE — CONSULTS
Pulmonary Medicine and Critical Care Consult    Patient - Mandie Bustamante   MRN -  [id number]   [id number]   - 1955      Date of Admission -  10/10/2024  6:11 PM  Date of evaluation -  10/17/2024  Room -    Hospital Day - 7  Consulting - Taye Morales DO Primary Care Physician - No primary care provider on file.     Reason for Consult      ICU management  Assessment/recommendations   Acute hypoxic respiratory insufficiency  Continue assist-control ventilator tidal volume 500 rate of 14 PEEP of 8 FiO2 30%  Sedation with Versed RASS -2 wean off propofol for hypertriglyceridemia  Dilaudid as needed for pain    Status post exploratory laparotomy with removal of mesh and wound VAC application/history of cholecystectomy  Surgery following/wound care/explained for second exploration tomorrow  N.p.o.    Sepsis/septic shock/abdominal abscess  Off Zosyn/cefepime Flagyl IV check blood cultures  Wean off Levophed to MAP above 65  ID consulted  IV fluid    Status post SHAI/urinary retention with history of suburethral sling  Nephrology on consult/on Lasix 20 IV twice daily hold for hypotension  Urology on consult    discussed with RN  Peptic ulcer disease prophylaxis  DVT prophylaxis  Problem List      Patient Active Problem List   Diagnosis    Abdominal hernia without obstruction and without gangrene    Anxiety    BMI 45.0-49.9, adult    Bursitis of right shoulder    Gastro-esophageal reflux disease without esophagitis    History of parathyroidectomy    Essential hypertension    Incisional hernia, without obstruction or gangrene    Incisional pain    Mixed dyslipidemia    Morbid (severe) obesity due to excess calories    Other abnormal glucose    Pain in bone fixation device (HCC)    Primary hyperparathyroidism (HCC)    Primary insomnia    Right shoulder pain    Seasonal allergic rhinitis    Sensitive skin    Vitamin D insufficiency    Elevated fasting glucose    Hyponatremia    Hypokalemia     sedated  Skin - No bruising or bleeding  Extremities - No cyanosis, clubbing or edema    Labs  - Old records and notes have been reviewed in [facility]   CBC     Lab Results   Component Value Date/Time    WBC 31.3 10/17/2024 04:50 AM    RBC 3.26 10/17/2024 04:50 AM    HGB 9.7 10/17/2024 04:50 AM    HCT 27.7 10/17/2024 04:50 AM     10/17/2024 04:50 AM    MCV 85.0 10/17/2024 04:50 AM    MCH 29.8 10/17/2024 04:50 AM    MCHC 35.0 10/17/2024 04:50 AM    RDW 12.7 10/17/2024 04:50 AM    METASPCT 1 10/10/2024 11:46 AM    LYMPHOPCT 3 10/17/2024 04:50 AM    MONOPCT 7 10/17/2024 04:50 AM    MYELOPCT 1 10/10/2024 11:46 AM    EOSPCT 0 10/17/2024 04:50 AM    BASOPCT 1 10/17/2024 04:50 AM    MONOSABS 2.19 10/17/2024 04:50 AM    LYMPHSABS 0.94 10/17/2024 04:50 AM    EOSABS 0.00 10/17/2024 04:50 AM    BASOSABS 0.31 10/17/2024 04:50 AM     BMP   Lab Results   Component Value Date/Time     10/17/2024 12:05 PM    K 3.1 10/17/2024 12:05 PM    CL 94 10/17/2024 12:05 PM    CO2 25 10/17/2024 12:05 PM    BUN 16 10/17/2024 04:50 AM    CREATININE 0.7 10/17/2024 04:50 AM    GLUCOSE 182 10/17/2024 04:50 AM    CALCIUM 7.3 10/17/2024 04:50 AM    MG 2.0 10/17/2024 04:50 AM    PHOS 3.3 10/17/2024 04:50 AM     LFTS  Lab Results   Component Value Date/Time    ALKPHOS 105 10/11/2024 05:48 AM    ALT 22 10/11/2024 05:48 AM    AST 20 10/11/2024 05:48 AM    BILITOT 0.6 10/11/2024 05:48 AM       INR   Lab Results   Component Value Date    INR 1.1 10/11/2024    PROTIME 14.6 (H) 10/11/2024      Latest Reference Range & Units 10/17/24 03:49   POC HCO3 21.0 - 28.0 mmol/L 24.7   POC O2 SAT 94.0 - 98.0 % 99.0 (H)   POC pCO2 35.0 - 48.0 mm Hg 32.6 (L)   POC pH 7.350 - 7.450  7.488 (H)   POC PO2 83.0 - 108.0 mm Hg 118.1 (H)   (H): Data is abnormally high  (L): Data is abnormally low  Radiology    CXR  1. Endotracheal tube and enteric tube in satisfactory position.  2. Small left effusion and dense atelectasis left base similar to prior.    CT abdomen

## 2024-10-17 NOTE — PROGRESS NOTES
End Of Shift Note  [facility]  Summary of shift: Pt resting with diprovan gtt, had to start Levophed gtt for BP, following commands. Medicated x1 for pain. Good u/o, not much out the wound vac. Dr Cummings to take Pt back to OR on Friday.    Vitals:    Vitals:    10/17/24 0415 10/17/24 0430 10/17/24 0445 10/17/24 0500   BP: (!) 107/51 (!) 111/54 (!) 109/56 (!) 112/55   Pulse: 68 69 70 68   Resp: 16 17 18 16   Temp:       TempSrc:       SpO2: 100% 100%  100%   Weight:       Height:            I&O:   Intake/Output Summary (Last 24 hours) at 10/17/2024 0522  Last data filed at 10/17/2024 0012  Gross per 24 hour   Intake 1464.65 ml   Output 2600 ml   Net -1135.35 ml       Resp Status: remains on vent    Ventilator Settings:  Vent Mode: AC/PRVC Resp Rate (Set): 14 bpm/Vt (Set, mL): 500 mL/ /FiO2 : 30 %    Critical Care IV infusions:   norepinephrine 5 mcg/min (10/17/24 0157)    dextrose      propofol 40 mcg/kg/min (10/17/24 0450)    lactated ringers IV soln 50 mL/hr at 10/17/24 0143    sodium chloride 50 mL/hr at 10/16/24 1623        LDA:   PICC 10/11/24 Right Brachial (Active)   Number of days: 5       Urinary Catheter 10/10/24 Bar (Active)   Number of days: 6       ETT  (Active)   Number of days: 0       Incision 10/01/24 Abdomen Medial;Upper (Active)   Number of days: 15       Incision 10/16/24 Abdomen Lower;Medial (Active)   Number of days: 0

## 2024-10-18 ENCOUNTER — ANESTHESIA (OUTPATIENT)
Dept: OPERATING ROOM | Age: 69
End: 2024-10-18
Payer: COMMERCIAL

## 2024-10-18 ENCOUNTER — APPOINTMENT (OUTPATIENT)
Dept: VASCULAR LAB | Age: 69
End: 2024-10-18
Attending: INTERNAL MEDICINE
Payer: COMMERCIAL

## 2024-10-18 ENCOUNTER — ANESTHESIA EVENT (OUTPATIENT)
Dept: OPERATING ROOM | Age: 69
End: 2024-10-18
Payer: COMMERCIAL

## 2024-10-18 ENCOUNTER — APPOINTMENT (OUTPATIENT)
Dept: GENERAL RADIOLOGY | Age: 69
End: 2024-10-18
Attending: STUDENT IN AN ORGANIZED HEALTH CARE EDUCATION/TRAINING PROGRAM
Payer: COMMERCIAL

## 2024-10-18 LAB
ABO + RH BLD: NORMAL
ANION GAP SERPL CALCULATED.3IONS-SCNC: 10 MMOL/L (ref 9–17)
ANION GAP SERPL CALCULATED.3IONS-SCNC: 12 MMOL/L (ref 9–17)
ANION GAP SERPL CALCULATED.3IONS-SCNC: 12 MMOL/L (ref 9–17)
ANION GAP SERPL CALCULATED.3IONS-SCNC: 9 MMOL/L (ref 9–17)
ARM BAND NUMBER: NORMAL
BASOPHILS # BLD: 0 K/UL (ref 0–0.2)
BASOPHILS NFR BLD: 0 %
BLOOD BANK SAMPLE EXPIRATION: NORMAL
BLOOD GROUP ANTIBODIES SERPL: NEGATIVE
BUN SERPL-MCNC: 14 MG/DL (ref 8–23)
BUN/CREAT SERPL: 20 (ref 9–20)
CA-I BLD-SCNC: 1.03 MMOL/L (ref 1.15–1.33)
CALCIUM SERPL-MCNC: 7.4 MG/DL (ref 8.6–10.4)
CHLORIDE SERPL-SCNC: 101 MMOL/L (ref 98–107)
CHLORIDE SERPL-SCNC: 103 MMOL/L (ref 98–107)
CHLORIDE SERPL-SCNC: 103 MMOL/L (ref 98–107)
CHLORIDE SERPL-SCNC: 99 MMOL/L (ref 98–107)
CO2 SERPL-SCNC: 22 MMOL/L (ref 20–31)
CO2 SERPL-SCNC: 24 MMOL/L (ref 20–31)
CO2 SERPL-SCNC: 25 MMOL/L (ref 20–31)
CO2 SERPL-SCNC: 25 MMOL/L (ref 20–31)
CREAT SERPL-MCNC: 0.7 MG/DL (ref 0.5–0.9)
EOSINOPHIL # BLD: 0.17 K/UL (ref 0–0.4)
EOSINOPHILS RELATIVE PERCENT: 1 % (ref 1–4)
ERYTHROCYTE [DISTWIDTH] IN BLOOD BY AUTOMATED COUNT: 13.2 % (ref 11.8–14.4)
FIO2: 30
GFR, ESTIMATED: >90 ML/MIN/1.73M2
GLUCOSE BLD-MCNC: 106 MG/DL (ref 65–105)
GLUCOSE BLD-MCNC: 127 MG/DL (ref 65–105)
GLUCOSE BLD-MCNC: 179 MG/DL (ref 65–105)
GLUCOSE BLD-MCNC: 94 MG/DL (ref 65–105)
GLUCOSE SERPL-MCNC: 125 MG/DL (ref 70–99)
HCT VFR BLD AUTO: 27.8 % (ref 36.3–47.1)
HGB BLD-MCNC: 9.4 G/DL (ref 11.9–15.1)
IMM GRANULOCYTES # BLD AUTO: 0.66 K/UL (ref 0–0.3)
IMM GRANULOCYTES NFR BLD: 4 %
LYMPHOCYTES NFR BLD: 1.49 K/UL (ref 1–4.8)
LYMPHOCYTES RELATIVE PERCENT: 9 % (ref 24–44)
MCH RBC QN AUTO: 29.5 PG (ref 25.2–33.5)
MCHC RBC AUTO-ENTMCNC: 33.8 G/DL (ref 28.4–34.8)
MCV RBC AUTO: 87.1 FL (ref 82.6–102.9)
MODE: AC
MONOCYTES NFR BLD: 1.66 K/UL (ref 0.2–0.8)
MONOCYTES NFR BLD: 10 % (ref 1–7)
MORPHOLOGY: ABNORMAL
NEUTROPHILS NFR BLD: 76 % (ref 36–66)
NEUTS SEG NFR BLD: 12.62 K/UL (ref 1.8–7.7)
NRBC BLD-RTO: 0 PER 100 WBC
O2 DELIVERY DEVICE: ABNORMAL
PHOSPHATE SERPL-MCNC: 2.4 MG/DL (ref 2.6–4.5)
PLATELET # BLD AUTO: 317 K/UL (ref 138–453)
PMV BLD AUTO: 8.6 FL (ref 8.1–13.5)
POC HCO3: 24.9 MMOL/L (ref 21–28)
POC O2 SATURATION: 97.5 % (ref 94–98)
POC PCO2: 33.7 MM HG (ref 35–48)
POC PH: 7.48 (ref 7.35–7.45)
POC PO2: 88.2 MM HG (ref 83–108)
POSITIVE BASE EXCESS, ART: 1.5 MMOL/L (ref 0–3)
POTASSIUM SERPL-SCNC: 3.4 MMOL/L (ref 3.7–5.3)
POTASSIUM SERPL-SCNC: 3.5 MMOL/L (ref 3.7–5.3)
POTASSIUM SERPL-SCNC: 3.7 MMOL/L (ref 3.7–5.3)
POTASSIUM SERPL-SCNC: 3.7 MMOL/L (ref 3.7–5.3)
RBC # BLD AUTO: 3.19 M/UL (ref 3.95–5.11)
SODIUM SERPL-SCNC: 135 MMOL/L (ref 135–144)
SODIUM SERPL-SCNC: 136 MMOL/L (ref 135–144)
SODIUM SERPL-SCNC: 137 MMOL/L (ref 135–144)
SODIUM SERPL-SCNC: 137 MMOL/L (ref 135–144)
TRIGL SERPL-MCNC: 162 MG/DL
WBC OTHER # BLD: 16.6 K/UL (ref 3.5–11.3)

## 2024-10-18 PROCEDURE — 2000000000 HC ICU R&B

## 2024-10-18 PROCEDURE — 82947 ASSAY GLUCOSE BLOOD QUANT: CPT

## 2024-10-18 PROCEDURE — 36600 WITHDRAWAL OF ARTERIAL BLOOD: CPT

## 2024-10-18 PROCEDURE — 86901 BLOOD TYPING SEROLOGIC RH(D): CPT

## 2024-10-18 PROCEDURE — 99232 SBSQ HOSP IP/OBS MODERATE 35: CPT | Performed by: INTERNAL MEDICINE

## 2024-10-18 PROCEDURE — 86900 BLOOD TYPING SEROLOGIC ABO: CPT

## 2024-10-18 PROCEDURE — 94761 N-INVAS EAR/PLS OXIMETRY MLT: CPT

## 2024-10-18 PROCEDURE — 6360000002 HC RX W HCPCS

## 2024-10-18 PROCEDURE — 3700000001 HC ADD 15 MINUTES (ANESTHESIA): Performed by: SURGERY

## 2024-10-18 PROCEDURE — 3600000002 HC SURGERY LEVEL 2 BASE: Performed by: SURGERY

## 2024-10-18 PROCEDURE — 80048 BASIC METABOLIC PNL TOTAL CA: CPT

## 2024-10-18 PROCEDURE — 2580000003 HC RX 258

## 2024-10-18 PROCEDURE — 6360000002 HC RX W HCPCS: Performed by: INTERNAL MEDICINE

## 2024-10-18 PROCEDURE — 3700000000 HC ANESTHESIA ATTENDED CARE: Performed by: SURGERY

## 2024-10-18 PROCEDURE — 6360000002 HC RX W HCPCS: Performed by: NURSE ANESTHETIST, CERTIFIED REGISTERED

## 2024-10-18 PROCEDURE — 2500000003 HC RX 250 WO HCPCS: Performed by: NURSE ANESTHETIST, CERTIFIED REGISTERED

## 2024-10-18 PROCEDURE — 36415 COLL VENOUS BLD VENIPUNCTURE: CPT

## 2024-10-18 PROCEDURE — 2500000003 HC RX 250 WO HCPCS: Performed by: INTERNAL MEDICINE

## 2024-10-18 PROCEDURE — 85025 COMPLETE CBC W/AUTO DIFF WBC: CPT

## 2024-10-18 PROCEDURE — 6370000000 HC RX 637 (ALT 250 FOR IP)

## 2024-10-18 PROCEDURE — 84478 ASSAY OF TRIGLYCERIDES: CPT

## 2024-10-18 PROCEDURE — 71045 X-RAY EXAM CHEST 1 VIEW: CPT

## 2024-10-18 PROCEDURE — 82330 ASSAY OF CALCIUM: CPT

## 2024-10-18 PROCEDURE — 2500000003 HC RX 250 WO HCPCS

## 2024-10-18 PROCEDURE — 2580000003 HC RX 258: Performed by: NURSE ANESTHETIST, CERTIFIED REGISTERED

## 2024-10-18 PROCEDURE — 3600000012 HC SURGERY LEVEL 2 ADDTL 15MIN: Performed by: SURGERY

## 2024-10-18 PROCEDURE — 94003 VENT MGMT INPAT SUBQ DAY: CPT

## 2024-10-18 PROCEDURE — 82803 BLOOD GASES ANY COMBINATION: CPT

## 2024-10-18 PROCEDURE — 84100 ASSAY OF PHOSPHORUS: CPT

## 2024-10-18 PROCEDURE — 86850 RBC ANTIBODY SCREEN: CPT

## 2024-10-18 PROCEDURE — 80051 ELECTROLYTE PANEL: CPT

## 2024-10-18 PROCEDURE — 87040 BLOOD CULTURE FOR BACTERIA: CPT

## 2024-10-18 PROCEDURE — 2700000000 HC OXYGEN THERAPY PER DAY

## 2024-10-18 PROCEDURE — 2709999900 HC NON-CHARGEABLE SUPPLY: Performed by: SURGERY

## 2024-10-18 RX ORDER — SODIUM CHLORIDE, SODIUM LACTATE, POTASSIUM CHLORIDE, CALCIUM CHLORIDE 600; 310; 30; 20 MG/100ML; MG/100ML; MG/100ML; MG/100ML
INJECTION, SOLUTION INTRAVENOUS
Status: DISCONTINUED | OUTPATIENT
Start: 2024-10-18 | End: 2024-10-18 | Stop reason: SDUPTHER

## 2024-10-18 RX ORDER — MIDAZOLAM HYDROCHLORIDE 1 MG/ML
INJECTION INTRAMUSCULAR; INTRAVENOUS
Status: DISCONTINUED | OUTPATIENT
Start: 2024-10-18 | End: 2024-10-18 | Stop reason: SDUPTHER

## 2024-10-18 RX ADMIN — CHLORHEXIDINE GLUCONATE 15 ML: 1.2 RINSE ORAL at 09:38

## 2024-10-18 RX ADMIN — PANTOPRAZOLE SODIUM 40 MG: 40 INJECTION, POWDER, FOR SOLUTION INTRAVENOUS at 09:41

## 2024-10-18 RX ADMIN — HYDROMORPHONE HYDROCHLORIDE 0.5 MG: 1 INJECTION, SOLUTION INTRAMUSCULAR; INTRAVENOUS; SUBCUTANEOUS at 12:50

## 2024-10-18 RX ADMIN — METRONIDAZOLE 500 MG: 500 INJECTION, SOLUTION INTRAVENOUS at 20:59

## 2024-10-18 RX ADMIN — Medication 8 MG/HR: at 03:16

## 2024-10-18 RX ADMIN — MIDAZOLAM 1 MG: 1 INJECTION INTRAMUSCULAR; INTRAVENOUS at 06:06

## 2024-10-18 RX ADMIN — SODIUM CHLORIDE, POTASSIUM CHLORIDE, SODIUM LACTATE AND CALCIUM CHLORIDE: 600; 310; 30; 20 INJECTION, SOLUTION INTRAVENOUS at 15:55

## 2024-10-18 RX ADMIN — MIDAZOLAM 2 MG: 1 INJECTION INTRAMUSCULAR; INTRAVENOUS at 16:24

## 2024-10-18 RX ADMIN — HYDROMORPHONE HYDROCHLORIDE 0.5 MG: 1 INJECTION, SOLUTION INTRAMUSCULAR; INTRAVENOUS; SUBCUTANEOUS at 02:13

## 2024-10-18 RX ADMIN — CEFEPIME 2000 MG: 2 INJECTION, POWDER, FOR SOLUTION INTRAVENOUS at 15:26

## 2024-10-18 RX ADMIN — ACETAMINOPHEN 650 MG: 650 SUPPOSITORY RECTAL at 12:51

## 2024-10-18 RX ADMIN — HYDROMORPHONE HYDROCHLORIDE 0.5 MG: 1 INJECTION, SOLUTION INTRAMUSCULAR; INTRAVENOUS; SUBCUTANEOUS at 08:10

## 2024-10-18 RX ADMIN — ENOXAPARIN SODIUM 30 MG: 100 INJECTION SUBCUTANEOUS at 21:14

## 2024-10-18 RX ADMIN — CEFEPIME 2000 MG: 2 INJECTION, POWDER, FOR SOLUTION INTRAVENOUS at 06:33

## 2024-10-18 RX ADMIN — Medication 10 MG/HR: at 18:02

## 2024-10-18 RX ADMIN — NOREPINEPHRINE BITARTRATE 4 MCG/MIN: 1 INJECTION, SOLUTION, CONCENTRATE INTRAVENOUS at 15:55

## 2024-10-18 RX ADMIN — HYDROCORTISONE: 1 CREAM TOPICAL at 21:17

## 2024-10-18 RX ADMIN — NOREPINEPHRINE BITARTRATE 5 MCG/MIN: 0.06 INJECTION, SOLUTION INTRAVENOUS at 13:47

## 2024-10-18 RX ADMIN — METRONIDAZOLE 500 MG: 500 INJECTION, SOLUTION INTRAVENOUS at 12:48

## 2024-10-18 RX ADMIN — ANTI-FUNGAL POWDER MICONAZOLE NITRATE TALC FREE: 1.42 POWDER TOPICAL at 21:17

## 2024-10-18 RX ADMIN — POTASSIUM PHOSPHATE, MONOBASIC POTASSIUM PHOSPHATE, DIBASIC: 224; 236 INJECTION, SOLUTION, CONCENTRATE INTRAVENOUS at 18:11

## 2024-10-18 RX ADMIN — HYDROMORPHONE HYDROCHLORIDE 0.5 MG: 1 INJECTION, SOLUTION INTRAMUSCULAR; INTRAVENOUS; SUBCUTANEOUS at 22:41

## 2024-10-18 RX ADMIN — CEFEPIME 2000 MG: 2 INJECTION, POWDER, FOR SOLUTION INTRAVENOUS at 22:37

## 2024-10-18 RX ADMIN — METRONIDAZOLE 500 MG: 500 INJECTION, SOLUTION INTRAVENOUS at 05:10

## 2024-10-18 RX ADMIN — CHLORHEXIDINE GLUCONATE 15 ML: 1.2 RINSE ORAL at 21:14

## 2024-10-18 ASSESSMENT — PULMONARY FUNCTION TESTS
PIF_VALUE: 27
PIF_VALUE: 26
PIF_VALUE: 22
PIF_VALUE: 25
PIF_VALUE: 24
PIF_VALUE: 30
PIF_VALUE: 24
PIF_VALUE: 29
PIF_VALUE: 24
PIF_VALUE: 26
PIF_VALUE: 25
PIF_VALUE: 33
PIF_VALUE: 25
PIF_VALUE: 24
PIF_VALUE: 25
PIF_VALUE: 29
PIF_VALUE: 26
PIF_VALUE: 26
PIF_VALUE: 28
PIF_VALUE: 30
PIF_VALUE: 28
PIF_VALUE: 27
PIF_VALUE: 29
PIF_VALUE: 24
PIF_VALUE: 26
PIF_VALUE: 27
PIF_VALUE: 34
PIF_VALUE: 22
PIF_VALUE: 27
PIF_VALUE: 26
PIF_VALUE: 26
PIF_VALUE: 25
PIF_VALUE: 27
PIF_VALUE: 33

## 2024-10-18 ASSESSMENT — PAIN SCALES - GENERAL
PAINLEVEL_OUTOF10: 8
PAINLEVEL_OUTOF10: 0
PAINLEVEL_OUTOF10: 8
PAINLEVEL_OUTOF10: 0
PAINLEVEL_OUTOF10: 0

## 2024-10-18 ASSESSMENT — ENCOUNTER SYMPTOMS: SHORTNESS OF BREATH: 0

## 2024-10-18 NOTE — PROGRESS NOTES
Pulmonary Critical Care Progress Note    Patient seen for the follow up of Hyponatremia     Subjective:    She is intubated on Versed 10 mg.  Still requires Levophed at 4 mcg/min.  She had been receiving Dilaudid rarely.  She has been having adequate urine output.  She is been NPO.  Vacuum suction showing some fecal material    Examination:    Vitals: BP (!) 120/47   Pulse 100   Temp (!) 103 °F (39.4 °C) (Axillary)   Resp 23   Ht 1.6 m (5' 3\")   Wt 122.5 kg (270 lb)   SpO2 97%   BMI 47.83 kg/m²   SpO2  Av.3 %  Min: 92 %  Max: 100 %  General appearance: alert and cooperative with exam  Neck: No JVD  Lungs: Decreased breath sound no crackles or wheezes  Heart: regular rate and rhythm, S1, S2 normal, no gallop  Abdomen: Soft, non tender, + BS VAC in place evidence of fecal material in container  Extremities: no cyanosis or clubbing. No significant edema    LABs:    CBC:   Recent Labs     10/17/24  0450 10/18/24  0625   WBC 31.3* 16.6*   HGB 9.7* 9.4*   HCT 27.7* 27.8*    317     BMP:   Recent Labs     10/17/24  1750 10/18/24  0020 10/18/24  0600 10/18/24  1359   *   < > 136 137   K 3.3*   < > 3.7 3.5*   CO2 24   < > 25 22   BUN 14  --  14  --    CREATININE 0.7  --  0.7  --    LABGLOM >90  --  >90  --    GLUCOSE 122*  --  125*  --     < > = values in this interval not displayed.        Latest Reference Range & Units 10/18/24 04:12   POC HCO3 21.0 - 28.0 mmol/L 24.9   POC O2 SAT 94.0 - 98.0 % 97.5   POC pCO2 35.0 - 48.0 mm Hg 33.7 (L)   POC pH 7.350 - 7.450  7.476 (H)   POC PO2 83.0 - 108.0 mm Hg 88.2   (L): Data is abnormally low  (H): Data is abnormally high  Radiology:    Chest x-ray 10/18  ET tube and enteric tube are stable.  Small bibasilar pleural effusions with  bibasilar atelectasis.  Stable appearance of the lungs.  No pneumothorax.  Median sternotomy wires. Cardiomediastinal silhouette and bony thorax are  unchanged.        Impression/recommendations:  Acute hypoxic respiratory  insufficiency  Continue assist-control ventilator tidal volume 500 rate of 14 PEEP of 8 FiO2 30%  Sedation with Versed RASS -2 wean off propofol for hypertriglyceridemia  Dilaudid as needed for pain/history of reaction to fentanyl and side effect of morphine     Status post exploratory laparotomy with removal of mesh and wound VAC application/history of cholecystectomy  Surgery following/wound care  second exploration today  N.p.o./TPN     Sepsis/septic shock/abdominal abscess  Off Zosyn/cefepime Flagyl IV check blood cultures  Wean off Levophed to MAP above 65  ID consulted  IV fluid     Status post SHAI/urinary retention with history of suburethral sling  Nephrology on consult/on Lasix 20 IV twice daily hold for hypotension  Urology on consult     discussed with RN  Discussed with  at bedside  Peptic ulcer disease prophylaxis  DVT prophylaxis        David Coyne MD, MD, [facility]  Pulmonary Critical Care and Sleep Medicine,  [facility]  Cell: [phone]  Office: [phone]

## 2024-10-18 NOTE — PROGRESS NOTES
[facility]  General Surgery  Consult Progress Note    Service date: 10/18/2024, 7:10 AM  Room : 2030/2030-01   Name: Mandie Bustamante  MRN: [id number]    Length of stay: 8 day(s)    OR dates:   10/1/2024: Robotic assisted laparoscopic mesh hernioplasty  10/17/2024: Diagnostic laparoscopy with conversion to exploratory laparotomy and explantation of mesh with wound VAC application and abdominal wash out.     POD #17 and POD# 2  Procedure(s):  DIAGNOSTIC LAPAROSCOPY EXPLORATORY LAPAROTOMY EXPLANTATION OF MESH AND WOUND VAC APPLICATION    Subjective     Patient seen at bedside, still intubated and sedated.  Patient requiring 2 (from 5) Levophed for pressure support at this time but otherwise is vital signs stable with minimal vent settings.  Patient has wound VAC in place with good seal and over 500 cc of dark brown output.  Abdomen is soft on examination today prior.  Patient not responsive on sedation    Objective   Physical exam: /71   Pulse 91   Temp 98.4 °F (36.9 °C) (Temporal)   Resp 23   Ht 1.6 m (5' 3\")   Wt 121.6 kg (268 lb)   SpO2 92%   BMI 47.47 kg/m²     General: Intubated and sedated.    Neuro: Sedated   Abdomen: Soft, no grimace to palpation, wound VAC in place, brown output, mild erythema around wound vac site   Chest: Non-labored, symmetrical respirations.   CVS: Pulse RRR, on 5 of Levophed       Date 10/18/24 0000 - 10/18/24 2359   Shift 8767-8266 4148-7552 7411-2042 24 Hour Total   INTAKE   I.V.(mL/kg) 758.1(6.2)   758.1(6.2)   IV Piggyback(mL/kg) 466.9(3.8)   466.9(3.8)   Shift Total(mL/kg) 1225(10.1)   1225(10.1)   OUTPUT   Urine(mL/kg/hr) 500   500   Shift Total(mL/kg) 500(4.1)   500(4.1)   Weight (kg) 121.6 121.6 121.6 121.6           Medications:    Current Facility-Administered Medications   Medication Dose Route Frequency Provider Last Rate Last Admin    norepinephrine (LEVOPHED) 16 mg in sodium chloride 0.9 % 250 mL infusion  1-100 mcg/min IntraVENous Continuous Lyn,  assessment, plan and orders as documented by the resident.      Spoke with family at bedside.  This is an unusual case and that the patient has not had any free intraperitoneal air, her diagnostic laparoscopy demonstrated no evidence of succus within her peritoneal cavity, however her colon was fairly firmly affixed to the back wall of the mesh.  No sutures were going through the bowel.  The entire anterior surface of the distended colon which was in contact with the back wall of the mesh appeared to be extremely friable with associated thickening.  Over the last 24 hours, the ABThera VAC seems to be putting out more.  My obvious concern is that the bowel is slowly breaking down secondary to this inflammatory process.  Given her morbid obesity, I am not sure that we could bring up an ostomy.  We will return to the operative today to reevaluate and make further decisions regarding her plan of care but I think it would be very difficult to create an ostomy.  The patient's family also states that this would not be in line with her wishes.

## 2024-10-18 NOTE — ANESTHESIA POSTPROCEDURE EVALUATION
Department of Anesthesiology  Postprocedure Note    Patient: Mandie Bustamante  MRN: [id number]  YOB: 1955  Date of evaluation: 10/18/2024    Procedure Summary       Date: 10/18/24 Room / Location: [address]    Anesthesia Start: 1547 Anesthesia Stop: 1654    Procedure: LAPAROTOMY EXPLORATORY, ABDOMENAL WASH OUT, WOUND VAC PLACEMENT (Abdomen) Diagnosis:       Abdominal wound dehiscence, initial encounter      (Abdominal wound dehiscence, initial encounter [T81.321A])    Surgeons: Gm Cummings MD Responsible Provider: Laxmi Parkinson MD    Anesthesia Type: general ASA Status: 4            Anesthesia Type: No value filed.    Rosa Phase I:      Rosa Phase II:      Anesthesia Post Evaluation    Patient location during evaluation: ICU  Patient participation: complete - patient cannot participate  Level of consciousness: sedated and ventilated  Airway patency: patent  Nausea & Vomiting: no nausea and no vomiting  Cardiovascular status: vasoactive/inotropes  Respiratory status: ventilator  Hydration status: euvolemic  Pain management: adequate    No notable events documented.

## 2024-10-18 NOTE — ANESTHESIA PRE PROCEDURE
Department of Anesthesiology  Preprocedure Note       Name:  Mandie Bustamante   Age:  69 y.o.  :  1955                                          MRN:  [id number]         Date:  10/18/2024      Surgeon: Surgeon(s):  Gm Cummings MD    Procedure: Procedure(s):  LAPAROTOMY EXPLORATORY, WOUND VAC PLACEMENT    Medications prior to admission:   Prior to Admission medications    Medication Sig Start Date End Date Taking? Authorizing Provider   acetaminophen-codeine (TYLENOL #3) 300-30 MG per tablet Take 1 tablet by mouth every 8 hours as needed for Pain. Max Daily Amount: 3 tablets   Yes Shaina Cardozo MD   polyethyl glycol-propyl glycol 0.4-0.3 % (SYSTANE) 0.4-0.3 % ophthalmic solution Place 1 drop into both eyes as needed for Dry Eyes   Yes Shaina Cardozo MD   zolpidem (AMBIEN) 5 MG tablet Take 1 tablet by mouth nightly as needed for Sleep. 23   Shaina Cardozo MD   tacrolimus (PROTOPIC) 0.1 % ointment Apply 1 Application topically every evening To Eye brows    Shaina Cardozo MD   loratadine (CLARITIN) 10 MG capsule Take 1 capsule by mouth as needed    Shaina Cardozo MD   irbesartan (AVAPRO) 150 MG tablet Take 1/4 tablet by mouth 2-3 times weekly    Shaina Cardozo MD       Current medications:    Current Facility-Administered Medications   Medication Dose Route Frequency Provider Last Rate Last Admin    PN-Adult 2-in-1 Central Line (Standard)   IntraVENous Continuous TPN Luis Gabriel DO        norepinephrine (LEVOPHED) 16 mg in sodium chloride 0.9 % 250 mL infusion  1-100 mcg/min IntraVENous Continuous Kwame Lyn MD 4.7 mL/hr at 10/18/24 1347 5 mcg/min at 10/18/24 1347    metroNIDAZOLE (FLAGYL) 500 mg in 0.9% NaCl 100 mL IVPB premix  500 mg IntraVENous Q8H Luis Gabriel DO   Stopped at 10/18/24 1348    ceFEPIme (MAXIPIME) 2,000 mg in sodium chloride 0.9 % 100 mL IVPB (mini-bag)  2,000 mg IntraVENous q8h Luis Gabriel DO 25 mL/hr at 10/18/24 1526 2,000 mg at

## 2024-10-18 NOTE — PROGRESS NOTES
Benjamin Almazan MD   Urology Progress Note            Subjective: Follow-up urinary retention    Patient Vitals for the past 24 hrs:   BP Temp Temp src Pulse Resp SpO2 Height Weight   10/18/24 1449 -- -- -- 100 23 97 % -- --   10/18/24 1225 (!) 120/47 (!) 103 °F (39.4 °C) Axillary (!) 112 28 -- -- --   10/18/24 1200 (!) 117/50 -- -- (!) 112 26 -- -- --   10/18/24 1130 (!) 119/54 -- -- (!) 110 23 -- -- --   10/18/24 1128 -- -- -- (!) 110 24 98 % -- --   10/18/24 1115 (!) 128/54 -- -- (!) 110 25 -- -- --   10/18/24 1100 134/61 -- -- (!) 111 24 -- -- --   10/18/24 1045 (!) 133/55 -- -- (!) 109 25 -- -- --   10/18/24 1030 (!) 134/57 -- -- (!) 107 24 96 % -- --   10/18/24 1015 (!) 136/58 -- -- (!) 106 25 -- -- --   10/18/24 1000 (!) 135/57 -- -- (!) 105 24 96 % -- --   10/18/24 0945 (!) 140/54 -- -- (!) 105 24 -- -- --   10/18/24 0930 138/61 -- -- (!) 103 21 -- -- --   10/18/24 0918 -- -- -- (!) 103 24 96 % 1.6 m (5' 3\") 122.5 kg (270 lb)   10/18/24 0915 (!) 138/59 -- -- (!) 102 23 -- -- --   10/18/24 0900 138/61 -- -- (!) 103 24 97 % -- --   10/18/24 0845 (!) 133/54 -- -- (!) 101 22 -- -- --   10/18/24 0830 130/60 -- -- (!) 101 23 97 % -- --   10/18/24 0815 (!) 134/52 -- -- 99 21 94 % -- --   10/18/24 0800 133/62 97.1 °F (36.2 °C) Temporal 98 21 97 % -- --   10/18/24 0745 (!) 142/62 -- -- 97 24 -- -- --   10/18/24 0730 (!) 140/58 -- -- 97 25 99 % -- --   10/18/24 0715 132/62 -- -- 96 23 96 % -- --   10/18/24 0700 133/66 -- -- 95 26 99 % -- --   10/18/24 0645 128/61 -- -- 94 23 100 % -- --   10/18/24 0616 -- -- -- -- -- -- -- 121.6 kg (268 lb)   10/18/24 0615 124/71 -- -- 91 23 92 % -- --   10/18/24 0600 (!) 131/56 -- -- 91 23 94 % -- --   10/18/24 0545 (!) 124/54 -- -- 89 22 97 % -- --   10/18/24 0530 (!) 121/53 -- -- 84 19 -- -- --   10/18/24 0515 (!) 115/49 -- -- 83 19 -- -- --   10/18/24 0500 (!) 109/50 -- -- 84 20 -- -- --   10/18/24 0445 (!) 110/49 -- -- 82 19 -- -- --   10/18/24 0430  (!) 108/49 -- -- 84 19 -- -- --   10/18/24 0415 (!) 117/52 -- -- 84 20 -- -- --   10/18/24 0414 -- -- -- 86 19 -- -- --   10/18/24 0403 -- -- -- 83 19 97 % 1.6 m (5' 3\") 122.5 kg (270 lb)   10/18/24 0400 (!) 109/48 98.4 °F (36.9 °C) Temporal 83 19 97 % -- --   10/18/24 0345 (!) 109/50 -- -- 83 18 -- -- --   10/18/24 0330 (!) 107/51 -- -- 84 19 -- -- --   10/18/24 0315 95/70 -- -- 85 20 -- -- --   10/18/24 0300 (!) 99/50 -- -- 83 16 -- -- --   10/18/24 0245 (!) 104/48 -- -- 85 19 -- -- --   10/18/24 0243 -- -- -- -- 24 -- -- --   10/18/24 0230 (!) 109/47 -- -- 86 19 -- -- --   10/18/24 0215 (!) 114/57 -- -- 89 23 98 % -- --   10/18/24 0130 (!) 124/55 -- -- 85 20 -- -- --   10/18/24 0115 (!) 131/55 -- -- 85 21 -- -- --   10/18/24 0100 (!) 126/56 -- -- 86 22 100 % -- --   10/18/24 0045 (!) 124/53 -- -- 88 23 -- -- --   10/18/24 0030 (!) 118/52 -- -- 89 23 98 % -- --   10/18/24 0015 (!) 109/56 -- -- 91 25 -- -- --   10/18/24 0011 -- -- -- -- -- 99 % -- --   10/18/24 0000 (!) 118/47 98.8 °F (37.1 °C) Temporal 89 24 99 % -- --   10/17/24 2345 (!) 117/52 -- -- 90 24 -- -- --   10/17/24 2330 (!) 116/56 -- -- 90 25 99 % -- --   10/17/24 2315 (!) 116/52 -- -- 90 24 -- -- --   10/17/24 2300 (!) 117/51 -- -- 88 23 -- -- --   10/17/24 2245 (!) 116/53 -- -- 86 23 99 % 1.6 m (5' 3\") 122.5 kg (270 lb)   10/17/24 2230 (!) 112/52 -- -- 85 22 -- -- --   10/17/24 2215 (!) 115/54 -- -- 86 20 98 % -- --   10/17/24 2200 (!) 105/44 -- -- 85 20 98 % -- --   10/17/24 2145 (!) 106/42 -- -- 84 20 98 % -- --   10/17/24 2130 (!) 105/43 -- -- 84 20 98 % -- --   10/17/24 2115 (!) 106/44 -- -- 84 20 98 % -- --   10/17/24 2100 (!) 112/44 -- -- 83 21 -- -- --   10/17/24 2045 (!) 109/45 -- -- 84 21 97 % -- --   10/17/24 2030 (!) 114/47 -- -- 84 21 -- -- --   10/17/24 2015 (!) 114/50 -- -- 85 22 -- -- --   10/17/24 2000 (!) 113/50 97.9 °F (36.6 °C) Temporal 86 23 97 % -- --   10/17/24 1945 (!) 117/55 -- -- 79 19 -- -- --   10/17/24 1930 (!) 121/55 --

## 2024-10-18 NOTE — PROGRESS NOTES
Comprehensive Nutrition Assessment    Type and Reason for Visit:  Reassess    Nutrition Recommendations/Plan:   Initiate TPN at 41.6 ml/hr to provide 1000 ml.   Suspect patient will have some refeeding, monitor and replace lytes as needed  Will increase macros in PN appropriately, no lipids at this time.   Due to TPN shortage, we are providing PN on MWF but patient may qualify for more days considering malnutrition status and recent surgeries, RD to follow.      Malnutrition Assessment:  Malnutrition Status:  Moderate malnutrition (10/17/24 1147)    Context:  Acute Illness     Findings of the 6 clinical characteristics of malnutrition:  Energy Intake:  50% or less of estimated energy requirements for 5 or more days  Weight Loss:  Unable to assess     Body Fat Loss:  Unable to assess     Muscle Mass Loss:  Unable to assess    Fluid Accumulation:  Moderate to Severe     Strength:       Nutrition Assessment:    Patient continues on CVICU, intubated and sedated; propofol has been stopped, patient on versed, levo, and LR @ 50 ml/hr. Plan is to go back to the OR this afternoon for relook lap. TPN to start tonight. TG have come down. PICC in place. Labs, meds, PMH reviewed.    Nutrition Related Findings:    LBM 10/15, absent bs, +2 UE and +3 LE edema. OR on 10/1 and 10/17. Corrected calcium 8.4. Wound Type: Surgical Incision       Current Nutrition Intake & Therapies:    Average Meal Intake: NPO  Average Supplements Intake: NPO  Current Parenteral Nutrition Orders:  Type and Formula:     Lipids:    Duration:    Rate/Volume:    Current PN Order Provides:    Goal PN Orders Provides:      Anthropometric Measures:  Height: 160 cm (5' 3\")  Ideal Body Weight (IBW): 115 lbs (52 kg)       Current Body Weight: 122.5 kg (270 lb), 245.4 % IBW. Weight Source: Bed Scale  Current BMI (kg/m2): 47.8                          BMI Categories: Obese Class 3 (BMI 40.0 or greater)    Estimated Daily Nutrient Needs:  Energy

## 2024-10-18 NOTE — PROGRESS NOTES
End Of Shift Note  [facility]  Summary of shift: Pt night uneventful, continue to wean levophed, tolerated well to 2 mcq/kg/min. Medicated x1 for pain. OR today at 3 pm.Opens eyes, follows commands. K+ replaced. Plan for TPN to start today    Vitals:    Vitals:    10/18/24 0415 10/18/24 0430 10/18/24 0445 10/18/24 0500   BP: (!) 117/52 (!) 108/49 (!) 110/49 (!) 109/50   Pulse: 84 84 82 84   Resp: 20 19 19 20   Temp:       TempSrc:       SpO2:       Weight:       Height:            I&O:   Intake/Output Summary (Last 24 hours) at 10/18/2024 0513  Last data filed at 10/18/2024 0007  Gross per 24 hour   Intake 3193.55 ml   Output 2525 ml   Net 668.55 ml       Resp Status: Vent, minimal settings    Ventilator Settings:  Vent Mode: AC/PRVC Resp Rate (Set): 14 bpm/Vt (Set, mL): 500 mL/ /FiO2 : 30 %    Critical Care IV infusions:   norepinephrine 2 mcg/min (10/18/24 0329)    propofol Stopped (10/17/24 1652)    midazolam 8 mg/hr (10/18/24 0316)    lactated ringers IV soln 50 mL/hr at 10/18/24 0007    dextrose      sodium chloride 50 mL/hr at 10/16/24 1623        LDA:   PICC 10/11/24 Right Brachial (Active)   Number of days: 6       Urinary Catheter 10/10/24 Bar (Active)   Number of days: 7       ETT  (Active)   Number of days: 1       Incision 10/01/24 Abdomen Medial;Upper (Active)   Number of days: 16       Incision 10/16/24 Abdomen Lower;Medial (Active)   Number of days: 1

## 2024-10-18 NOTE — PROGRESS NOTES
Nephrology Progress Note      SUBJECTIVE    Patient was seen and examined. The patient is seen and examined on the ventilator.  She is awaiting a return to the OR later today, 10/18/2024 for washout. Patient has CT scan followed by exploratory laparotomy and wound VAC placement on 10/16/2024.  CT scan and operative findings noted.  Now patient is on ventilator with 30% FiO2.  Minute temperature stable with systolic blood pressure in the 130s.  However, patient does have sinus tachycardia with heart rate in the 100-108 bpm range.  He continues to have a stable serum creatinine and has improved serum sodium.  Labs today showed sodium 136, potassium 3.7, chloride 101 and CO2 25 BUN 14 and creatinine 0.7.       Brief history:  Patient is status post robotic assisted laparoscopic incisional hernia repair with mesh on 10/1/2024 by Dr. Cummings.  She started noticing some abdominal pain 2 to 3 days prior to admission, had poor appetite, did feel like she was developing more abdominal distention and also got some diarrhea.  In an attempt to keep herself hydrated she started drinking a fair amount of free water.  Symptoms did not get better, she presented to the ER and had the following labs:  Sodium 109 which was down from 140 about 10 days earlier, potassium of 3.4, bicarb 24, creatinine 0.8 and an albumin of 3.1.  There was some issue with urinary retention, Bar was placed and we got about 500 mL of urine out.     Initial CT scan of the abdomen and pelvis showed a large air-fluid collection seen along midline consistent with an abscess measuring 13.8 x 7 cm  Urine studies showed urine sodium of 20, urine osmolarity of greater than 425.   Additional history is noted positive for chronic hypertension, history of hyperparathyroidism and mild hypercalcemia status post parathyroidectomy of ectopic gland in May 2023.  She additionally has had history of cholecystectomy, hysterectomy, incisional hernia in the past and surgical  repair of incarcerated incisional hernia as of 10/1/2024    OBJECTIVE      CURRENT TEMPERATURE:  Temp: 97.1 °F (36.2 °C)  MAXIMUM TEMPERATURE OVER 24HRS:  Temp (24hrs), Av °F (36.7 °C), Min:97.1 °F (36.2 °C), Max:98.8 °F (37.1 °C)    CURRENT RESPIRATORY RATE:  Respirations: 24  CURRENT PULSE:  Pulse: (!) 103  CURRENT BLOOD PRESSURE:  BP: 124/71  24HR BLOOD PRESSURE RANGE:  Systolic (24hrs), Av , Min:95 , Max:131   ; Diastolic (24hrs), Av, Min:42, Max:71    24HR INTAKE/OUTPUT:    Intake/Output Summary (Last 24 hours) at 10/18/2024 1040  Last data filed at 10/18/2024 0616  Gross per 24 hour   Intake 2430.52 ml   Output 1525 ml   Net 905.52 ml     WEIGHT :Patient Vitals for the past 96 hrs (Last 3 readings):   Weight   10/18/24 0918 122.5 kg (270 lb)   10/18/24 0616 121.6 kg (268 lb)   10/18/24 0403 122.5 kg (270 lb)     PHYSICAL EXAM      GENERAL APPEARANCE: Intubated and sedated  SKIN: Warm to touch  EYES: pale conjunctiva with no icterus  NECK: No obvious JVD or accessory muscle use  PULMONARY: Bilateral air entry and clear  CARDIOVASCULAR: regular rate and rhythm positive S1 and S2 and no rubs  ABDOMEN: open abdomen with wound VAC in place  EXTREMITIES: improving 1+ LE edma with positive livedo reticularis    CURRENT MEDICATIONS      norepinephrine (LEVOPHED) 16 mg in sodium chloride 0.9 % 250 mL infusion, Continuous  metroNIDAZOLE (FLAGYL) 500 mg in 0.9% NaCl 100 mL IVPB premix, Q8H  ceFEPIme (MAXIPIME) 2,000 mg in sodium chloride 0.9 % 100 mL IVPB (mini-bag), q8h  propofol infusion, Continuous  midazolam (VERSED) 1 mg/mL in NS infusion, Continuous  midazolam (VERSED) injection 1 mg, Q30 Min PRN  lactated ringers IV soln infusion, Continuous  chlorhexidine (PERIDEX) 0.12 % solution 15 mL, BID  acetaminophen (TYLENOL) tablet 650 mg, Q6H PRN   Or  acetaminophen (TYLENOL) suppository 650 mg, Q6H PRN  insulin lispro (HUMALOG,ADMELOG) injection vial 0-16 Units, 4x Daily AC & HS  glucose chewable tablet 16

## 2024-10-18 NOTE — PROGRESS NOTES
[facility]   IN-PATIENT SERVICE   [facility]    Progress Note    10/18/2024    1:13 PM    Name:   Mandie Bustamante  MRN:     [id number]     Acct:      [id number]   Room:   2030/2030-01  IP Day:  8  Admit Date:  10/10/2024  6:11 PM    PCP:   No primary care provider on file.  Code Status:  Full Code    Subjective:     C/C: Abdominal pain  Interval History Status: not changed.     She is intubated and sedated. Leukocytosis has improved. The output from her wound vac is brown and surgery is concerned she has developed a fistula. She will be going back to the OR today.    Brief History:     Patient is a 69-year-old female who recently underwent an out patient hernia repair with mesh on 10/1/2024, she presents to the ED with abdominal pain on 10/10/2024. Work up in the ED revealed severe hyponatremia of 109, leukocytosis, and  CT abdomen pelvis without contrast was suggestive of large abscess with air-fluid collection seen along the peritoneum just below the rectus muscle. There was concern about gas and fluid seen extending the previous site of hernia suggesting recurrent hernia or phlegmon. A simons was placed for urinary retention.    Surgery performed bedside US guided aspiration of fluid collection on 10/11/2024. Patient had continued leukocytosis so a CT abdomen/pelvis was ordered which revealed increased air-fluid level. Surgery took patient to OR for ex-lap, patient left open with wound vac due to distention, and remained intubated after surgery. Plans to take back to OR Friday 10/18/2024.    Review of Systems:     Review of Systems   Unable to perform ROS: Intubated       Medications:     Allergies:    Allergies   Allergen Reactions    Latex Itching     Other reaction(s): Other (See Comments)   sensitivity   Added based on information entered during case entry, please review and add reactions, type, and severity as needed   Added based on information entered during case entry, please review

## 2024-10-18 NOTE — PLAN OF CARE
catheter remains patent  Outcome: Progressing     Problem: Skin/Tissue Integrity - Adult  Goal: Skin integrity remains intact  Outcome: Progressing  Flowsheets (Taken 10/17/2024 2000)  Skin Integrity Remains Intact:   Monitor for areas of redness and/or skin breakdown   Assess vascular access sites hourly  Goal: Incisions, wounds, or drain sites healing without S/S of infection  Outcome: Progressing  Flowsheets (Taken 10/17/2024 2000)  Incisions, Wounds, or Drain Sites Healing Without Sign and Symptoms of Infection: TWICE DAILY: Assess and document skin integrity     Problem: Hematologic - Adult  Goal: Maintains hematologic stability  Outcome: Progressing  Flowsheets (Taken 10/17/2024 2000)  Maintains hematologic stability:   Assess for signs and symptoms of bleeding or hemorrhage   Administer blood products/factors as ordered     Problem: Musculoskeletal - Adult  Goal: Return mobility to safest level of function  Outcome: Progressing  Flowsheets (Taken 10/17/2024 2000)  Return Mobility to Safest Level of Function:   Assess patient stability and activity tolerance for standing, transferring and ambulating with or without assistive devices   Assist with transfers and ambulation using safe patient handling equipment as needed   Ensure adequate protection for wounds/incisions during mobilization   Obtain physical therapy/occupational therapy consults as needed     Problem: Nutrition Deficit:  Goal: Optimize nutritional status  Outcome: Progressing     Problem: Pain  Goal: Verbalizes/displays adequate comfort level or baseline comfort level  Outcome: Progressing     Problem: Chronic Conditions and Co-morbidities  Goal: Patient's chronic conditions and co-morbidity symptoms are monitored and maintained or improved  Outcome: Progressing  Flowsheets (Taken 10/17/2024 2000)  Care Plan - Patient's Chronic Conditions and Co-Morbidity Symptoms are Monitored and Maintained or Improved:   Monitor and assess patient's chronic  conditions and comorbid symptoms for stability, deterioration, or improvement   Collaborate with multidisciplinary team to address chronic and comorbid conditions and prevent exacerbation or deterioration   Update acute care plan with appropriate goals if chronic or comorbid symptoms are exacerbated and prevent overall improvement and discharge     Problem: Safety - Medical Restraint  Goal: Remains free of injury from restraints (Restraint for Interference with Medical Device)  Description: INTERVENTIONS:  1. Determine that other, less restrictive measures have been tried or would not be effective before applying the restraint  2. Evaluate the patient's condition at the time of restraint application  3. Inform patient/family regarding the reason for restraint  4. Q2H: Monitor safety, psychosocial status, comfort, nutrition and hydration  Outcome: Progressing  Flowsheets (Taken 10/17/2024 2000)  Remains free of injury from restraints (restraint for interference with medical device):   Determine that other, less restrictive measures have been tried or would not be effective before applying the restraint   Evaluate the patient's condition at the time of restraint application   Every 2 hours: Monitor safety, psychosocial status, comfort, nutrition and hydration

## 2024-10-18 NOTE — PLAN OF CARE
Problem: Safety - Medical Restraint  Goal: Remains free of injury from restraints (Restraint for Interference with Medical Device)  Description: INTERVENTIONS:  1. Determine that other, less restrictive measures have been tried or would not be effective before applying the restraint  2. Evaluate the patient's condition at the time of restraint application  3. Inform patient/family regarding the reason for restraint  4. Q2H: Monitor safety, psychosocial status, comfort, nutrition and hydration  Outcome: Progressing  Flowsheets (Taken 10/18/2024 0800 by Kira Alvarado RN)  Remains free of injury from restraints (restraint for interference with medical device):   Determine that other, less restrictive measures have been tried or would not be effective before applying the restraint   Evaluate the patient's condition at the time of restraint application   Inform patient/family regarding the reason for restraint   Every 2 hours: Monitor safety, psychosocial status, comfort, nutrition and hydration

## 2024-10-18 NOTE — OP NOTE
Operative Note      Patient: Mandie Bustamante  YOB: 1955  MRN: [id number]    Date of Procedure: 10/18/2024    Preoperative diagnosis: Open abdomen    Post-Op Diagnosis: Same       Procedure(s):  LAPAROTOMY EXPLORATORY, ABDOMENAL WASH OUT, WOUND VAC PLACEMENT    Surgeon(s):  Gm Cummings MD    Assistant:   Resident: uLis Gabriel DO    Anesthesia: General    Estimated Blood Loss (mL): less than 50     Complications: None    Specimens:   * No specimens in log *    Implants:  * No implants in log *      Drains:   Urinary Catheter 10/10/24 Bar (Active)   $ Urethral catheter insertion $ Not inserted for procedure 10/10/24 1249   Catheter Indications Need for fluid volume management of the critically ill patient in a critical care setting;Urinary retention (acute or chronic), continuous bladder irrigation or bladder outlet obstruction 10/18/24 1200   Site Assessment No urethral drainage 10/18/24 1200   Urine Color Yellow 10/18/24 1200   Urine Appearance Clear 10/18/24 1200   Urine Odor Other (Comment) 10/17/24 1001   Collection Container Standard 10/18/24 1200   Securement Method Securing device (Describe) 10/18/24 1200   Catheter Care  Soap and water 10/17/24 1001   Catheter Best Practices  Drainage tube clipped to bed;Catheter secured to thigh;Tamper seal intact;Bag below bladder;Bag not on floor;Lack of dependent loop in tubing;Drainage bag less than half full 10/18/24 1200   Status Draining 10/18/24 1200   Output (mL) 600 mL 10/18/24 1317       Findings:  Other Findings: 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  The patient developed severe hyponatremia.  The patient represented to the hospital secondary to weakness.  A CAT scan suggested a possible fluid collection in her abdominal wall.  Aspiration was

## 2024-10-18 NOTE — CARE COORDINATION
Discharge planning    Pt. Intubated at 30% FIO2. Plan for possible abdominal closure today. [facility] following from afar.  has been at bedside. Watch for LTACH vs SNF vs HC.

## 2024-10-19 ENCOUNTER — APPOINTMENT (OUTPATIENT)
Dept: GENERAL RADIOLOGY | Age: 69
End: 2024-10-19
Attending: STUDENT IN AN ORGANIZED HEALTH CARE EDUCATION/TRAINING PROGRAM
Payer: COMMERCIAL

## 2024-10-19 ENCOUNTER — APPOINTMENT (OUTPATIENT)
Dept: VASCULAR LAB | Age: 69
End: 2024-10-19
Attending: STUDENT IN AN ORGANIZED HEALTH CARE EDUCATION/TRAINING PROGRAM
Payer: COMMERCIAL

## 2024-10-19 PROBLEM — R60.9 EDEMA: Status: ACTIVE | Noted: 2024-10-19

## 2024-10-19 LAB
ALBUMIN SERPL-MCNC: 2.2 G/DL (ref 3.5–5.2)
ALP SERPL-CCNC: 101 U/L (ref 35–104)
ALT SERPL-CCNC: 19 U/L (ref 5–33)
ANION GAP SERPL CALCULATED.3IONS-SCNC: 10 MMOL/L (ref 9–17)
AST SERPL-CCNC: 15 U/L
B PARAP IS1001 DNA NPH QL NAA+NON-PROBE: NOT DETECTED
B PERT DNA SPEC QL NAA+PROBE: NOT DETECTED
BASOPHILS # BLD: 0.07 K/UL (ref 0–0.2)
BASOPHILS NFR BLD: 0 % (ref 0–2)
BILIRUB DIRECT SERPL-MCNC: 0.1 MG/DL
BILIRUB INDIRECT SERPL-MCNC: 0.1 MG/DL (ref 0–1)
BILIRUB SERPL-MCNC: 0.2 MG/DL (ref 0.3–1.2)
BUN SERPL-MCNC: 16 MG/DL (ref 8–23)
BUN/CREAT SERPL: 27 (ref 9–20)
C PNEUM DNA NPH QL NAA+NON-PROBE: NOT DETECTED
CA-I BLD-SCNC: 1.05 MMOL/L (ref 1.15–1.33)
CALCIUM SERPL-MCNC: 7.3 MG/DL (ref 8.6–10.4)
CHLORIDE SERPL-SCNC: 104 MMOL/L (ref 98–107)
CO2 SERPL-SCNC: 24 MMOL/L (ref 20–31)
CREAT SERPL-MCNC: 0.6 MG/DL (ref 0.5–0.9)
ECHO BSA: 2.33 M2
EOSINOPHIL # BLD: 0.18 K/UL (ref 0–0.44)
EOSINOPHILS RELATIVE PERCENT: 1 % (ref 1–4)
ERYTHROCYTE [DISTWIDTH] IN BLOOD BY AUTOMATED COUNT: 13.4 % (ref 11.8–14.4)
FIO2: 30
FLUAV RNA NPH QL NAA+NON-PROBE: NOT DETECTED
FLUBV RNA NPH QL NAA+NON-PROBE: NOT DETECTED
GFR, ESTIMATED: >90 ML/MIN/1.73M2
GLUCOSE BLD-MCNC: 133 MG/DL (ref 65–105)
GLUCOSE BLD-MCNC: 158 MG/DL (ref 65–105)
GLUCOSE BLD-MCNC: 169 MG/DL (ref 65–105)
GLUCOSE BLD-MCNC: 184 MG/DL (ref 65–105)
GLUCOSE SERPL-MCNC: 209 MG/DL (ref 70–99)
HADV DNA NPH QL NAA+NON-PROBE: NOT DETECTED
HCOV 229E RNA NPH QL NAA+NON-PROBE: NOT DETECTED
HCOV HKU1 RNA NPH QL NAA+NON-PROBE: NOT DETECTED
HCOV NL63 RNA NPH QL NAA+NON-PROBE: NOT DETECTED
HCOV OC43 RNA NPH QL NAA+NON-PROBE: NOT DETECTED
HCT VFR BLD AUTO: 26.3 % (ref 36.3–47.1)
HGB BLD-MCNC: 8.8 G/DL (ref 11.9–15.1)
HMPV RNA NPH QL NAA+NON-PROBE: NOT DETECTED
HPIV1 RNA NPH QL NAA+NON-PROBE: NOT DETECTED
HPIV2 RNA NPH QL NAA+NON-PROBE: NOT DETECTED
HPIV3 RNA NPH QL NAA+NON-PROBE: NOT DETECTED
HPIV4 RNA NPH QL NAA+NON-PROBE: NOT DETECTED
IMM GRANULOCYTES # BLD AUTO: 0.29 K/UL (ref 0–0.3)
IMM GRANULOCYTES NFR BLD: 2 %
LYMPHOCYTES NFR BLD: 1.31 K/UL (ref 1.1–3.7)
LYMPHOCYTES RELATIVE PERCENT: 7 % (ref 24–43)
M PNEUMO DNA NPH QL NAA+NON-PROBE: NOT DETECTED
MAGNESIUM SERPL-MCNC: 2 MG/DL (ref 1.6–2.6)
MCH RBC QN AUTO: 29.8 PG (ref 25.2–33.5)
MCHC RBC AUTO-ENTMCNC: 33.5 G/DL (ref 28.4–34.8)
MCV RBC AUTO: 89.2 FL (ref 82.6–102.9)
MODE: AC
MONOCYTES NFR BLD: 0.97 K/UL (ref 0.1–1.2)
MONOCYTES NFR BLD: 5 % (ref 3–12)
NEUTROPHILS NFR BLD: 85 % (ref 36–65)
NEUTS SEG NFR BLD: 16.49 K/UL (ref 1.5–8.1)
NRBC BLD-RTO: 0 PER 100 WBC
O2 DELIVERY DEVICE: ABNORMAL
PHOSPHATE SERPL-MCNC: 2.1 MG/DL (ref 2.6–4.5)
PLATELET # BLD AUTO: 332 K/UL (ref 138–453)
PMV BLD AUTO: 8.7 FL (ref 8.1–13.5)
POC HCO3: 24.3 MMOL/L (ref 21–28)
POC O2 SATURATION: 98.9 % (ref 94–98)
POC PCO2: 32.7 MM HG (ref 35–48)
POC PH: 7.48 (ref 7.35–7.45)
POC PO2: 115.3 MM HG (ref 83–108)
POSITIVE BASE EXCESS, ART: 0.9 MMOL/L (ref 0–3)
POTASSIUM SERPL-SCNC: 3.8 MMOL/L (ref 3.7–5.3)
PROT SERPL-MCNC: 4.9 G/DL (ref 6.4–8.3)
RBC # BLD AUTO: 2.95 M/UL (ref 3.95–5.11)
RSV RNA NPH QL NAA+NON-PROBE: NOT DETECTED
RV+EV RNA NPH QL NAA+NON-PROBE: NOT DETECTED
SARS-COV-2 RNA NPH QL NAA+NON-PROBE: NOT DETECTED
SODIUM SERPL-SCNC: 138 MMOL/L (ref 135–144)
SPECIMEN DESCRIPTION: NORMAL
TRIGL SERPL-MCNC: 101 MG/DL
WBC OTHER # BLD: 19.3 K/UL (ref 3.5–11.3)

## 2024-10-19 PROCEDURE — 99223 1ST HOSP IP/OBS HIGH 75: CPT | Performed by: INTERNAL MEDICINE

## 2024-10-19 PROCEDURE — 85025 COMPLETE CBC W/AUTO DIFF WBC: CPT

## 2024-10-19 PROCEDURE — 6360000002 HC RX W HCPCS

## 2024-10-19 PROCEDURE — 87070 CULTURE OTHR SPECIMN AEROBIC: CPT

## 2024-10-19 PROCEDURE — 80076 HEPATIC FUNCTION PANEL: CPT

## 2024-10-19 PROCEDURE — 87205 SMEAR GRAM STAIN: CPT

## 2024-10-19 PROCEDURE — 2500000003 HC RX 250 WO HCPCS: Performed by: INTERNAL MEDICINE

## 2024-10-19 PROCEDURE — 2000000000 HC ICU R&B

## 2024-10-19 PROCEDURE — 93970 EXTREMITY STUDY: CPT | Performed by: SURGERY

## 2024-10-19 PROCEDURE — 0BH17EZ INSERTION OF ENDOTRACHEAL AIRWAY INTO TRACHEA, VIA NATURAL OR ARTIFICIAL OPENING: ICD-10-PCS | Performed by: INTERNAL MEDICINE

## 2024-10-19 PROCEDURE — 2580000003 HC RX 258: Performed by: INTERNAL MEDICINE

## 2024-10-19 PROCEDURE — 2580000003 HC RX 258

## 2024-10-19 PROCEDURE — 82330 ASSAY OF CALCIUM: CPT

## 2024-10-19 PROCEDURE — 71045 X-RAY EXAM CHEST 1 VIEW: CPT

## 2024-10-19 PROCEDURE — 82803 BLOOD GASES ANY COMBINATION: CPT

## 2024-10-19 PROCEDURE — 99232 SBSQ HOSP IP/OBS MODERATE 35: CPT | Performed by: INTERNAL MEDICINE

## 2024-10-19 PROCEDURE — 2700000000 HC OXYGEN THERAPY PER DAY

## 2024-10-19 PROCEDURE — 83735 ASSAY OF MAGNESIUM: CPT

## 2024-10-19 PROCEDURE — 93970 EXTREMITY STUDY: CPT

## 2024-10-19 PROCEDURE — 80048 BASIC METABOLIC PNL TOTAL CA: CPT

## 2024-10-19 PROCEDURE — 84100 ASSAY OF PHOSPHORUS: CPT

## 2024-10-19 PROCEDURE — 94003 VENT MGMT INPAT SUBQ DAY: CPT

## 2024-10-19 PROCEDURE — 84478 ASSAY OF TRIGLYCERIDES: CPT

## 2024-10-19 PROCEDURE — 0202U NFCT DS 22 TRGT SARS-COV-2: CPT

## 2024-10-19 PROCEDURE — 2500000003 HC RX 250 WO HCPCS

## 2024-10-19 PROCEDURE — 94761 N-INVAS EAR/PLS OXIMETRY MLT: CPT

## 2024-10-19 PROCEDURE — 82947 ASSAY GLUCOSE BLOOD QUANT: CPT

## 2024-10-19 RX ORDER — ACETAMINOPHEN 650 MG/1
650 SUPPOSITORY RECTAL EVERY 6 HOURS PRN
Status: DISCONTINUED | OUTPATIENT
Start: 2024-10-19 | End: 2024-11-05 | Stop reason: SDUPTHER

## 2024-10-19 RX ORDER — INSULIN LISPRO 100 [IU]/ML
0-4 INJECTION, SOLUTION INTRAVENOUS; SUBCUTANEOUS EVERY 6 HOURS
Status: DISCONTINUED | OUTPATIENT
Start: 2024-10-19 | End: 2024-11-08 | Stop reason: HOSPADM

## 2024-10-19 RX ORDER — ACETAMINOPHEN 500 MG
1000 TABLET ORAL EVERY 6 HOURS SCHEDULED
Status: DISCONTINUED | OUTPATIENT
Start: 2024-10-19 | End: 2024-10-19

## 2024-10-19 RX ORDER — ACETAMINOPHEN 325 MG/1
650 TABLET ORAL EVERY 6 HOURS PRN
Status: DISCONTINUED | OUTPATIENT
Start: 2024-10-19 | End: 2024-11-05 | Stop reason: SDUPTHER

## 2024-10-19 RX ADMIN — Medication 10 MG/HR: at 17:56

## 2024-10-19 RX ADMIN — PANTOPRAZOLE SODIUM 40 MG: 40 INJECTION, POWDER, FOR SOLUTION INTRAVENOUS at 08:34

## 2024-10-19 RX ADMIN — HYDROCORTISONE: 1 CREAM TOPICAL at 20:46

## 2024-10-19 RX ADMIN — CEFEPIME 2000 MG: 2 INJECTION, POWDER, FOR SOLUTION INTRAVENOUS at 06:17

## 2024-10-19 RX ADMIN — ENOXAPARIN SODIUM 30 MG: 100 INJECTION SUBCUTANEOUS at 20:44

## 2024-10-19 RX ADMIN — ANTI-FUNGAL POWDER MICONAZOLE NITRATE TALC FREE: 1.42 POWDER TOPICAL at 08:35

## 2024-10-19 RX ADMIN — CHLORHEXIDINE GLUCONATE 15 ML: 1.2 RINSE ORAL at 20:45

## 2024-10-19 RX ADMIN — POTASSIUM CHLORIDE 20 MEQ: 29.8 INJECTION, SOLUTION INTRAVENOUS at 02:00

## 2024-10-19 RX ADMIN — METRONIDAZOLE 500 MG: 500 INJECTION, SOLUTION INTRAVENOUS at 20:44

## 2024-10-19 RX ADMIN — CEFEPIME 2000 MG: 2 INJECTION, POWDER, FOR SOLUTION INTRAVENOUS at 17:02

## 2024-10-19 RX ADMIN — METRONIDAZOLE 500 MG: 500 INJECTION, SOLUTION INTRAVENOUS at 12:40

## 2024-10-19 RX ADMIN — Medication 10 MG/HR: at 05:19

## 2024-10-19 RX ADMIN — SODIUM CHLORIDE, POTASSIUM CHLORIDE, SODIUM LACTATE AND CALCIUM CHLORIDE: 600; 310; 30; 20 INJECTION, SOLUTION INTRAVENOUS at 23:45

## 2024-10-19 RX ADMIN — ANTI-FUNGAL POWDER MICONAZOLE NITRATE TALC FREE: 1.42 POWDER TOPICAL at 20:46

## 2024-10-19 RX ADMIN — CHLORHEXIDINE GLUCONATE 15 ML: 1.2 RINSE ORAL at 08:35

## 2024-10-19 RX ADMIN — SODIUM CHLORIDE, PRESERVATIVE FREE 10 ML: 5 INJECTION INTRAVENOUS at 08:35

## 2024-10-19 RX ADMIN — SODIUM CHLORIDE, POTASSIUM CHLORIDE, SODIUM LACTATE AND CALCIUM CHLORIDE: 600; 310; 30; 20 INJECTION, SOLUTION INTRAVENOUS at 11:07

## 2024-10-19 RX ADMIN — METRONIDAZOLE 500 MG: 500 INJECTION, SOLUTION INTRAVENOUS at 05:08

## 2024-10-19 RX ADMIN — POTASSIUM CHLORIDE 20 MEQ: 29.8 INJECTION, SOLUTION INTRAVENOUS at 01:15

## 2024-10-19 RX ADMIN — NOREPINEPHRINE BITARTRATE 4 MCG/MIN: 0.06 INJECTION, SOLUTION INTRAVENOUS at 15:40

## 2024-10-19 RX ADMIN — HYDROMORPHONE HYDROCHLORIDE 0.5 MG: 1 INJECTION, SOLUTION INTRAMUSCULAR; INTRAVENOUS; SUBCUTANEOUS at 05:56

## 2024-10-19 RX ADMIN — ENOXAPARIN SODIUM 30 MG: 100 INJECTION SUBCUTANEOUS at 08:34

## 2024-10-19 RX ADMIN — SODIUM PHOSPHATE, MONOBASIC, MONOHYDRATE AND SODIUM PHOSPHATE, DIBASIC, ANHYDROUS 20 MMOL: 142; 276 INJECTION, SOLUTION INTRAVENOUS at 15:29

## 2024-10-19 RX ADMIN — CEFEPIME 2000 MG: 2 INJECTION, POWDER, FOR SOLUTION INTRAVENOUS at 23:32

## 2024-10-19 ASSESSMENT — PULMONARY FUNCTION TESTS
PIF_VALUE: 24
PIF_VALUE: 27
PIF_VALUE: 27
PIF_VALUE: 26
PIF_VALUE: 26
PIF_VALUE: 27
PIF_VALUE: 24
PIF_VALUE: 26
PIF_VALUE: 26
PIF_VALUE: 25
PIF_VALUE: 24
PIF_VALUE: 24
PIF_VALUE: 25
PIF_VALUE: 24
PIF_VALUE: 26
PIF_VALUE: 25
PIF_VALUE: 24
PIF_VALUE: 24

## 2024-10-19 ASSESSMENT — PAIN SCALES - GENERAL: PAINLEVEL_OUTOF10: 0

## 2024-10-19 NOTE — PROGRESS NOTES
Nutrition Note    Received call from pharmacy; due to fluid shortage guidelines, patient may not be able to get TPN until Monday. RD to follow    Electronically signed by Gill Carter RD on 10/19/24 at 2:39 PM EDT    Contact: [phone]

## 2024-10-19 NOTE — PROGRESS NOTES
End Of Shift Note  [facility]  Summary of shift: Patient returned to surgery this shift. Washout completed and new wound vac dressing applied. Unable to close. PT to remain intubated over the weekened. Bedside dressing change Monday per surgery. Versed at 10, pt also requiring PRN dilaudid for agitation/pain. Levo infusing. TPN started 1800. Pt had a small BM this shift and 600 output. Did have fever 103, tylenol given and recheck temp 97.1 post surgery. Orders for ID consult, blood cultures.     Vitals:    Vitals:    10/18/24 1900 10/18/24 1915 10/18/24 1930 10/18/24 1944   BP: (!) 104/58 (!) 102/57 (!) 109/59    Pulse: 72 71 71 72   Resp: 19 19 19 20   Temp:       TempSrc:       SpO2: 97% 96% 97% 98%   Weight:    122.5 kg (270 lb)   Height:    1.6 m (5' 3\")        I&O:   Intake/Output Summary (Last 24 hours) at 10/18/2024 2003  Last data filed at 10/18/2024 1653  Gross per 24 hour   Intake 2149.23 ml   Output 1100 ml   Net 1049.23 ml       Resp Status: Vented    Ventilator Settings:  Vent Mode: AC/PRVC Resp Rate (Set): 14 bpm/Vt (Set, mL): 500 mL/ /FiO2 : 30 %    Critical Care IV infusions:   PN-Adult 2-in-1 Central Line (Standard) 26 mL/hr at 10/18/24 1923    norepinephrine 5 mcg/min (10/18/24 1347)    propofol Stopped (10/17/24 1652)    midazolam 10 mg/hr (10/18/24 1802)    lactated ringers IV soln 50 mL/hr at 10/18/24 1339    dextrose      sodium chloride 50 mL/hr at 10/16/24 1623        LDA:   PICC 10/11/24 Right Brachial (Active)   Number of days: 6       NG/OG/NJ/NE Tube Orogastric 16 fr Center mouth (Active)   Number of days: 2       Urinary Catheter 10/10/24 Bar (Active)   Number of days: 8       Incision 10/01/24 Abdomen Medial;Upper (Active)   Number of days: 17       Incision 10/16/24 Abdomen Lower;Medial (Active)   Number of days: 2

## 2024-10-19 NOTE — PROGRESS NOTES
Pulmonary Critical Care Progress Note    Patient seen for the follow up of Hyponatremia     Subjective:    She was taken to OR yesterday underwent exploratory laparotomy  abdominal washout with wound VAC application.  She stillis intubated on Versed 10 mg.  Still requires Levophed at 4 mcg/min.  She had been receiving Dilaudid rarely.  She has been having adequate urine output.   She was having fevers yesterday that resolved today.    Examination:    Vitals: BP (!) 101/52   Pulse 69   Temp 97.9 °F (36.6 °C) (Axillary)   Resp 20   Ht 1.6 m (5' 3\")   Wt 122.5 kg (270 lb)   SpO2 100%   BMI 47.83 kg/m²   SpO2  Av.7 %  Min: 94 %  Max: 100 %  General appearance: alert and cooperative with exam  Neck: No JVD  Lungs: Decreased breath sound no crackles or wheezes  Heart: regular rate and rhythm, S1, S2 normal, no gallop  Abdomen: Soft, non tender, + BS VAC in place evidence of fecal material in container  Extremities: no cyanosis or clubbing. No significant edema    LABs:    CBC:   Recent Labs     10/18/24  0625 10/19/24  0545   WBC 16.6* 19.3*   HGB 9.4* 8.8*   HCT 27.8* 26.3*    332     BMP:   Recent Labs     10/18/24  0600 10/18/24  1359 10/18/24  2230 10/19/24  0545      < > 137 138   K 3.7   < > 3.4* 3.8   CO2 25   < > 25 24   BUN 14  --   --  16   CREATININE 0.7  --   --  0.6   LABGLOM >90  --   --  >90   GLUCOSE 125*  --   --  209*    < > = values in this interval not displayed.        Latest Reference Range & Units 10/18/24 04:12   POC HCO3 21.0 - 28.0 mmol/L 24.9   POC O2 SAT 94.0 - 98.0 % 97.5   POC pCO2 35.0 - 48.0 mm Hg 33.7 (L)   POC pH 7.350 - 7.450  7.476 (H)   POC PO2 83.0 - 108.0 mm Hg 88.2   (L): Data is abnormally low  (H): Data is abnormally high  Radiology:   Chest x-ray 10/19 reviewed  Bibasilar opacities could represent subsegmental atelectasis or infection     Chest x-ray 10/18  ET tube and enteric tube are stable.  Small bibasilar pleural effusions with  bibasilar

## 2024-10-19 NOTE — PROGRESS NOTES
Covid 19 swab taken from right nare, labeled, placed in red dot bag, and handed off to second healthcare worker outside of room for transport to laboratory per hospital policy and procedure.  Patient tolerated procedure well.    Sputum C&S obtained and sent per endotrachial suction as well.

## 2024-10-19 NOTE — PLAN OF CARE
Problem: Discharge Planning  Goal: Discharge to home or other facility with appropriate resources  10/19/2024 1634 by Kira Alvarado RN  Outcome: Progressing  10/19/2024 0330 by Katya Avila RN  Outcome: Progressing     Problem: Skin/Tissue Integrity  Goal: Absence of new skin breakdown  Description: 1.  Monitor for areas of redness and/or skin breakdown  2.  Assess vascular access sites hourly  3.  Every 4-6 hours minimum:  Change oxygen saturation probe site  4.  Every 4-6 hours:  If on nasal continuous positive airway pressure, respiratory therapy assess nares and determine need for appliance change or resting period.  10/19/2024 1634 by Kira Alvarado RN  Outcome: Progressing  10/19/2024 0330 by Katya Avila RN  Outcome: Progressing     Problem: ABCDS Injury Assessment  Goal: Absence of physical injury  10/19/2024 1634 by Kira Alvarado RN  Outcome: Progressing  10/19/2024 0330 by Katya Avila RN  Outcome: Progressing     Problem: Safety - Adult  Goal: Free from fall injury  10/19/2024 1634 by Kira Alvarado RN  Outcome: Progressing  10/19/2024 0330 by Katya Avila RN  Outcome: Progressing     Problem: Metabolic/Fluid and Electrolytes - Adult  Goal: Electrolytes maintained within normal limits  10/19/2024 1634 by Kira Alvarado RN  Outcome: Progressing  10/19/2024 0330 by Katya Avila RN  Outcome: Progressing  Goal: Hemodynamic stability and optimal renal function maintained  10/19/2024 1634 by Kira Alvarado RN  Outcome: Progressing  10/19/2024 0330 by Katya Avila RN  Outcome: Progressing  Goal: Glucose maintained within prescribed range  10/19/2024 1634 by Kira Alvarado RN  Outcome: Progressing  10/19/2024 0330 by Katya Avila RN  Outcome: Progressing     Problem: Neurosensory - Adult  Goal: Achieves stable or improved neurological status  10/19/2024 1634 by Kira Alvarado RN  Outcome: Progressing  10/19/2024 0330 by Katya Avila RN  Outcome:

## 2024-10-19 NOTE — PLAN OF CARE
Problem: Discharge Planning  Goal: Discharge to home or other facility with appropriate resources  10/19/2024 0330 by Katya Avila RN  Outcome: Progressing  10/18/2024 2000 by Kira Alvarado RN  Outcome: Progressing  Flowsheets (Taken 10/18/2024 2000 by Katya Avila RN)  Discharge to home or other facility with appropriate resources: Identify barriers to discharge with patient and caregiver     Problem: Skin/Tissue Integrity  Goal: Absence of new skin breakdown  Description: 1.  Monitor for areas of redness and/or skin breakdown  2.  Assess vascular access sites hourly  3.  Every 4-6 hours minimum:  Change oxygen saturation probe site  4.  Every 4-6 hours:  If on nasal continuous positive airway pressure, respiratory therapy assess nares and determine need for appliance change or resting period.  10/19/2024 0330 by Katya Avila RN  Outcome: Progressing  10/18/2024 2000 by Kira Alvarado RN  Outcome: Progressing     Problem: ABCDS Injury Assessment  Goal: Absence of physical injury  10/19/2024 0330 by Katya Avila RN  Outcome: Progressing  10/18/2024 2000 by Kira Alvarado RN  Outcome: Progressing     Problem: Safety - Adult  Goal: Free from fall injury  10/19/2024 0330 by Katya Avila RN  Outcome: Progressing  10/18/2024 2000 by Kira Alvarado RN  Outcome: Progressing     Problem: Metabolic/Fluid and Electrolytes - Adult  Goal: Electrolytes maintained within normal limits  10/19/2024 0330 by Katya Avila RN  Outcome: Progressing  10/18/2024 2000 by Kira Alvarado RN  Outcome: Progressing  Flowsheets (Taken 10/18/2024 2000 by Katya Avila RN)  Electrolytes maintained within normal limits:   Monitor labs and assess patient for signs and symptoms of electrolyte imbalances   Administer electrolyte replacement as ordered  Goal: Hemodynamic stability and optimal renal function maintained  10/19/2024 0330 by Katya Avila RN  Outcome: Progressing  10/18/2024 2000 by

## 2024-10-19 NOTE — CONSULTS
[facility] -   Infectious diseases evaluation  admission date 10/10/2024    reason for consultation:   Fever despite antibiotics    Impression :   Current:  Fever likely abdominal source  Abdominal abscess  Hypotention  Status post exploratory laparotomy with removal of mesh and wound VAC application 10/16/2024 subsequently underwent exploratory laparotomy abdominal washout with wound VAC application 10/18/2024.   Status post robotic assisted laparoscopic repair of recurrent incisional hernia with mesh placement 10/1/2024  Status post ultrasound guided aspiration of abdominal wall fluid collection on 10/11/2024 no growth on culture  Acute respiratory failure required intubation  Obesity    HENCE:   Continue IV cefepime and Flagyl  Respiratory culture  Respiratory panel with COVID-19  The patient was treated with IV Zosyn 10/10/2024 through 10/17/2024  No growth on blood cultures from 10/16/2024  Follow blood cultures from 10/18/2024  Follow CBC and renal function  Further workup if recurrent fever    Infection Control Recommendations   [place] Precautions      Antimicrobial Stewardship Recommendations   Simplification of therapy  Targeted therapy      History of Present Illness:   Initial history:  Mandie Bustamante is a 69 y.o.-year-old female was seen at the ICU, intubated, sedated, unable to provide history that was obtained from chart review and nursing staff.  She is on 1 mcg/min of Levophed, right arm PICC line was placed 10/11/2024, on TPN.  Bar catheter in place with clear urine was placed on 10/10/2024  NG tube in place.  Status post robotic assisted laparoscopic repair of recurrent incisional hernia with mesh placement 10/1/2024 was complicated by fluid collection status post aspiration on 10/11/2024 with no growth on culture.  Status post exploratory laparotomy with removal of mesh and wound VAC application 10/16/2024 subsequently underwent exploratory  Tuberculosis Maternal Grandfather     Diabetes Paternal Grandmother     Obesity Paternal Grandmother     Obesity Maternal Aunt         N/A    Obesity Paternal Aunt       Medical Decision Making:   I have independently reviewed/ordered the following labs:    CBC with Differential:   Recent Labs     10/18/24  0625 10/19/24  0545   WBC 16.6* 19.3*   HGB 9.4* 8.8*   HCT 27.8* 26.3*    332   LYMPHOPCT 9* 7*   MONOPCT 10* 5   EOSPCT 1 1     BMP:  Recent Labs     10/17/24  1750 10/18/24  0020 10/18/24  0600 10/18/24  1359 10/18/24  2230 10/19/24  0545   *   < > 136   < > 137 138   K 3.3*   < > 3.7   < > 3.4* 3.8   CL 98   < > 101   < > 103 104   CO2 24   < > 25   < > 25 24   BUN 14  --  14  --   --  16   CREATININE 0.7  --  0.7  --   --  0.6   MG 1.8  --   --   --   --  2.0    < > = values in this interval not displayed.     Hepatic Function Panel:   Recent Labs     10/19/24  0545   BILIDIR 0.1   IBILI 0.1   BILITOT 0.2*   ALKPHOS 101   ALT 19   AST 15     No results for input(s): \"RPR\" in the last 72 hours.  No results for input(s): \"HIV\" in the last 72 hours.  No results for input(s): \"BC\" in the last 72 hours.  Lab Results   Component Value Date/Time    CREATININE 0.6 10/19/2024 05:45 AM    GLUCOSE 209 10/19/2024 05:45 AM       Detailed results:        Thank you for allowing us to participate in the care of this patient.Please call with questions.    This note is created with the assistance of a speech recognition program.  While intending to generate adocument that actually reflects the content of the visit, the document can still have some errors including those of syntax and sound a like substitutions which may escape proof reading.  It such instances, actual meaningcan be extrapolated by contextual diversion.    Tra Cardozo MD  Office: [phone]  Perfect serve / office [phone]

## 2024-10-19 NOTE — PLAN OF CARE
Problem: Respiratory - Adult  Goal: Achieves optimal ventilation and oxygenation  10/19/2024 1923 by Vaishnavi Rednon RCP  Outcome: Progressing     Problem: Respiratory - Adult  Goal: Will be able to breathe spontaneously, without ventilator support  Description: Will be able to breathe spontaneously, without ventilator support  Outcome: Progressing     Problem: Respiratory - Adult  Goal: Patient's breath sounds will be clear and equal  Outcome: Progressing     Problem: Respiratory - Adult  Goal: Adequate oxygenation  Description: Adequate oxygenation  Outcome: Progressing         PROVIDE ADEQUATE OXYGENATION WITH ACCEPTABLE SP02/ABG'S    [x]  IDENTIFY APPROPRIATE OXYGEN THERAPY  [x]   MONITOR SP02/ABG'S AS NEEDED   [x]   PATIENT EDUCATION AS NEEDED      MECHANICAL VENTILATION     [x]  PROVIDE OPTIMAL VENTILATION  [x]   ASSESS FOR EXTUBATION READINESS  [x]   ASSESS FOR WEANING READINESS  [x]  EXTUBATE AS TOLERATED  [x]  IMPLEMENT ADULT MECHANICAL VENTILATION PROTOCOL  [x]  MAINTAIN ADEQUATE OXYGENATION  [x]  PERFORM SPONTANEOUS WEANING TRIAL AS TOLERATED

## 2024-10-19 NOTE — PLAN OF CARE
Problem: Respiratory - Adult  Goal: Achieves optimal ventilation and oxygenation  10/19/2024 0826 by Angelique Gasca RCP  Outcome: Progressing  10/19/2024 0330 by Katya Avila, RN  Outcome: Progressing  10/18/2024 2000 by Kira Alvarado, RN  Outcome: Progressing  Flowsheets (Taken 10/18/2024 2000 by Katya Avila, RN)  Achieves optimal ventilation and oxygenation: Assess for changes in respiratory status

## 2024-10-19 NOTE — PROGRESS NOTES
Physical Therapy  DATE: 10/19/2024    NAME: Mandie Bustamante  MRN: [id number]   : 1955    Patient not seen this date for Physical Therapy due to:      [] Cancel by RN or physician due to:    [] Hemodialysis    [] Critical Lab Value Level     [] Blood transfusion in progress    [] Acute or unstable cardiovascular status   _MAP < 55 or more than >115  _HR < 40 or > 130    [] Acute or unstable pulmonary status   -FiO2 > 60%   _RR < 5 or >40    _O2 sats < 85%    [] Strict Bedrest    [] Off Unit for surgery or procedure    [] Off Unit for testing       [] Pending imaging to R/O fracture    [] Refusal by Patient      [x] Other  Patient s/p sx 10/18.  Per chart, Patient to remain intubated over the weekend. PT continue to follow.     [] PT being discontinued at this time. Patient independent. No further needs.     [] PT being discontinued at this time as the patient has been transferred to hospice care. No further needs.      Ruby Hannah, PT

## 2024-10-19 NOTE — PROGRESS NOTES
Comprehensive Nutrition Assessment    Type and Reason for Visit:  Reassess    Nutrition Recommendations/Plan:   Continue TPN, increasing protein/dextrose  Using a custom lytes formula now due to decrease in phos  Suspect some refeeding as K+ and phos both dropped  Replete lytes as needed  Aim to increase TPN to goal  Glucose elevated, will wait until PN is at goal dextrose and then can add insulin to bag  RD to follow      Malnutrition Assessment:  Malnutrition Status:  Moderate malnutrition (10/17/24 1147)    Context:  Acute Illness     Findings of the 6 clinical characteristics of malnutrition:  Energy Intake:  50% or less of estimated energy requirements for 5 or more days  Weight Loss:  Unable to assess     Body Fat Loss:  Unable to assess     Muscle Mass Loss:  Unable to assess    Fluid Accumulation:  Moderate to Severe     Strength:       Nutrition Assessment:    Pt s/p surgery yesterday, continues NPO with wound vac in place. On versed. LR stopped. TG > 150, no lipids for now. PICC in place, pt tolerating TPN at present. Due to patients high risk condition, we will continue TPN daily for now.    Nutrition Related Findings:    LBM 10/15, absent bs, +2 UE and +3 LE edema. OR on 10/1 and 10/17. Corrected calcium 8.4. Wound Type: Surgical Incision       Current Nutrition Intake & Therapies:    Average Meal Intake: NPO  Average Supplements Intake: NPO  Current Parenteral Nutrition Orders:  Type and Formula: Premix Central   Lipids: None  Duration: Continuous  Rate/Volume: 1200 ml  Current PN Order Provides: 1056 calories, 240 gm dextrose, 60 gm protein  Goal PN Orders Provides: 250 ml lipids (500 kcal), PN at 65 ml/hr (1560 ml): 1373 kcal, 78 gm protein, 312 gm dextrose (total 1873 kcal with lipids)    Anthropometric Measures:  Height: 160 cm (5' 3\")  Ideal Body Weight (IBW): 115 lbs (52 kg)       Current Body Weight: 122.5 kg (270 lb), 245.4 % IBW. Weight Source: Bed Scale  Current BMI (kg/m2): 47.8                           BMI Categories: Obese Class 3 (BMI 40.0 or greater)    Estimated Daily Nutrient Needs:  Energy Requirements Based On: Kcal/kg  Weight Used for Energy Requirements: Current  Energy (kcal/day): 3753-8324 kcal (12-15 kcal/kg)  Weight Used for Protein Requirements: Ideal  Protein (g/day): 67-83 gm of protein (1.3-1.6 gm/kg)  Method Used for Fluid Requirements: 1 ml/kcal  Fluid (ml/day): 4862-1748 mL    Nutrition Diagnosis:   Inadequate oral intake related to inadequate protein-energy intake as evidenced by NPO or clear liquid status due to medical condition, poor intake prior to admission, GI abnormality, nausea    Nutrition Interventions:   Food and/or Nutrient Delivery: Continue NPO  Nutrition Education/Counseling: Education not indicated  Coordination of Nutrition Care: Continue to monitor while inpatient       Goals:     Goals: Initiate PO diet, within 2 days       Nutrition Monitoring and Evaluation:      Food/Nutrient Intake Outcomes: Diet Advancement/Tolerance  Physical Signs/Symptoms Outcomes: Biochemical Data, Fluid Status or Edema, Skin, Weight, GI Status    Discharge Planning:    Too soon to determine     Gill Carter RD  Contact: [phone]

## 2024-10-19 NOTE — PROGRESS NOTES
[facility]  General Surgery  Consult Progress Note    Service date: 10/19/2024, 8:00 AM  Room : 2030/2030-01   Name: Mandie Bustamante  MRN: [id number]    Length of stay: 9 day(s)    OR dates:   10/1/2024: POD#18:Robotic assisted laparoscopic mesh hernioplasty  10/17/2024: POD#3: Diagnostic laparoscopy with conversion to exploratory laparotomy and explantation of mesh with wound VAC application and abdominal wash out.   10/19/2024: POD#1: Exploratory laparotomy with abdominal washout and wound VAC placement.    Subjective     Patient seen at bedside, still intubated and sedated.  Patient requiring 4 Levophed for pressure support at this time but otherwise is vital signs stable with minimal vent settings.  Patient has wound VAC in place with good seal no output from the VAC..  Abdomen is soft on examination today prior.  Patient not responsive on sedation.    Objective   Physical exam: BP (!) 98/48   Pulse 73   Temp 97.5 °F (36.4 °C) (Axillary)   Resp 18   Ht 1.6 m (5' 3\")   Wt 122.5 kg (270 lb)   SpO2 100%   BMI 47.83 kg/m²     General: Intubated and sedated.    Neuro: Sedated   Abdomen: Soft, no grimace to palpation, wound VAC in place, mild erythema around wound vac site seem to have extended along the borders of Tegaderm.   Chest: Non-labored, symmetrical respirations.   CVS: Pulse RRR, on 5 of Levophed       Date 10/19/24 0000 - 10/19/24 2359   Shift 9563-7169 6743-3830 2283-5080 24 Hour Total   INTAKE   I.V.(mL/kg) 1175.9(9.6)   1175.9(9.6)   IV Piggyback(mL/kg) 502.1(4.1)   502.1(4.1)   TPN(mL/kg) 524.1(4.3)   524.1(4.3)   Shift Total(mL/kg) 2202.1(18)   2202.1(18)   OUTPUT   Urine(mL/kg/hr) 300(0.3)   300   Shift Total(mL/kg) 300(2.4)   300(2.4)   Weight (kg) 122.5 122.5 122.5 122.5           Medications:    Current Facility-Administered Medications   Medication Dose Route Frequency Provider Last Rate Last Admin    acetaminophen (TYLENOL) tablet 650 mg  650 mg Orogastric Q6H PRN Terry

## 2024-10-19 NOTE — PROGRESS NOTES
[facility]  Office: [phone]  Keenan Foster DO, Kevin Siegel DO, Carlos A Orta DO, Taye Morales DO, Maxx Madison MD, Meena Eckert MD, Efren Vo MD, Makayla Last MD,  Jerman Christian MD, Gina Bourgeois MD, Gladys Fry MD,  Anu Murillo DO, Jennifer Blanca MD, Oral Duncan MD, Harlan Foster DO, Katya Simmons MD,  Filiberto Slade DO, Elizabeth Alves MD, Samantha Pinto MD, Arlene Bryant MD, Bandar Barrios MD,  Kulwant Rodrigez MD, Michelle Dennison MD, Jinny Steel MD, Kathia Vargas MD, Gerardo Alvarez MD, Richard Johnson MD, Demetri Ibarra DO, Benjie Alvarenga MD, Shirley Waterhouse, CNP,  Georgette Villarreal CNP, Demetri Toure, CNP,  Silvina Castano, CARINA, Tatiana Mckeon, CNP, Angelina Harper, CNP, Alma Rosa Peng, CNP, Rashmi Mccartney, CNP, IMAN ErvinC, IMAN ArmentaC, Stephanie Kaminski, CNP, Tono Yee, CNP,  Chrissie Schwartz, CNP, Sheri Jones, CNP,  Nadiya Rick, CNP, Cindi Lam, CNP         [facility]   IN-PATIENT SERVICE   [facility]    Progress Note    10/19/2024    1:00 PM    Name:   Mandie Bustamante  MRN:     [id number]     Acct:      [id number]   Room:   2030/2030-01  IP Day:  9  Admit Date:  10/10/2024  6:11 PM    PCP:   No primary care provider on file.  Code Status:  Full Code    Subjective:     C/C: abd pain  Interval History Status: not changed.     Remains on vent postop  Taken back to OR 10/18 and apparently had copious drainage from the multiple fistulas now seen      Brief History:     Per my partner:  \"Mandie Bustamante is a 69 y.o. Non- / non  female who presents with No chief complaint on file.   and is admitted to the hospital for the management of Hyponatremia.     recently underwent robotic assisted laparoscopic incisional hernia repair with mesh on 10/1/2024 with surgery, now presented from outlying facility for management of hyponatremia and abdominal abscess.     At [facility] patient presented with      XR CHEST PORTABLE    Result Date: 10/12/2024  1. Right-sided PICC distal tip overlying the cavoatrial junction, stable. 2. Low lung volume exam.  Mild pulmonary vascular congestion.  Stable cardiomegaly.  Mild bibasilar airspace opacity, atelectasis and/or infiltrate with trace bilateral effusions.  Findings overall favor mild CHF related change.     XR CHEST PORTABLE    Result Date: 10/11/2024  1. PICC line on the right with the tip in the right atrium. 2. Findings suggesting an ileus.     CT ABDOMEN PELVIS WO CONTRAST Additional Contrast? None    Result Date: 10/10/2024  1. Large abscess with air-fluid collection seen along the peritoneum just below the rectus muscle.  Additional infiltration with gas and fluid seen extending in previous site of hernia suggesting recurrent hernia and or phlegmon.     XR CHEST PORTABLE    Result Date: 10/10/2024  Shallow lung volumes without acute consolidation.       Physical Examination:        General appearance:  no distress  Mental Status:  sedated  Lungs:  clear to auscultation bilaterally, normal effort on vent  Heart:  regular rate and rhythm, positive murmur  Abdomen:  soft, nondistended,  no masses, hepatomegaly, splenomegaly; obese  Extremities:  no redness, tenderness in the calves; 1+ ble edema: improved  Skin:  no gross lesions, rashes, induration    Assessment:        Hospital Problems             Last Modified POA    * (Principal) Hyponatremia 10/10/2024 Yes    Intra-abdominal abscess (HCC) 10/10/2024 Yes    Anxiety 10/10/2024 Yes    Gastro-esophageal reflux disease without esophagitis 10/10/2024 Yes    Essential hypertension (Chronic) 10/10/2024 Yes    Hypokalemia 10/10/2024 Yes    Elevated brain natriuretic peptide (BNP) level 10/10/2024 Yes    Leukocytosis 10/10/2024 Yes    Prediabetes (Chronic) 10/10/2024 Yes    Overview Signed 10/10/2024  8:47 PM by Gerardo Alvarez MD     Patient reportedly was in the prediabetic range according to labs in 2019 per chart  Relaxed

## 2024-10-19 NOTE — PROGRESS NOTES
End Of Shift Note  [facility]  Summary of shift: Patient did well this shift. No tachycardia, no fevers, pain well controlled and calmer on the vent. Minimal output from wound vac. Levo titrated down to 2. Plan for vac dressing change Monday    Vitals:    Vitals:    10/19/24 1800 10/19/24 1815 10/19/24 1830 10/19/24 1845   BP: (!) 111/53 (!) 112/55 (!) 113/55 (!) 109/55   Pulse: 78 79 79 79   Resp:       Temp:       TempSrc:       SpO2:       Weight:       Height:            I&O:   Intake/Output Summary (Last 24 hours) at 10/19/2024 1847  Last data filed at 10/19/2024 0700  Gross per 24 hour   Intake 2202.11 ml   Output 600 ml   Net 1602.11 ml       Resp Status: vented    Ventilator Settings:  Vent Mode: AC/PRVC Resp Rate (Set): 14 bpm/Vt (Set, mL): 500 mL/ /FiO2 : 30 %    Critical Care IV infusions:   norepinephrine 4 mcg/min (10/19/24 1540)    propofol Stopped (10/17/24 1652)    midazolam 10 mg/hr (10/19/24 1756)    lactated ringers IV soln 50 mL/hr at 10/19/24 1107    dextrose      sodium chloride 50 mL/hr at 10/16/24 1623        LDA:   PICC 10/11/24 Right Brachial (Active)   Number of days: 7       NG/OG/NJ/NE Tube Orogastric 16 fr Center mouth (Active)   Number of days: 3       Urinary Catheter 10/10/24 Bar (Active)   Number of days: 9       ETT  (Active)   Number of days: 3       Incision 10/01/24 Abdomen Medial;Upper (Active)   Number of days: 18       Incision 10/16/24 Abdomen Lower;Medial (Active)   Number of days: 3

## 2024-10-19 NOTE — PLAN OF CARE
Problem: Discharge Planning  Goal: Discharge to home or other facility with appropriate resources  Outcome: Progressing     Problem: Skin/Tissue Integrity  Goal: Absence of new skin breakdown  Description: 1.  Monitor for areas of redness and/or skin breakdown  2.  Assess vascular access sites hourly  3.  Every 4-6 hours minimum:  Change oxygen saturation probe site  4.  Every 4-6 hours:  If on nasal continuous positive airway pressure, respiratory therapy assess nares and determine need for appliance change or resting period.  Outcome: Progressing     Problem: ABCDS Injury Assessment  Goal: Absence of physical injury  Outcome: Progressing     Problem: Safety - Adult  Goal: Free from fall injury  Outcome: Progressing     Problem: Metabolic/Fluid and Electrolytes - Adult  Goal: Electrolytes maintained within normal limits  Outcome: Progressing  Goal: Hemodynamic stability and optimal renal function maintained  Outcome: Progressing  Goal: Glucose maintained within prescribed range  Outcome: Progressing     Problem: Neurosensory - Adult  Goal: Achieves stable or improved neurological status  Outcome: Progressing  Goal: Absence of seizures  Outcome: Progressing  Goal: Remains free of injury related to seizures activity  Outcome: Progressing     Problem: Cardiovascular - Adult  Goal: Maintains optimal cardiac output and hemodynamic stability  Outcome: Progressing  Goal: Absence of cardiac dysrhythmias or at baseline  Outcome: Progressing     Problem: Respiratory - Adult  Goal: Achieves optimal ventilation and oxygenation  Outcome: Progressing     Problem: Gastrointestinal - Adult  Goal: Maintains or returns to baseline bowel function  Outcome: Progressing  Goal: Maintains adequate nutritional intake  Outcome: Progressing     Problem: Genitourinary - Adult  Goal: Urinary catheter remains patent  Outcome: Progressing     Problem: Skin/Tissue Integrity - Adult  Goal: Skin integrity remains intact  Outcome:

## 2024-10-20 ENCOUNTER — APPOINTMENT (OUTPATIENT)
Dept: GENERAL RADIOLOGY | Age: 69
End: 2024-10-20
Attending: STUDENT IN AN ORGANIZED HEALTH CARE EDUCATION/TRAINING PROGRAM
Payer: COMMERCIAL

## 2024-10-20 LAB
ALBUMIN SERPL-MCNC: 1.8 G/DL (ref 3.5–5.2)
ALP SERPL-CCNC: 101 U/L (ref 35–104)
ALT SERPL-CCNC: 15 U/L (ref 5–33)
ANION GAP SERPL CALCULATED.3IONS-SCNC: 8 MMOL/L (ref 9–17)
AST SERPL-CCNC: 14 U/L
BASOPHILS # BLD: <0.03 K/UL (ref 0–0.2)
BASOPHILS NFR BLD: 0 % (ref 0–2)
BILIRUB DIRECT SERPL-MCNC: 0.1 MG/DL
BILIRUB INDIRECT SERPL-MCNC: 0.1 MG/DL (ref 0–1)
BILIRUB SERPL-MCNC: 0.2 MG/DL (ref 0.3–1.2)
BUN SERPL-MCNC: 13 MG/DL (ref 8–23)
BUN/CREAT SERPL: 22 (ref 9–20)
CA-I BLD-SCNC: 1.04 MMOL/L (ref 1.15–1.33)
CALCIUM SERPL-MCNC: 7 MG/DL (ref 8.6–10.4)
CHLORIDE SERPL-SCNC: 108 MMOL/L (ref 98–107)
CO2 SERPL-SCNC: 23 MMOL/L (ref 20–31)
CREAT SERPL-MCNC: 0.6 MG/DL (ref 0.5–0.9)
EOSINOPHIL # BLD: 0.21 K/UL (ref 0–0.44)
EOSINOPHILS RELATIVE PERCENT: 2 % (ref 1–4)
ERYTHROCYTE [DISTWIDTH] IN BLOOD BY AUTOMATED COUNT: 13.4 % (ref 11.8–14.4)
GFR, ESTIMATED: >90 ML/MIN/1.73M2
GLUCOSE BLD-MCNC: 109 MG/DL (ref 65–105)
GLUCOSE BLD-MCNC: 122 MG/DL (ref 65–105)
GLUCOSE BLD-MCNC: 142 MG/DL (ref 65–105)
GLUCOSE SERPL-MCNC: 118 MG/DL (ref 70–99)
HCT VFR BLD AUTO: 23.2 % (ref 36.3–47.1)
HCT VFR BLD AUTO: 24.8 % (ref 36.3–47.1)
HGB BLD-MCNC: 7.6 G/DL (ref 11.9–15.1)
HGB BLD-MCNC: 8.2 G/DL (ref 11.9–15.1)
IMM GRANULOCYTES # BLD AUTO: 0.19 K/UL (ref 0–0.3)
IMM GRANULOCYTES NFR BLD: 2 %
INR PPP: 1.3
LYMPHOCYTES NFR BLD: 0.8 K/UL (ref 1.1–3.7)
LYMPHOCYTES RELATIVE PERCENT: 8 % (ref 24–43)
MAGNESIUM SERPL-MCNC: 2 MG/DL (ref 1.6–2.6)
MCH RBC QN AUTO: 29.5 PG (ref 25.2–33.5)
MCHC RBC AUTO-ENTMCNC: 33.1 G/DL (ref 28.4–34.8)
MCV RBC AUTO: 89.2 FL (ref 82.6–102.9)
MONOCYTES NFR BLD: 0.64 K/UL (ref 0.1–1.2)
MONOCYTES NFR BLD: 6 % (ref 3–12)
NEUTROPHILS NFR BLD: 82 % (ref 36–65)
NEUTS SEG NFR BLD: 8.59 K/UL (ref 1.5–8.1)
NRBC BLD-RTO: 0 PER 100 WBC
PARTIAL THROMBOPLASTIN TIME: 31.1 SEC (ref 23.9–33.8)
PHOSPHATE SERPL-MCNC: 2.7 MG/DL (ref 2.6–4.5)
PLATELET # BLD AUTO: 290 K/UL (ref 138–453)
PMV BLD AUTO: 8.8 FL (ref 8.1–13.5)
POTASSIUM SERPL-SCNC: 3.7 MMOL/L (ref 3.7–5.3)
PROT SERPL-MCNC: 4.6 G/DL (ref 6.4–8.3)
PROTHROMBIN TIME: 15.8 SEC (ref 11.5–14.2)
RBC # BLD AUTO: 2.78 M/UL (ref 3.95–5.11)
SODIUM SERPL-SCNC: 139 MMOL/L (ref 135–144)
WBC OTHER # BLD: 10.5 K/UL (ref 3.5–11.3)

## 2024-10-20 PROCEDURE — 99232 SBSQ HOSP IP/OBS MODERATE 35: CPT | Performed by: INTERNAL MEDICINE

## 2024-10-20 PROCEDURE — 85730 THROMBOPLASTIN TIME PARTIAL: CPT

## 2024-10-20 PROCEDURE — 82947 ASSAY GLUCOSE BLOOD QUANT: CPT

## 2024-10-20 PROCEDURE — 84100 ASSAY OF PHOSPHORUS: CPT

## 2024-10-20 PROCEDURE — 85610 PROTHROMBIN TIME: CPT

## 2024-10-20 PROCEDURE — 2000000000 HC ICU R&B

## 2024-10-20 PROCEDURE — 6360000002 HC RX W HCPCS

## 2024-10-20 PROCEDURE — 2580000003 HC RX 258

## 2024-10-20 PROCEDURE — 80048 BASIC METABOLIC PNL TOTAL CA: CPT

## 2024-10-20 PROCEDURE — 85025 COMPLETE CBC W/AUTO DIFF WBC: CPT

## 2024-10-20 PROCEDURE — 94761 N-INVAS EAR/PLS OXIMETRY MLT: CPT

## 2024-10-20 PROCEDURE — 83735 ASSAY OF MAGNESIUM: CPT

## 2024-10-20 PROCEDURE — 2500000003 HC RX 250 WO HCPCS

## 2024-10-20 PROCEDURE — 94003 VENT MGMT INPAT SUBQ DAY: CPT

## 2024-10-20 PROCEDURE — 71045 X-RAY EXAM CHEST 1 VIEW: CPT

## 2024-10-20 PROCEDURE — 80076 HEPATIC FUNCTION PANEL: CPT

## 2024-10-20 PROCEDURE — 2700000000 HC OXYGEN THERAPY PER DAY

## 2024-10-20 PROCEDURE — 82330 ASSAY OF CALCIUM: CPT

## 2024-10-20 PROCEDURE — 85014 HEMATOCRIT: CPT

## 2024-10-20 PROCEDURE — 85018 HEMOGLOBIN: CPT

## 2024-10-20 RX ORDER — CALCIUM GLUCONATE 20 MG/ML
1000 INJECTION, SOLUTION INTRAVENOUS ONCE
Status: COMPLETED | OUTPATIENT
Start: 2024-10-20 | End: 2024-10-20

## 2024-10-20 RX ORDER — DEXTROSE MONOHYDRATE, SODIUM CHLORIDE, AND POTASSIUM CHLORIDE 50; 1.49; 4.5 G/1000ML; G/1000ML; G/1000ML
INJECTION, SOLUTION INTRAVENOUS CONTINUOUS
Status: DISCONTINUED | OUTPATIENT
Start: 2024-10-20 | End: 2024-11-04

## 2024-10-20 RX ADMIN — ANTI-FUNGAL POWDER MICONAZOLE NITRATE TALC FREE: 1.42 POWDER TOPICAL at 08:08

## 2024-10-20 RX ADMIN — Medication 8 MG/HR: at 23:00

## 2024-10-20 RX ADMIN — ENOXAPARIN SODIUM 30 MG: 100 INJECTION SUBCUTANEOUS at 08:08

## 2024-10-20 RX ADMIN — POTASSIUM CHLORIDE, DEXTROSE MONOHYDRATE AND SODIUM CHLORIDE: 150; 5; 450 INJECTION, SOLUTION INTRAVENOUS at 19:19

## 2024-10-20 RX ADMIN — POTASSIUM CHLORIDE, DEXTROSE MONOHYDRATE AND SODIUM CHLORIDE: 150; 5; 450 INJECTION, SOLUTION INTRAVENOUS at 08:26

## 2024-10-20 RX ADMIN — ANTI-FUNGAL POWDER MICONAZOLE NITRATE TALC FREE: 1.42 POWDER TOPICAL at 20:48

## 2024-10-20 RX ADMIN — SODIUM CHLORIDE, PRESERVATIVE FREE 10 ML: 5 INJECTION INTRAVENOUS at 08:09

## 2024-10-20 RX ADMIN — CEFEPIME 2000 MG: 2 INJECTION, POWDER, FOR SOLUTION INTRAVENOUS at 22:58

## 2024-10-20 RX ADMIN — Medication 10 MG/HR: at 07:09

## 2024-10-20 RX ADMIN — PANTOPRAZOLE SODIUM 40 MG: 40 INJECTION, POWDER, FOR SOLUTION INTRAVENOUS at 08:08

## 2024-10-20 RX ADMIN — CEFEPIME 2000 MG: 2 INJECTION, POWDER, FOR SOLUTION INTRAVENOUS at 15:35

## 2024-10-20 RX ADMIN — HYDROCORTISONE: 1 CREAM TOPICAL at 20:48

## 2024-10-20 RX ADMIN — CHLORHEXIDINE GLUCONATE 15 ML: 1.2 RINSE ORAL at 19:44

## 2024-10-20 RX ADMIN — HYDROCORTISONE: 1 CREAM TOPICAL at 08:09

## 2024-10-20 RX ADMIN — METRONIDAZOLE 500 MG: 500 INJECTION, SOLUTION INTRAVENOUS at 13:32

## 2024-10-20 RX ADMIN — CEFEPIME 2000 MG: 2 INJECTION, POWDER, FOR SOLUTION INTRAVENOUS at 05:46

## 2024-10-20 RX ADMIN — HYDROMORPHONE HYDROCHLORIDE 0.5 MG: 1 INJECTION, SOLUTION INTRAMUSCULAR; INTRAVENOUS; SUBCUTANEOUS at 20:51

## 2024-10-20 RX ADMIN — HYDROMORPHONE HYDROCHLORIDE 0.5 MG: 1 INJECTION, SOLUTION INTRAMUSCULAR; INTRAVENOUS; SUBCUTANEOUS at 05:14

## 2024-10-20 RX ADMIN — CALCIUM GLUCONATE 1000 MG: 20 INJECTION, SOLUTION INTRAVENOUS at 06:00

## 2024-10-20 RX ADMIN — CHLORHEXIDINE GLUCONATE 15 ML: 1.2 RINSE ORAL at 08:09

## 2024-10-20 RX ADMIN — METRONIDAZOLE 500 MG: 500 INJECTION, SOLUTION INTRAVENOUS at 04:21

## 2024-10-20 RX ADMIN — METRONIDAZOLE 500 MG: 500 INJECTION, SOLUTION INTRAVENOUS at 19:43

## 2024-10-20 ASSESSMENT — PULMONARY FUNCTION TESTS
PIF_VALUE: 25
PIF_VALUE: 27
PIF_VALUE: 32
PIF_VALUE: 26
PIF_VALUE: 27
PIF_VALUE: 26
PIF_VALUE: 25
PIF_VALUE: 25
PIF_VALUE: 26
PIF_VALUE: 27
PIF_VALUE: 26
PIF_VALUE: 27
PIF_VALUE: 26
PIF_VALUE: 25
PIF_VALUE: 26
PIF_VALUE: 26
PIF_VALUE: 27
PIF_VALUE: 26
PIF_VALUE: 28
PIF_VALUE: 25

## 2024-10-20 ASSESSMENT — PAIN SCALES - GENERAL: PAINLEVEL_OUTOF10: 0

## 2024-10-20 NOTE — PLAN OF CARE
Skin integrity remains intact  10/19/2024 2347 by Katya Avila RN  Outcome: Progressing  Flowsheets (Taken 10/19/2024 2000)  Skin Integrity Remains Intact: Monitor for areas of redness and/or skin breakdown  10/19/2024 1634 by Kira Alvarado RN  Outcome: Progressing  Goal: Incisions, wounds, or drain sites healing without S/S of infection  10/19/2024 2347 by Katya Avila RN  Outcome: Progressing  Flowsheets (Taken 10/19/2024 2000)  Incisions, Wounds, or Drain Sites Healing Without Sign and Symptoms of Infection: TWICE DAILY: Assess and document skin integrity  10/19/2024 1634 by Kira Alvarado RN  Outcome: Progressing     Problem: Hematologic - Adult  Goal: Maintains hematologic stability  10/19/2024 2347 by Katya Avila RN  Outcome: Progressing  Flowsheets (Taken 10/19/2024 2000)  Maintains hematologic stability:   Assess for signs and symptoms of bleeding or hemorrhage   Monitor labs for bleeding or clotting disorders  10/19/2024 1634 by Kira Alvarado RN  Outcome: Progressing     Problem: Musculoskeletal - Adult  Goal: Return mobility to safest level of function  10/19/2024 2347 by Katya Avila RN  Outcome: Progressing  10/19/2024 1634 by Kira Alvarado RN  Outcome: Progressing     Problem: Nutrition Deficit:  Goal: Optimize nutritional status  10/19/2024 2347 by Katya Avila RN  Outcome: Progressing  10/19/2024 1634 by Kira Alvarado RN  Outcome: Progressing     Problem: Pain  Goal: Verbalizes/displays adequate comfort level or baseline comfort level  10/19/2024 2347 by Katya Avila RN  Outcome: Progressing  10/19/2024 1634 by Kira Alvarado RN  Outcome: Progressing     Problem: Chronic Conditions and Co-morbidities  Goal: Patient's chronic conditions and co-morbidity symptoms are monitored and maintained or improved  10/19/2024 2347 by Katya Avila RN  Outcome: Progressing  Flowsheets (Taken 10/19/2024 2000)  Care Plan - Patient's Chronic Conditions and

## 2024-10-20 NOTE — PLAN OF CARE
Problem: Respiratory - Adult  Goal: Achieves optimal ventilation and oxygenation  10/20/2024 0819 by Angelique Gasca RCP  Outcome: Progressing  10/19/2024 2347 by Katya Avila RN  Outcome: Progressing  Flowsheets (Taken 10/19/2024 2000)  Achieves optimal ventilation and oxygenation: Assess for changes in respiratory status  10/19/2024 1923 by Vaishnavi Rendon RCP  Outcome: Progressing  Goal: Will be able to breathe spontaneously, without ventilator support  Description: Will be able to breathe spontaneously, without ventilator support  10/20/2024 0819 by Angelique Gasca RCP  Outcome: Progressing  10/19/2024 1923 by Vaishnavi Rendon RCP  Outcome: Progressing  Goal: Patient's breath sounds will be clear and equal  10/20/2024 0819 by Angelique Gasca RCP  Outcome: Progressing  10/19/2024 1923 by Vaishnavi Rendon RCP  Outcome: Progressing  Goal: Adequate oxygenation  Description: Adequate oxygenation  10/20/2024 0819 by Angelique Gasca RCP  Outcome: Progressing  10/19/2024 1923 by Vaishnavi Rendon RCP  Outcome: Progressing

## 2024-10-20 NOTE — PLAN OF CARE
Problem: Discharge Planning  Goal: Discharge to home or other facility with appropriate resources  Outcome: Progressing     Problem: Skin/Tissue Integrity  Goal: Absence of new skin breakdown  Description: 1.  Monitor for areas of redness and/or skin breakdown  2.  Assess vascular access sites hourly  3.  Every 4-6 hours minimum:  Change oxygen saturation probe site  4.  Every 4-6 hours:  If on nasal continuous positive airway pressure, respiratory therapy assess nares and determine need for appliance change or resting period.  Outcome: Progressing     Problem: ABCDS Injury Assessment  Goal: Absence of physical injury  Outcome: Progressing     Problem: Safety - Adult  Goal: Free from fall injury  Outcome: Progressing     Problem: Metabolic/Fluid and Electrolytes - Adult  Goal: Electrolytes maintained within normal limits  Outcome: Progressing  Goal: Hemodynamic stability and optimal renal function maintained  Outcome: Progressing  Goal: Glucose maintained within prescribed range  Outcome: Progressing     Problem: Neurosensory - Adult  Goal: Achieves stable or improved neurological status  Outcome: Progressing  Goal: Absence of seizures  Outcome: Progressing  Goal: Remains free of injury related to seizures activity  Outcome: Progressing     Problem: Cardiovascular - Adult  Goal: Maintains optimal cardiac output and hemodynamic stability  Outcome: Progressing  Goal: Absence of cardiac dysrhythmias or at baseline  Outcome: Progressing     Problem: Respiratory - Adult  Goal: Achieves optimal ventilation and oxygenation  10/20/2024 1437 by Kira Alvarado RN  Outcome: Progressing  10/20/2024 0819 by Angelique Gasca RCP  Outcome: Progressing  Goal: Will be able to breathe spontaneously, without ventilator support  Description: Will be able to breathe spontaneously, without ventilator support  10/20/2024 0819 by Angelique Gasca RCP  Outcome: Progressing  Goal: Patient's breath sounds will be clear and  equal  10/20/2024 0819 by Angelique Gasca RCP  Outcome: Progressing  Goal: Adequate oxygenation  Description: Adequate oxygenation  10/20/2024 0819 by Angelique Gasca RCP  Outcome: Progressing     Problem: Gastrointestinal - Adult  Goal: Maintains or returns to baseline bowel function  Outcome: Progressing  Goal: Maintains adequate nutritional intake  Outcome: Progressing     Problem: Genitourinary - Adult  Goal: Urinary catheter remains patent  Outcome: Progressing     Problem: Skin/Tissue Integrity - Adult  Goal: Skin integrity remains intact  Outcome: Progressing  Goal: Incisions, wounds, or drain sites healing without S/S of infection  Outcome: Progressing     Problem: Hematologic - Adult  Goal: Maintains hematologic stability  Outcome: Progressing     Problem: Musculoskeletal - Adult  Goal: Return mobility to safest level of function  Outcome: Progressing     Problem: Nutrition Deficit:  Goal: Optimize nutritional status  Outcome: Progressing     Problem: Pain  Goal: Verbalizes/displays adequate comfort level or baseline comfort level  Outcome: Progressing     Problem: Chronic Conditions and Co-morbidities  Goal: Patient's chronic conditions and co-morbidity symptoms are monitored and maintained or improved  Outcome: Progressing     Problem: Safety - Medical Restraint  Goal: Remains free of injury from restraints (Restraint for Interference with Medical Device)  Description: INTERVENTIONS:  1. Determine that other, less restrictive measures have been tried or would not be effective before applying the restraint  2. Evaluate the patient's condition at the time of restraint application  3. Inform patient/family regarding the reason for restraint  4. Q2H: Monitor safety, psychosocial status, comfort, nutrition and hydration  Outcome: Progressing  Flowsheets (Taken 10/20/2024 0800)  Remains free of injury from restraints (restraint for interference with medical device):   Determine that other, less

## 2024-10-20 NOTE — PROGRESS NOTES
Spiritual Health History and Assessment/Progress Note  [facility]    (P) Follow-up,  ,  ,      Name: Mandie Bustamante MRN: [id number]    Age: 69 y.o.     Sex: female   Language: English   Anabaptism: Jainism   Hyponatremia     Date: 10/20/2024            Total Time Calculated: (P) 20 min              Spiritual Assessment began in [facility]        Referral/Consult From: (P) Rounding   Encounter Overview/Reason: (P) Follow-up  Service Provided For: (P) Patient    Elaine, Belief, Meaning:   Patient unable to assess at this time  Family/Friends No family/friends present      Importance and Influence:  Patient unable to assess at this time  Family/Friends No family/friends present    Community:  Patient Other: Unknown  Family/Friends No family/friends present    Assessment and Plan of Care:     Patient Interventions include: Other: Gentle Touch, Reading, Prayer, Presence  Family/Friends Interventions include: No family/friends present    Pt is intubated. Pt opened her eyes when  introduced herself. According to pt's documented Jainism, chaplain Rober requested permission from pt who did not respond and then proceeded to provide liturgy of infirmity, a reading from [facility], and prayer while holding pt's hand.    Patient Plan of Care: Spiritual Care available upon further referral  Family/Friends Plan of Care: No family/friends present    Electronically signed by Chaplain Lesvia on 10/20/2024 at 1:20 PM

## 2024-10-20 NOTE — PROGRESS NOTES
this month hemoglobin A1c was 6.3%.  Patient does complain of urinary frequency, urinalysis performed same time was negative for infection or signs of stone so could likely be from her hyperglycemia or potentially hypercalcemia which each were discussed in depth with the patient.  We also discussed cushionoid appearance and how that could relate to hyperglycemia as well.  Patient states we could possibly investigate this at later date.         Edema 10/19/2024 Yes       Plan:        Supportive care on vent  Monitor na levels  On cefepime and flagyl for intraabd abscess  On tpn mwf, while not on tpn, add extra iv fluid to keep total rate at 115cc/hr  Pt/ot/mobilize  Wound vac change Monday planned  Hopeful extubation tomorrow  D/w rn and rt    Taye Morales DO  10/20/2024  11:57 AM

## 2024-10-20 NOTE — PROGRESS NOTES
[facility]  General Surgery  Consult Progress Note    Service date: 10/20/2024, 7:26 AM  Room : 2030/2030-01   Name: Mandie Bustamante  MRN: [id number]    Length of stay: 10 day(s)    OR dates:   10/1/2024: POD#19:Robotic assisted laparoscopic mesh hernioplasty  10/17/2024: POD#4: Diagnostic laparoscopy with conversion to exploratory laparotomy and explantation of mesh with wound VAC application and abdominal wash out.   10/19/2024: POD#2: Exploratory laparotomy with abdominal washout and wound VAC placement.    Subjective     Patient seen at bedside, still intubated and sedated.  Patient requiring 3 Levophed for pressure support at this time but otherwise is vital signs stable with minimal vent settings.  Patient has wound VAC in place with good seal, no output from the VAC..  Abdomen is soft on examination today prior.    Patient was following commands on SBT.    Objective   Physical exam: /61   Pulse 68   Temp 98.1 °F (36.7 °C) (Axillary)   Resp 17   Ht 1.6 m (5' 3\")   Wt 122.5 kg (270 lb)   SpO2 100%   BMI 47.83 kg/m²     General: Intubated and sedated.    Neuro: Sedated   Abdomen: Soft, no grimace to palpation, wound VAC in place, mild erythema around wound vac site seem to have extended along the borders of Tegaderm.   Chest: Non-labored, symmetrical respirations.   CVS: Pulse RRR, on 5 of Levophed       Date 10/20/24 0000 - 10/20/24 2359   Shift 7582-6180 7995-9638 7157-1737 24 Hour Total   INTAKE   I.V.(mL/kg) 1127.7(9.2)   1127.7(9.2)   IV Piggyback(mL/kg) 339.2(2.8)   339.2(2.8)   Shift Total(mL/kg) 1466.9(12)   1466.9(12)   OUTPUT   Urine(mL/kg/hr) 550   550   Emesis/NG output(mL/kg) 150(1.2)   150(1.2)   Shift Total(mL/kg) 700(5.7)   700(5.7)   Weight (kg) 122.5 122.5 122.5 122.5           Medications:    Current Facility-Administered Medications   Medication Dose Route Frequency Provider Last Rate Last Admin    acetaminophen (TYLENOL) tablet 650 mg  650 mg Orogastric Q6H PRN Terry

## 2024-10-20 NOTE — PROGRESS NOTES
End Of Shift Note  [facility]  Summary of shift: Patient had an uneventful night.  Potassium was 3.4 and patient received 40 meq IV.  Will recheck potassium with am labs.    Vitals:    Vitals:    10/19/24 1909 10/19/24 2000 10/19/24 2030 10/19/24 2100   BP:  (!) 115/59 (!) 111/56 (!) 115/59   Pulse: 80 80 80 81   Resp:  21 22 22   Temp:  98.3 °F (36.8 °C)     TempSrc:  Axillary     SpO2: 99% 98% 96% 97%   Weight:       Height:            I&O:   Intake/Output Summary (Last 24 hours) at 10/19/2024 2156  Last data filed at 10/19/2024 1853  Gross per 24 hour   Intake 3900.33 ml   Output 1100 ml   Net 2800.33 ml       Resp Status: 30% FI02    Ventilator Settings:  Vent Mode: AC/PRVC Resp Rate (Set): 14 bpm/Vt (Set, mL): 500 mL/ /FiO2 : 30 %    Critical Care IV infusions:   norepinephrine 2 mcg/min (10/19/24 1849)    propofol Stopped (10/17/24 1652)    midazolam 10 mg/hr (10/19/24 1849)    lactated ringers IV soln 90 mL/hr at 10/19/24 1849    dextrose      sodium chloride 50 mL/hr at 10/16/24 1623        LDA:   PICC 10/11/24 Right Brachial (Active)   Number of days: 7       NG/OG/NJ/NE Tube Orogastric 16 fr Center mouth (Active)   Number of days: 3       Urinary Catheter 10/10/24 Bar (Active)   Number of days: 9       ETT  (Active)   Number of days: 3       Incision 10/01/24 Abdomen Medial;Upper (Active)   Number of days: 18       Incision 10/16/24 Abdomen Lower;Medial (Active)   Number of days: 3

## 2024-10-20 NOTE — PROGRESS NOTES
End Of Shift Note  [facility]  Summary of shift: Patient had an uneventful night.  Repl Icalcium 1.06, replaced with Calcium gluconate 1 gram.      Vitals:    Vitals:    10/20/24 0400 10/20/24 0500 10/20/24 0600 10/20/24 0615   BP: (!) 105/57 (!) 103/52 108/60 106/61   Pulse: 70 67 68 68   Resp: 18 18 18 17   Temp: 98.1 °F (36.7 °C)      TempSrc: Axillary      SpO2:   100%    Weight:       Height:            I&O:   Intake/Output Summary (Last 24 hours) at 10/20/2024 0658  Last data filed at 10/20/2024 0600  Gross per 24 hour   Intake 5367.18 ml   Output 1200 ml   Net 4167.18 ml       Resp Status: 30    Ventilator Settings:  Vent Mode: AC/PRVC Resp Rate (Set): 14 bpm/Vt (Set, mL): 500 mL/ /FiO2 : 30 %    Critical Care IV infusions:   norepinephrine 3 mcg/min (10/19/24 2335)    propofol Stopped (10/17/24 1652)    midazolam 10 mg/hr (10/19/24 1849)    lactated ringers IV soln 90 mL/hr at 10/19/24 2345    dextrose      sodium chloride 50 mL/hr at 10/16/24 1623        LDA:   PICC 10/11/24 Right Brachial (Active)   Number of days: 8       NG/OG/NJ/NE Tube Orogastric 16 fr Center mouth (Active)   Number of days: 3       Urinary Catheter 10/10/24 Bar (Active)   Number of days: 9       ETT  (Active)   Number of days: 3       Incision 10/01/24 Abdomen Medial;Upper (Active)   Number of days: 18       Incision 10/16/24 Abdomen Lower;Medial (Active)   Number of days: 3

## 2024-10-20 NOTE — PROGRESS NOTES
Pulmonary Critical Care Progress Note    Patient seen for the follow up of Hyponatremia     Subjective:    She had exploratory laparotomy 2 days ago with abdominal washout with wound VAC application.  She stillis intubated on Versed 10 mg.  Still requires Levophed at 2 mcg/min.  She had been receiving Dilaudid rarely.  She wakes up follows commands.  She has been having adequate urine output.   She has no fever  Examination:    Vitals: /61   Pulse 73   Temp 98.2 °F (36.8 °C) (Axillary)   Resp 20   Ht 1.6 m (5' 3\")   Wt 122.5 kg (270 lb)   SpO2 100%   BMI 47.83 kg/m²   SpO2  Av.6 %  Min: 96 %  Max: 100 %  General appearance: Intubated sedated  Neck: No JVD  Lungs: Decreased breath sound no crackles or wheezes  Heart: regular rate and rhythm, S1, S2 normal, no gallop  Abdomen: Soft, non tender, + BS VAC in place evidence of fecal material in container  Extremities: no cyanosis or clubbing. No significant edema    LABs:    CBC:   Recent Labs     10/19/24  0545 10/20/24  0435   WBC 19.3* 10.5   HGB 8.8* 8.2*   HCT 26.3* 24.8*    290     BMP:   Recent Labs     10/19/24  0545 10/20/24  0435    139   K 3.8 3.7   CO2 24 23   BUN 16 13   CREATININE 0.6 0.6   LABGLOM >90 >90   GLUCOSE 209* 118*        Latest Reference Range & Units 10/18/24 04:12   POC HCO3 21.0 - 28.0 mmol/L 24.9   POC O2 SAT 94.0 - 98.0 % 97.5   POC pCO2 35.0 - 48.0 mm Hg 33.7 (L)   POC pH 7.350 - 7.450  7.476 (H)   POC PO2 83.0 - 108.0 mm Hg 88.2   (L): Data is abnormally low  (H): Data is abnormally high  Radiology:   Chest x-ray 10/19 reviewed  Bibasilar opacities could represent subsegmental atelectasis or infection     Chest x-ray 10/18  ET tube and enteric tube are stable.  Small bibasilar pleural effusions with  bibasilar atelectasis.  Stable appearance of the lungs.  No pneumothorax.  Median sternotomy wires. Cardiomediastinal silhouette and bony thorax are  unchanged.        Impression/recommendations:  Acute hypoxic

## 2024-10-21 ENCOUNTER — APPOINTMENT (OUTPATIENT)
Dept: GENERAL RADIOLOGY | Age: 69
End: 2024-10-21
Attending: STUDENT IN AN ORGANIZED HEALTH CARE EDUCATION/TRAINING PROGRAM
Payer: COMMERCIAL

## 2024-10-21 LAB
ALBUMIN SERPL-MCNC: 2 G/DL (ref 3.5–5.2)
ALP SERPL-CCNC: 90 U/L (ref 35–104)
ALT SERPL-CCNC: 13 U/L (ref 5–33)
ANION GAP SERPL CALCULATED.3IONS-SCNC: 7 MMOL/L (ref 9–17)
AST SERPL-CCNC: 11 U/L
BASOPHILS # BLD: 0.03 K/UL (ref 0–0.2)
BASOPHILS NFR BLD: 0 % (ref 0–2)
BILIRUB DIRECT SERPL-MCNC: <0.1 MG/DL
BILIRUB INDIRECT SERPL-MCNC: ABNORMAL MG/DL (ref 0–1)
BILIRUB SERPL-MCNC: 0.2 MG/DL (ref 0.3–1.2)
BUN SERPL-MCNC: 12 MG/DL (ref 8–23)
BUN/CREAT SERPL: 20 (ref 9–20)
CA-I BLD-SCNC: 1.07 MMOL/L (ref 1.15–1.33)
CALCIUM SERPL-MCNC: 7.1 MG/DL (ref 8.6–10.4)
CHLORIDE SERPL-SCNC: 108 MMOL/L (ref 98–107)
CO2 SERPL-SCNC: 24 MMOL/L (ref 20–31)
CREAT SERPL-MCNC: 0.6 MG/DL (ref 0.5–0.9)
EOSINOPHIL # BLD: 0.32 K/UL (ref 0–0.44)
EOSINOPHILS RELATIVE PERCENT: 4 % (ref 1–4)
ERYTHROCYTE [DISTWIDTH] IN BLOOD BY AUTOMATED COUNT: 13.6 % (ref 11.8–14.4)
GFR, ESTIMATED: >90 ML/MIN/1.73M2
GLUCOSE BLD-MCNC: 121 MG/DL (ref 65–105)
GLUCOSE BLD-MCNC: 127 MG/DL (ref 65–105)
GLUCOSE BLD-MCNC: 140 MG/DL (ref 65–105)
GLUCOSE SERPL-MCNC: 147 MG/DL (ref 70–99)
HCT VFR BLD AUTO: 26.6 % (ref 36.3–47.1)
HGB BLD-MCNC: 8.6 G/DL (ref 11.9–15.1)
IMM GRANULOCYTES # BLD AUTO: 0.22 K/UL (ref 0–0.3)
IMM GRANULOCYTES NFR BLD: 2 %
LYMPHOCYTES NFR BLD: 0.83 K/UL (ref 1.1–3.7)
LYMPHOCYTES RELATIVE PERCENT: 9 % (ref 24–43)
MAGNESIUM SERPL-MCNC: 2 MG/DL (ref 1.6–2.6)
MCH RBC QN AUTO: 29.6 PG (ref 25.2–33.5)
MCHC RBC AUTO-ENTMCNC: 32.3 G/DL (ref 28.4–34.8)
MCV RBC AUTO: 91.4 FL (ref 82.6–102.9)
MICROORGANISM SPEC CULT: NORMAL
MICROORGANISM/AGENT SPEC: NORMAL
MONOCYTES NFR BLD: 0.81 K/UL (ref 0.1–1.2)
MONOCYTES NFR BLD: 9 % (ref 3–12)
NEUTROPHILS NFR BLD: 76 % (ref 36–65)
NEUTS SEG NFR BLD: 6.93 K/UL (ref 1.5–8.1)
NRBC BLD-RTO: 0 PER 100 WBC
PHOSPHATE SERPL-MCNC: 1.8 MG/DL (ref 2.6–4.5)
PLATELET # BLD AUTO: 278 K/UL (ref 138–453)
PMV BLD AUTO: 9 FL (ref 8.1–13.5)
POTASSIUM SERPL-SCNC: 3.9 MMOL/L (ref 3.7–5.3)
PROT SERPL-MCNC: 4.6 G/DL (ref 6.4–8.3)
RBC # BLD AUTO: 2.91 M/UL (ref 3.95–5.11)
SERVICE CMNT-IMP: NORMAL
SODIUM SERPL-SCNC: 139 MMOL/L (ref 135–144)
SPECIMEN DESCRIPTION: NORMAL
TRIGL SERPL-MCNC: 127 MG/DL
WBC OTHER # BLD: 9.1 K/UL (ref 3.5–11.3)

## 2024-10-21 PROCEDURE — 80048 BASIC METABOLIC PNL TOTAL CA: CPT

## 2024-10-21 PROCEDURE — 83735 ASSAY OF MAGNESIUM: CPT

## 2024-10-21 PROCEDURE — 99233 SBSQ HOSP IP/OBS HIGH 50: CPT | Performed by: INTERNAL MEDICINE

## 2024-10-21 PROCEDURE — 99232 SBSQ HOSP IP/OBS MODERATE 35: CPT | Performed by: INTERNAL MEDICINE

## 2024-10-21 PROCEDURE — 6360000002 HC RX W HCPCS

## 2024-10-21 PROCEDURE — 82330 ASSAY OF CALCIUM: CPT

## 2024-10-21 PROCEDURE — 80076 HEPATIC FUNCTION PANEL: CPT

## 2024-10-21 PROCEDURE — 71045 X-RAY EXAM CHEST 1 VIEW: CPT

## 2024-10-21 PROCEDURE — 84100 ASSAY OF PHOSPHORUS: CPT

## 2024-10-21 PROCEDURE — 94003 VENT MGMT INPAT SUBQ DAY: CPT

## 2024-10-21 PROCEDURE — 2580000003 HC RX 258

## 2024-10-21 PROCEDURE — 2700000000 HC OXYGEN THERAPY PER DAY

## 2024-10-21 PROCEDURE — 2500000003 HC RX 250 WO HCPCS

## 2024-10-21 PROCEDURE — 94761 N-INVAS EAR/PLS OXIMETRY MLT: CPT

## 2024-10-21 PROCEDURE — 2000000000 HC ICU R&B

## 2024-10-21 PROCEDURE — 82947 ASSAY GLUCOSE BLOOD QUANT: CPT

## 2024-10-21 PROCEDURE — 85025 COMPLETE CBC W/AUTO DIFF WBC: CPT

## 2024-10-21 PROCEDURE — 84478 ASSAY OF TRIGLYCERIDES: CPT

## 2024-10-21 RX ADMIN — CEFEPIME 2000 MG: 2 INJECTION, POWDER, FOR SOLUTION INTRAVENOUS at 15:42

## 2024-10-21 RX ADMIN — METRONIDAZOLE 500 MG: 500 INJECTION, SOLUTION INTRAVENOUS at 14:07

## 2024-10-21 RX ADMIN — HYDROCORTISONE: 1 CREAM TOPICAL at 20:15

## 2024-10-21 RX ADMIN — SODIUM CHLORIDE, PRESERVATIVE FREE 10 ML: 5 INJECTION INTRAVENOUS at 09:33

## 2024-10-21 RX ADMIN — ANTI-FUNGAL POWDER MICONAZOLE NITRATE TALC FREE: 1.42 POWDER TOPICAL at 09:32

## 2024-10-21 RX ADMIN — Medication 8 MG/HR: at 16:17

## 2024-10-21 RX ADMIN — CHLORHEXIDINE GLUCONATE 15 ML: 1.2 RINSE ORAL at 20:14

## 2024-10-21 RX ADMIN — METRONIDAZOLE 500 MG: 500 INJECTION, SOLUTION INTRAVENOUS at 04:24

## 2024-10-21 RX ADMIN — ANTI-FUNGAL POWDER MICONAZOLE NITRATE TALC FREE: 1.42 POWDER TOPICAL at 20:15

## 2024-10-21 RX ADMIN — POTASSIUM CHLORIDE, DEXTROSE MONOHYDRATE AND SODIUM CHLORIDE: 150; 5; 450 INJECTION, SOLUTION INTRAVENOUS at 06:33

## 2024-10-21 RX ADMIN — HYDROCORTISONE: 1 CREAM TOPICAL at 09:39

## 2024-10-21 RX ADMIN — HYDROMORPHONE HYDROCHLORIDE 0.5 MG: 1 INJECTION, SOLUTION INTRAMUSCULAR; INTRAVENOUS; SUBCUTANEOUS at 16:50

## 2024-10-21 RX ADMIN — POTASSIUM PHOSPHATE, MONOBASIC POTASSIUM PHOSPHATE, DIBASIC: 224; 236 INJECTION, SOLUTION, CONCENTRATE INTRAVENOUS at 19:33

## 2024-10-21 RX ADMIN — CEFEPIME 2000 MG: 2 INJECTION, POWDER, FOR SOLUTION INTRAVENOUS at 06:23

## 2024-10-21 RX ADMIN — POTASSIUM CHLORIDE, DEXTROSE MONOHYDRATE AND SODIUM CHLORIDE: 150; 5; 450 INJECTION, SOLUTION INTRAVENOUS at 18:44

## 2024-10-21 RX ADMIN — CEFEPIME 2000 MG: 2 INJECTION, POWDER, FOR SOLUTION INTRAVENOUS at 22:50

## 2024-10-21 RX ADMIN — I.V. FAT EMULSION 100 ML: 20 EMULSION INTRAVENOUS at 19:39

## 2024-10-21 RX ADMIN — CHLORHEXIDINE GLUCONATE 15 ML: 1.2 RINSE ORAL at 09:44

## 2024-10-21 RX ADMIN — SODIUM CHLORIDE, PRESERVATIVE FREE 10 ML: 5 INJECTION INTRAVENOUS at 20:17

## 2024-10-21 RX ADMIN — METRONIDAZOLE 500 MG: 500 INJECTION, SOLUTION INTRAVENOUS at 20:13

## 2024-10-21 RX ADMIN — PANTOPRAZOLE SODIUM 40 MG: 40 INJECTION, POWDER, FOR SOLUTION INTRAVENOUS at 09:32

## 2024-10-21 ASSESSMENT — PULMONARY FUNCTION TESTS
PIF_VALUE: 22
PIF_VALUE: 28
PIF_VALUE: 21
PIF_VALUE: 21
PIF_VALUE: 23
PIF_VALUE: 27
PIF_VALUE: 25
PIF_VALUE: 22
PIF_VALUE: 28
PIF_VALUE: 30
PIF_VALUE: 29
PIF_VALUE: 26
PIF_VALUE: 21
PIF_VALUE: 21
PIF_VALUE: 27
PIF_VALUE: 28
PIF_VALUE: 21
PIF_VALUE: 27
PIF_VALUE: 27
PIF_VALUE: 26
PIF_VALUE: 22
PIF_VALUE: 27
PIF_VALUE: 26
PIF_VALUE: 27
PIF_VALUE: 28
PIF_VALUE: 26
PIF_VALUE: 27
PIF_VALUE: 28
PIF_VALUE: 29
PIF_VALUE: 27
PIF_VALUE: 21
PIF_VALUE: 28
PIF_VALUE: 22
PIF_VALUE: 21
PIF_VALUE: 21
PIF_VALUE: 27
PIF_VALUE: 26
PIF_VALUE: 28
PIF_VALUE: 29
PIF_VALUE: 23
PIF_VALUE: 28
PIF_VALUE: 28
PIF_VALUE: 21
PIF_VALUE: 22
PIF_VALUE: 26
PIF_VALUE: 21
PIF_VALUE: 25
PIF_VALUE: 21
PIF_VALUE: 23
PIF_VALUE: 25
PIF_VALUE: 28
PIF_VALUE: 26
PIF_VALUE: 20
PIF_VALUE: 27
PIF_VALUE: 22
PIF_VALUE: 22
PIF_VALUE: 25
PIF_VALUE: 21

## 2024-10-21 ASSESSMENT — PAIN SCALES - GENERAL
PAINLEVEL_OUTOF10: 0
PAINLEVEL_OUTOF10: 4
PAINLEVEL_OUTOF10: 0

## 2024-10-21 ASSESSMENT — PAIN DESCRIPTION - ORIENTATION: ORIENTATION: MID

## 2024-10-21 ASSESSMENT — PAIN DESCRIPTION - DESCRIPTORS: DESCRIPTORS: OTHER (COMMENT)

## 2024-10-21 ASSESSMENT — PAIN DESCRIPTION - LOCATION: LOCATION: ABDOMEN

## 2024-10-21 NOTE — PLAN OF CARE
Problem: Respiratory - Adult  Goal: Achieves optimal ventilation and oxygenation  10/21/2024 1932 by Ruby Gaston RCP  Outcome: Progressing     Problem: Respiratory - Adult  Goal: Will be able to breathe spontaneously, without ventilator support  Description: Will be able to breathe spontaneously, without ventilator support  10/21/2024 1932 by Ruby Gaston RCP  Outcome: Progressing     Problem: Respiratory - Adult  Goal: Patient's breath sounds will be clear and equal  10/21/2024 1932 by Ruby Gaston RCP  Outcome: Progressing     Problem: Respiratory - Adult  Goal: Adequate oxygenation  Description: Adequate oxygenation  10/21/2024 1932 by Ruby Gaston RCP  Outcome: Progressing

## 2024-10-21 NOTE — PROGRESS NOTES
Benjamin Almazan MD   Urology Progress Note            Subjective: Follow-up urinary retention  Patient seen in conjunction with nursing staff  Patient Vitals for the past 24 hrs:   BP Temp Temp src Pulse Resp SpO2 Weight   10/21/24 0845 116/65 -- -- 70 23 99 % --   10/21/24 0830 115/60 -- -- 68 21 100 % --   10/21/24 0815 122/66 -- -- 69 19 100 % --   10/21/24 0800 121/66 96.8 °F (36 °C) Temporal 71 21 99 % --   10/21/24 0745 116/63 -- -- 71 27 99 % --   10/21/24 0734 -- -- -- 70 29 99 % --   10/21/24 0719 -- -- -- 70 19 99 % --   10/21/24 0712 -- -- -- 70 20 99 % --   10/21/24 0700 116/62 -- -- 69 20 99 % --   10/21/24 0600 122/65 -- -- 71 20 100 % --   10/21/24 0530 117/71 -- -- 69 20 98 % --   10/21/24 0500 120/69 -- -- 69 19 98 % --   10/21/24 0459 -- -- -- -- -- -- 122.5 kg (270 lb)   10/21/24 0456 111/69 98.5 °F (36.9 °C) Oral 70 -- -- --   10/21/24 0400 119/68 -- -- 74 21 96 % --   10/21/24 0349 -- -- -- 70 19 98 % --   10/21/24 0300 110/60 -- -- 69 19 98 % --   10/21/24 0200 (!) 103/59 -- -- 68 17 96 % --   10/21/24 0100 (!) 103/58 -- -- 69 18 97 % --   10/21/24 0030 (!) 99/57 -- -- 68 18 96 % --   10/21/24 0015 (!) 97/55 -- -- 66 17 96 % --   10/21/24 0000 119/65 -- -- 71 16 100 % --   10/20/24 2345 (!) 94/53 -- -- 67 18 96 % --   10/20/24 2340 (!) 96/54 -- -- 66 17 96 % --   10/20/24 2336 (!) 96/54 97.6 °F (36.4 °C) Axillary -- -- -- --   10/20/24 2330 (!) 98/53 -- -- 66 17 97 % --   10/20/24 2310 (!) 105/57 -- -- 68 19 97 % --   10/20/24 2300 (!) 95/54 -- -- 69 17 97 % --   10/20/24 2200 (!) 93/52 -- -- 69 17 98 % --   10/20/24 2130 (!) 101/54 -- -- 71 17 97 % --   10/20/24 2121 -- -- -- -- 18 -- --   10/20/24 2110 101/60 -- -- 75 24 97 % --   10/20/24 2030 (!) 106/57 -- -- 71 20 99 % --   10/20/24 2000 107/61 -- -- 71 20 -- --   [birth year]/1956 107/61 (!) 95.9 °F (35.5 °C) Temporal 71 20 -- --   10/20/24 1900 107/60 -- -- 71 19 99 % --   10/20/24 1857 -- -- -- 70 20 97 % --

## 2024-10-21 NOTE — CARE COORDINATION
Discharge planning    Went back to OR for a washout.Abdomen still open with wound vac. Remains intubated at 30%. CPAP trialing  today. [facility] following.  has been at the bedside. Will need LTACH vs SNF placement.

## 2024-10-21 NOTE — PROGRESS NOTES
End Of Shift Note  [facility]  Summary of shift: Patient had an uneventful night. NG OP 50 ml, and a large BM overnigh.     Vitals:    Vitals:    10/21/24 0700 10/21/24 0712 10/21/24 0719 10/21/24 0734   BP: 116/62      Pulse: 69 70 70 70   Resp: 20 20 19 29   Temp:       TempSrc:       SpO2: 99% 99% 99% 99%   Weight:       Height:            I&O:   Intake/Output Summary (Last 24 hours) at 10/21/2024 0742  Last data filed at 10/21/2024 0607  Gross per 24 hour   Intake 1649.85 ml   Output 1050 ml   Net 599.85 ml       Resp Status: 30%    Ventilator Settings:  Vent Mode: (S) CPAP/PS Resp Rate (Set): 14 bpm/Vt (Set, mL): 500 mL/ /FiO2 : 30 %    Critical Care IV infusions:   dextrose 5% and 0.45% NaCl with KCl 20 mEq 90 mL/hr at 10/21/24 0633    norepinephrine 2 mcg/min (10/20/24 2301)    propofol Stopped (10/17/24 1652)    midazolam 8 mg/hr (10/20/24 2300)    dextrose      sodium chloride 50 mL/hr at 10/16/24 1623        LDA:   PICC 10/11/24 Right Brachial (Active)   Number of days: 9       NG/OG/NJ/NE Tube Orogastric 16 fr Center mouth (Active)   Number of days: 4       Urinary Catheter 10/10/24 Bar (Active)   Number of days: 10       ETT  (Active)   Number of days: 4       Incision 10/01/24 Abdomen Medial;Upper (Active)   Number of days: 19       Incision 10/16/24 Abdomen Lower;Medial (Active)   Number of days: 4

## 2024-10-21 NOTE — FLOWSHEET NOTE
10/21/24 0800   Care Plan Interventions   Remains free of injury from restraints (restraint for interference with medical device) Every 2 hours: Monitor safety, psychosocial status, comfort, nutrition and hydration

## 2024-10-21 NOTE — PROGRESS NOTES
Eyes PRN Buffkin, Luis A, DO        hydrocortisone 1 % cream   Topical BID Buffkin, Luis A, DO   Given at 10/20/24 2048    potassium chloride 20 mEq/50 mL IVPB (Central Line)  20 mEq IntraVENous PRN Buffkin, Luis A, DO 50 mL/hr at 10/19/24 0200 20 mEq at 10/19/24 0200    lidocaine PF 1 % injection 5 mL  5 mL Tracheal Tube Once Buffkin, Luis A, DO        labetalol (NORMODYNE;TRANDATE) injection 10 mg  10 mg IntraVENous Q6H PRN Buffkin, Luis A, DO   10 mg at 10/14/24 1847    sodium chloride flush 0.9 % injection 5-40 mL  5-40 mL IntraVENous 2 times per day Buffkin, Luis A, DO   10 mL at 10/20/24 0809    sodium chloride flush 0.9 % injection 10 mL  10 mL IntraVENous PRN Buffkin, Luis A, DO   10 mL at 10/15/24 2103    0.9 % sodium chloride infusion   IntraVENous PRN Buffkin, Luis A, DO 50 mL/hr at 10/16/24 1623 New Bag at 10/16/24 1623    potassium chloride (KLOR-CON M) extended release tablet 40 mEq  40 mEq Oral PRN Buffkin, Luis A, DO   40 mEq at 10/15/24 2221    Or    potassium bicarb-citric acid (EFFER-K) effervescent tablet 40 mEq  40 mEq Oral PRN Buffkin, Luis A, DO        Or    potassium chloride 10 mEq/100 mL IVPB (Peripheral Line)  10 mEq IntraVENous PRN Buffkin, Luis A,  mL/hr at 10/12/24 0613 10 mEq at 10/12/24 0613    magnesium sulfate 1000 mg in dextrose 5% 100 mL IVPB  1,000 mg IntraVENous PRN Buffkin, Luis A, DO        ondansetron (ZOFRAN) injection 4 mg  4 mg IntraVENous Q6H PRN Buffkin, Luis A, DO        pantoprazole (PROTONIX) 40 mg in sodium chloride (PF) 0.9 % 10 mL injection  40 mg IntraVENous Daily Buffkin, Luis A, DO   40 mg at 10/20/24 0808          Labs  CBC:   Recent Labs     10/19/24  0545 10/20/24  0435 10/20/24  1058 10/21/24  0630   WBC 19.3* 10.5  --  9.1   HGB 8.8* 8.2* 7.6* 8.6*    290  --  278     Chemistry:   Recent Labs     10/18/24  2230 10/19/24  0545 10/20/24  0435    138 139   K 3.4* 3.8 3.7    104 108*   CO2 25 24 23   GLUCOSE  --  209* 118*   BUN  --  16

## 2024-10-21 NOTE — PLAN OF CARE
Problem: Discharge Planning  Goal: Discharge to home or other facility with appropriate resources  Outcome: Progressing  Flowsheets (Taken 10/20/2024 2000)  Discharge to home or other facility with appropriate resources: Identify barriers to discharge with patient and caregiver     Problem: Skin/Tissue Integrity  Goal: Absence of new skin breakdown  Description: 1.  Monitor for areas of redness and/or skin breakdown  2.  Assess vascular access sites hourly  3.  Every 4-6 hours minimum:  Change oxygen saturation probe site  4.  Every 4-6 hours:  If on nasal continuous positive airway pressure, respiratory therapy assess nares and determine need for appliance change or resting period.  Outcome: Progressing     Problem: ABCDS Injury Assessment  Goal: Absence of physical injury  Outcome: Progressing     Problem: Safety - Adult  Goal: Free from fall injury  Outcome: Progressing  Flowsheets (Taken 10/21/2024 0600)  Free From Fall Injury: Instruct family/caregiver on patient safety     Problem: Metabolic/Fluid and Electrolytes - Adult  Goal: Electrolytes maintained within normal limits  Outcome: Progressing  Flowsheets (Taken 10/20/2024 2000)  Electrolytes maintained within normal limits: Monitor labs and assess patient for signs and symptoms of electrolyte imbalances  Goal: Hemodynamic stability and optimal renal function maintained  Outcome: Progressing  Flowsheets (Taken 10/20/2024 2000)  Hemodynamic stability and optimal renal function maintained:   Monitor labs and assess for signs and symptoms of volume excess or deficit   Monitor intake, output and patient weight  Goal: Glucose maintained within prescribed range  Outcome: Progressing  Flowsheets (Taken 10/20/2024 2000)  Glucose maintained within prescribed range: Monitor blood glucose as ordered     Problem: Neurosensory - Adult  Goal: Achieves stable or improved neurological status  Outcome: Progressing  Flowsheets (Taken 10/20/2024 2000)  Achieves stable or  chronic conditions and comorbid symptoms for stability, deterioration, or improvement     Problem: Safety - Medical Restraint  Goal: Remains free of injury from restraints (Restraint for Interference with Medical Device)  Description: INTERVENTIONS:  1. Determine that other, less restrictive measures have been tried or would not be effective before applying the restraint  2. Evaluate the patient's condition at the time of restraint application  3. Inform patient/family regarding the reason for restraint  4. Q2H: Monitor safety, psychosocial status, comfort, nutrition and hydration  Outcome: Progressing  Flowsheets (Taken 10/21/2024 0200)  Remains free of injury from restraints (restraint for interference with medical device):   Determine that other, less restrictive measures have been tried or would not be effective before applying the restraint   Evaluate the patient's condition at the time of restraint application

## 2024-10-21 NOTE — PROCEDURES
PROCEDURE NOTE  Date: 10/21/2024   Name: Mandie Bustamante  YOB: 1955    Procedures    Pre-operative diagnosis:  Open abdominal wound  Post-operative diagnosis:  Same  Procedure:  Abdominal wound vac change  Surgeon:  Gm Cummings M.D.  Anesthesia:  None  EBL:  Minimal  Complications:  None  Procedure: After consent from the patient's , the patient's abdominal VAC was removed.  Local inspection of the wound was performed.  There was no evidence of fibrinous debris or enteric contents at the base of the wound.  The wound was irrigated.  The underlying bowel still appeared erythematous but there was no evidence of any open fistula tracts.  A white foam was placed at the base of the wound over the bowel.  A black foam dressing was then placed over this and sealed with appropriate drapes.  The patient tolerated procedure well.  There were no immediate complications.

## 2024-10-21 NOTE — PROGRESS NOTES
[facility]   IN-PATIENT SERVICE   [facility]    Progress Note    10/21/2024    10:07 AM    Name:   Mandie Bustamante  MRN:     [id number]     Acct:      [id number]   Room:   2030/2030-01   Day:  11  Admit Date:  10/10/2024  6:11 PM    PCP:   No primary care provider on file.  Code Status:  Full Code    Subjective:     C/C: Abdominal pain    Interval History Status: not changed.     Intubated and sedated. She is on Levophed at 2 mcg/min. She is sedated on versed drip. TPN is given every MWF. Surgery plans to change wound vac later today. Was on CPAP this morning but critical care would like her to remain intubated today.    Brief History:     Patient is a 69-year-old female who recently underwent an out patient hernia repair with mesh on 10/1/2024, she presents to the ED with abdominal pain on 10/10/2024. Work up in the ED revealed severe hyponatremia of 109, leukocytosis, and  CT abdomen pelvis without contrast was suggestive of large abscess with air-fluid collection seen along the peritoneum just below the rectus muscle. There was concern about gas and fluid seen extending the previous site of hernia suggesting recurrent hernia or phlegmon. A simons was placed for urinary retention.     Surgery performed bedside US guided aspiration of fluid collection on 10/11/2024. Patient had continued leukocytosis so a CT abdomen/pelvis was ordered which revealed increased air-fluid level. Surgery took patient to OR for ex-lap, patient left open with wound vac due to distention, and remained intubated after surgery. Plans to take back to OR Friday 10/18/2024.    Back to the OR on 10/18/2024 for a washout and wound vac placement. 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 She was kept intubated post op with plans to change out wound vac on 10/21/2024.    Review of Systems:     Review of Systems   Unable to perform ROS: Intubated       Medications:      Allergies:    Allergies   Allergen Reactions    Latex Itching     Other reaction(s): Other (See Comments)   sensitivity   Added based on information entered during case entry, please review and add reactions, type, and severity as needed   Added based on information entered during case entry, please review and add reactions, type, and severity as needed    Added based on information entered during case entry, please review and add reactions, type, and severity as needed    Fentanyl Shortness Of Breath     PT STATES\" DIDN'T GIVE HER PAIN RELIEF\"    Povidone Iodine Itching     Red face and neck with cat scan med,  Betadine itching and skin flaking for 2 weeks    Clindamycin Hives, Itching and Rash    Flexeril [Cyclobenzaprine] Itching and Rash     Pt became itchy and arms and legs became red.    Sulfa Antibiotics Nausea And Vomiting and Other (See Comments)     Other Reaction(s): ABDOMINAL CRAMPS,       abdominal pain    Iodinated Contrast Media Itching    Minocycline Itching    Kenalog [Triamcinolone] Hives and Rash    Oxycodone-Acetaminophen Itching    Statins Nausea And Vomiting and Other (See Comments)       Current Meds:   Scheduled Meds:    insulin lispro  0-4 Units SubCUTAneous Q6H    metroNIDAZOLE  500 mg IntraVENous Q8H    cefepime  2,000 mg IntraVENous q8h    chlorhexidine  15 mL Mouth/Throat BID    miconazole   Topical BID    hydrocortisone   Topical BID    lidocaine PF  5 mL Tracheal Tube Once    sodium chloride flush  5-40 mL IntraVENous 2 times per day    pantoprazole (PROTONIX) 40 mg in sodium chloride (PF) 0.9 % 10 mL injection  40 mg IntraVENous Daily     Continuous Infusions:    dextrose 5% and 0.45% NaCl with KCl 20 mEq 90 mL/hr at 10/21/24 0633    norepinephrine 2 mcg/min (10/20/24 2301)    propofol Stopped (10/17/24 1652)    midazolam 8 mg/hr (10/20/24 2300)    dextrose      sodium chloride 50 mL/hr at 10/16/24 1623     PRN Meds: acetaminophen **OR** acetaminophen, midazolam, glucose, dextrose

## 2024-10-21 NOTE — PLAN OF CARE
Problem: Discharge Planning  Goal: Discharge to home or other facility with appropriate resources  10/21/2024 1424 by Ramana Balderas, RN  Outcome: Progressing     Problem: Skin/Tissue Integrity  Goal: Absence of new skin breakdown  Description: 1.  Monitor for areas of redness and/or skin breakdown  2.  Assess vascular access sites hourly  3.  Every 4-6 hours minimum:  Change oxygen saturation probe site  4.  Every 4-6 hours:  If on nasal continuous positive airway pressure, respiratory therapy assess nares and determine need for appliance change or resting period.  10/21/2024 1424 by Ramana Balderas, RN  Outcome: Progressing     Problem: ABCDS Injury Assessment  Goal: Absence of physical injury  10/21/2024 1424 by Ramana Balderas, RN  Outcome: Progressing     Problem: Safety - Adult  Goal: Free from fall injury  10/21/2024 1424 by Ramana Balderas, RN  Outcome: Progressing     Problem: Metabolic/Fluid and Electrolytes - Adult  Goal: Electrolytes maintained within normal limits  10/21/2024 1424 by Ramana Balderas, RN  Outcome: Progressing     Problem: Metabolic/Fluid and Electrolytes - Adult  Goal: Hemodynamic stability and optimal renal function maintained  10/21/2024 1424 by Ramana Balderas, RN  Outcome: Progressing     Problem: Metabolic/Fluid and Electrolytes - Adult  Goal: Glucose maintained within prescribed range  10/21/2024 1424 by Ramana Balderas, RN  Outcome: Progressing     Problem: Respiratory - Adult  Goal: Achieves optimal ventilation and oxygenation  10/21/2024 1424 by Ramana Balderas, RN  Outcome: Progressing     Problem: Neurosensory - Adult  Goal: Achieves stable or improved neurological status  10/21/2024 1424 by Ramana Balderas, RN  Outcome: Progressing     Problem: Neurosensory - Adult  Goal: Absence of seizures  10/21/2024 1424 by Ramana Balderas, RN  Outcome: Progressing     Problem: Neurosensory - Adult  Goal: Remains free of injury related to seizures activity  10/21/2024 1424 by Andrey

## 2024-10-21 NOTE — PROGRESS NOTES
Comprehensive Nutrition Assessment    Type and Reason for Visit:  Reassess    Nutrition Recommendations/Plan:   Restart TPN, MWF schedule with titered start/finish  Monitor lytes closely, patient at refeeding risk  Recommend IV thiamine for refeeding risk support  RD to follow/monitor.  Will start lipids as TG wnl.      Malnutrition Assessment:  Malnutrition Status:  Moderate malnutrition (10/17/24 1147)    Context:  Acute Illness     Findings of the 6 clinical characteristics of malnutrition:  Energy Intake:  50% or less of estimated energy requirements for 5 or more days  Weight Loss:  Unable to assess     Body Fat Loss:  Unable to assess     Muscle Mass Loss:  Unable to assess    Fluid Accumulation:  Moderate to Severe     Strength:       Nutrition Assessment:    Patient continues NPO, s/p surgery on the 1st, 19th and 21st of this month. She remains intubated, no longer on pressors. On D5. TPN ran on Friday night and will restart today for MWF schedule. NG OP 50 ml, large BM this morning, abdomen soft. Wound vac in place. Urology and surgery following. Labs, meds, PMH reviewed.    Nutrition Related Findings:    LBM 10/20; +2 UE and LE edema, skin intact. Wound Type: Surgical Incision       Current Nutrition Intake & Therapies:    Average Meal Intake: NPO  Average Supplements Intake: NPO  Current Parenteral Nutrition Orders:  Type and Formula: Premix Central   Lipids: 100ml  Duration: Continuous  Rate/Volume: 1200 ml  Current PN Order Provides: 1200 ml, 1256 calories with 100 ml lipids; 240 gm dextrose, 60 gm protein  Goal PN Orders Provides: 250 ml lipids (500 kcal), PN at 65 ml/hr (1560 ml): 1373 kcal, 78 gm protein, 312 gm dextrose (total 1873 kcal with lipids)    Anthropometric Measures:  Height: 160 cm (5' 3\")  Ideal Body Weight (IBW): 115 lbs (52 kg)       Current Body Weight: 122.5 kg (270 lb), 245.4 % IBW. Weight Source: Bed Scale  Current BMI (kg/m2): 47.8                          BMI Categories:

## 2024-10-21 NOTE — PROGRESS NOTES
Physical Therapy  DATE: 10/21/2024    NAME: Mandie Bustamante  MRN: [id number]   : 1955    Patient not seen this date for Physical Therapy due to:      [] Cancel by RN or physician due to:    [x] Pt remains intubated & sedated on vent    [] Critical Lab Value Level     [] Blood transfusion in progress    [] Acute or unstable cardiovascular status   _MAP < 55 or more than >115  _HR < 40 or > 130    [] Acute or unstable pulmonary status   -FiO2 > 60%   _RR < 5 or >40    _O2 sats < 85%    [] Strict Bedrest    [] Off Unit for surgery or procedure    [] Off Unit for testing       [] Pending imaging to R/O fracture    [] Refusal by Patient      [] Other      [] PT being discontinued at this time. Patient independent. No further needs.     [] PT being discontinued at this time as the patient has been transferred to hospice care. No further needs.      PEGGY GREER, PT

## 2024-10-21 NOTE — PROGRESS NOTES
Pulmonary Critical Care Progress Note    Patient seen for the follow up of Hyponatremia     Subjective:    She is intubated on Versed 8 mg.  She is on CPAP at 30% FiO2 required pressure support of 14 and having increases with rate around 30/min.  She still requires Levophed at 2 mcg/min.  She had been receiving Dilaudid rarely.  She wakes up follows commands.  She has been having adequate urine output.   She has no fever    Examination:    Vitals: BP (!) 101/56   Pulse 67   Temp 96.8 °F (36 °C) (Temporal)   Resp 17   Ht 1.6 m (5' 3\")   Wt 122.5 kg (270 lb)   SpO2 100%   BMI 47.83 kg/m²   SpO2  Av.2 %  Min: 96 %  Max: 100 %  General appearance: Intubated sedated  Neck: No JVD  Lungs: Decreased breath sound no crackles or wheezes  Heart: regular rate and rhythm, S1, S2 normal, no gallop  Abdomen: Soft, non tender, + BS VAC in place evidence of fecal material in container  Extremities: no cyanosis or clubbing. No significant edema    LABs:    CBC:   Recent Labs     10/20/24  0435 10/20/24  1058 10/21/24  0630   WBC 10.5  --  9.1   HGB 8.2* 7.6* 8.6*   HCT 24.8* 23.2* 26.6*     --  278     BMP:   Recent Labs     10/20/24  0435 10/21/24  0630    139   K 3.7 3.9   CO2 23 24   BUN 13 12   CREATININE 0.6 0.6   LABGLOM >90 >90   GLUCOSE 118* 147*        Latest Reference Range & Units 10/18/24 04:12   POC HCO3 21.0 - 28.0 mmol/L 24.9   POC O2 SAT 94.0 - 98.0 % 97.5   POC pCO2 35.0 - 48.0 mm Hg 33.7 (L)   POC pH 7.350 - 7.450  7.476 (H)   POC PO2 83.0 - 108.0 mm Hg 88.2   (L): Data is abnormally low  (H): Data is abnormally high  Radiology:  Chest x-ray 10/21  1. Improved aeration of the left lung with persistent hazy opacities  overlying the right lung.  2. Suspected trace bilateral pleural effusions.          Chest x-ray 10/19 reviewed        Impression/recommendations:  Acute hypoxic respiratory insufficiency /atelectasis  Continue assist-control ventilator tidal volume 500 rate of 14 PEEP of 8 FiO2  30%  Sedation with Versed RASS -2 wean off propofol for hypertriglyceridemia  Dilaudid as needed for pain/history of reaction to fentanyl and side effect of morphine    noted surgery  request to keep patient on the vent till Monday for VAC change  Okay for CPAP trial today    Status post exploratory laparotomy with removal of mesh and wound VAC application/history of cholecystectomy  Surgery following/wound care  second exploration today  N.p.o./TPN at 3 times a week given shortage of IV fluids per [facility] policy     Decreased hemoglobin   Monitor hemoglobin hematocrit/repeat now check coags  Hold Lovenox subcu to hemoglobin stable    sepsis/septic shock/abdominal abscess  Off Zosyn/cefepime Flagyl IV check blood cultures  Wean off Levophed to MAP above 65  ID on consult  IV fluid total 100 mL/h     Status post SHAI/urinary retention with history of suburethral sling  Nephrology on consult/on Lasix 20 IV twice daily hold for hypotension  Urology on consult      Obesity suspected sleep apnea   Outpatient Sleep evaluation    discussed with RN and RT    Peptic ulcer disease prophylaxis Protonix  DVT prophylaxis EPC        David Coyne MD, MD, [facility]  Pulmonary Critical Care and Sleep Medicine,  [facility]  Cell: [phone]  Office: [phone]

## 2024-10-22 ENCOUNTER — APPOINTMENT (OUTPATIENT)
Dept: GENERAL RADIOLOGY | Age: 69
End: 2024-10-22
Attending: STUDENT IN AN ORGANIZED HEALTH CARE EDUCATION/TRAINING PROGRAM
Payer: COMMERCIAL

## 2024-10-22 LAB
ANION GAP SERPL CALCULATED.3IONS-SCNC: 11 MMOL/L (ref 9–17)
BUN SERPL-MCNC: 10 MG/DL (ref 8–23)
BUN/CREAT SERPL: 17 (ref 9–20)
CALCIUM SERPL-MCNC: 5.8 MG/DL (ref 8.6–10.4)
CHLORIDE SERPL-SCNC: 110 MMOL/L (ref 98–107)
CO2 SERPL-SCNC: 19 MMOL/L (ref 20–31)
CREAT SERPL-MCNC: 0.6 MG/DL (ref 0.5–0.9)
GFR, ESTIMATED: >90 ML/MIN/1.73M2
GLUCOSE BLD-MCNC: 135 MG/DL (ref 65–105)
GLUCOSE BLD-MCNC: 159 MG/DL (ref 65–105)
GLUCOSE BLD-MCNC: 165 MG/DL (ref 65–105)
GLUCOSE BLD-MCNC: 166 MG/DL (ref 65–105)
GLUCOSE SERPL-MCNC: 150 MG/DL (ref 70–99)
MAGNESIUM SERPL-MCNC: 1.6 MG/DL (ref 1.6–2.6)
PHOSPHATE SERPL-MCNC: 3.5 MG/DL (ref 2.6–4.5)
POTASSIUM SERPL-SCNC: 3.3 MMOL/L (ref 3.7–5.3)
SODIUM SERPL-SCNC: 140 MMOL/L (ref 135–144)

## 2024-10-22 PROCEDURE — 2500000003 HC RX 250 WO HCPCS

## 2024-10-22 PROCEDURE — 6360000002 HC RX W HCPCS

## 2024-10-22 PROCEDURE — 71045 X-RAY EXAM CHEST 1 VIEW: CPT

## 2024-10-22 PROCEDURE — 2580000003 HC RX 258

## 2024-10-22 PROCEDURE — 94003 VENT MGMT INPAT SUBQ DAY: CPT

## 2024-10-22 PROCEDURE — 6360000002 HC RX W HCPCS: Performed by: NURSE PRACTITIONER

## 2024-10-22 PROCEDURE — 2580000003 HC RX 258: Performed by: INTERNAL MEDICINE

## 2024-10-22 PROCEDURE — 83735 ASSAY OF MAGNESIUM: CPT

## 2024-10-22 PROCEDURE — 82947 ASSAY GLUCOSE BLOOD QUANT: CPT

## 2024-10-22 PROCEDURE — 94761 N-INVAS EAR/PLS OXIMETRY MLT: CPT

## 2024-10-22 PROCEDURE — 84100 ASSAY OF PHOSPHORUS: CPT

## 2024-10-22 PROCEDURE — 2000000000 HC ICU R&B

## 2024-10-22 PROCEDURE — 2700000000 HC OXYGEN THERAPY PER DAY

## 2024-10-22 PROCEDURE — 2500000003 HC RX 250 WO HCPCS: Performed by: INTERNAL MEDICINE

## 2024-10-22 PROCEDURE — 80048 BASIC METABOLIC PNL TOTAL CA: CPT

## 2024-10-22 PROCEDURE — 99233 SBSQ HOSP IP/OBS HIGH 50: CPT | Performed by: INTERNAL MEDICINE

## 2024-10-22 PROCEDURE — 99232 SBSQ HOSP IP/OBS MODERATE 35: CPT | Performed by: INTERNAL MEDICINE

## 2024-10-22 PROCEDURE — 6360000002 HC RX W HCPCS: Performed by: INTERNAL MEDICINE

## 2024-10-22 RX ORDER — FUROSEMIDE 10 MG/ML
40 INJECTION INTRAMUSCULAR; INTRAVENOUS ONCE
Status: COMPLETED | OUTPATIENT
Start: 2024-10-22 | End: 2024-10-22

## 2024-10-22 RX ORDER — CALCIUM GLUCONATE 20 MG/ML
2000 INJECTION, SOLUTION INTRAVENOUS ONCE
Status: COMPLETED | OUTPATIENT
Start: 2024-10-22 | End: 2024-10-22

## 2024-10-22 RX ADMIN — FUROSEMIDE 40 MG: 10 INJECTION, SOLUTION INTRAMUSCULAR; INTRAVENOUS at 11:34

## 2024-10-22 RX ADMIN — POTASSIUM CHLORIDE 10 MEQ: 7.46 INJECTION, SOLUTION INTRAVENOUS at 09:55

## 2024-10-22 RX ADMIN — I.V. FAT EMULSION 100 ML: 20 EMULSION INTRAVENOUS at 19:07

## 2024-10-22 RX ADMIN — CALCIUM GLUCONATE 2000 MG: 20 INJECTION, SOLUTION INTRAVENOUS at 07:45

## 2024-10-22 RX ADMIN — SODIUM CHLORIDE, PRESERVATIVE FREE 10 ML: 5 INJECTION INTRAVENOUS at 20:25

## 2024-10-22 RX ADMIN — CALCIUM GLUCONATE: 98 INJECTION, SOLUTION INTRAVENOUS at 22:51

## 2024-10-22 RX ADMIN — POTASSIUM CHLORIDE 10 MEQ: 7.46 INJECTION, SOLUTION INTRAVENOUS at 08:38

## 2024-10-22 RX ADMIN — METRONIDAZOLE 500 MG: 500 INJECTION, SOLUTION INTRAVENOUS at 03:27

## 2024-10-22 RX ADMIN — NOREPINEPHRINE BITARTRATE 5 MCG/MIN: 0.06 INJECTION, SOLUTION INTRAVENOUS at 16:22

## 2024-10-22 RX ADMIN — METRONIDAZOLE 500 MG: 500 INJECTION, SOLUTION INTRAVENOUS at 12:32

## 2024-10-22 RX ADMIN — POTASSIUM CHLORIDE 10 MEQ: 7.46 INJECTION, SOLUTION INTRAVENOUS at 08:04

## 2024-10-22 RX ADMIN — DEXMEDETOMIDINE 0.4 MCG/KG/HR: 100 INJECTION, SOLUTION INTRAVENOUS at 22:29

## 2024-10-22 RX ADMIN — DEXMEDETOMIDINE 0.2 MCG/KG/HR: 100 INJECTION, SOLUTION INTRAVENOUS at 14:09

## 2024-10-22 RX ADMIN — ANTI-FUNGAL POWDER MICONAZOLE NITRATE TALC FREE: 1.42 POWDER TOPICAL at 20:24

## 2024-10-22 RX ADMIN — MAGNESIUM SULFATE HEPTAHYDRATE 1000 MG: 1 INJECTION, SOLUTION INTRAVENOUS at 07:29

## 2024-10-22 RX ADMIN — Medication 6 MG/HR: at 11:15

## 2024-10-22 RX ADMIN — CEFEPIME 2000 MG: 2 INJECTION, POWDER, FOR SOLUTION INTRAVENOUS at 05:34

## 2024-10-22 RX ADMIN — HYDROCORTISONE: 1 CREAM TOPICAL at 20:24

## 2024-10-22 RX ADMIN — HYDROCORTISONE: 1 CREAM TOPICAL at 09:26

## 2024-10-22 RX ADMIN — CEFEPIME 2000 MG: 2 INJECTION, POWDER, FOR SOLUTION INTRAVENOUS at 15:45

## 2024-10-22 RX ADMIN — MAGNESIUM SULFATE HEPTAHYDRATE 1000 MG: 1 INJECTION, SOLUTION INTRAVENOUS at 06:27

## 2024-10-22 RX ADMIN — METRONIDAZOLE 500 MG: 500 INJECTION, SOLUTION INTRAVENOUS at 20:23

## 2024-10-22 RX ADMIN — POTASSIUM CHLORIDE 10 MEQ: 7.46 INJECTION, SOLUTION INTRAVENOUS at 09:03

## 2024-10-22 RX ADMIN — CHLORHEXIDINE GLUCONATE 15 ML: 1.2 RINSE ORAL at 10:39

## 2024-10-22 RX ADMIN — CEFEPIME 2000 MG: 2 INJECTION, POWDER, FOR SOLUTION INTRAVENOUS at 22:33

## 2024-10-22 RX ADMIN — CHLORHEXIDINE GLUCONATE 15 ML: 1.2 RINSE ORAL at 20:23

## 2024-10-22 RX ADMIN — ANTI-FUNGAL POWDER MICONAZOLE NITRATE TALC FREE: 1.42 POWDER TOPICAL at 09:26

## 2024-10-22 RX ADMIN — PANTOPRAZOLE SODIUM 40 MG: 40 INJECTION, POWDER, FOR SOLUTION INTRAVENOUS at 09:25

## 2024-10-22 RX ADMIN — SODIUM CHLORIDE, PRESERVATIVE FREE 10 ML: 5 INJECTION INTRAVENOUS at 09:27

## 2024-10-22 ASSESSMENT — PULMONARY FUNCTION TESTS
PIF_VALUE: 22
PIF_VALUE: 25
PIF_VALUE: 22
PIF_VALUE: 27
PIF_VALUE: 22
PIF_VALUE: 27
PIF_VALUE: 26
PIF_VALUE: 22
PIF_VALUE: 28
PIF_VALUE: 22
PIF_VALUE: 25
PIF_VALUE: 26
PIF_VALUE: 22
PIF_VALUE: 22
PIF_VALUE: 27
PIF_VALUE: 22
PIF_VALUE: 25
PIF_VALUE: 22
PIF_VALUE: 25
PIF_VALUE: 22
PIF_VALUE: 22
PIF_VALUE: 27
PIF_VALUE: 22
PIF_VALUE: 27
PIF_VALUE: 22
PIF_VALUE: 26
PIF_VALUE: 25
PIF_VALUE: 27
PIF_VALUE: 27
PIF_VALUE: 26
PIF_VALUE: 22
PIF_VALUE: 28
PIF_VALUE: 22
PIF_VALUE: 34
PIF_VALUE: 22
PIF_VALUE: 22
PIF_VALUE: 28
PIF_VALUE: 22
PIF_VALUE: 28
PIF_VALUE: 27
PIF_VALUE: 28
PIF_VALUE: 26
PIF_VALUE: 27
PIF_VALUE: 21

## 2024-10-22 ASSESSMENT — PAIN SCALES - GENERAL
PAINLEVEL_OUTOF10: 0

## 2024-10-22 NOTE — PROGRESS NOTES
OUT, WOUND VAC PLACEMENT performed by Gm Cummings MD at [facility] OR    PARATHYROID GLAND SURGERY  05/03/2023    Ectopic Parathyroid gland from sternum Mini sternotomy    PARATHYROIDECTOMY  05/2022    SHOULDER ARTHROSCOPY Right 08/2011    SHOULDER ARTHROSCOPY Right 04/2012    SHOULDER ARTHROSCOPY Left 05/2012    SHOULDER SURGERY Right 10/2000    arthroscopy/bone spur    TOTAL VAGINAL HYSTERECTOMY  1986    Partial 1981    UMBILICAL HERNIA REPAIR      UPPER GASTROINTESTINAL ENDOSCOPY  2014    URETHRA SURGERY  1986    Repair    VENTRAL HERNIA REPAIR N/A 10/1/2024    ROBOTIC RECURRENT INCISIONAL HERNIA REPAIR with mesh performed by Gm Cummings MD at [facility] OR       Medications:      fat emulsion  100 mL IntraVENous Daily    insulin lispro  0-4 Units SubCUTAneous Q6H    metroNIDAZOLE  500 mg IntraVENous Q8H    cefepime  2,000 mg IntraVENous q8h    chlorhexidine  15 mL Mouth/Throat BID    miconazole   Topical BID    hydrocortisone   Topical BID    lidocaine PF  5 mL Tracheal Tube Once    sodium chloride flush  5-40 mL IntraVENous 2 times per day    pantoprazole (PROTONIX) 40 mg in sodium chloride (PF) 0.9 % 10 mL injection  40 mg IntraVENous Daily       Social History:     Social History     Socioeconomic History    Marital status:      Spouse name: Not on file    Number of children: Not on file    Years of education: Not on file    Highest education level: Not on file   Occupational History    Not on file   Tobacco Use    Smoking status: Never    Smokeless tobacco: Never   Vaping Use    Vaping status: Never Used   Substance and Sexual Activity    Alcohol use: Yes     Alcohol/week: 1.0 standard drink of alcohol     Types: 1 Shots of liquor per week     Comment: social    Drug use: Never    Sexual activity: Yes     Partners: Male   Other Topics Concern    Not on file   Social History Narrative    Not on file     Social Determinants of Health     Financial Resource Strain: Low Risk  (7/3/2024)    Overall Financial  Mother     Alzheimer's Disease Mother     Migraines Mother     Neuropathy Mother     Cancer Mother     Obesity Mother     High Cholesterol Father     High Blood Pressure Father     Heart Disease Father     Migraines Father     Diabetes Father     Arthritis Father     Hearing Loss Father     Stroke Father     Heart Attack Father     Rheum Arthritis Father     Migraines Sister     Diabetes Sister     Arthritis Sister         N/A    Rheum Arthritis Sister     Diabetes Brother     Arthritis Brother     Tuberculosis Maternal Grandmother     Tuberculosis Maternal Grandfather     Diabetes Paternal Grandmother     Obesity Paternal Grandmother     Obesity Maternal Aunt         N/A    Obesity Paternal Aunt       Medical Decision Making:   I have independently reviewed/ordered the following labs:    CBC with Differential:   Recent Labs     10/20/24  0435 10/20/24  1058 10/21/24  0630   WBC 10.5  --  9.1   HGB 8.2* 7.6* 8.6*   HCT 24.8* 23.2* 26.6*     --  278   LYMPHOPCT 8*  --  9*   MONOPCT 6  --  9   EOSPCT 2  --  4     BMP:  Recent Labs     10/20/24  0435 10/21/24  0630    139   K 3.7 3.9   * 108*   CO2 23 24   BUN 13 12   CREATININE 0.6 0.6   MG 2.0 2.0     Hepatic Function Panel:   Recent Labs     10/20/24  0435 10/21/24  0630   BILIDIR 0.1 <0.1   IBILI 0.1 Can not be calculated   BILITOT 0.2* 0.2*   ALKPHOS 101 90   ALT 15 13   AST 14 11     No results for input(s): \"RPR\" in the last 72 hours.  No results for input(s): \"HIV\" in the last 72 hours.  No results for input(s): \"BC\" in the last 72 hours.  Lab Results   Component Value Date/Time    CREATININE 0.6 10/21/2024 06:30 AM    GLUCOSE 147 10/21/2024 06:30 AM       Detailed results:        Thank you for allowing us to participate in the care of this patient.Please call with questions.    This note is created with the assistance of a speech recognition program.  While intending to generate adocument that actually reflects the content of the visit, the

## 2024-10-22 NOTE — PROGRESS NOTES
End Of Shift Note  [facility]  Summary of shift: Patient was started on TPN and lipids at beginning of shift and tolerated well. D5% and 0.45% NaCl with Kcl 20 mEq  Patient remains intubated on Versed. Levophed has been weaned off and patient is tolerating with MAPs around 70's. Patient received CHG bath around 0500. Wound vac remains in place and is to be changed Thursday. Wound vac is having adequate output.     Vitals:    Vitals:    10/22/24 0348 10/22/24 0400 10/22/24 0500 10/22/24 0534   BP: (!) 102/50 (!) 104/52 (!) 99/47    Pulse: 85 87 84    Resp: 24 25 25    Temp:  97.9 °F (36.6 °C)     TempSrc:  Temporal     SpO2: 96% 96% 95%    Weight:    121.9 kg (268 lb 12.8 oz)   Height:            I&O:   Intake/Output Summary (Last 24 hours) at 10/22/2024 0539  Last data filed at 10/22/2024 0505  Gross per 24 hour   Intake 3436.19 ml   Output 1825 ml   Net 1611.19 ml       Resp Status: ETT    Ventilator Settings:  Vent Mode: AC/PRVC Resp Rate (Set): 14 bpm/Vt (Set, mL): 500 mL/ /FiO2 : 30 %    Critical Care IV infusions:   PN-Adult 2-in-1 Central Line (Custom) 50 mL/hr at 10/21/24 2250    dextrose 5% and 0.45% NaCl with KCl 20 mEq Stopped (10/21/24 1933)    norepinephrine Stopped (10/21/24 2246)    propofol Stopped (10/17/24 1652)    midazolam 7 mg/hr (10/22/24 0501)    dextrose      sodium chloride 50 mL/hr at 10/16/24 1623        LDA:   PICC 10/11/24 Right Brachial (Active)   Number of days: 10       NG/OG/NJ/NE Tube Orogastric 16 fr Center mouth (Active)   Number of days: 5       Urinary Catheter 10/10/24 Bar (Active)   Number of days: 11       ETT  (Active)   Number of days: 5       Incision 10/01/24 Abdomen Medial;Upper (Active)   Number of days: 20       Incision 10/16/24 Abdomen Lower;Medial (Active)   Number of days: 5

## 2024-10-22 NOTE — FLOWSHEET NOTE
10/22/24 1130   Treatment Team Notification   Reason for Communication Review case   Name of Team Member Notified Dr. Cardozo   Treatment Team Role Consulting Provider   Method of Communication Face to face   Response At bedside

## 2024-10-22 NOTE — PROGRESS NOTES
Comprehensive Nutrition Assessment    Type and Reason for Visit:  Reassess    Nutrition Recommendations/Plan:   Received call from pharmacy that it is okay to continue pts TPN today.  Increasing calcium, potassium, and acetate in PN  Monitor lytes closely and replete as needed  Recommend IV thiamine  Patient still a refeeding risk, macros are within limits for refeeding  ? Long term plan for use of gut? Recommend merging to enteral feeds when medically appropriate.  RD to follow/monitor.      Malnutrition Assessment:  Malnutrition Status:  Moderate malnutrition (10/17/24 1147)    Context:  Acute Illness     Findings of the 6 clinical characteristics of malnutrition:  Energy Intake:  50% or less of estimated energy requirements for 5 or more days  Weight Loss:  Unable to assess     Body Fat Loss:  Unable to assess     Muscle Mass Loss:  Unable to assess    Fluid Accumulation:  Moderate to Severe     Strength:       Nutrition Assessment:    Patient continues NPO, s/p surgery on the 1st, 19th and 21st of this month; remains intubated, on precedex. D5 has been stopped per nursing. TPN running and pharmacy has approved TPN to run tonight as well. Wound vac in place. Patient with BM today, abdomen soft. Urology and surgery following.    Nutrition Related Findings:    LBM 10/22, +3 UE and +2 LE edema, skin intact. Wound Type: Surgical Incision       Current Nutrition Intake & Therapies:    Average Meal Intake: NPO  Average Supplements Intake: NPO  Current Parenteral Nutrition Orders:  Type and Formula: Premix Central   Lipids: 100ml  Duration: Continuous  Rate/Volume: 1560 ml  Current PN Order Provides: 1560 ml, 78 gm protein, 312 g dextrose, 100 ml lipids (200 calories) = 1573 calories (13 kcal/kg)  Goal PN Orders Provides: 250 ml lipids (500 kcal), PN at 65 ml/hr (1560 ml): 1373 kcal, 78 gm protein, 312 gm dextrose (total 1873 kcal with lipids)    Anthropometric Measures:  Height: 160 cm (5' 3\")  Ideal Body Weight  (IBW): 115 lbs (52 kg)       Current Body Weight: 121.6 kg (268 lb), 245.4 % IBW. Weight Source: Bed Scale  Current BMI (kg/m2): 47.5                          BMI Categories: Obese Class 3 (BMI 40.0 or greater)    Estimated Daily Nutrient Needs:  Energy Requirements Based On: Kcal/kg  Weight Used for Energy Requirements: Current  Energy (kcal/day): 6419-2633 kcal (12-15 kcal/kg)  Weight Used for Protein Requirements: Ideal  Protein (g/day): 67-83 gm of protein (1.3-1.6 gm/kg)  Method Used for Fluid Requirements: 1 ml/kcal  Fluid (ml/day): 8733-9744 mL    Nutrition Diagnosis:   Inadequate oral intake related to inadequate protein-energy intake as evidenced by NPO or clear liquid status due to medical condition, poor intake prior to admission, GI abnormality, nausea    Nutrition Interventions:   Food and/or Nutrient Delivery: Continue NPO  Nutrition Education/Counseling: Education not indicated  Coordination of Nutrition Care: Continue to monitor while inpatient       Goals:     Goals: Initiate PO diet, within 2 days       Nutrition Monitoring and Evaluation:      Food/Nutrient Intake Outcomes: Diet Advancement/Tolerance  Physical Signs/Symptoms Outcomes: Biochemical Data, Fluid Status or Edema, Skin, Weight, GI Status    Discharge Planning:    Too soon to determine     Gill Carter RD  Contact: [phone]

## 2024-10-22 NOTE — PLAN OF CARE
Problem: Respiratory - Adult  Goal: Achieves optimal ventilation and oxygenation  10/22/2024 0720 by Lisa Lopez RCP  Outcome: Progressing  Flowsheets  Taken 10/22/2024 0348 by Ruby Gaston RCP  Achieves optimal ventilation and oxygenation:   Assess for changes in respiratory status   Oxygen supplementation based on oxygen saturation or arterial blood gases   Assess the need for suctioning and aspirate as needed   Respiratory therapy support as indicated  Taken 10/22/2024 0007 by Ruby Gaston RCP  Achieves optimal ventilation and oxygenation:   Assess for changes in respiratory status   Oxygen supplementation based on oxygen saturation or arterial blood gases   Assess the need for suctioning and aspirate as needed   Respiratory therapy support as indicated     Problem: Respiratory - Adult  Goal: Will be able to breathe spontaneously, without ventilator support  Description: Will be able to breathe spontaneously, without ventilator support  10/22/2024 0720 by Lisa Lopez RCP  Outcome: Progressing     Problem: Respiratory - Adult  Goal: Patient's breath sounds will be clear and equal  10/22/2024 0720 by Lisa Lopez RCP  Outcome: Progressing     Problem: Respiratory - Adult  Goal: Adequate oxygenation  Description: Adequate oxygenation  10/22/2024 0720 by Lisa Lopez RCP  Outcome: Progressing

## 2024-10-22 NOTE — FLOWSHEET NOTE
10/22/24 0800   Care Plan Interventions   Remains free of injury from restraints (restraint for interference with medical device) Every 2 hours: Monitor safety, psychosocial status, comfort, nutrition and hydration

## 2024-10-22 NOTE — PROGRESS NOTES
Pulmonary Critical Care Progress Note    Patient seen for the follow up of Hyponatremia     Subjective:    She is intubated on Versed 6 mg.  She is on CPAP at 30% FiO2 required pressure support of 14 and having increases with rate around 28min.  She is weaned off Levophed she had been receiving Dilaudid rarely.  She wakes up follows commands.  She has been having adequate urine output.   She has no fever    Examination:    Vitals: BP 99/61   Pulse 75   Temp 96.8 °F (36 °C) (Temporal)   Resp 23   Ht 1.6 m (5' 3\")   Wt 121.9 kg (268 lb 12.8 oz)   SpO2 96%   BMI 47.62 kg/m²   SpO2  Av.9 %  Min: 95 %  Max: 100 %  General appearance: Intubated sedated  Neck: No JVD  Lungs: Decreased breath sound no crackles or wheezes  Heart: regular rate and rhythm, S1, S2 normal, no gallop  Abdomen: Soft, non tender, + BS VAC in place evidence of fecal material in container  Extremities: no cyanosis or clubbing.  1+ edema    LABs:    CBC:   Recent Labs     10/20/24  0435 10/20/24  1058 10/21/24  0630   WBC 10.5  --  9.1   HGB 8.2* 7.6* 8.6*   HCT 24.8* 23.2* 26.6*     --  278     BMP:   Recent Labs     10/21/24  0630 10/22/24  0417    140   K 3.9 3.3*   CO2 24 19*   BUN 12 10   CREATININE 0.6 0.6   LABGLOM >90 >90   GLUCOSE 147* 150*        Latest Reference Range & Units 10/18/24 04:12   POC HCO3 21.0 - 28.0 mmol/L 24.9   POC O2 SAT 94.0 - 98.0 % 97.5   POC pCO2 35.0 - 48.0 mm Hg 33.7 (L)   POC pH 7.350 - 7.450  7.476 (H)   POC PO2 83.0 - 108.0 mm Hg 88.2   (L): Data is abnormally low  (H): Data is abnormally high  Radiology:  Chest x-ray 10/22  Slight worsening pleural effusion with bibasilar airspace opacities likely  related atelectasis.      Chest x-ray 10/21  1. Improved aeration of the left lung with persistent hazy opacities  overlying the right lung.  2. Suspected trace bilateral pleural effusions.          Chest x-ray 10/19 reviewed        Impression/recommendations:  Acute hypoxic respiratory

## 2024-10-22 NOTE — PROGRESS NOTES
[facility]  General Surgery  Consult Progress Note    Service date: 10/22/2024, 6:25 AM  Room : 2030/2030-01   Name: Mandie Bustamante  MRN: [id number]    Length of stay: 12 day(s)    OR dates:   10/1/2024: POD#21:Robotic assisted laparoscopic mesh hernioplasty  10/17/2024: POD#6: Diagnostic laparoscopy with conversion to exploratory laparotomy and explantation of mesh with wound VAC application and abdominal wash out.   10/19/2024: POD#3: Exploratory laparotomy with abdominal washout and wound VAC placement.    Subjective     Patient seen at bedside, still intubated and sedated.      Levophed was discontinued yesterday since patient was maintaining her maps above 65 on her own.    Wound VAC changed at bedside yesterday with white foam and black foam dressing.  No feculent discharge was noted at the time of change of dressing.  Abdomen is soft on examination today prior.    Had a bowel movement yesterday  Patient was following commands on SBT.  CPAP trial was given yesterday    Objective   Physical exam: /60   Pulse 79   Temp 97.9 °F (36.6 °C) (Temporal)   Resp 23   Ht 1.6 m (5' 3\")   Wt 121.9 kg (268 lb 12.8 oz)   SpO2 95%   BMI 47.62 kg/m²     General: Intubated and sedated.    Neuro: Sedated   Abdomen: Soft, no grimace to palpation, wound VAC in place, mild erythema around wound vac site seem to have extended along the borders of Tegaderm.  Feculent output in the wound VAC.   Chest: Non-labored, symmetrical respirations.   CVS: Pulse RRR, on 5 of Levophed       Date 10/22/24 0000 - 10/22/24 2359   Shift 0593-8813 [id number] 1427-1942 24 Hour Total   INTAKE   IV Piggyback(mL/kg) 206.9(1.7)   206.9(1.7)   TPN(mL/kg) 403.5(3.3)   403.5(3.3)   Shift Total(mL/kg) 610.4(5)   610.4(5)   OUTPUT   Urine(mL/kg/hr) 500   500   Shift Total(mL/kg) 500(4.1)   500(4.1)   Weight (kg) 121.9 121.9 121.9 121.9           Medications:    Current Facility-Administered Medications   Medication Dose Route Frequency

## 2024-10-22 NOTE — PROGRESS NOTES
Benjamin Almazan MD   Urology Progress Note            Subjective:  follow-up urinary retention neurogenic bladder    Patient Vitals for the past 24 hrs:   BP Temp Temp src Pulse Resp SpO2 Weight   10/22/24 0534 -- -- -- -- -- -- 121.9 kg (268 lb 12.8 oz)   10/22/24 0500 (!) 99/47 -- -- 84 25 95 % --   10/22/24 0400 (!) 104/52 97.9 °F (36.6 °C) Temporal 87 25 96 % --   10/22/24 0348 (!) 102/50 -- -- 85 24 96 % --   10/22/24 0300 (!) 105/52 -- -- 87 23 96 % --   10/22/24 0200 (!) 109/54 -- -- 86 23 96 % --   10/22/24 0100 (!) 111/56 -- -- 88 24 96 % --   10/22/24 0007 -- -- -- 85 24 98 % --   10/22/24 0000 (!) 116/55 97.8 °F (36.6 °C) Temporal 86 24 -- --   10/21/24 2300 (!) 116/58 -- -- 81 24 -- --   10/21/24 2200 (!) 104/54 -- -- 79 21 -- --   10/21/24 2100 116/68 -- -- 76 22 -- --   10/21/24 2000 123/73 97.8 °F (36.6 °C) Temporal 79 21 99 % --   10/21/24 1952 -- -- -- 79 23 99 % --   10/21/24 1900 106/60 -- -- 70 17 -- --   10/21/24 1830 (!) 98/53 -- -- 68 18 99 % --   10/21/24 1800 (!) 97/52 -- -- 68 16 100 % --   10/21/24 1745 (!) 95/53 -- -- 68 17 99 % --   10/21/24 1730 (!) 106/57 -- -- 69 16 -- --   10/21/24 1715 (!) 98/57 -- -- 68 18 99 % --   10/21/24 1700 (!) 97/56 -- -- 67 17 -- --   10/21/24 1650 -- -- -- -- 18 -- --   10/21/24 1645 (!) 94/56 -- -- 71 21 96 % --   10/21/24 1630 (!) 164/83 -- -- 66 22 100 % --   10/21/24 1615 121/66 -- -- 70 22 99 % --   10/21/24 1607 -- -- -- 72 26 99 % --   10/21/24 1600 110/61 96.8 °F (36 °C) Temporal 72 26 99 % --   10/21/24 1545 (!) 105/57 -- -- 72 28 99 % --   10/21/24 1530 111/66 -- -- 69 28 98 % --   10/21/24 1515 112/64 -- -- 68 27 100 % --   10/21/24 1500 112/65 -- -- 70 28 100 % --   10/21/24 1449 -- -- -- 66 18 100 % --   10/21/24 1445 113/67 -- -- 65 21 -- --   10/21/24 1430 114/69 -- -- 69 22 -- --   10/21/24 1415 127/72 -- -- 69 18 -- --   10/21/24 1400 (!) 99/59 -- -- 66 17 100 % --   10/21/24 1345 (!) 97/58 -- -- 67 19 100 % --    10/21/24 1330 (!) 99/56 -- -- 66 19 100 % --   10/21/24 1315 (!) 99/57 -- -- 66 18 100 % --   10/21/24 1300 (!) 95/58 -- -- 67 18 100 % --   10/21/24 1245 (!) 101/56 -- -- 67 17 100 % --   10/21/24 1230 109/65 -- -- 66 19 -- --   10/21/24 1215 114/67 -- -- 67 18 -- --   10/21/24 1200 -- 97.7 °F (36.5 °C) Oral -- -- -- --   10/21/24 1149 -- -- -- 71 29 100 % --   10/21/24 1130 112/65 -- -- 72 29 100 % --   10/21/24 1115 116/64 -- -- 70 26 99 % --   10/21/24 1104 114/64 -- -- 71 25 99 % --   10/21/24 1100 114/64 -- -- 73 28 99 % --   10/21/24 1045 113/60 -- -- 71 28 99 % --   10/21/24 1030 119/63 -- -- 69 24 99 % --   10/21/24 1015 117/64 -- -- 72 26 99 % --   10/21/24 1000 108/62 -- -- 77 28 99 % --   10/21/24 0945 111/61 -- -- 74 28 98 % --   10/21/24 0930 (!) 112/59 -- -- 74 29 99 % --   10/21/24 0915 (!) 114/59 -- -- 73 24 99 % --   10/21/24 0845 116/65 -- -- 70 23 99 % --   10/21/24 0830 115/60 -- -- 68 21 100 % --   10/21/24 0815 122/66 -- -- 69 19 100 % --   10/21/24 0800 121/66 96.8 °F (36 °C) Temporal 71 21 99 % --   10/21/24 0745 116/63 -- -- 71 27 99 % --   10/21/24 0734 -- -- -- 70 29 99 % --   10/21/24 0719 -- -- -- 70 19 99 % --   10/21/24 0712 -- -- -- 70 20 99 % --   10/21/24 0700 116/62 -- -- 69 20 99 % --   10/21/24 0600 122/65 -- -- 71 20 100 % --       Intake/Output Summary (Last 24 hours) at 10/22/2024 0557  Last data filed at 10/22/2024 0551  Gross per 24 hour   Intake 4046.55 ml   Output 1825 ml   Net 2221.55 ml       Recent Labs     10/20/24  0435 10/20/24  1058 10/21/24  0630   WBC 10.5  --  9.1   HGB 8.2* 7.6* 8.6*   HCT 24.8* 23.2* 26.6*   MCV 89.2  --  91.4     --  278     Recent Labs     10/20/24  0435 10/21/24  0630    139   K 3.7 3.9   * 108*   CO2 23 24   PHOS 2.7 1.8*   BUN 13 12   CREATININE 0.6 0.6       No results for input(s): \"COLORU\", \"PHUR\", \"LABCAST\", \"WBCUA\", \"RBCUA\", \"MUCUS\", \"TRICHOMONAS\", \"YEAST\", \"BACTERIA\", \"CLARITYU\", \"SPECGRAV\", \"LEUKOCYTESUR\",

## 2024-10-22 NOTE — PROGRESS NOTES
Physical Therapy  DATE: 10/22/2024    NAME: Mandie Bustamante  MRN: [id number]   : 1955    Patient not seen this date for Physical Therapy due to:      [] Cancel by RN or physician due to:    [x] Pt remains intubated      [] Critical Lab Value Level     [] Blood transfusion in progress    [] Acute or unstable cardiovascular status   _MAP < 55 or more than >115  _HR < 40 or > 130    [] Acute or unstable pulmonary status   -FiO2 > 60%   _RR < 5 or >40    _O2 sats < 85%    [] Strict Bedrest    [] Off Unit for surgery or procedure    [] Off Unit for testing       [] Pending imaging to R/O fracture    [] Refusal by Patient      [] Other      [] PT being discontinued at this time. Patient independent. No further needs.     [] PT being discontinued at this time as the patient has been transferred to hospice care. No further needs.      PEGGY GREER, PT

## 2024-10-22 NOTE — PLAN OF CARE
Problem: Respiratory - Adult  Goal: Achieves optimal ventilation and oxygenation  10/22/2024 1947 by Ruby Gaston RCP  Outcome: Progressing  Flowsheets (Taken 10/22/2024 1937)  Achieves optimal ventilation and oxygenation:   Assess for changes in respiratory status   Oxygen supplementation based on oxygen saturation or arterial blood gases   Assess the need for suctioning and aspirate as needed   Respiratory therapy support as indicated     Problem: Respiratory - Adult  Goal: Will be able to breathe spontaneously, without ventilator support  Description: Will be able to breathe spontaneously, without ventilator support  10/22/2024 1947 by Ruby Gaston RCP  Outcome: Progressing     Problem: Respiratory - Adult  Goal: Patient's breath sounds will be clear and equal  10/22/2024 1947 by Ruby Gaston RCP  Outcome: Progressing     Problem: Respiratory - Adult  Goal: Adequate oxygenation  Description: Adequate oxygenation  10/22/2024 1947 by Ruby Gaston RCP  Outcome: Progressing

## 2024-10-22 NOTE — PROGRESS NOTES
[facility]  Office: [phone]  Keenan Foster DO, Kevin Siegel DO, Carlos A Orta DO, Taye Morales DO, Maxx Madison MD, Meena Eckert MD, Efren Vo MD, Makayla Last MD,  Jerman Christian MD, Gina Bourgeois MD, Gladys Fry MD,  Anu Murillo DO, Jennifer Blanca MD, Oral Duncan MD, Harlan Foster DO, Katya Simmons MD,  Filiberto Slade DO, Elizabeth Alves MD, Samantha Pinto MD, Arlene Bryant MD, Bandar Barrios MD,  Kulwant Rodrigez MD, Michelle Dennison MD, Jinny Steel MD, Kathia Vargas MD, Gerardo Alvarez MD, Richard Johnson MD, Demetri Ibarra DO, Benjie Alvarenga MD, Shirley Waterhouse, CNP,  Georgette Villarreal CNP, Demetri Toure, CATHY,  Silvina Castano, CARINA, Tatiana Mckeon, CNP, Angelina Harper, CNP, Alma Rosa Peng, CNP, Rashmi Mccartney, CNP, IMAN ErvinC, IMAN ArmentaC, Stephanie Kaminski, CNP, Tono Yee, CNP,  Chrissie Schwartz, CNP, Sheri Jones, CNP,  Nadiya Rick, CNP, Cindi Lam, CNP         [facility]   IN-PATIENT SERVICE   [facility]    Progress Note    10/22/2024    3:19 PM    Name:   Mandie Bustamante  MRN:     [id number]     Acct:      [id number]   Room:   2030/2030-01  IP Day:  12  Admit Date:  10/10/2024  6:11 PM    PCP:   No primary care provider on file.  Code Status:  Full Code    Subjective:     C/C: Abdominal pain    Interval History Status: not changed.     Patient remains intubated.  Currently undergoing CPAP trial.  Arousable follows a few simple commands    Brief History:     Per prior documentation  \"Patient is a 69-year-old female who recently underwent an out patient hernia repair with mesh on 10/1/2024, she presents to the ED with abdominal pain on 10/10/2024. Work up in the ED revealed severe hyponatremia of 109, leukocytosis, and  CT abdomen pelvis without contrast was suggestive of large abscess with air-fluid collection seen along the peritoneum just below the rectus muscle. There was concern about gas and fluid seen  appropriately positioned.     XR CHEST PORTABLE    Result Date: 10/16/2024  Left basilar atelectasis or developing infiltrate and small left pleural effusion.     CT ABDOMEN PELVIS WO CONTRAST Additional Contrast? Radiologist Recommendation    Result Date: 10/16/2024  1. Interval increase in size of air-fluid/air-contrast level seen within the midline with gas extending into the subcutaneous soft tissues along with inflammatory changes concerning for abscess.  Definite communication to the bowel is not seen although suspect injury to the transverse colon.  Recommend surgical consultation.     XR ABDOMEN (KUB) (SINGLE AP VIEW)    Result Date: 10/16/2024  Nonspecific bowel gas pattern without evidence for obstruction.       Physical Examination:        General appearance: Intubated and sedated  Mental Status: Cannot be assessed due to intubation  Lungs:  clear to auscultation bilaterally, normal effort, vented, currently undergoing CPAP trial  Heart:  regular rate and rhythm, no murmur  Abdomen:  soft, nontender, nondistended, normal bowel sounds, no masses, hepatomegaly, splenomegaly  Extremities:  no edema, redness, tenderness in the calves  Skin:  no gross lesions, rashes, induration    Assessment:        Hospital Problems             Last Modified POA    * (Principal) Intra-abdominal abscess (HCC) 10/22/2024 Yes    Anxiety 10/10/2024 Yes    Gastro-esophageal reflux disease without esophagitis 10/10/2024 Yes    Essential hypertension (Chronic) 10/10/2024 Yes    Hyponatremia 10/22/2024 Yes    Hypokalemia 10/10/2024 Yes    Elevated brain natriuretic peptide (BNP) level 10/10/2024 Yes    Leukocytosis 10/10/2024 Yes    Prediabetes (Chronic) 10/10/2024 Yes    Overview Signed 10/10/2024  8:47 PM by Gerardo Alvarez MD     Patient reportedly was in the prediabetic range according to labs in 2019 per chart review, however I am unable to see those labs.  But on recent blood work performed this month hemoglobin A1c was

## 2024-10-22 NOTE — PROGRESS NOTES
[facility] -   Infectious diseases evaluation  admission date 10/10/2024    reason for consultation:   Fever despite antibiotics    Impression :   Current:  Fever/leukocytosis likely abdominal source  Abdominal abscess  Hypotention  Status post exploratory laparotomy with removal of mesh and wound VAC application 10/16/2024 subsequently underwent exploratory laparotomy abdominal washout with wound VAC application 10/18/2024.   Status post robotic assisted laparoscopic repair of recurrent incisional hernia with mesh placement 10/1/2024  Status post ultrasound guided aspiration of abdominal wall fluid collection on 10/11/2024 no growth on culture  Acute respiratory failure required intubation  Obesity    HENCE:   Continue IV cefepime and Flagyl  Respiratory culture grew normal brenda on 10/19/2024  Respiratory panel with COVID-19 negative  The patient was treated with IV Zosyn 10/10/2024 through 10/17/2024  No growth on blood cultures from 10/16/2024  Follow blood cultures from 10/18/2024, no growth  Follow CBC and renal function  Discussed with  at the bedside      Infection Control Recommendations   Webber Precautions      Antimicrobial Stewardship Recommendations   Simplification of therapy  Targeted therapy      History of Present Illness:   Initial history:  Mandie Bustamante is a 69 y.o.-year-old female was seen at the ICU, intubated, sedated, unable to provide history that was obtained from chart review and nursing staff.  She is on 1 mcg/min of Levophed, right arm PICC line was placed 10/11/2024, on TPN.  Bar catheter in place with clear urine was placed on 10/10/2024  NG tube in place.  Status post robotic assisted laparoscopic repair of recurrent incisional hernia with mesh placement 10/1/2024 was complicated by fluid collection status post aspiration on 10/11/2024 with no growth on culture.  Status post exploratory laparotomy with removal of mesh and  Mother     Obesity Mother     High Cholesterol Father     High Blood Pressure Father     Heart Disease Father     Migraines Father     Diabetes Father     Arthritis Father     Hearing Loss Father     Stroke Father     Heart Attack Father     Rheum Arthritis Father     Migraines Sister     Diabetes Sister     Arthritis Sister         N/A    Rheum Arthritis Sister     Diabetes Brother     Arthritis Brother     Tuberculosis Maternal Grandmother     Tuberculosis Maternal Grandfather     Diabetes Paternal Grandmother     Obesity Paternal Grandmother     Obesity Maternal Aunt         N/A    Obesity Paternal Aunt       Medical Decision Making:   I have independently reviewed/ordered the following labs:    CBC with Differential:   Recent Labs     10/20/24  0435 10/20/24  1058 10/21/24  0630   WBC 10.5  --  9.1   HGB 8.2* 7.6* 8.6*   HCT 24.8* 23.2* 26.6*     --  278   LYMPHOPCT 8*  --  9*   MONOPCT 6  --  9   EOSPCT 2  --  4     BMP:  Recent Labs     10/21/24  0630 10/22/24  0417    140   K 3.9 3.3*   * 110*   CO2 24 19*   BUN 12 10   CREATININE 0.6 0.6   MG 2.0 1.6     Hepatic Function Panel:   Recent Labs     10/20/24  0435 10/21/24  0630   BILIDIR 0.1 <0.1   IBILI 0.1 Can not be calculated   BILITOT 0.2* 0.2*   ALKPHOS 101 90   ALT 15 13   AST 14 11     No results for input(s): \"RPR\" in the last 72 hours.  No results for input(s): \"HIV\" in the last 72 hours.  No results for input(s): \"BC\" in the last 72 hours.  Lab Results   Component Value Date/Time    CREATININE 0.6 10/22/2024 04:17 AM    GLUCOSE 150 10/22/2024 04:17 AM       Detailed results:        Thank you for allowing us to participate in the care of this patient.Please call with questions.    This note is created with the assistance of a speech recognition program.  While intending to generate adocument that actually reflects the content of the visit, the document can still have some errors including those of syntax and sound a like

## 2024-10-22 NOTE — PLAN OF CARE
Problem: Discharge Planning  Goal: Discharge to home or other facility with appropriate resources  10/21/2024 2214 by Moraima Bae RN  Outcome: Progressing  10/21/2024 1424 by Ramana Balderas, RN  Outcome: Progressing     Problem: Skin/Tissue Integrity  Goal: Absence of new skin breakdown  Description: 1.  Monitor for areas of redness and/or skin breakdown  2.  Assess vascular access sites hourly  3.  Every 4-6 hours minimum:  Change oxygen saturation probe site  4.  Every 4-6 hours:  If on nasal continuous positive airway pressure, respiratory therapy assess nares and determine need for appliance change or resting period.  10/21/2024 2214 by Moraima Bae RN  Outcome: Progressing  10/21/2024 1424 by Ramana Balderas, RN  Outcome: Progressing     Problem: ABCDS Injury Assessment  Goal: Absence of physical injury  10/21/2024 2214 by Moraima Bae RN  Outcome: Progressing  10/21/2024 1424 by Ramana Balderas, RN  Outcome: Progressing     Problem: Safety - Adult  Goal: Free from fall injury  10/21/2024 2214 by Moraima Bae RN  Outcome: Progressing  10/21/2024 1424 by Ramana Balderas, RN  Outcome: Progressing     Problem: Metabolic/Fluid and Electrolytes - Adult  Goal: Electrolytes maintained within normal limits  10/21/2024 2214 by Moraima Bae RN  Outcome: Progressing  10/21/2024 1424 by Ramana Balderas, RN  Outcome: Progressing  Goal: Hemodynamic stability and optimal renal function maintained  10/21/2024 2214 by Moraima Bae RN  Outcome: Progressing  10/21/2024 1424 by Ramana Balderas, RN  Outcome: Progressing  Goal: Glucose maintained within prescribed range  10/21/2024 2214 by Moraima Bae RN  Outcome: Progressing  10/21/2024 1424 by Ramana Balderas, RN  Outcome: Progressing     Problem: Neurosensory - Adult  Goal: Achieves stable or improved neurological status  10/21/2024 2214 by Moraima Bae, RN  Outcome: Progressing  10/21/2024 1424 by Ramana Balderas,  Progressing  10/21/2024 1424 by Ramana Balderas, RN  Outcome: Progressing     Problem: Safety - Medical Restraint  Goal: Remains free of injury from restraints (Restraint for Interference with Medical Device)  Description: INTERVENTIONS:  1. Determine that other, less restrictive measures have been tried or would not be effective before applying the restraint  2. Evaluate the patient's condition at the time of restraint application  3. Inform patient/family regarding the reason for restraint  4. Q2H: Monitor safety, psychosocial status, comfort, nutrition and hydration  10/21/2024 2214 by Moraima Bae, RN  Outcome: Progressing  10/21/2024 1424 by Ramana Balderas, RN  Outcome: Progressing  Flowsheets (Taken 10/21/2024 0800)  Remains free of injury from restraints (restraint for interference with medical device): Every 2 hours: Monitor safety, psychosocial status, comfort, nutrition and hydration

## 2024-10-22 NOTE — FLOWSHEET NOTE
10/22/24 0830   Treatment Team Notification   Reason for Communication Review case   Name of Team Member Notified Dr. Orta   Treatment Team Role Attending Provider   Method of Communication Face to face   Response At bedside

## 2024-10-22 NOTE — PLAN OF CARE
Problem: Discharge Planning  Goal: Discharge to home or other facility with appropriate resources  10/22/2024 1030 by Ramana Balderas, RN  Outcome: Progressing     Problem: Skin/Tissue Integrity  Goal: Absence of new skin breakdown  Description: 1.  Monitor for areas of redness and/or skin breakdown  2.  Assess vascular access sites hourly  3.  Every 4-6 hours minimum:  Change oxygen saturation probe site  4.  Every 4-6 hours:  If on nasal continuous positive airway pressure, respiratory therapy assess nares and determine need for appliance change or resting period.  10/22/2024 1030 by Ramana Balderas, RN  Outcome: Progressing     Problem: ABCDS Injury Assessment  Goal: Absence of physical injury  10/22/2024 1030 by Ramana Balderas, RN  Outcome: Progressing     Problem: Safety - Adult  Goal: Free from fall injury  10/22/2024 1030 by Ramana Balderas, RN  Outcome: Progressing     Problem: Metabolic/Fluid and Electrolytes - Adult  Goal: Electrolytes maintained within normal limits  10/22/2024 1030 by Ramana Balderas, RN  Outcome: Progressing     Problem: Metabolic/Fluid and Electrolytes - Adult  Goal: Hemodynamic stability and optimal renal function maintained  10/22/2024 1030 by Ramana Balderas, RN  Outcome: Progressing     Problem: Metabolic/Fluid and Electrolytes - Adult  Goal: Glucose maintained within prescribed range  10/22/2024 1030 by Ramana Balderas, RN  Outcome: Progressing     Problem: Respiratory - Adult  Goal: Achieves optimal ventilation and oxygenation  10/22/2024 1030 by Ramana Balderas, RN  Outcome: Progressing     Problem: Neurosensory - Adult  Goal: Achieves stable or improved neurological status  10/22/2024 1030 by Rmaana Balderas, RN  Outcome: Progressing     Problem: Neurosensory - Adult  Goal: Absence of seizures  10/22/2024 1030 by Ramana Balderas, RN  Outcome: Progressing     Problem: Neurosensory - Adult  Goal: Remains free of injury related to seizures activity  10/22/2024 1030 by Andrey

## 2024-10-22 NOTE — PROGRESS NOTES
Patient flipped back to PRVC mode on the ventilator.  Tolerated CPAP mode for over 8 hours today.  Continue to monitor.

## 2024-10-22 NOTE — CARE COORDINATION
DC Planning    Pt had wound vac change yesterday, remains on [facility] following. Insurance may be a barrier.

## 2024-10-22 NOTE — FLOWSHEET NOTE
10/22/24 1113   Treatment Team Notification   Reason for Communication Review case   Name of Team Member Notified Dr. Coyne   Treatment Team Role Consulting Provider   Method of Communication Face to face   Response At bedside;See orders     Give 40 Lasix IVP, switch sedation to Precedex.

## 2024-10-23 ENCOUNTER — APPOINTMENT (OUTPATIENT)
Dept: GENERAL RADIOLOGY | Age: 69
End: 2024-10-23
Attending: STUDENT IN AN ORGANIZED HEALTH CARE EDUCATION/TRAINING PROGRAM
Payer: COMMERCIAL

## 2024-10-23 LAB
ALBUMIN SERPL-MCNC: 1.9 G/DL (ref 3.5–5.2)
ALLEN TEST: POSITIVE
ALP SERPL-CCNC: 79 U/L (ref 35–104)
ALT SERPL-CCNC: 7 U/L (ref 5–33)
ANION GAP SERPL CALCULATED.3IONS-SCNC: 6 MMOL/L (ref 9–17)
AST SERPL-CCNC: 8 U/L
BILIRUB DIRECT SERPL-MCNC: <0.1 MG/DL
BILIRUB INDIRECT SERPL-MCNC: ABNORMAL MG/DL (ref 0–1)
BILIRUB SERPL-MCNC: 0.1 MG/DL (ref 0.3–1.2)
BUN SERPL-MCNC: 14 MG/DL (ref 8–23)
BUN/CREAT SERPL: 23 (ref 9–20)
CA-I BLD-SCNC: 1.09 MMOL/L (ref 1.15–1.33)
CALCIUM SERPL-MCNC: 6.7 MG/DL (ref 8.6–10.4)
CHLORIDE SERPL-SCNC: 108 MMOL/L (ref 98–107)
CO2 SERPL-SCNC: 22 MMOL/L (ref 20–31)
CREAT SERPL-MCNC: 0.6 MG/DL (ref 0.5–0.9)
FIO2: 30
GFR, ESTIMATED: >90 ML/MIN/1.73M2
GLUCOSE BLD-MCNC: 165 MG/DL (ref 65–105)
GLUCOSE BLD-MCNC: 169 MG/DL (ref 65–105)
GLUCOSE BLD-MCNC: 177 MG/DL (ref 65–105)
GLUCOSE BLD-MCNC: 191 MG/DL (ref 65–105)
GLUCOSE BLD-MCNC: 195 MG/DL (ref 65–105)
GLUCOSE SERPL-MCNC: 200 MG/DL (ref 70–99)
MAGNESIUM SERPL-MCNC: 1.9 MG/DL (ref 1.6–2.6)
MICROORGANISM SPEC CULT: NORMAL
MICROORGANISM SPEC CULT: NORMAL
MODE: ABNORMAL
NEGATIVE BASE EXCESS, ART: 0.4 MMOL/L (ref 0–2)
O2 DELIVERY DEVICE: ABNORMAL
PHOSPHATE SERPL-MCNC: 2.3 MG/DL (ref 2.6–4.5)
POC HCO3: 23 MMOL/L (ref 21–28)
POC O2 SATURATION: 98.2 % (ref 94–98)
POC PCO2: 32.6 MM HG (ref 35–48)
POC PH: 7.46 (ref 7.35–7.45)
POC PO2: 100.4 MM HG (ref 83–108)
POTASSIUM SERPL-SCNC: 3.6 MMOL/L (ref 3.7–5.3)
PROT SERPL-MCNC: 4.2 G/DL (ref 6.4–8.3)
SAMPLE SITE: ABNORMAL
SERVICE CMNT-IMP: NORMAL
SERVICE CMNT-IMP: NORMAL
SODIUM SERPL-SCNC: 136 MMOL/L (ref 135–144)
SPECIMEN DESCRIPTION: NORMAL
SPECIMEN DESCRIPTION: NORMAL

## 2024-10-23 PROCEDURE — 6360000002 HC RX W HCPCS

## 2024-10-23 PROCEDURE — 94003 VENT MGMT INPAT SUBQ DAY: CPT

## 2024-10-23 PROCEDURE — 2580000003 HC RX 258: Performed by: INTERNAL MEDICINE

## 2024-10-23 PROCEDURE — 36600 WITHDRAWAL OF ARTERIAL BLOOD: CPT

## 2024-10-23 PROCEDURE — 99233 SBSQ HOSP IP/OBS HIGH 50: CPT | Performed by: INTERNAL MEDICINE

## 2024-10-23 PROCEDURE — 99232 SBSQ HOSP IP/OBS MODERATE 35: CPT | Performed by: INTERNAL MEDICINE

## 2024-10-23 PROCEDURE — 2580000003 HC RX 258

## 2024-10-23 PROCEDURE — 2500000003 HC RX 250 WO HCPCS: Performed by: INTERNAL MEDICINE

## 2024-10-23 PROCEDURE — 84100 ASSAY OF PHOSPHORUS: CPT

## 2024-10-23 PROCEDURE — 2700000000 HC OXYGEN THERAPY PER DAY

## 2024-10-23 PROCEDURE — 80076 HEPATIC FUNCTION PANEL: CPT

## 2024-10-23 PROCEDURE — 6370000000 HC RX 637 (ALT 250 FOR IP)

## 2024-10-23 PROCEDURE — 82330 ASSAY OF CALCIUM: CPT

## 2024-10-23 PROCEDURE — 83735 ASSAY OF MAGNESIUM: CPT

## 2024-10-23 PROCEDURE — 2000000000 HC ICU R&B

## 2024-10-23 PROCEDURE — 80048 BASIC METABOLIC PNL TOTAL CA: CPT

## 2024-10-23 PROCEDURE — 6360000002 HC RX W HCPCS: Performed by: INTERNAL MEDICINE

## 2024-10-23 PROCEDURE — 2500000003 HC RX 250 WO HCPCS

## 2024-10-23 PROCEDURE — 71045 X-RAY EXAM CHEST 1 VIEW: CPT

## 2024-10-23 PROCEDURE — 94761 N-INVAS EAR/PLS OXIMETRY MLT: CPT

## 2024-10-23 PROCEDURE — 82803 BLOOD GASES ANY COMBINATION: CPT

## 2024-10-23 RX ORDER — FUROSEMIDE 10 MG/ML
40 INJECTION INTRAMUSCULAR; INTRAVENOUS EVERY 12 HOURS
Status: DISCONTINUED | OUTPATIENT
Start: 2024-10-23 | End: 2024-10-28

## 2024-10-23 RX ORDER — CALCIUM GLUCONATE 20 MG/ML
2000 INJECTION, SOLUTION INTRAVENOUS PRN
Status: DISCONTINUED | OUTPATIENT
Start: 2024-10-23 | End: 2024-11-08 | Stop reason: HOSPADM

## 2024-10-23 RX ADMIN — METRONIDAZOLE 500 MG: 500 INJECTION, SOLUTION INTRAVENOUS at 19:48

## 2024-10-23 RX ADMIN — INSULIN LISPRO 1 UNITS: 100 INJECTION, SOLUTION INTRAVENOUS; SUBCUTANEOUS at 00:23

## 2024-10-23 RX ADMIN — ANTI-FUNGAL POWDER MICONAZOLE NITRATE TALC FREE: 1.42 POWDER TOPICAL at 08:46

## 2024-10-23 RX ADMIN — HYDROMORPHONE HYDROCHLORIDE 0.5 MG: 1 INJECTION, SOLUTION INTRAMUSCULAR; INTRAVENOUS; SUBCUTANEOUS at 20:54

## 2024-10-23 RX ADMIN — SODIUM PHOSPHATE, MONOBASIC, MONOHYDRATE AND SODIUM PHOSPHATE, DIBASIC, ANHYDROUS: 142; 276 INJECTION, SOLUTION INTRAVENOUS at 18:01

## 2024-10-23 RX ADMIN — METRONIDAZOLE 500 MG: 500 INJECTION, SOLUTION INTRAVENOUS at 03:24

## 2024-10-23 RX ADMIN — PANTOPRAZOLE SODIUM 40 MG: 40 INJECTION, POWDER, FOR SOLUTION INTRAVENOUS at 08:37

## 2024-10-23 RX ADMIN — HYDROCORTISONE: 1 CREAM TOPICAL at 20:46

## 2024-10-23 RX ADMIN — CEFEPIME 2000 MG: 2 INJECTION, POWDER, FOR SOLUTION INTRAVENOUS at 06:10

## 2024-10-23 RX ADMIN — CEFEPIME 2000 MG: 2 INJECTION, POWDER, FOR SOLUTION INTRAVENOUS at 15:09

## 2024-10-23 RX ADMIN — I.V. FAT EMULSION 100 ML: 20 EMULSION INTRAVENOUS at 18:13

## 2024-10-23 RX ADMIN — FUROSEMIDE 40 MG: 10 INJECTION, SOLUTION INTRAMUSCULAR; INTRAVENOUS at 12:30

## 2024-10-23 RX ADMIN — CHLORHEXIDINE GLUCONATE 15 ML: 1.2 RINSE ORAL at 08:39

## 2024-10-23 RX ADMIN — DEXMEDETOMIDINE 0.4 MCG/KG/HR: 100 INJECTION, SOLUTION INTRAVENOUS at 03:56

## 2024-10-23 RX ADMIN — METRONIDAZOLE 500 MG: 500 INJECTION, SOLUTION INTRAVENOUS at 12:29

## 2024-10-23 RX ADMIN — ANTI-FUNGAL POWDER MICONAZOLE NITRATE TALC FREE: 1.42 POWDER TOPICAL at 20:46

## 2024-10-23 RX ADMIN — CEFEPIME 2000 MG: 2 INJECTION, POWDER, FOR SOLUTION INTRAVENOUS at 22:55

## 2024-10-23 RX ADMIN — INSULIN LISPRO 1 UNITS: 100 INJECTION, SOLUTION INTRAVENOUS; SUBCUTANEOUS at 06:08

## 2024-10-23 RX ADMIN — HYDROCORTISONE: 1 CREAM TOPICAL at 08:46

## 2024-10-23 ASSESSMENT — PULMONARY FUNCTION TESTS
PIF_VALUE: 25
PIF_VALUE: 21
PIF_VALUE: 24
PIF_VALUE: 28
PIF_VALUE: 27
PIF_VALUE: 22
PIF_VALUE: 25
PIF_VALUE: 21
PIF_VALUE: 21
PIF_VALUE: 20
PIF_VALUE: 21
PIF_VALUE: 21
PIF_VALUE: 25
PIF_VALUE: 21
PIF_VALUE: 26
PIF_VALUE: 27
PIF_VALUE: 25
PIF_VALUE: 30
PIF_VALUE: 21
PIF_VALUE: 26
PIF_VALUE: 21
PIF_VALUE: 21
PIF_VALUE: 20
PIF_VALUE: 25
PIF_VALUE: 21

## 2024-10-23 ASSESSMENT — PAIN DESCRIPTION - ORIENTATION: ORIENTATION: MID

## 2024-10-23 ASSESSMENT — PAIN DESCRIPTION - DESCRIPTORS: DESCRIPTORS: PATIENT UNABLE TO DESCRIBE

## 2024-10-23 ASSESSMENT — PAIN DESCRIPTION - LOCATION: LOCATION: ABDOMEN

## 2024-10-23 NOTE — PROGRESS NOTES
[facility]  Office: [phone]  Keenan Foster DO, Kevin Siegel DO, Carlos A Orta DO, Taye Morales DO, Maxx Madison MD, Meena Eckert MD, Efren Vo MD, Makayla Last MD,  Jerman Christian MD, Gina Bourgeois MD, Gladys Fry MD,  Anu Murillo DO, Jennifer Blanca MD, Oral Duncan MD, Harlan Foster DO, Katya Simmons MD,  Filiberto Slade DO, Elizabeth Alves MD, Samantha Pinto MD, Arlene Bryant MD, Bandar Barrios MD,  Kulwant Rodrigez MD, Michelle Dennison MD, Jinny Steel MD, Kathia Vargas MD, Gerardo Alvarez MD, Richard Johnson MD, Demetri Ibarra DO, Benjie Alvarenga MD, Shirley Waterhouse, CNP,  Georgette Villarreal CNP, Demetri Toure, CATHY,  Silvina Castano, CARINA, Tatiana Mckeon, CNP, Angelina Harper, CNP, Alma Rosa Peng, CNP, Rashmi Mccartney, CNP, IMAN ErvinC, IMAN ArmentaC, Stephanie Kaminski, CNP, Tono Yee, CNP,  Chrissie Schwartz, CNP, Sheri Jones, CNP,  Nadiya Rick, CNP, Cindi Lam, CNP         [facility]   IN-PATIENT SERVICE   [facility]    Progress Note    10/23/2024    7:33 AM    Name:   Mandie Bustamante  MRN:     [id number]     Acct:      [id number]   Room:   2030/2030-01  IP Day:  13  Admit Date:  10/10/2024  6:11 PM    PCP:   No primary care provider on file.  Code Status:  Full Code    Subjective:     C/C: Abdominal pain    Interval History Status: not changed.     Patient remains intubated, currently undergoing CPAP trial.  Opens eyes and follows few simple commands.  No acute issues overnight per staff    Brief History:     Per prior documentation  \"Patient is a 69-year-old female who recently underwent an out patient hernia repair with mesh on 10/1/2024, she presents to the ED with abdominal pain on 10/10/2024. Work up in the ED revealed severe hyponatremia of 109, leukocytosis, and  CT abdomen pelvis without contrast was suggestive of large abscess with air-fluid collection seen along the peritoneum just below the rectus muscle. There

## 2024-10-23 NOTE — PROGRESS NOTES
End Of Shift Note  [facility]  Summary of shift: Versed off at beginning of shift and patient started on Precedex. Minimal drain output overnight. Patient BP tolerated well still on Levophed.    Vitals:    Vitals:    10/23/24 0344 10/23/24 0400 10/23/24 0409 10/23/24 0500   BP:  (!) 94/56  101/63   Pulse: 61 63 62 63   Resp: 21 22 22 21   Temp:  98.4 °F (36.9 °C)     TempSrc:  Axillary     SpO2: 98%  98% 95%   Weight:       Height:            I&O:   Intake/Output Summary (Last 24 hours) at 10/23/2024 0545  Last data filed at 10/23/2024 0545  Gross per 24 hour   Intake 3515.66 ml   Output 3700 ml   Net -184.34 ml       Resp Status: ETT    Ventilator Settings:  Vent Mode: AC/PRVC Resp Rate (Set): 14 bpm/Vt (Set, mL): 500 mL/ /FiO2 : 30 %    Critical Care IV infusions:   dexmedeTOMIDine (PRECEDEX) 1,000 mcg in sodium chloride 0.9 % 250 mL infusion 0.4 mcg/kg/hr (10/23/24 0359)    PN-Adult 2-in-1 Central Line (Custom) 65 mL/hr at 10/23/24 0545    dextrose 5% and 0.45% NaCl with KCl 20 mEq Stopped (10/22/24 1038)    norepinephrine 2 mcg/min (10/23/24 0545)    propofol Stopped (10/17/24 1652)    midazolam Stopped (10/22/24 1854)    dextrose      sodium chloride 50 mL/hr at 10/16/24 1623        LDA:   PICC 10/11/24 Right Brachial (Active)   Number of days: 11       NG/OG/NJ/NE Tube Orogastric 16 fr Center mouth (Active)   Number of days: 6       Urinary Catheter 10/10/24 Bar (Active)   Number of days: 12       ETT  (Active)   Number of days: 6       Incision 10/01/24 Abdomen Medial;Upper (Active)   Number of days: 21       Incision 10/16/24 Abdomen Lower;Medial (Active)   Number of days: 6

## 2024-10-23 NOTE — FLOWSHEET NOTE
10/23/24 0805   Treatment Team Notification   Reason for Communication Evaluate;Review case   Name of Team Member Notified    Treatment Team Role Attending Provider   Method of Communication Face to face   Response In department   Notification Time 0818     MD rounded, chart reviewed. No new orders.

## 2024-10-23 NOTE — PLAN OF CARE
blood gases   Assess the need for suctioning and aspirate as needed   Respiratory therapy support as indicated  Goal: Will be able to breathe spontaneously, without ventilator support  Description: Will be able to breathe spontaneously, without ventilator support  10/22/2024 1947 by Ruby Gaston RCP  Outcome: Progressing  10/22/2024 0720 by Lisa Lopez RCP  Outcome: Progressing  Goal: Patient's breath sounds will be clear and equal  10/22/2024 1947 by Ruby Gaston RCP  Outcome: Progressing  10/22/2024 0720 by Lisa Lopez RCP  Outcome: Progressing  Goal: Adequate oxygenation  Description: Adequate oxygenation  10/22/2024 1947 by Ruby Gaston RCP  Outcome: Progressing  10/22/2024 0720 by Lisa Lopez RCP  Outcome: Progressing     Problem: Gastrointestinal - Adult  Goal: Maintains or returns to baseline bowel function  10/22/2024 2019 by Moraima Bae RN  Outcome: Progressing  10/22/2024 1030 by Ramana Balderas, RN  Outcome: Progressing  Goal: Maintains adequate nutritional intake  10/22/2024 2019 by Moraima Bae RN  Outcome: Progressing  10/22/2024 1030 by Ramana Balderas, RN  Outcome: Progressing     Problem: Genitourinary - Adult  Goal: Urinary catheter remains patent  10/22/2024 2019 by Moraima Bae RN  Outcome: Progressing  10/22/2024 1030 by Ramana Balderas, RN  Outcome: Progressing     Problem: Skin/Tissue Integrity - Adult  Goal: Skin integrity remains intact  10/22/2024 2019 by Moraima Bae RN  Outcome: Progressing  10/22/2024 1030 by Ramana Balderas, RN  Outcome: Progressing  Goal: Incisions, wounds, or drain sites healing without S/S of infection  10/22/2024 2019 by Moraima Bae RN  Outcome: Progressing  10/22/2024 1030 by Ramana Balderas, RN  Outcome: Progressing     Problem: Hematologic - Adult  Goal: Maintains hematologic stability  10/22/2024 2019 by Moraima Bae RN  Outcome: Progressing  10/22/2024 1030 by Ramana Balderas,

## 2024-10-23 NOTE — PROGRESS NOTES
10/23/24 1113   Vent Information   Vent Mode CPAP/PS   Ventilator Settings   FiO2  30 %   PEEP/CPAP (cmH2O) 8   Pressure Support (cm H2O) 12 cm H2O     CPAP/ PS from 1103-0687

## 2024-10-23 NOTE — PROGRESS NOTES
Comprehensive Nutrition Assessment    Type and Reason for Visit:  Reassess    Nutrition Recommendations/Plan:   Continue TPN, keep macros as is for 1-2 more days as patients lytes stabilize, will then increase to goal.   Adjusted lytes today, calcium and phos low.   Will add insulin to bag when at goal.   RD to follow/monitor.      Malnutrition Assessment:  Malnutrition Status:  Moderate malnutrition (10/17/24 1147)    Context:  Acute Illness     Findings of the 6 clinical characteristics of malnutrition:  Energy Intake:  50% or less of estimated energy requirements for 5 or more days  Weight Loss:  Unable to assess     Body Fat Loss:  Unable to assess     Muscle Mass Loss:  Unable to assess    Fluid Accumulation:  Moderate to Severe     Strength:       Nutrition Assessment:    Patient continues on vent, tolerated some cpap yesterday; remains NPO with sugery following; s/p surgery on the 1st, 19th, and 21st of this month; wound vac in place, to be changed tomorrow. Continue TPN per surgery. Labs, meds, PMH reviewed.    Nutrition Related Findings:    LBM 10/22, +3 UE and +2 LE edema, skin intact. Wound Type: Surgical Incision       Current Nutrition Intake & Therapies:    Average Meal Intake: NPO  Average Supplements Intake: NPO  Current Parenteral Nutrition Orders:  Type and Formula: Premix Central   Lipids: 100ml  Duration: Continuous  Rate/Volume: 1560 ml  Current PN Order Provides: 1560 ml, 78 gm protein, 312 g dextrose, 100 ml lipids (200 calories) = 1573 calories (13 kcal/kg)  Goal PN Orders Provides: 250 ml lipids (500 kcal), PN at 65 ml/hr (1560 ml): 1373 kcal, 78 gm protein, 312 gm dextrose (total 1873 kcal with lipids)    Anthropometric Measures:  Height: 160 cm (5' 3\")  Ideal Body Weight (IBW): 115 lbs (52 kg)       Current Body Weight: 122 kg (269 lb), 245.4 % IBW. Weight Source: Bed Scale  Current BMI (kg/m2): 47.7                          BMI Categories: Obese Class 3 (BMI 40.0 or

## 2024-10-23 NOTE — PLAN OF CARE
Problem: Discharge Planning  Goal: Discharge to home or other facility with appropriate resources  Outcome: Progressing  Flowsheets (Taken 10/23/2024 0800)  Discharge to home or other facility with appropriate resources: Identify barriers to discharge with patient and caregiver     Problem: Skin/Tissue Integrity  Goal: Absence of new skin breakdown  Description: 1.  Monitor for areas of redness and/or skin breakdown  2.  Assess vascular access sites hourly  3.  Every 4-6 hours minimum:  Change oxygen saturation probe site  4.  Every 4-6 hours:  If on nasal continuous positive airway pressure, respiratory therapy assess nares and determine need for appliance change or resting period.  Outcome: Progressing     Problem: ABCDS Injury Assessment  Goal: Absence of physical injury  Outcome: Progressing     Problem: Safety - Adult  Goal: Free from fall injury  Outcome: Progressing     Problem: Metabolic/Fluid and Electrolytes - Adult  Goal: Electrolytes maintained within normal limits  Outcome: Progressing  Flowsheets (Taken 10/23/2024 0800)  Electrolytes maintained within normal limits: Monitor labs and assess patient for signs and symptoms of electrolyte imbalances  Goal: Hemodynamic stability and optimal renal function maintained  Outcome: Progressing  Goal: Glucose maintained within prescribed range  Outcome: Progressing  Flowsheets (Taken 10/23/2024 0800)  Glucose maintained within prescribed range: Monitor blood glucose as ordered     Problem: Metabolic/Fluid and Electrolytes - Adult  Goal: Glucose maintained within prescribed range  Outcome: Progressing  Flowsheets (Taken 10/23/2024 0800)  Glucose maintained within prescribed range: Monitor blood glucose as ordered     Problem: Respiratory - Adult  Goal: Achieves optimal ventilation and oxygenation  10/23/2024 1820 by Alma Rosa Pinon RN  Outcome: Progressing  Flowsheets (Taken 10/23/2024 0800)  Achieves optimal ventilation and oxygenation: Assess for changes in  Maintains optimal cardiac output and hemodynamic stability  Outcome: Progressing  Flowsheets (Taken 10/23/2024 0800)  Maintains optimal cardiac output and hemodynamic stability: Monitor blood pressure and heart rate  Goal: Absence of cardiac dysrhythmias or at baseline  Outcome: Progressing  Flowsheets (Taken 10/23/2024 0800)  Absence of cardiac dysrhythmias or at baseline: Monitor cardiac rate and rhythm     Problem: Gastrointestinal - Adult  Goal: Maintains or returns to baseline bowel function  Outcome: Progressing  Flowsheets (Taken 10/23/2024 0800)  Maintains or returns to baseline bowel function: Assess bowel function  Goal: Maintains adequate nutritional intake  Outcome: Progressing  Flowsheets (Taken 10/23/2024 0800)  Maintains adequate nutritional intake: Identify factors contributing to decreased intake, treat as appropriate     Problem: Genitourinary - Adult  Goal: Urinary catheter remains patent  Outcome: Progressing  Flowsheets (Taken 10/23/2024 0800)  Urinary catheter remains patent: Assess patency of urinary catheter     Problem: Skin/Tissue Integrity - Adult  Goal: Skin integrity remains intact  Outcome: Progressing  Flowsheets (Taken 10/23/2024 0800)  Skin Integrity Remains Intact: Monitor for areas of redness and/or skin breakdown  Goal: Incisions, wounds, or drain sites healing without S/S of infection  Outcome: Progressing  Flowsheets (Taken 10/23/2024 0800)  Incisions, Wounds, or Drain Sites Healing Without Sign and Symptoms of Infection: TWICE DAILY: Assess and document skin integrity     Problem: Hematologic - Adult  Goal: Maintains hematologic stability  Outcome: Progressing  Flowsheets (Taken 10/23/2024 0800)  Maintains hematologic stability: Assess for signs and symptoms of bleeding or hemorrhage     Problem: Musculoskeletal - Adult  Goal: Return mobility to safest level of function  Outcome: Progressing  Flowsheets (Taken 10/23/2024 0800)  Return Mobility to Safest Level of Function:

## 2024-10-23 NOTE — PROGRESS NOTES
Benjamin Almazan MD   Urology Progress Note            Subjective:   follow-up urinary retention neurogenic bladder  Patient seen in conjunction with nursing staff    Patient Vitals for the past 24 hrs:   BP Temp Temp src Pulse Resp SpO2 Weight   10/23/24 0600 111/66 -- -- 66 24 97 % --   10/23/24 0500 101/63 -- -- 63 21 95 % --   10/23/24 0409 -- -- -- 62 22 98 % --   10/23/24 0400 (!) 94/56 98.4 °F (36.9 °C) Axillary 63 22 -- --   10/23/24 0344 -- -- -- 61 21 98 % --   10/23/24 0320 -- -- -- -- -- -- 122.2 kg (269 lb 8 oz)   10/23/24 0300 (!) 97/56 -- -- 61 21 98 % --   10/23/24 0200 102/60 -- -- 63 22 98 % --   10/23/24 0100 (!) 98/56 -- -- 61 20 98 % --   10/23/24 0000 (!) 90/55 98 °F (36.7 °C) Axillary 60 21 98 % --   10/22/24 2306 -- -- -- 60 19 98 % --   10/22/24 2300 (!) 107/55 -- -- 60 19 98 % --   10/22/24 2200 118/60 -- -- 62 21 98 % --   10/22/24 2100 101/62 -- -- 59 20 98 % --   10/22/24 2041 104/61 -- -- 59 20 98 % --   10/22/24 2000 106/61 98.6 °F (37 °C) Axillary 62 19 98 % --   10/22/24 1937 -- -- -- 62 20 98 % --   10/22/24 1900 (!) 98/58 -- -- 63 21 98 % --   10/22/24 1845 98/62 -- -- 63 19 98 % --   10/22/24 1815 103/63 -- -- 63 19 97 % --   10/22/24 1800 106/64 -- -- 63 21 98 % --   10/22/24 1745 99/64 -- -- 63 21 98 % --   10/22/24 1715 (!) 145/70 -- -- 62 22 97 % --   10/22/24 1700 131/63 -- -- 62 22 99 % --   10/22/24 1615 (!) 92/54 -- -- 72 24 97 % --   10/22/24 1600 (!) 97/58 -- -- 75 26 97 % --   10/22/24 1545 (!) 96/56 -- -- 74 23 97 % --   10/22/24 1540 (!) 96/56 -- -- 75 24 97 % --   10/22/24 1530 (!) 97/53 -- -- 75 25 98 % --   10/22/24 1515 101/61 -- -- 73 25 96 % --   10/22/24 1510 -- -- -- 75 28 97 % --   10/22/24 1500 (!) 93/54 -- -- 75 28 96 % --   10/22/24 1445 (!) 94/59 -- -- 75 25 99 % --   10/22/24 1430 (!) 102/58 -- -- 76 27 97 % --   10/22/24 1415 (!) 103/59 -- -- 76 25 96 % --   10/22/24 1400 (!) 105/57 -- -- 77 23 97 % --   10/22/24 1230 99/61

## 2024-10-23 NOTE — FLOWSHEET NOTE
10/23/24 1121   Treatment Team Notification   Reason for Communication Evaluate;Review case   Name of Team Member Notified    Treatment Team Role Consulting Provider   Method of Communication Face to face   Response At bedside   Notification Time 1122     MD rounded, keep intubated. Lasiz 40 IVP q12, wean sedation as able.

## 2024-10-23 NOTE — PROGRESS NOTES
General Surgery:  Daily Progress Note          PATIENT NAME: Mandie Bustamante     TODAY'S DATE: 10/23/2024, 7:13 AM    SUBJECTIVE:     Pt seen and examined at bedside.  No acute overnight events. Afebrile, placed back on low-dose of Levophed yesterday afternoon; switched to Precedex.  Low output from OG tube, 125 cc of brown fluid in canister.  Urine output adequate.  Continues to be mechanically ventilated, tolerated CPAP yesterday    OBJECTIVE:   VITALS:  /66   Pulse 66   Temp 98.4 °F (36.9 °C) (Axillary)   Resp 24   Ht 1.6 m (5' 3\")   Wt 122.2 kg (269 lb 8 oz)   SpO2 97%   BMI 47.74 kg/m²      INTAKE/OUTPUT:      Intake/Output Summary (Last 24 hours) at 10/23/2024 0713  Last data filed at 10/23/2024 0657  Gross per 24 hour   Intake 2886.02 ml   Output 3600 ml   Net -713.98 ml       PHYSICAL EXAM:  General Appearance: Intubated, sedated; grimaces to palpation over abdomen  HEENT:  Normocephalic, atraumatic, mucus membranes moist; OG tube to suction  Heart: Regular rate and rhythm  Lungs: normal effort with symmetric rise and fall of chest wall  Abdomen: Soft, distended, tender to palpation around incision.  Wound VAC in good suction  Extremities: Bilateral lower extremity pitting edema  Skin: Skin color, texture, turgor normal. No rashes or lesions.      Data:  CBC:   Recent Labs     10/20/24  1058 10/21/24  0630   WBC  --  9.1   HGB 7.6* 8.6*   PLT  --  278     Chemistry:   Recent Labs     10/21/24  0630 10/22/24  0417 10/23/24  0029    140 136   K 3.9 3.3* 3.6*   * 110* 108*   CO2 24 19* 22   GLUCOSE 147* 150* 200*   BUN 12 10 14   CREATININE 0.6 0.6 0.6   MG 2.0 1.6 1.9   ANIONGAP 7* 11 6*   LABGLOM >90 >90 >90   CALCIUM 7.1* 5.8* 6.7*   PHOS 1.8* 3.5 2.3*     Hepatic:   Recent Labs     10/21/24  0630 10/23/24  0029   AST 11 8   ALT 13 7   ALKPHOS 90 79   BILITOT 0.2* 0.1*   BILIDIR <0.1 <0.1     Coagulation:   Recent Labs     10/20/24  1058   APTT 31.1   INR 1.3         Radiology Review:  limits evaluation of the visceral organs. Lower chest: Trace right pleural effusion with lower lobe atelectasis. EG junction, stomach and duodenal sweep: Unremarkable Liver: Unremarkable Gallbladder: Surgically absent Biliary tree: Unremarkable Pancreas: Unremarkable for patient's age. Spleen: Unremarkable Kidneys and ureters: Unremarkable Adrenal glands: Unremarkable Retroperitoneal structures:  Unremarkable Small bowel and colon: Scattered air-fluid level seen throughout the large and small bowel with distention of the transverse colon with air-fluid levels.  There is a large air-fluid collection seen along the midline consistent with an abscess measuring 13.8 x 7 cm.  This is contiguous and extends with gas extending out into the subcutaneous soft tissues along the midline with infiltration suggesting infectious etiology and fat containing hernia. Appendix: Not seen Urinary bladder: Decompressed with Bar catheter Free fluid/air: None Lymph nodes: No enlarged lymph nodes Osseus structures: No destructive lesion Vasculature: No aneurysm Other: The uterus is surgically absent.     1. Large abscess with air-fluid collection seen along the peritoneum just below the rectus muscle.  Additional infiltration with gas and fluid seen extending in previous site of hernia suggesting recurrent hernia and or phlegmon.     XR CHEST PORTABLE    Result Date: 10/10/2024  EXAMINATION: ONE XRAY VIEW OF THE CHEST 10/10/2024 12:26 pm COMPARISON: 09/01/2023 HISTORY: ORDERING SYSTEM PROVIDED HISTORY: shortness of breath TECHNOLOGIST PROVIDED HISTORY: shortness of breath Reason for Exam: sob FINDINGS: Mild cardiomegaly.  Shallow lung volumes without acute consolidation.     Shallow lung volumes without acute consolidation.       ASSESSMENT:  Active Hospital Problems    Diagnosis Date Noted    Edema [R60.9] 10/19/2024    Hyponatremia [E87.1] 10/10/2024    Hypokalemia [E87.6] 10/10/2024    Elevated brain natriuretic peptide (BNP) level

## 2024-10-23 NOTE — FLOWSHEET NOTE
10/23/24 0740   Treatment Team Notification   Reason for Communication Evaluate;Review case   Name of Team Member Notified    Treatment Team Role Consulting Provider   Method of Communication Face to face   Response No new orders   Notification Time 0740       MD rounded, chart reviewed. No new orders.

## 2024-10-23 NOTE — PLAN OF CARE
Problem: Respiratory - Adult  Goal: Achieves optimal ventilation and oxygenation  10/23/2024 0716 by Lisa Lopez RCP  Outcome: Progressing  Flowsheets  Taken 10/23/2024 0344 by Ruby Gaston RCP  Achieves optimal ventilation and oxygenation:   Assess for changes in respiratory status   Oxygen supplementation based on oxygen saturation or arterial blood gases   Assess the need for suctioning and aspirate as needed   Respiratory therapy support as indicated  Taken 10/22/2024 2306 by Ruby Gaston RCP  Achieves optimal ventilation and oxygenation:   Assess for changes in respiratory status   Oxygen supplementation based on oxygen saturation or arterial blood gases   Assess the need for suctioning and aspirate as needed   Respiratory therapy support as indicated     Problem: Respiratory - Adult  Goal: Will be able to breathe spontaneously, without ventilator support  Description: Will be able to breathe spontaneously, without ventilator support  10/23/2024 0716 by Lisa Lopez RCP  Outcome: Progressing     Problem: Respiratory - Adult  Goal: Patient's breath sounds will be clear and equal  10/23/2024 0716 by Lisa Lopez RCP  Outcome: Progressing     Problem: Respiratory - Adult  Goal: Adequate oxygenation  Description: Adequate oxygenation  10/23/2024 0716 by Lisa Lopez RCP  Outcome: Progressing

## 2024-10-23 NOTE — PROGRESS NOTES
Physical Therapy  DATE: 10/23/2024    NAME: Mandie Bustamante  MRN: [id number]   : 1955    Patient not seen this date for Physical Therapy due to:      [] Cancel by RN or physician due to:    [x] Cx, Pt undergoing Cpap trial to determine if can be extubated     [] Critical Lab Value Level     [] Blood transfusion in progress    [] Acute or unstable cardiovascular status   _MAP < 55 or more than >115  _HR < 40 or > 130    [] Acute or unstable pulmonary status   -FiO2 > 60%   _RR < 5 or >40    _O2 sats < 85%    [] Strict Bedrest    [] Off Unit for surgery or procedure    [] Off Unit for testing       [] Pending imaging to R/O fracture    [] Refusal by Patient      [] Other      [] PT being discontinued at this time. Patient independent. No further needs.     [] PT being discontinued at this time as the patient has been transferred to hospice care. No further needs.      PEGGY GREER, PT

## 2024-10-23 NOTE — PROGRESS NOTES
[facility] -   Infectious diseases evaluation  admission date 10/10/2024    reason for consultation:   Fever despite antibiotics    Impression :   Current:  Fever/leukocytosis likely abdominal source  Abdominal abscess  Hypotention  Status post exploratory laparotomy with removal of mesh and wound VAC application 10/16/2024 subsequently underwent exploratory laparotomy abdominal washout with wound VAC application 10/18/2024.   Status post robotic assisted laparoscopic repair of recurrent incisional hernia with mesh placement 10/1/2024  Status post ultrasound guided aspiration of abdominal wall fluid collection on 10/11/2024 no growth on culture  Acute respiratory failure required intubation  Obesity    HENCE:   Continue IV cefepime and Flagyl  Respiratory culture grew normal brenda on 10/19/2024  Respiratory panel with COVID-19 negative  The patient was treated with IV Zosyn 10/10/2024 through 10/17/2024  No growth on blood cultures from 10/16/2024  Follow blood cultures from 10/18/2024, no growth  Follow CBC and renal function  Discussed with  at the bedside      Infection Control Recommendations   [place] Precautions      Antimicrobial Stewardship Recommendations   Simplification of therapy  Targeted therapy      History of Present Illness:   Initial history:  Mandie Bustamante is a 69 y.o.-year-old female was seen at the ICU, intubated, sedated, unable to provide history that was obtained from chart review and nursing staff.  She is on 1 mcg/min of Levophed, right arm PICC line was placed 10/11/2024, on TPN.  Bar catheter in place with clear urine was placed on 10/10/2024  NG tube in place.  Status post robotic assisted laparoscopic repair of recurrent incisional hernia with mesh placement 10/1/2024 was complicated by fluid collection status post aspiration on 10/11/2024 with no growth on culture.  Status post exploratory laparotomy with removal of mesh and

## 2024-10-23 NOTE — FLOWSHEET NOTE
.End Of Shift Note  [facility]  Summary of shift: Patient remains on the ventilator, she did fairly well with CPAP trials. She didn't however like being off precedex, she wasn't tolerating the ventilator and precedex remains. Levo weaned as tolerated, tpn/lipids continues. Antibiotics  given as ordered. She does respond to pain, and opens eyes and squeezes hands when asked.    Vitals:    Vitals:    10/23/24 1730 10/23/24 1745 10/23/24 1800 10/23/24 1900   BP: (!) 102/50 (!) 101/55 (!) 99/55 103/60   Pulse: 80 84 83 86   Resp: 25 24 25 24   Temp:       TempSrc:       SpO2:    96%   Weight:       Height:            I&O:   Intake/Output Summary (Last 24 hours) at 10/23/2024 1930  Last data filed at 10/23/2024 1905  Gross per 24 hour   Intake 2306.75 ml   Output 3635 ml   Net -1328.25 ml       Resp Status: Vent      Ventilator Settings:  Vent Mode: (S) AC/PRVC Resp Rate (Set): 14 bpm/Vt (Set, mL): 500 mL/ /FiO2 : 30 %    Critical Care IV infusions:   dexmedeTOMIDine (PRECEDEX) 1,000 mcg in sodium chloride 0.9 % 250 mL infusion 0.4 mcg/kg/hr (10/23/24 1905)    PN-Adult 2-in-1 Central Line (Custom) 67.8 mL/hr at 10/23/24 1905    dextrose 5% and 0.45% NaCl with KCl 20 mEq Stopped (10/22/24 1038)    norepinephrine Stopped (10/23/24 1510)    propofol Stopped (10/17/24 1652)    midazolam Stopped (10/22/24 1854)    dextrose      sodium chloride 50 mL/hr at 10/16/24 1623        LDA:   PICC 10/11/24 Right Brachial (Active)   Number of days: 11       NG/OG/NJ/NE Tube Orogastric 16 fr Center mouth (Active)   Number of days: 7       Urinary Catheter 10/10/24 Bar (Active)   Number of days: 13       ETT  (Active)   Number of days: 7       Incision 10/01/24 Abdomen Medial;Upper (Active)   Number of days: 22       Incision 10/16/24 Abdomen Lower;Medial (Active)   Number of days: 7

## 2024-10-23 NOTE — PLAN OF CARE
Problem: Respiratory - Adult  Goal: Achieves optimal ventilation and oxygenation  10/23/2024 1934 by Vaishnavi Rendon RCP  Outcome: Progressing     Problem: Respiratory - Adult  Goal: Will be able to breathe spontaneously, without ventilator support  Description: Will be able to breathe spontaneously, without ventilator support  10/23/2024 1934 by Vaishnavi Rendon RCP  Outcome: Progressing     Problem: Respiratory - Adult  Goal: Patient's breath sounds will be clear and equal  10/23/2024 1934 by Vaishnavi Rendon RCP  Outcome: Progressing     Problem: Respiratory - Adult  Goal: Adequate oxygenation  Description: Adequate oxygenation  10/23/2024 1934 by Vaishnavi Rendon RCP  Outcome: Progressing         PROVIDE ADEQUATE OXYGENATION WITH ACCEPTABLE SP02/ABG'S    [x]  IDENTIFY APPROPRIATE OXYGEN THERAPY  [x]   MONITOR SP02/ABG'S AS NEEDED   [x]   PATIENT EDUCATION AS NEEDED      MECHANICAL VENTILATION     [x]  PROVIDE OPTIMAL VENTILATION  [x]   ASSESS FOR EXTUBATION READINESS  [x]   ASSESS FOR WEANING READINESS  [x]  EXTUBATE AS TOLERATED  [x]  IMPLEMENT ADULT MECHANICAL VENTILATION PROTOCOL  [x]  MAINTAIN ADEQUATE OXYGENATION  [x]  PERFORM SPONTANEOUS WEANING TRIAL AS TOLERATED

## 2024-10-23 NOTE — PROGRESS NOTES
Pulmonary Critical Care Progress Note    Patient seen for the follow up of Intra-abdominal abscess (HCC)     Subjective:    She is intubated on Precedex 0.4 of Versed still unresponsive she is on CPAP at 30% FiO2 required pressure support of 12 and having increases with rate around 28min.  She is back on 2 mcg/min.  Levophed she had been receiving Dilaudid rarely.  She wakes up follows commands.  She has been having adequate urine output.   She has no fever    Examination:    Vitals: /61   Pulse 82   Temp 98.5 °F (36.9 °C) (Axillary)   Resp (!) 34   Ht 1.6 m (5' 3\")   Wt 122.2 kg (269 lb 8 oz)   SpO2 99%   BMI 47.74 kg/m²   SpO2  Av.7 %  Min: 95 %  Max: 100 %  General appearance: Intubated sedated  Neck: No JVD  Lungs: Decreased breath sound no crackles or wheezes  Heart: regular rate and rhythm, S1, S2 normal, no gallop  Abdomen: Soft, non tender, + BS VAC in place evidence of fecal material in container  Extremities: no cyanosis or clubbing.  1+ edema    LABs:    CBC:   Recent Labs     10/21/24  0630   WBC 9.1   HGB 8.6*   HCT 26.6*        BMP:   Recent Labs     10/22/24  0417 10/23/24  0029    136   K 3.3* 3.6*   CO2 19* 22   BUN 10 14   CREATININE 0.6 0.6   LABGLOM >90 >90   GLUCOSE 150* 200*        Latest Reference Range & Units 10/18/24 04:12   POC HCO3 21.0 - 28.0 mmol/L 24.9   POC O2 SAT 94.0 - 98.0 % 97.5   POC pCO2 35.0 - 48.0 mm Hg 33.7 (L)   POC pH 7.350 - 7.450  7.476 (H)   POC PO2 83.0 - 108.0 mm Hg 88.2   (L): Data is abnormally low  (H): Data is abnormally high  Radiology:  Chest x-ray 10/23  No significant interval change since yesterday's exam   Reviewed no change      Chest x-ray 10/21  1. Improved aeration of the left lung with persistent hazy opacities  overlying the right lung.  2. Suspected trace bilateral pleural effusions.          Chest x-ray 10/19 reviewed        Impression/recommendations:  Acute hypoxic respiratory insufficiency /atelectasis/pulmonary

## 2024-10-24 ENCOUNTER — APPOINTMENT (OUTPATIENT)
Dept: GENERAL RADIOLOGY | Age: 69
End: 2024-10-24
Attending: STUDENT IN AN ORGANIZED HEALTH CARE EDUCATION/TRAINING PROGRAM
Payer: COMMERCIAL

## 2024-10-24 PROBLEM — L98.492 ABDOMINAL WALL ULCER, WITH FAT LAYER EXPOSED (HCC): Status: ACTIVE | Noted: 2024-10-24

## 2024-10-24 PROBLEM — K63.2 COLOCUTANEOUS FISTULA: Status: ACTIVE | Noted: 2024-10-24

## 2024-10-24 LAB
ALLEN TEST: POSITIVE
ANION GAP SERPL CALCULATED.3IONS-SCNC: 7 MMOL/L (ref 9–17)
BASOPHILS # BLD: 0.03 K/UL (ref 0–0.2)
BASOPHILS NFR BLD: 0 % (ref 0–2)
BUN SERPL-MCNC: 16 MG/DL (ref 8–23)
BUN/CREAT SERPL: 27 (ref 9–20)
CALCIUM SERPL-MCNC: 7 MG/DL (ref 8.6–10.4)
CHLORIDE SERPL-SCNC: 105 MMOL/L (ref 98–107)
CO2 BLD CALC-SCNC: 25 MMOL/L (ref 22–30)
CO2 SERPL-SCNC: 24 MMOL/L (ref 20–31)
CREAT SERPL-MCNC: 0.6 MG/DL (ref 0.5–0.9)
EOSINOPHIL # BLD: 0.48 K/UL (ref 0–0.44)
EOSINOPHILS RELATIVE PERCENT: 6 % (ref 1–4)
ERYTHROCYTE [DISTWIDTH] IN BLOOD BY AUTOMATED COUNT: 13.2 % (ref 11.8–14.4)
FIO2: 30
GFR, ESTIMATED: >90 ML/MIN/1.73M2
GLUCOSE BLD-MCNC: 149 MG/DL (ref 65–105)
GLUCOSE BLD-MCNC: 151 MG/DL (ref 65–105)
GLUCOSE BLD-MCNC: 178 MG/DL (ref 65–105)
GLUCOSE BLD-MCNC: 183 MG/DL (ref 65–105)
GLUCOSE BLD-MCNC: 193 MG/DL (ref 65–105)
GLUCOSE SERPL-MCNC: 190 MG/DL (ref 70–99)
HCT VFR BLD AUTO: 27.4 % (ref 36.3–47.1)
HGB BLD-MCNC: 9.1 G/DL (ref 11.9–15.1)
IMM GRANULOCYTES # BLD AUTO: 0.2 K/UL (ref 0–0.3)
IMM GRANULOCYTES NFR BLD: 3 %
LYMPHOCYTES NFR BLD: 1.51 K/UL (ref 1.1–3.7)
LYMPHOCYTES RELATIVE PERCENT: 20 % (ref 24–43)
MAGNESIUM SERPL-MCNC: 1.9 MG/DL (ref 1.6–2.6)
MCH RBC QN AUTO: 29.4 PG (ref 25.2–33.5)
MCHC RBC AUTO-ENTMCNC: 33.2 G/DL (ref 28.4–34.8)
MCV RBC AUTO: 88.7 FL (ref 82.6–102.9)
MODE: ABNORMAL
MONOCYTES NFR BLD: 1.06 K/UL (ref 0.1–1.2)
MONOCYTES NFR BLD: 14 % (ref 3–12)
NEUTROPHILS NFR BLD: 57 % (ref 36–65)
NEUTS SEG NFR BLD: 4.39 K/UL (ref 1.5–8.1)
NRBC BLD-RTO: 0 PER 100 WBC
O2 DELIVERY DEVICE: ABNORMAL
PHOSPHATE SERPL-MCNC: 2.5 MG/DL (ref 2.6–4.5)
PLATELET # BLD AUTO: 234 K/UL (ref 138–453)
PMV BLD AUTO: 9.2 FL (ref 8.1–13.5)
POC HCO3: 24.9 MMOL/L (ref 21–28)
POC O2 SATURATION: 98.4 % (ref 94–98)
POC PCO2: 32.3 MM HG (ref 35–48)
POC PH: 7.5 (ref 7.35–7.45)
POC PO2: 101.5 MM HG (ref 83–108)
POSITIVE BASE EXCESS, ART: 2 MMOL/L (ref 0–3)
POTASSIUM SERPL-SCNC: 3.7 MMOL/L (ref 3.7–5.3)
RBC # BLD AUTO: 3.09 M/UL (ref 3.95–5.11)
SAMPLE SITE: ABNORMAL
SODIUM SERPL-SCNC: 136 MMOL/L (ref 135–144)
TRIGL SERPL-MCNC: 107 MG/DL
WBC OTHER # BLD: 7.7 K/UL (ref 3.5–11.3)

## 2024-10-24 PROCEDURE — 2700000000 HC OXYGEN THERAPY PER DAY

## 2024-10-24 PROCEDURE — 6360000002 HC RX W HCPCS

## 2024-10-24 PROCEDURE — 2500000003 HC RX 250 WO HCPCS

## 2024-10-24 PROCEDURE — 84478 ASSAY OF TRIGLYCERIDES: CPT

## 2024-10-24 PROCEDURE — 2500000003 HC RX 250 WO HCPCS: Performed by: INTERNAL MEDICINE

## 2024-10-24 PROCEDURE — 83735 ASSAY OF MAGNESIUM: CPT

## 2024-10-24 PROCEDURE — 99232 SBSQ HOSP IP/OBS MODERATE 35: CPT | Performed by: NURSE PRACTITIONER

## 2024-10-24 PROCEDURE — 80048 BASIC METABOLIC PNL TOTAL CA: CPT

## 2024-10-24 PROCEDURE — 6370000000 HC RX 637 (ALT 250 FOR IP)

## 2024-10-24 PROCEDURE — 2580000003 HC RX 258

## 2024-10-24 PROCEDURE — 71045 X-RAY EXAM CHEST 1 VIEW: CPT

## 2024-10-24 PROCEDURE — 99232 SBSQ HOSP IP/OBS MODERATE 35: CPT | Performed by: INTERNAL MEDICINE

## 2024-10-24 PROCEDURE — 36600 WITHDRAWAL OF ARTERIAL BLOOD: CPT

## 2024-10-24 PROCEDURE — 94761 N-INVAS EAR/PLS OXIMETRY MLT: CPT

## 2024-10-24 PROCEDURE — 97110 THERAPEUTIC EXERCISES: CPT

## 2024-10-24 PROCEDURE — 94003 VENT MGMT INPAT SUBQ DAY: CPT

## 2024-10-24 PROCEDURE — 82947 ASSAY GLUCOSE BLOOD QUANT: CPT

## 2024-10-24 PROCEDURE — 6360000002 HC RX W HCPCS: Performed by: INTERNAL MEDICINE

## 2024-10-24 PROCEDURE — 82374 ASSAY BLOOD CARBON DIOXIDE: CPT

## 2024-10-24 PROCEDURE — 84100 ASSAY OF PHOSPHORUS: CPT

## 2024-10-24 PROCEDURE — 2580000003 HC RX 258: Performed by: INTERNAL MEDICINE

## 2024-10-24 PROCEDURE — 82803 BLOOD GASES ANY COMBINATION: CPT

## 2024-10-24 PROCEDURE — 2000000000 HC ICU R&B

## 2024-10-24 PROCEDURE — 85025 COMPLETE CBC W/AUTO DIFF WBC: CPT

## 2024-10-24 PROCEDURE — 99222 1ST HOSP IP/OBS MODERATE 55: CPT

## 2024-10-24 RX ORDER — DIPHENHYDRAMINE HYDROCHLORIDE 50 MG/ML
25 INJECTION INTRAMUSCULAR; INTRAVENOUS EVERY 6 HOURS PRN
Status: DISCONTINUED | OUTPATIENT
Start: 2024-10-24 | End: 2024-10-30

## 2024-10-24 RX ADMIN — HYDROMORPHONE HYDROCHLORIDE 0.5 MG: 1 INJECTION, SOLUTION INTRAMUSCULAR; INTRAVENOUS; SUBCUTANEOUS at 10:22

## 2024-10-24 RX ADMIN — HYDROMORPHONE HYDROCHLORIDE 0.5 MG: 1 INJECTION, SOLUTION INTRAMUSCULAR; INTRAVENOUS; SUBCUTANEOUS at 06:31

## 2024-10-24 RX ADMIN — HYDROMORPHONE HYDROCHLORIDE 0.5 MG: 1 INJECTION, SOLUTION INTRAMUSCULAR; INTRAVENOUS; SUBCUTANEOUS at 16:06

## 2024-10-24 RX ADMIN — ANTI-FUNGAL POWDER MICONAZOLE NITRATE TALC FREE: 1.42 POWDER TOPICAL at 21:02

## 2024-10-24 RX ADMIN — PANTOPRAZOLE SODIUM 40 MG: 40 INJECTION, POWDER, FOR SOLUTION INTRAVENOUS at 08:05

## 2024-10-24 RX ADMIN — DEXMEDETOMIDINE 0.2 MCG/KG/HR: 100 INJECTION, SOLUTION INTRAVENOUS at 19:15

## 2024-10-24 RX ADMIN — SODIUM PHOSPHATE, MONOBASIC, MONOHYDRATE AND SODIUM PHOSPHATE, DIBASIC, ANHYDROUS: 142; 276 INJECTION, SOLUTION INTRAVENOUS at 17:23

## 2024-10-24 RX ADMIN — I.V. FAT EMULSION 100 ML: 20 EMULSION INTRAVENOUS at 17:25

## 2024-10-24 RX ADMIN — INSULIN LISPRO 2 UNITS: 100 INJECTION, SOLUTION INTRAVENOUS; SUBCUTANEOUS at 12:24

## 2024-10-24 RX ADMIN — DEXMEDETOMIDINE 0.4 MCG/KG/HR: 100 INJECTION, SOLUTION INTRAVENOUS at 01:21

## 2024-10-24 RX ADMIN — CEFEPIME 2000 MG: 2 INJECTION, POWDER, FOR SOLUTION INTRAVENOUS at 15:49

## 2024-10-24 RX ADMIN — CHLORHEXIDINE GLUCONATE 15 ML: 1.2 RINSE ORAL at 12:31

## 2024-10-24 RX ADMIN — CEFEPIME 2000 MG: 2 INJECTION, POWDER, FOR SOLUTION INTRAVENOUS at 08:08

## 2024-10-24 RX ADMIN — METRONIDAZOLE 500 MG: 500 INJECTION, SOLUTION INTRAVENOUS at 20:08

## 2024-10-24 RX ADMIN — METRONIDAZOLE 500 MG: 500 INJECTION, SOLUTION INTRAVENOUS at 12:28

## 2024-10-24 RX ADMIN — HYDROMORPHONE HYDROCHLORIDE 0.5 MG: 1 INJECTION, SOLUTION INTRAMUSCULAR; INTRAVENOUS; SUBCUTANEOUS at 02:28

## 2024-10-24 RX ADMIN — FUROSEMIDE 40 MG: 10 INJECTION, SOLUTION INTRAMUSCULAR; INTRAVENOUS at 08:05

## 2024-10-24 RX ADMIN — METRONIDAZOLE 500 MG: 500 INJECTION, SOLUTION INTRAVENOUS at 04:12

## 2024-10-24 RX ADMIN — HYDROCORTISONE: 1 CREAM TOPICAL at 21:02

## 2024-10-24 RX ADMIN — FUROSEMIDE 40 MG: 10 INJECTION, SOLUTION INTRAMUSCULAR; INTRAVENOUS at 21:02

## 2024-10-24 RX ADMIN — HYDROMORPHONE HYDROCHLORIDE 0.5 MG: 1 INJECTION, SOLUTION INTRAMUSCULAR; INTRAVENOUS; SUBCUTANEOUS at 12:39

## 2024-10-24 RX ADMIN — INSULIN LISPRO 1 UNITS: 100 INJECTION, SOLUTION INTRAVENOUS; SUBCUTANEOUS at 05:58

## 2024-10-24 RX ADMIN — ANTI-FUNGAL POWDER MICONAZOLE NITRATE TALC FREE: 1.42 POWDER TOPICAL at 12:31

## 2024-10-24 RX ADMIN — HYDROMORPHONE HYDROCHLORIDE 0.5 MG: 1 INJECTION, SOLUTION INTRAMUSCULAR; INTRAVENOUS; SUBCUTANEOUS at 19:30

## 2024-10-24 ASSESSMENT — PAIN SCALES - GENERAL
PAINLEVEL_OUTOF10: 5
PAINLEVEL_OUTOF10: 3

## 2024-10-24 ASSESSMENT — PAIN DESCRIPTION - LOCATION
LOCATION: ABDOMEN

## 2024-10-24 ASSESSMENT — PULMONARY FUNCTION TESTS
PIF_VALUE: 19
PIF_VALUE: 22
PIF_VALUE: 19
PIF_VALUE: 23
PIF_VALUE: 24
PIF_VALUE: 18
PIF_VALUE: 17
PIF_VALUE: 23
PIF_VALUE: 22
PIF_VALUE: 23
PIF_VALUE: 21
PIF_VALUE: 18
PIF_VALUE: 21
PIF_VALUE: 18

## 2024-10-24 ASSESSMENT — PAIN DESCRIPTION - DESCRIPTORS
DESCRIPTORS: ITCHING;NAGGING
DESCRIPTORS: PATIENT UNABLE TO DESCRIBE

## 2024-10-24 ASSESSMENT — PAIN DESCRIPTION - ORIENTATION
ORIENTATION: MID
ORIENTATION: MID

## 2024-10-24 ASSESSMENT — PAIN DESCRIPTION - PAIN TYPE: TYPE: SURGICAL PAIN

## 2024-10-24 NOTE — FLOWSHEET NOTE
10/24/24 1636   Treatment Team Notification   Reason for Communication Review case   Name of Team Member Notified    Treatment Team Role Consulting Provider   Method of Communication Secure Message   Response Waiting for response   Notification Time 1636     Message sent for unsuccessful Ng attempts & patient not wanting us to try again.

## 2024-10-24 NOTE — PROGRESS NOTES
Pulmonary Critical Care Progress Note    Patient seen for the follow up of Intra-abdominal abscess (HCC)     Subjective:    She is intubated on Precedex 0.4 off Versed wakes up follows commands.  Still lethargic she is on CPAP at 30% FiO2 now at full vent support.  She previously required pressure support of 12 and having increases with rate around 28min.  She is off Levophed blood pressure was borderline yesterday Lasix was not given the evening.  She had over 2 L out with diuresis.  She had dressing changed with over 600 mL L brown fluid obtained from abdomen drain was removed before because it was not working.  Lr    Examination:    Vitals: BP (!) 95/58   Pulse 78   Temp 96.8 °F (36 °C) (Temporal)   Resp 21   Ht 1.6 m (5' 3\")   Wt 119.7 kg (264 lb)   SpO2 98%   BMI 46.77 kg/m²   SpO2  Av.7 %  Min: 91 %  Max: 100 %  General appearance: Intubated sedated  Neck: No JVD  Lungs: Decreased breath sound no crackles or wheezes  Heart: regular rate and rhythm, S1, S2 normal, no gallop  Abdomen: Soft, non tender, + BS VAC in place evidence of fecal material in container  Extremities: no cyanosis or clubbing.  1+ edema    LABs:    CBC:   Recent Labs     10/24/24  0500   WBC 7.7   HGB 9.1*   HCT 27.4*        BMP:   Recent Labs     10/23/24  0029 10/24/24  0500    136   K 3.6* 3.7   CO2 22 24   BUN 14 16   CREATININE 0.6 0.6   LABGLOM >90 >90   GLUCOSE 200* 190*        Latest Reference Range & Units 10/18/24 04:12   POC HCO3 21.0 - 28.0 mmol/L 24.9   POC O2 SAT 94.0 - 98.0 % 97.5   POC pCO2 35.0 - 48.0 mm Hg 33.7 (L)   POC pH 7.350 - 7.450  7.476 (H)   POC PO2 83.0 - 108.0 mm Hg 88.2   (L): Data is abnormally low  (H): Data is abnormally high  Radiology:  Chest x-ray 10/24  1. Bibasilar airspace disease and small bilateral effusions.  Stable  cardiomegaly.  Mild pulmonary vascular congestion.  Findings favor CHF  related changes with underlying infiltrate not excluded.  Follow-up is  recommended to

## 2024-10-24 NOTE — FLOWSHEET NOTE
10/24/24 1139   Treatment Team Notification   Reason for Communication Evaluate;Review case   Name of Team Member Notified    Treatment Team Role Consulting Provider   Method of Communication Call   Response No new orders   Notification Time 1139     MD called unit, no surgery for abdoemen today. Have WOC see patient today.

## 2024-10-24 NOTE — PROGRESS NOTES
Spiritual Health History and Assessment/Progress Note  [facility]    (P) Spiritual/Emotional Needs,  ,  ,      Name: Mandie Bustamante MRN: [id number]    Age: 69 y.o.     Sex: female   Language: English   Synagogue: Christianity   Intra-abdominal abscess (HCC)     Date: 10/24/2024            Total Time Calculated: (P) 9 min              Spiritual Assessment began in [facility]        Referral/Consult From: (P) Rounding   Encounter Overview/Reason: (P) Spiritual/Emotional Needs  Service Provided For: (P) Patient and family together    Elaine, Belief, Meaning:   Patient is connected with a elaine tradition or spiritual practice and has beliefs or practices that help with coping during difficult times  Family/Friends are connected with a elaine tradition or spiritual practice and have beliefs or practices that help with coping during difficult times      Importance and Influence:  Patient has spiritual/personal beliefs that influence decisions regarding their health  Family/Friends have spiritual/personal beliefs that influence decisions regarding the patient's health    Community:  Patient feels well-supported. Support system includes: Spouse/Partner  Family/Friends feel well-supported. Support system includes: Extended family    Assessment and Plan of Care:     Patient Interventions include: Affirmed coping skills/support systems and Other: supportive pastoral presence  Family/Friends Interventions include: Facilitated expression of thoughts and feelings, Explored spiritual coping/struggle/distress, and Affirmed coping skills/support systems    Patient Plan of Care: Spiritual Care available upon further referral  Family/Friends Plan of Care: Spiritual Care available upon further referral    Electronically signed by Chaplain Ashly on 10/24/2024 at 3:20 PM

## 2024-10-24 NOTE — PROGRESS NOTES
General Surgery:  Daily Progress Note          PATIENT NAME: Mandie Bustamante     TODAY'S DATE: 10/24/2024, 6:48 AM    SUBJECTIVE:     Pt seen and examined at bedside.  No acute overnight events. Afebrile,  Low output from OG tube,   Had feculent material seeping out from under the wound vac. Wound vac removed and cavity cleaned. 300 cc of feculent output aspirated. No site of fistula identified. Xeroform gauze, wet to dry fluff and ABD applied for dressing.     OBJECTIVE:   VITALS:  BP (!) 109/58   Pulse 84   Temp 97 °F (36.1 °C) (Temporal)   Resp 22   Ht 1.6 m (5' 3\")   Wt 119.7 kg (264 lb)   SpO2 99%   BMI 46.77 kg/m²      INTAKE/OUTPUT:      Intake/Output Summary (Last 24 hours) at 10/24/2024 0648  Last data filed at 10/24/2024 0600  Gross per 24 hour   Intake 2485.06 ml   Output 4565 ml   Net -2079.94 ml       PHYSICAL EXAM:  General Appearance: Intubated, sedated; grimaces to palpation over abdomen  HEENT:  Normocephalic, atraumatic, mucus membranes moist; OG tube to suction  Heart: Regular rate and rhythm  Lungs: normal effort with symmetric rise and fall of chest wall  Abdomen: Soft, distended, tender to palpation around incision.  Wound VAC in good suction  Extremities: Bilateral lower extremity pitting edema  Skin: Skin color, texture, turgor normal. No rashes or lesions.      Data:  CBC:   Recent Labs     10/24/24  0500   WBC 7.7   HGB 9.1*        Chemistry:   Recent Labs     10/22/24  0417 10/23/24  0029 10/24/24  0500    136 136   K 3.3* 3.6* 3.7   * 108* 105   CO2 19* 22 24   GLUCOSE 150* 200* 190*   BUN 10 14 16   CREATININE 0.6 0.6 0.6   MG 1.6 1.9 1.9   ANIONGAP 11 6* 7*   LABGLOM >90 >90 >90   CALCIUM 5.8* 6.7* 7.0*   PHOS 3.5 2.3* 2.5*     Hepatic:   Recent Labs     10/23/24  0029   AST 8   ALT 7   ALKPHOS 79   BILITOT 0.1*   BILIDIR <0.1     Coagulation:   No results for input(s): \"APTT\", \"INR\" in the last 72 hours.    Invalid input(s): \"PROT\"        Radiology Review:   [R79.89] 10/10/2024    Leukocytosis [D72.829] 10/10/2024    Intra-abdominal abscess (HCC) [K65.1] 10/10/2024    Anxiety [F41.9] 02/24/2023    Prediabetes [R73.03] 09/06/2021    Essential hypertension [I10] 11/21/2015    Gastro-esophageal reflux disease without esophagitis [K21.9] 11/21/2015       69 y.o. female status post robotic assisted laparoscopic incisional hernia repair with mesh  Postop day 8 from exploratory laparotomy and wound VAC placement  EC fistula present.       Plan: as discussed with Dr. Man  Continue medical management and supportive care per ICU and primary team.  Will treat abdominal wound as a controlled fistula for the time being.  Continue TPN and keep n.p.o.  Wean pressors as able  Continue antibiotics.  Keep intubated till further plan for management of EC fistula.   No need for wound vac change. Will contact wound ostomy team for application of fistula management system.    Electronically signed by Hao Stewart MD  on 10/24/2024 at 6:48 AM

## 2024-10-24 NOTE — PROGRESS NOTES
[facility] -   Infectious diseases evaluation  admission date 10/10/2024    reason for consultation:   Fever despite antibiotics    Impression :   Current:  Fever/leukocytosis likely abdominal source  Abdominal abscess  Hypotention  Status post exploratory laparotomy with removal of mesh and wound VAC application 10/16/2024 subsequently underwent exploratory laparotomy abdominal washout with wound VAC application 10/18/2024.   Status post robotic assisted laparoscopic repair of recurrent incisional hernia with mesh placement 10/1/2024  Status post ultrasound guided aspiration of abdominal wall fluid collection on 10/11/2024 no growth on culture  Acute respiratory failure required intubation  Obesity    HENCE:   Continue IV cefepime and Flagyl per Dr. Cardozo  The patient received IV Zosyn 10/10/2024 through 10/17/2024  Plan per the surgical team remains pending  No growth on blood cultures  Continue supportive care      Infection Control Recommendations   Lane Precautions      Antimicrobial Stewardship Recommendations   Simplification of therapy  Targeted therapy      History of Present Illness:   Initial history:  Mandie Bustamante is a 69 y.o.-year-old female was seen at the ICU, intubated, sedated, unable to provide history that was obtained from chart review and nursing staff.  She is on 1 mcg/min of Levophed, right arm PICC line was placed 10/11/2024, on TPN.  Bar catheter in place with clear urine was placed on 10/10/2024  NG tube in place.  Status post robotic assisted laparoscopic repair of recurrent incisional hernia with mesh placement 10/1/2024 was complicated by fluid collection status post aspiration on 10/11/2024 with no growth on culture.  Status post exploratory laparotomy with removal of mesh and wound VAC application 10/16/2024 subsequently underwent exploratory laparotomy abdominal washout with wound VAC application 10/18/2024.  She had a fever

## 2024-10-24 NOTE — PROGRESS NOTES
Order obtained for extubation.  SpO2 of 98 on 30% FiO2.   Patient extubated and placed on 2 liters/min via nasal cannula.   Post extubation SpO2 is 95% with HR  93 bpm and RR 20  breaths/min.    Patient had moderate cough that was non-productive.  Extubation Well tolerated by patient..   Breath Sounds: Dimished    TONJA RAMOS RCP   3:42 PM

## 2024-10-24 NOTE — PROGRESS NOTES
Patient switched to Cpap for weaning trial.   10/24/24 1302   Patient Observation   Pulse 79   Respirations 21   SpO2 98 %   Vent Information   Vent Mode (S)  CPAP/PS   Ventilator Settings   FiO2  30 %   Vt (Set, mL) 500 mL   Resp Rate (Set) 14 bpm   PEEP/CPAP (cmH2O) 8   Pressure Support (cm H2O) (S)  10 cm H2O   Vent Patient Data (Readings)   Vt (Measured) 436 mL   Peak Inspiratory Pressure (cmH2O) 18 cmH2O   Rate Measured 22.1 br/min   Minute Volume (L/min) 10.3 Liters   Mean Airway Pressure (cmH2O) 11.7 cmH20   Plateau Pressure (cm H2O) 37.9 cm H2O   Driving Pressure 29.9   I:E Ratio 1:1.72   I Time/ I Time % 0.9 s   Vent Alarm Settings   High Pressure (cmH2O) 49 cmH2O   Low Minute Volume (lpm) 6 L/min   RR High (bpm) 39 br/min

## 2024-10-24 NOTE — PROGRESS NOTES
[facility]  Office: [phone]  Keenan Foster DO, Kevin Siegel DO, Carlos A Orta DO, Taye Morales DO, Maxx Madison MD, Meena Eckert MD, Efren Vo MD, Makayla Last MD,  Jerman Christian MD, Gina Bourgeois MD, Gladys Fry MD,  Anu Murillo DO, Jennifer Blanca MD, Oral Duncan MD, Harlan Foster DO, Katya Simmons MD,  Filiberto Slade DO, Elizabeth Alves MD, Samantha Pinto MD, Arlene Bryant MD, Bandar Barrios MD,  Kulwant Rodrigez MD, Michelle Dennison MD, Jinny Steel MD, Kathia Vargas MD, Gerardo Alvarez MD, Richard Johnson MD, Demetri Ibarra DO, Benjie Alvarenga MD, Shirley Waterhouse, CNP,  Georgette Villarreal CNP, Demetri Toure, CNP,  Silvina Castano, CARINA, Tatiana Mckeon, CNP, Angelina Harper, CNP, Alma Rosa Peng, CNP, Rashmi Mccartney, CNP, IMAN ErvinC, IMAN ArmentaC, Stephanie Kaminski, CNP, Tono Yee, CNP,  Chrissie Schwartz, CNP, Sheri Jones, CNP,  Nadiya Rick, CNP, Cindi Lam, CNP         [facility]   IN-PATIENT SERVICE   [facility]    Progress Note    10/24/2024    7:13 AM    Name:   Mandie Bustamante  MRN:     [id number]     Acct:      [id number]   Room:   2030/2030-01  IP Day:  14  Admit Date:  10/10/2024  6:11 PM    PCP:   No primary care provider on file.  Code Status:  Full Code    Subjective:     C/C: Abdominal pain    Interval History Status: not changed.     Patient remains intubated, tolerating CPAP trial.  Has spontaneous eye opening follow some commands, does not consistently answer questions    Brief History:     Per prior documentation  \"Patient is a 69-year-old female who recently underwent an out patient hernia repair with mesh on 10/1/2024, she presents to the ED with abdominal pain on 10/10/2024. Work up in the ED revealed severe hyponatremia of 109, leukocytosis, and  CT abdomen pelvis without contrast was suggestive of large abscess with air-fluid collection seen along the peritoneum just below the rectus muscle. There  of stone so could likely be from her hyperglycemia or potentially hypercalcemia which each were discussed in depth with the patient.  We also discussed cushionoid appearance and how that could relate to hyperglycemia as well.  Patient states we could possibly investigate this at later date.         Edema 10/19/2024 Yes       Plan:        Wean vent as able  Continue local wound care, wound VAC per surgery  Continue cefepime and Flagyl per ID recommendations  Monitor glucose  Trend labs, correct electrolytes as needed  Wound/fistula care per surgery  PT and OT as able  See orders for details    Carlos A Orta, DO  10/24/2024  7:13 AM

## 2024-10-24 NOTE — FLOWSHEET NOTE
10/24/24 0841   Hygiene   Hygiene Lola care   Level of Assistance Dependent   Skin Care Soap and water;Chlorhexidine solution   Oral Care Performed Mouth swabbed;Mouth suctioned   Urinary Catheter 10/10/24 Bar   Placement Date/Time: 10/10/24 1243   Inserted by: Neeta LANE  Insertion attempts: 1  Catheter Type: Bar  Catheter Size: 16 FR  Catheter Balloon Size: 10 mL   Catheter Care  Soap and water   Catheter Best Practices  Bag below bladder;Bag not on floor   Status Draining;Patent

## 2024-10-24 NOTE — PLAN OF CARE
Problem: Safety - Medical Restraint  Goal: Remains free of injury from restraints (Restraint for Interference with Medical Device)  Description: INTERVENTIONS:  1. Determine that other, less restrictive measures have been tried or would not be effective before applying the restraint  2. Evaluate the patient's condition at the time of restraint application  3. Inform patient/family regarding the reason for restraint  4. Q2H: Monitor safety, psychosocial status, comfort, nutrition and hydration  Recent Flowsheet Documentation  Taken 10/23/2024 2000 by Saul Dela Cruz RN  Remains free of injury from restraints (restraint for interference with medical device):   Determine that other, less restrictive measures have been tried or would not be effective before applying the restraint   Evaluate the patient's condition at the time of restraint application   Inform patient/family regarding the reason for restraint   Every 2 hours: Monitor safety, psychosocial status, comfort, nutrition and hydration

## 2024-10-24 NOTE — PROGRESS NOTES
Physical Therapy  Facility/Department: [facility]  Daily Treatment Note  NAME: Mandie Bustamante  : 1955  MRN: [id number]    Date of Service: 10/24/2024    Discharge Recommendations:  Patient would benefit from continued therapy after discharge        Patient Diagnosis(es): The encounter diagnosis was Edema, unspecified type.    Assessment  Assessment: Pt now on day 13 of her hospitalization, vented and orally intubated on 30% Fi02 - possible extubation if not returning to surgery. Pt spouse came at end of treatment, very supportive, loving. Pt w/ significant swelling padmini UE's - restraints in place and checked prior to leaving - RN / PT repositioned at end of treatment. Will continue to follow while hospitalized.    Activity Tolerance: Patient tolerated treatment well    Plan  Physical Therapy Plan  General Plan: 5-7 times per week  Specific Instructions for Next Treatment: ROM  Current Treatment Recommendations: Strengthening;ROM;Patient/Caregiver education & training;Cognitive reorientation;Positioning    Restrictions  Restrictions/Precautions  Restrictions/Precautions: General Precautions, Up as Tolerated, Fall Risk  Required Braces or Orthoses?: No  Position Activity Restriction  Other position/activity restrictions: Up w/ assist, RUE PICC, simons cath, telemetry, 2L O2, cont SPO2 monitor, BP cuff, MARCIA LIFT     Subjective   Subjective  Subjective: Pt alert, nods appropriately yes / no.  Pain: NAD - just given pain meds per RN    Objective  Vitals  Comment: on vitals monitor - ICU status  Bed Mobility Training  Overall Level of Assistance: Total assistance (pressure relief - repositioning w/ RN)     PT Exercises  Exercise Treatment: Gentle PROM x 4 extremities w/ light pressure for end range.Gastroc stretches  - able to achieve > neutral.     Sensory stimulation and reorientation.      Safety Devices  Type of Devices: Call light within reach;Nurse notified;Left in chair;All fall risk precautions in place;Heels

## 2024-10-24 NOTE — PLAN OF CARE
Problem: Safety - Medical Restraint  Goal: Remains free of injury from restraints (Restraint for Interference with Medical Device)  Description: INTERVENTIONS:  1. Determine that other, less restrictive measures have been tried or would not be effective before applying the restraint  2. Evaluate the patient's condition at the time of restraint application  3. Inform patient/family regarding the reason for restraint  4. Q2H: Monitor safety, psychosocial status, comfort, nutrition and hydration  10/24/2024 1602 by Alma Rosa Pinon, RN  Outcome: Completed  Flowsheets (Taken 10/24/2024 0800)  Remains free of injury from restraints (restraint for interference with medical device): Determine that other, less restrictive measures have been tried or would not be effective before applying the restraint  10/24/2024 0746 by Alma Rosa Pinon, RN  Outcome: Progressing  Flowsheets (Taken 10/23/2024 2000 by Saul Dela Cruz, RN)  Remains free of injury from restraints (restraint for interference with medical device):   Determine that other, less restrictive measures have been tried or would not be effective before applying the restraint   Evaluate the patient's condition at the time of restraint application   Inform patient/family regarding the reason for restraint   Every 2 hours: Monitor safety, psychosocial status, comfort, nutrition and hydration

## 2024-10-24 NOTE — FLOWSHEET NOTE
End Of Shift Note  [facility]  Summary of shift: Patient extubated today doing well post extubation. Per Gen Surg she doesn't require an NG insertion at this time. Continue with wet to dry dressings. TPN/Lipids infusing.precedex turned off     Vitals:    Vitals:    10/24/24 1337 10/24/24 1445 10/24/24 1448 10/24/24 1527   BP:    (!) 105/58   Pulse: 75 91 88 85   Resp: 23 27 25 25   Temp:    97.1 °F (36.2 °C)   TempSrc:    Temporal   SpO2: 98% 99% 98% 97%   Weight:       Height:            I&O:   Intake/Output Summary (Last 24 hours) at 10/24/2024 1915  Last data filed at 10/24/2024 1725  Gross per 24 hour   Intake 1651.96 ml   Output 4130 ml   Net -2478.04 ml       Resp Status: 2L/NC    Ventilator Settings:  Vent Mode: CPAP/PS Resp Rate (Set): 14 bpm/Vt (Set, mL): 500 mL/ /FiO2 : 30 %    Critical Care IV infusions:   PN-Adult 2-in-1 Central Line (Custom) 67.35 mL/hr at 10/24/24 1723    dexmedeTOMIDine (PRECEDEX) 1,000 mcg in sodium chloride 0.9 % 250 mL infusion Stopped (10/24/24 1725)    dextrose 5% and 0.45% NaCl with KCl 20 mEq Stopped (10/22/24 1038)    norepinephrine Stopped (10/23/24 1510)    propofol Stopped (10/17/24 1652)    midazolam Stopped (10/22/24 1854)    dextrose      sodium chloride 50 mL/hr at 10/16/24 1623        LDA:   PICC 10/11/24 Right Brachial (Active)   Number of days: 12       NG/OG/NJ/NE Tube Nasogastric 14 fr (Active)   Number of days: 0       Urinary Catheter 10/10/24 Bar (Active)   Number of days: 14       Wound Abdomen Left;Lower (Active)   Number of days:        Incision 10/01/24 Abdomen Medial;Upper (Active)   Number of days: 23       Incision 10/16/24 Abdomen Lower;Medial (Active)   Number of days: 8

## 2024-10-24 NOTE — FLOWSHEET NOTE
10/24/24 1026   Treatment Team Notification   Reason for Communication Evaluate;Review case   Name of Team Member Notified Dewayne   Treatment Team Role Advanced Practice Nurse   Method of Communication Face to face   Response At bedside   Notification Time 1027     NP rounded, chart/vitals reviewed.

## 2024-10-24 NOTE — FLOWSHEET NOTE
10/24/24 1658   Treatment Team Notification   Reason for Communication Evaluate;Medication concern   Name of Team Member Notified    Treatment Team Role Consulting Provider   Method of Communication Face to face   Response At bedside   Notification Time 9622     MD rounded, states ok for No NG at this time.

## 2024-10-24 NOTE — PROGRESS NOTES
Dr PHILLIP Man MD at bedside. Breakthrough drainage noted on wound vac dressing. Wound Vac removed by MD. Fecalith like output noted in wound. Irrigation and suction completed and wound vac to stay removed. Dressing is Kelix in wound and Petroulm guaze and ABD pad to to cover.

## 2024-10-24 NOTE — PLAN OF CARE
or resting period  10/23/2024 1820 by Alma Rosa Pinon RN  Outcome: Progressing  Flowsheets (Taken 10/23/2024 0800)  Skin Integrity Remains Intact: Monitor for areas of redness and/or skin breakdown  Goal: Incisions, wounds, or drain sites healing without S/S of infection  10/24/2024 0746 by Alma Rosa Pinon RN  Outcome: Progressing  10/23/2024 1820 by Alma Rosa Pinon RN  Outcome: Progressing  Flowsheets (Taken 10/23/2024 0800)  Incisions, Wounds, or Drain Sites Healing Without Sign and Symptoms of Infection: TWICE DAILY: Assess and document skin integrity     Problem: Hematologic - Adult  Goal: Maintains hematologic stability  10/24/2024 0746 by Alma Rosa Pinon RN  Outcome: Progressing  10/23/2024 1820 by Alma Rosa Pinon RN  Outcome: Progressing  Flowsheets (Taken 10/23/2024 0800)  Maintains hematologic stability: Assess for signs and symptoms of bleeding or hemorrhage     Problem: Musculoskeletal - Adult  Goal: Return mobility to safest level of function  10/24/2024 0746 by Alma Rosa Pinon RN  Outcome: Progressing  Flowsheets (Taken 10/23/2024 2000 by Saul Dela Cruz RN)  Return Mobility to Safest Level of Function:   Assess patient stability and activity tolerance for standing, transferring and ambulating with or without assistive devices   Assist with transfers and ambulation using safe patient handling equipment as needed   Obtain physical therapy/occupational therapy consults as needed   Ensure adequate protection for wounds/incisions during mobilization   Apply continuous passive motion per provider or physical therapy orders to increase flexion toward goal   Instruct patient/family in ordered activity level  10/23/2024 1820 by Alma Rosa Pinon RN  Outcome: Progressing  Flowsheets (Taken 10/23/2024 0800)  Return Mobility to Safest Level of Function: Assess patient stability and activity tolerance for standing, transferring and ambulating with or without assistive devices     Problem: Nutrition  Deficit:  Goal: Optimize nutritional status  10/24/2024 0746 by Alma Rosa Pinon RN  Outcome: Progressing  10/23/2024 1820 by Alma Rosa Pinon RN  Outcome: Progressing     Problem: Pain  Goal: Verbalizes/displays adequate comfort level or baseline comfort level  10/24/2024 0746 by Alma Rosa Pinon RN  Outcome: Progressing  10/23/2024 1820 by Alma Rosa Pinon RN  Outcome: Progressing     Problem: Chronic Conditions and Co-morbidities  Goal: Patient's chronic conditions and co-morbidity symptoms are monitored and maintained or improved  10/24/2024 0746 by Alma Rosa Pinon RN  Outcome: Progressing  10/23/2024 1820 by Alma Rosa Pinon RN  Outcome: Progressing  Flowsheets (Taken 10/23/2024 0800)  Care Plan - Patient's Chronic Conditions and Co-Morbidity Symptoms are Monitored and Maintained or Improved: Monitor and assess patient's chronic conditions and comorbid symptoms for stability, deterioration, or improvement

## 2024-10-24 NOTE — FLOWSHEET NOTE
10/24/24 1514   Treatment Team Notification   Reason for Communication Evaluate;Review case   Name of Team Member Notified    Treatment Team Role Consulting Provider   Method of Communication Call   Response See orders   Notification Time 1515      MD called unit. Ok to extubate, insert NG to suction. Bipap 18/10.

## 2024-10-24 NOTE — PROGRESS NOTES
[facility]  Consult Note       NAME:  Mandie Bustamante  MEDICAL RECORD NUMBER:  [id number]  AGE: 69 y.o.   GENDER: female  : 1955  TODAY'S DATE:  10/24/2024    Subjective:      Mandie Bustamante is a 69 y.o. female with inpatient referral to [facility] for:  abdominal wound with colonic fistula and fecal output from depth of wound bed    Per nursing General Surgery requesting WOC consultation and consideration for wound manager pouch system      Wound Identification:  Wound Type: non-healing surgical  Contributing Factors: obesity    Wound History: Multiple abdominal surgeries since hospital admission.   OR dates:   10/1/2024: POD#20:Robotic assisted laparoscopic mesh hernioplasty  10/16/2024: POD#5: Diagnostic laparoscopy with conversion to exploratory laparotomy and explantation of mesh with wound VAC application and abdominal wash out.   10/18/2024: POD#3: Exploratory laparotomy with abdominal washout and wound VAC placement.    Earlier today, general surgery team took wound vac down and feculent material was found to be coming from the base of the wound. There was a significant amount of output.   Current Wound Care Treatment:  petroleum gauze to wound base and remaining wound packed with saline moistened gauze.    Patient Goal of Care:  x wound Healing  x other: infection prevention        PAST MEDICAL HISTORY        Diagnosis Date    Allergic rhinitis     Anemia     Arthritis     Autoimmune disorder (HCC)     Rheumatologic    Cataracts, bilateral     GERD (gastroesophageal reflux disease)     Headache     Hypercalcemia     Hyperparathyroidism (HCC)     Hypertension     Obesity     Osteoarthritis        PAST SURGICAL HISTORY    Past Surgical History:   Procedure Laterality Date    APPENDECTOMY      BUNIONECTOMY Left 2016    Hardware removed 2022    CHOLECYSTECTOMY      FOOT SURGERY Left     cyst removal    FOOT SURGERY Left 2010    cyst removal

## 2024-10-24 NOTE — FLOWSHEET NOTE
10/24/24 1141   Treatment Team Notification   Reason for Communication Evaluate;Review case   Name of Team Member Notified    Treatment Team Role Consulting Provider   Method of Communication Face to face   Response In department   Notification Time 1141     MD rounded, parameters for lasix. Use levo if needed patient needs to get lasix!!. Cpap trial& wean as tolerated.

## 2024-10-24 NOTE — PLAN OF CARE
Problem: Respiratory - Adult  Goal: Achieves optimal ventilation and oxygenation  10/24/2024 0751 by Magda Soria RCP  Outcome: Progressing  10/24/2024 0746 by Alma Rosa Pinon, RN  Outcome: Progressing  Flowsheets (Taken 10/23/2024 2000 by Saul Dela Cruz, RN)  Achieves optimal ventilation and oxygenation:   Assess for changes in respiratory status   Assess for changes in mentation and behavior   Position to facilitate oxygenation and minimize respiratory effort   Oxygen supplementation based on oxygen saturation or arterial blood gases   Initiate smoking cessation protocol as indicated   Encourage broncho-pulmonary hygiene including cough, deep breathe, incentive spirometry   Assess the need for suctioning and aspirate as needed   Assess and instruct to report shortness of breath or any respiratory difficulty   Respiratory therapy support as indicated  10/23/2024 1934 by Vaishnavi Rendon RCP  Outcome: Progressing  10/23/2024 1820 by Alma Rosa Pinon, RN  Outcome: Progressing  Flowsheets (Taken 10/23/2024 0800)  Achieves optimal ventilation and oxygenation: Assess for changes in mentation and behavior  Goal: Will be able to breathe spontaneously, without ventilator support  Description: Will be able to breathe spontaneously, without ventilator support  10/24/2024 0751 by Magda Soria RCP  Outcome: Progressing  10/23/2024 1934 by Vaishnavi Rendon RCP  Outcome: Progressing  Goal: Patient's breath sounds will be clear and equal  10/24/2024 0751 by Magda Soria RCP  Outcome: Progressing  10/23/2024 1934 by Vaishnavi Rendon RCP  Outcome: Progressing  Goal: Adequate oxygenation  Description: Adequate oxygenation  10/24/2024 0751 by Magda Soria RCP  Outcome: Progressing  10/23/2024 1934 by Vaishnavi Rendon RCP  Outcome: Progressing

## 2024-10-24 NOTE — PROGRESS NOTES
Comprehensive Nutrition Assessment    Type and Reason for Visit:  Reassess    Nutrition Recommendations/Plan:   Increase lipids to 250 ml tomorrow (10/25) to bring patient to TPN goal.   Adjust patients caloric/protein needs as she continues in ICU/weans off vent.   Increasing phos in PN today.   RD to follow/monitor closely.  Daily TPN.      Malnutrition Assessment:  Malnutrition Status:  Moderate malnutrition (10/17/24 1147)    Context:  Acute Illness     Findings of the 6 clinical characteristics of malnutrition:  Energy Intake:  50% or less of estimated energy requirements for 5 or more days  Weight Loss:  Unable to assess     Body Fat Loss:  Unable to assess     Muscle Mass Loss:  Unable to assess    Fluid Accumulation:  Moderate to Severe     Strength:       Nutrition Assessment:    Patient remains intubated, on CPAP, off pressors, on TPN.  Wound VAC in place, some fecal output in wound. Patient with fistula. Tolerating TPN well, lytes are stabilizing. Labs, meds, PMH reviewed.    Nutrition Related Findings:    LBM 10/22, +3 UE and +2 LE edema. Skin intact. Weight stable. Wound Type: Surgical Incision       Current Nutrition Intake & Therapies:    Average Meal Intake: NPO  Average Supplements Intake: NPO  Current Parenteral Nutrition Orders:  Type and Formula: Premix Central   Lipids: 100ml  Duration: Continuous  Rate/Volume: 1560 ml  Current PN Order Provides: 1560 ml, 78 gm protein, 312 g dextrose, 100 ml lipids (200 calories) = 1573 calories (13 kcal/kg)  Goal PN Orders Provides: 250 ml lipids (500 kcal), PN at 65 ml/hr (1560 ml): 1373 kcal, 78 gm protein, 312 gm dextrose (total 1873 kcal with lipids)    Anthropometric Measures:  Height: 160 cm (5' 3\")  Ideal Body Weight (IBW): 115 lbs (52 kg)       Current Body Weight: 119.7 kg (264 lb), 245.4 % IBW. Weight Source: Bed Scale  Current BMI (kg/m2): 46.8                          BMI Categories: Obese Class 3 (BMI 40.0 or greater)    Estimated Daily

## 2024-10-24 NOTE — PROGRESS NOTES
103/63 98.5 °F (36.9 °C) Axillary 66 26 100 % --   10/23/24 1145 (!) 104/57 -- -- 67 27 100 % --   10/23/24 1130 100/61 -- -- 68 28 98 % --   10/23/24 1115 99/61 -- -- 65 24 95 % --   10/23/24 1113 -- -- -- 63 23 96 % --   10/23/24 1100 99/61 -- -- 65 26 -- --   10/23/24 1045 112/65 -- -- 65 28 96 % --   10/23/24 1030 (!) 105/57 -- -- 66 30 99 % --   10/23/24 1015 101/64 -- -- 63 28 98 % --   10/23/24 1000 (!) 105/58 -- -- 64 28 98 % --   10/23/24 0945 (!) 100/58 -- -- 64 30 99 % --   10/23/24 0930 (!) 109/57 -- -- 66 28 -- --   10/23/24 0915 102/60 -- -- 62 26 -- --   10/23/24 0900 (!) 97/57 -- -- 65 27 -- --   10/23/24 0845 (!) 97/59 -- -- 63 24 98 % --   10/23/24 0800 112/66 98.5 °F (36.9 °C) Axillary 67 30 98 % --   10/23/24 0745 117/70 -- -- 66 24 98 % --   10/23/24 0728 111/67 -- -- 66 24 97 % --   10/23/24 0716 -- -- -- 64 22 96 % --   10/23/24 0700 107/67 -- -- 63 23 98 % --       Intake/Output Summary (Last 24 hours) at 10/24/2024 0650  Last data filed at 10/24/2024 0600  Gross per 24 hour   Intake 2485.06 ml   Output 4565 ml   Net -2079.94 ml       Recent Labs     10/24/24  0500   WBC 7.7   HGB 9.1*   HCT 27.4*   MCV 88.7        Recent Labs     10/22/24  0417 10/23/24  0029 10/24/24  0500    136 136   K 3.3* 3.6* 3.7   * 108* 105   CO2 19* 22 24   PHOS 3.5 2.3* 2.5*   BUN 10 14 16   CREATININE 0.6 0.6 0.6       No results for input(s): \"COLORU\", \"PHUR\", \"LABCAST\", \"WBCUA\", \"RBCUA\", \"MUCUS\", \"TRICHOMONAS\", \"YEAST\", \"BACTERIA\", \"CLARITYU\", \"SPECGRAV\", \"LEUKOCYTESUR\", \"UROBILINOGEN\", \"BILIRUBINUR\", \"BLOODU\" in the last 72 hours.    Invalid input(s): \"NITRATE\", \"GLUCOSEUKETONESUAMORPHOUS\"    Additional Lab/culture results:    Physical Exam: Patient intubated on the vent with intermittent CPAP trials we have recommended to maintain the indwelling Bar considering the present status  Reviewed general surgery notes and discussed   General Surgery plans as outlined    Interval Imaging  Findings:    Impression:    Patient Active Problem List   Diagnosis    Abdominal hernia without obstruction and without gangrene    Anxiety    BMI 45.0-49.9, adult    Bursitis of right shoulder    Gastro-esophageal reflux disease without esophagitis    History of parathyroidectomy    Essential hypertension    Incisional hernia, without obstruction or gangrene    Incisional pain    Mixed dyslipidemia    Morbid (severe) obesity due to excess calories    Other abnormal glucose    Pain in bone fixation device (HCC)    Primary hyperparathyroidism (HCC)    Primary insomnia    Right shoulder pain    Seasonal allergic rhinitis    Sensitive skin    Vitamin D insufficiency    Elevated fasting glucose    Hyponatremia    Hypokalemia    Elevated brain natriuretic peptide (BNP) level    Leukocytosis    DJD (degenerative joint disease), ankle and foot, right    Prediabetes    Intra-abdominal abscess (HCC)    Edema       Plan: Maintain indwelling Bar at this    Benjamin Almazan MD  6:50 AM 10/24/2024

## 2024-10-25 LAB
ALBUMIN SERPL-MCNC: 2.1 G/DL (ref 3.5–5.2)
ALP SERPL-CCNC: 64 U/L (ref 35–104)
ALT SERPL-CCNC: 6 U/L (ref 5–33)
ANION GAP SERPL CALCULATED.3IONS-SCNC: 9 MMOL/L (ref 9–17)
AST SERPL-CCNC: 9 U/L
BASOPHILS # BLD: 0.04 K/UL (ref 0–0.2)
BASOPHILS NFR BLD: 1 % (ref 0–2)
BILIRUB DIRECT SERPL-MCNC: <0.1 MG/DL
BILIRUB INDIRECT SERPL-MCNC: ABNORMAL MG/DL (ref 0–1)
BILIRUB SERPL-MCNC: 0.2 MG/DL (ref 0.3–1.2)
BUN SERPL-MCNC: 16 MG/DL (ref 8–23)
BUN/CREAT SERPL: 27 (ref 9–20)
CALCIUM SERPL-MCNC: 7.1 MG/DL (ref 8.6–10.4)
CHLORIDE SERPL-SCNC: 102 MMOL/L (ref 98–107)
CO2 SERPL-SCNC: 26 MMOL/L (ref 20–31)
CREAT SERPL-MCNC: 0.6 MG/DL (ref 0.5–0.9)
EOSINOPHIL # BLD: 0.65 K/UL (ref 0–0.44)
EOSINOPHILS RELATIVE PERCENT: 8 % (ref 1–4)
ERYTHROCYTE [DISTWIDTH] IN BLOOD BY AUTOMATED COUNT: 13.1 % (ref 11.8–14.4)
GFR, ESTIMATED: >90 ML/MIN/1.73M2
GLUCOSE BLD-MCNC: 187 MG/DL (ref 65–105)
GLUCOSE BLD-MCNC: 192 MG/DL (ref 65–105)
GLUCOSE BLD-MCNC: 195 MG/DL (ref 65–105)
GLUCOSE BLD-MCNC: 213 MG/DL (ref 65–105)
GLUCOSE SERPL-MCNC: 199 MG/DL (ref 70–99)
HCT VFR BLD AUTO: 25.5 % (ref 36.3–47.1)
HGB BLD-MCNC: 8.5 G/DL (ref 11.9–15.1)
IMM GRANULOCYTES # BLD AUTO: 0.33 K/UL (ref 0–0.3)
IMM GRANULOCYTES NFR BLD: 4 %
LYMPHOCYTES NFR BLD: 1.62 K/UL (ref 1.1–3.7)
LYMPHOCYTES RELATIVE PERCENT: 19 % (ref 24–43)
MAGNESIUM SERPL-MCNC: 1.8 MG/DL (ref 1.6–2.6)
MCH RBC QN AUTO: 29.4 PG (ref 25.2–33.5)
MCHC RBC AUTO-ENTMCNC: 33.3 G/DL (ref 28.4–34.8)
MCV RBC AUTO: 88.2 FL (ref 82.6–102.9)
MONOCYTES NFR BLD: 1.18 K/UL (ref 0.1–1.2)
MONOCYTES NFR BLD: 14 % (ref 3–12)
NEUTROPHILS NFR BLD: 54 % (ref 36–65)
NEUTS SEG NFR BLD: 4.7 K/UL (ref 1.5–8.1)
NRBC BLD-RTO: 0 PER 100 WBC
PHOSPHATE SERPL-MCNC: 2.9 MG/DL (ref 2.6–4.5)
PLATELET # BLD AUTO: 240 K/UL (ref 138–453)
PMV BLD AUTO: 9.4 FL (ref 8.1–13.5)
POTASSIUM SERPL-SCNC: 3.4 MMOL/L (ref 3.7–5.3)
PROT SERPL-MCNC: 4.5 G/DL (ref 6.4–8.3)
RBC # BLD AUTO: 2.89 M/UL (ref 3.95–5.11)
SODIUM SERPL-SCNC: 137 MMOL/L (ref 135–144)
WBC OTHER # BLD: 8.5 K/UL (ref 3.5–11.3)

## 2024-10-25 PROCEDURE — 84100 ASSAY OF PHOSPHORUS: CPT

## 2024-10-25 PROCEDURE — 6360000002 HC RX W HCPCS: Performed by: INTERNAL MEDICINE

## 2024-10-25 PROCEDURE — 2500000003 HC RX 250 WO HCPCS: Performed by: INTERNAL MEDICINE

## 2024-10-25 PROCEDURE — 6360000002 HC RX W HCPCS

## 2024-10-25 PROCEDURE — 02HV33Z INSERTION OF INFUSION DEVICE INTO SUPERIOR VENA CAVA, PERCUTANEOUS APPROACH: ICD-10-PCS | Performed by: INTERNAL MEDICINE

## 2024-10-25 PROCEDURE — 2580000003 HC RX 258: Performed by: INTERNAL MEDICINE

## 2024-10-25 PROCEDURE — 6360000002 HC RX W HCPCS: Performed by: NURSE PRACTITIONER

## 2024-10-25 PROCEDURE — 2580000003 HC RX 258

## 2024-10-25 PROCEDURE — 85025 COMPLETE CBC W/AUTO DIFF WBC: CPT

## 2024-10-25 PROCEDURE — 6370000000 HC RX 637 (ALT 250 FOR IP)

## 2024-10-25 PROCEDURE — 80048 BASIC METABOLIC PNL TOTAL CA: CPT

## 2024-10-25 PROCEDURE — 99233 SBSQ HOSP IP/OBS HIGH 50: CPT | Performed by: INTERNAL MEDICINE

## 2024-10-25 PROCEDURE — 82947 ASSAY GLUCOSE BLOOD QUANT: CPT

## 2024-10-25 PROCEDURE — 2000000000 HC ICU R&B

## 2024-10-25 PROCEDURE — 80076 HEPATIC FUNCTION PANEL: CPT

## 2024-10-25 PROCEDURE — 2700000000 HC OXYGEN THERAPY PER DAY

## 2024-10-25 PROCEDURE — 94761 N-INVAS EAR/PLS OXIMETRY MLT: CPT

## 2024-10-25 PROCEDURE — 83735 ASSAY OF MAGNESIUM: CPT

## 2024-10-25 PROCEDURE — 99232 SBSQ HOSP IP/OBS MODERATE 35: CPT | Performed by: INTERNAL MEDICINE

## 2024-10-25 PROCEDURE — 2500000003 HC RX 250 WO HCPCS

## 2024-10-25 PROCEDURE — 99232 SBSQ HOSP IP/OBS MODERATE 35: CPT

## 2024-10-25 RX ORDER — HEPARIN SODIUM 5000 [USP'U]/ML
5000 INJECTION, SOLUTION INTRAVENOUS; SUBCUTANEOUS 2 TIMES DAILY
Status: DISCONTINUED | OUTPATIENT
Start: 2024-10-25 | End: 2024-11-08 | Stop reason: HOSPADM

## 2024-10-25 RX ORDER — HYDROXYZINE HYDROCHLORIDE 10 MG/1
10 TABLET, FILM COATED ORAL ONCE
Status: DISCONTINUED | OUTPATIENT
Start: 2024-10-25 | End: 2024-10-26

## 2024-10-25 RX ADMIN — CEFEPIME 2000 MG: 2 INJECTION, POWDER, FOR SOLUTION INTRAVENOUS at 16:49

## 2024-10-25 RX ADMIN — HYDROMORPHONE HYDROCHLORIDE 0.5 MG: 1 INJECTION, SOLUTION INTRAMUSCULAR; INTRAVENOUS; SUBCUTANEOUS at 15:32

## 2024-10-25 RX ADMIN — POTASSIUM CHLORIDE 20 MEQ: 29.8 INJECTION, SOLUTION INTRAVENOUS at 06:17

## 2024-10-25 RX ADMIN — INSULIN LISPRO 1 UNITS: 100 INJECTION, SOLUTION INTRAVENOUS; SUBCUTANEOUS at 19:01

## 2024-10-25 RX ADMIN — HEPARIN SODIUM 5000 UNITS: 5000 INJECTION INTRAVENOUS; SUBCUTANEOUS at 20:00

## 2024-10-25 RX ADMIN — INSULIN LISPRO 1 UNITS: 100 INJECTION, SOLUTION INTRAVENOUS; SUBCUTANEOUS at 12:13

## 2024-10-25 RX ADMIN — METRONIDAZOLE 500 MG: 500 INJECTION, SOLUTION INTRAVENOUS at 12:03

## 2024-10-25 RX ADMIN — INSULIN LISPRO 1 UNITS: 100 INJECTION, SOLUTION INTRAVENOUS; SUBCUTANEOUS at 05:34

## 2024-10-25 RX ADMIN — CEFEPIME 2000 MG: 2 INJECTION, POWDER, FOR SOLUTION INTRAVENOUS at 00:47

## 2024-10-25 RX ADMIN — POTASSIUM CHLORIDE: 2 INJECTION, SOLUTION, CONCENTRATE INTRAVENOUS at 17:55

## 2024-10-25 RX ADMIN — DIPHENHYDRAMINE HYDROCHLORIDE 25 MG: 50 INJECTION INTRAMUSCULAR; INTRAVENOUS at 00:43

## 2024-10-25 RX ADMIN — I.V. FAT EMULSION 250 ML: 20 EMULSION INTRAVENOUS at 18:03

## 2024-10-25 RX ADMIN — DIPHENHYDRAMINE HYDROCHLORIDE 25 MG: 50 INJECTION INTRAMUSCULAR; INTRAVENOUS at 07:36

## 2024-10-25 RX ADMIN — HYDROMORPHONE HYDROCHLORIDE 0.5 MG: 1 INJECTION, SOLUTION INTRAMUSCULAR; INTRAVENOUS; SUBCUTANEOUS at 03:17

## 2024-10-25 RX ADMIN — ANTI-FUNGAL POWDER MICONAZOLE NITRATE TALC FREE: 1.42 POWDER TOPICAL at 07:44

## 2024-10-25 RX ADMIN — HYDROCORTISONE: 1 CREAM TOPICAL at 07:39

## 2024-10-25 RX ADMIN — HYDROMORPHONE HYDROCHLORIDE 0.5 MG: 1 INJECTION, SOLUTION INTRAMUSCULAR; INTRAVENOUS; SUBCUTANEOUS at 06:55

## 2024-10-25 RX ADMIN — METRONIDAZOLE 500 MG: 500 INJECTION, SOLUTION INTRAVENOUS at 04:56

## 2024-10-25 RX ADMIN — POTASSIUM CHLORIDE 20 MEQ: 29.8 INJECTION, SOLUTION INTRAVENOUS at 07:34

## 2024-10-25 RX ADMIN — PANTOPRAZOLE SODIUM 40 MG: 40 INJECTION, POWDER, FOR SOLUTION INTRAVENOUS at 07:38

## 2024-10-25 RX ADMIN — SODIUM CHLORIDE, PRESERVATIVE FREE 10 ML: 5 INJECTION INTRAVENOUS at 20:16

## 2024-10-25 RX ADMIN — CEFEPIME 2000 MG: 2 INJECTION, POWDER, FOR SOLUTION INTRAVENOUS at 22:35

## 2024-10-25 RX ADMIN — METRONIDAZOLE 500 MG: 500 INJECTION, SOLUTION INTRAVENOUS at 20:15

## 2024-10-25 RX ADMIN — CEFEPIME 2000 MG: 2 INJECTION, POWDER, FOR SOLUTION INTRAVENOUS at 06:30

## 2024-10-25 RX ADMIN — DEXMEDETOMIDINE 0.4 MCG/KG/HR: 100 INJECTION, SOLUTION INTRAVENOUS at 04:44

## 2024-10-25 RX ADMIN — FUROSEMIDE 40 MG: 10 INJECTION, SOLUTION INTRAMUSCULAR; INTRAVENOUS at 20:00

## 2024-10-25 RX ADMIN — FUROSEMIDE 40 MG: 10 INJECTION, SOLUTION INTRAMUSCULAR; INTRAVENOUS at 07:36

## 2024-10-25 RX ADMIN — DIPHENHYDRAMINE HYDROCHLORIDE 25 MG: 50 INJECTION INTRAMUSCULAR; INTRAVENOUS at 17:45

## 2024-10-25 RX ADMIN — HYDROMORPHONE HYDROCHLORIDE 0.5 MG: 1 INJECTION, SOLUTION INTRAMUSCULAR; INTRAVENOUS; SUBCUTANEOUS at 00:43

## 2024-10-25 ASSESSMENT — PAIN SCALES - GENERAL
PAINLEVEL_OUTOF10: 3
PAINLEVEL_OUTOF10: 6
PAINLEVEL_OUTOF10: 4
PAINLEVEL_OUTOF10: 6
PAINLEVEL_OUTOF10: 2
PAINLEVEL_OUTOF10: 3
PAINLEVEL_OUTOF10: 4
PAINLEVEL_OUTOF10: 4
PAINLEVEL_OUTOF10: 8
PAINLEVEL_OUTOF10: 6
PAINLEVEL_OUTOF10: 2
PAINLEVEL_OUTOF10: 6
PAINLEVEL_OUTOF10: 7

## 2024-10-25 ASSESSMENT — PAIN DESCRIPTION - ORIENTATION
ORIENTATION: LEFT;RIGHT
ORIENTATION: MID

## 2024-10-25 ASSESSMENT — PAIN DESCRIPTION - LOCATION
LOCATION: ABDOMEN
LOCATION: ABDOMEN;BACK
LOCATION: ABDOMEN;BACK
LOCATION: ABDOMEN;OTHER (COMMENT)

## 2024-10-25 ASSESSMENT — PAIN DESCRIPTION - PAIN TYPE: TYPE: SURGICAL PAIN

## 2024-10-25 ASSESSMENT — PAIN DESCRIPTION - DESCRIPTORS
DESCRIPTORS: ITCHING;NAGGING
DESCRIPTORS: ITCHING

## 2024-10-25 ASSESSMENT — PAIN DESCRIPTION - ONSET: ONSET: ON-GOING

## 2024-10-25 ASSESSMENT — PAIN DESCRIPTION - FREQUENCY: FREQUENCY: CONTINUOUS

## 2024-10-25 NOTE — PROGRESS NOTES
End Of Shift Note  [facility]  Summary of shift: Pt had dressing changed this AM by gen surg, had 200ml stool suctioned out of abdomen wound. Pt had 3.4L out today through simons, receives IV lasix. Complains of Itching mostly in folds and creases. Abdominal pain at times managed w dilaudid IV. Wound care team seen pt today and changed dressing again and stated wound is healing well. No stool this change. Pt Q2hr turns, new Mepilex applied to sacrum, zinc cream applied, smear of stool on rectum. Currently has precedex, TPN & Lipids, IVAB infusing, K+ replaced. Pt still hallucinating and delerious, but answers questions appropriately. IV benadryl for itching.    Vitals:    Vitals:    10/25/24 1600 10/25/24 1602 10/25/24 1700 10/25/24 1800   BP: 92/66  99/64 119/82   Pulse: 75  77 84   Resp: 19 18 20 20   Temp: 98.1 °F (36.7 °C)      TempSrc: Oral      SpO2: 96%      Weight:       Height:            I&O:   Intake/Output Summary (Last 24 hours) at 10/25/2024 1825  Last data filed at 10/25/2024 1651  Gross per 24 hour   Intake 2018.68 ml   Output 6150 ml   Net -4131.32 ml       Resp Status: RA    Ventilator Settings:  Vent Mode: CPAP/PS Resp Rate (Set): 14 bpm/Vt (Set, mL): 500 mL/ /FiO2 : 30 %    Critical Care IV infusions:   PN-Adult 2-in-1 Central Line (Standard) 65 mL/hr at 10/25/24 1755    dexmedeTOMIDine (PRECEDEX) 1,000 mcg in sodium chloride 0.9 % 250 mL infusion 0.5 mcg/kg/hr (10/25/24 1721)    dextrose 5% and 0.45% NaCl with KCl 20 mEq Stopped (10/22/24 1038)    norepinephrine Stopped (10/23/24 1510)    dextrose      sodium chloride 50 mL/hr at 10/16/24 1623        LDA:   PICC 10/25/24 Right Cephalic (Active)   Number of days: 0       Urinary Catheter 10/10/24 Simons (Active)   Number of days: 15       Wound Abdomen Left;Lower (Active)   Number of days:        Incision 10/01/24 Abdomen Medial;Upper (Active)   Number of days: 24       Incision 10/16/24 Abdomen Lower;Medial (Active)   Number of days: 9

## 2024-10-25 NOTE — PROGRESS NOTES
[facility]  Office: [phone]  Keenan Foster DO, Kevin Siegel DO, Carlos A Orta DO, Taye Morales DO, Maxx Madison MD, Meena Eckert MD, Efren Vo MD, Makayla Last MD,  Jerman Christian MD, Gina Bourgeois MD, Gladys Fry MD,  Anu Murillo DO, Jennifer Blanca MD, Oral Duncan MD, Harlan Foster DO, Katya Simmons MD,  Filiberto Slade DO, Elizabeth Alves MD, Samantha Pinto MD, Arlene Bryant MD, Bandar Barrios MD,  Kulwant Rodrigez MD, Michelle Dennison MD, Jinny Steel MD, Kathia Vargas MD, Gerardo Alvarez MD, Richard Johnson MD, Demetri Ibarra DO, Benjie Alvarenga MD, Shirley Waterhouse, CNP,  Georgette Villarreal CNP, Demetri Toure, CNP,  Silvina Castano, CARINA, Tatiana Mckeon, CNP, Angelina Harper, CNP, Alma Rosa Peng, CNP, Rashmi Mccartney, CNP, IMAN ErvinC, IMAN ArmentaC, Stephanie Kaminski, CNP, Tono Yee, CNP,  Chrissie Schwartz, CNP, Sheri Jones, CNP,  Nadiya Rick, CNP, Cindi Lam, CNP         [facility]   IN-PATIENT SERVICE   [facility]    Progress Note    10/25/2024    6:41 AM    Name:   Mandie Bustamante  MRN:     [id number]     Acct:      [id number]   Room:   2030/2030-01  IP Day:  15  Admit Date:  10/10/2024  6:11 PM    PCP:   No primary care provider on file.  Code Status:  Full Code    Subjective:     C/C: Abdominal pain    Interval History Status: improved.     Patient now extubated, denies any complaints of chest pain, shortness of breath, nausea or vomiting.Continued abdominal pain    Brief History:     Per prior documentation  \"Patient is a 69-year-old female who recently underwent an out patient hernia repair with mesh on 10/1/2024, she presents to the ED with abdominal pain on 10/10/2024. Work up in the ED revealed severe hyponatremia of 109, leukocytosis, and  CT abdomen pelvis without contrast was suggestive of large abscess with air-fluid collection seen along the peritoneum just below the rectus muscle. There was concern about    HCT 27.4* 25.5*   MCV 88.7 88.2   MCH 29.4 29.4   MCHC 33.2 33.3   RDW 13.2 13.1    240   MPV 9.2 9.4     Chemistry:  Recent Labs     10/23/24  0029 10/24/24  0500 10/25/24  0530    136 137   K 3.6* 3.7 3.4*   * 105 102   CO2 22 24 26   GLUCOSE 200* 190* 199*   BUN 14 16 16   CREATININE 0.6 0.6 0.6   MG 1.9 1.9 1.8   ANIONGAP 6* 7* 9   LABGLOM >90 >90 >90   CALCIUM 6.7* 7.0* 7.1*   PHOS 2.3* 2.5* 2.9     Recent Labs     10/23/24  0029 10/23/24  0600 10/23/24  2359 10/24/24  0500 10/24/24  0556 10/24/24  1152 10/24/24  1836 10/24/24  2252 10/25/24  0527 10/25/24  0530   AST 8  --   --   --   --   --   --   --   --  9   ALT 7  --   --   --   --   --   --   --   --  6   ALKPHOS 79  --   --   --   --   --   --   --   --  64   BILITOT 0.1*  --   --   --   --   --   --   --   --  0.2*   BILIDIR <0.1  --   --   --   --   --   --   --   --  <0.1   TRIG  --   --   --  107  --   --   --   --   --   --    POCGLU  --    < > 178*  --  183* 193* 149* 151* 192*  --     < > = values in this interval not displayed.     ABG:  Lab Results   Component Value Date/Time    POCPH 7.496 10/24/2024 02:50 PM    POCPCO2 32.3 10/24/2024 02:50 PM    POCPO2 101.5 10/24/2024 02:50 PM    POCHCO3 24.9 10/24/2024 02:50 PM    NBEA 0.4 10/23/2024 04:12 AM    PBEA 2.0 10/24/2024 02:50 PM    LWMV6APY 98.4 10/24/2024 02:50 PM    FIO2 30.0 10/24/2024 02:50 PM     Lab Results   Component Value Date/Time    SPECIAL Site: Respiratory 10/19/2024 07:25 PM     Lab Results   Component Value Date/Time    CULTURE NORMAL RESPIRATORY CELINE RARE GROWTH 10/19/2024 07:25 PM       Radiology:  XR CHEST PORTABLE    Result Date: 10/24/2024  Right PICC tip projects over the region of the right brachiocephalic vein.     XR CHEST PORTABLE    Result Date: 10/24/2024  1. Bibasilar airspace disease and small bilateral effusions.  Stable cardiomegaly.  Mild pulmonary vascular congestion.  Findings favor CHF related changes with underlying infiltrate not

## 2024-10-25 NOTE — PROGRESS NOTES
End Of Shift Note  [facility]  Summary of shift:   Pt was extubated about 1530 yesterday to nasal cannula. Her respiratory status and hemodynamics remained stable overnight. She has had intermittent confusion and delusions/hallucinations requiring low dose precedex infusion. She also continues to c/o itching mostly on the abdomen and back.  was updated early in the evening. Pt is adamant that she is going home today.     Vitals:    Vitals:    10/25/24 0347 10/25/24 0400 10/25/24 0457 10/25/24 0500   BP:  108/62  (!) 90/53   Pulse:  85  73   Resp: 22 22 22   Temp:  97 °F (36.1 °C)     TempSrc:  Temporal     SpO2:  90%  95%   Weight:   123.7 kg (272 lb 11.2 oz)    Height:          I&O:   Intake/Output Summary (Last 24 hours) at 10/25/2024 0559  Last data filed at 10/25/2024 0504  Gross per 24 hour   Intake 2212.03 ml   Output 5300 ml   Net -3087.97 ml     Resp Status: Resp even and unlabored on nasal cannula. Lungs CTA with diminished bases bilaterally    Ventilator Settings:  Vent Mode: CPAP/PS Resp Rate (Set): 14 bpm/Vt (Set, mL): 500 mL/ /FiO2 : 30 %    Critical Care IV infusions:   PN-Adult 2-in-1 Central Line (Custom) 67.4 mL/hr at 10/25/24 0504    dexmedeTOMIDine (PRECEDEX) 1,000 mcg in sodium chloride 0.9 % 250 mL infusion 0.3 mcg/kg/hr (10/25/24 0528)    dextrose 5% and 0.45% NaCl with KCl 20 mEq Stopped (10/22/24 1038)    norepinephrine Stopped (10/23/24 1510)    dextrose      sodium chloride 50 mL/hr at 10/16/24 1623      LDA:   PICC 10/25/24 Right Cephalic (Active)   Number of days: 0       Urinary Catheter 10/10/24 Bar (Active)   Number of days: 14       Wound Abdomen Left;Lower (Active)   Number of days:        Incision 10/01/24 Abdomen Medial;Upper (Active)   Number of days: 23       Incision 10/16/24 Abdomen Lower;Medial (Active)   Number of days: 8

## 2024-10-25 NOTE — PROCEDURES
PROCEDURE NOTE  Date: 10/25/2024   Name: Mandie Bustamante  YOB: 1955    Procedures    Picc placement note:  Dynamic Access RN procedure    Consent signed and obtained by proceduralist, from Arnulfo Fitosera SWAIN. See consent form in paper chart.    Prescribed IV Therapy = TPN  Peripheral ultrasound assessment done. Plan for upper right cephalic vein insertion.   CVR measurement = 35 % (Linear CVR is preferred to be less than 45%).  Product type: Bard 5 fr triple lumen Power PICC.  History/Labs/Allergies Reviewed  Placed By: Marek Wheeler RN (Dynamic Access)  Time out Performed using Two Identifiers  Lot # [id number]  Expiration date = 3/31/25  Trimmed at 39 cm total  External catheter length 3 cm  Number of attempts 2  Special equipment used- Bard 3cg tip confirmation system, ultrasound, and micro-introducer (MST) technique   Catheter securement = adhesive 3M securement device  Dressing applied= Tegaderm CHG  Lidocaine administered intradermally conc.1%, approx 1 ml (Lidocaine Lot# - [id number] and Exp date - 7/30/26 )    Ruby RN aware picc placed with ECG technology and is confirmed in the distal 1/3 SVC. Picc is immediately released for use. Rn aware new iv tubing required.         PICC education:     [ X ] Discussed with patient/Family or POA prior to procedure.  Risks and Benefits along with reason for procedure were discussed and teaching was reinforced with an education handout on line  insertion. [facility] FAQ Catheter Associated Blood Stream Infections and [facility] [id number] REV. 7/13 Nursing and Booklet left at bedside or in chart. Patient (Family or POA) acknowledged understanding of information taught and agreed to procedure.      [  ] Was not discussed with patient/family or POA due to pts medical status at time of procedure. pts family or POA not available to discuss line education. [facility] FAQ Catheter Associated Blood Stream Infections and [facility] [id number] REV. 7/13 Nursing and Booklet left at bedside or in chart.

## 2024-10-25 NOTE — PROGRESS NOTES
General Surgery:  Daily Progress Note          PATIENT NAME: Mandie Bustamante     TODAY'S DATE: 10/25/2024, 7:04 AM    SUBJECTIVE:     Pt seen and examined at bedside.  No acute overnight events. Afebrile, vitals stable. Hallucinations last night. ABD dressing changed. Adequate urine output.     OBJECTIVE:   VITALS:  /68   Pulse 80   Temp 97 °F (36.1 °C) (Temporal)   Resp 27   Ht 1.6 m (5' 3\")   Wt 123.7 kg (272 lb 11.2 oz)   SpO2 92%   BMI 48.31 kg/m²      INTAKE/OUTPUT:      Intake/Output Summary (Last 24 hours) at 10/25/2024 0704  Last data filed at 10/25/2024 0643  Gross per 24 hour   Intake 2503.92 ml   Output 5150 ml   Net -2646.08 ml       PHYSICAL EXAM:  General Appearance: Intubated, sedated; grimaces to palpation over abdomen  HEENT:  Normocephalic, atraumatic, mucus membranes moist; OG tube to suction  Heart: Regular rate and rhythm  Lungs: normal effort with symmetric rise and fall of chest wall  Abdomen: Soft, distended, tender to palpation around incision.  Incisional wound with feculent output.  Extremities: Bilateral lower extremity pitting edema  Skin: Skin color, texture, turgor normal. No rashes or lesions.      Data:  CBC:   Recent Labs     10/24/24  0500 10/25/24  0530   WBC 7.7 8.5   HGB 9.1* 8.5*    240     Chemistry:   Recent Labs     10/23/24  0029 10/24/24  0500 10/25/24  0530    136 137   K 3.6* 3.7 3.4*   * 105 102   CO2 22 24 26   GLUCOSE 200* 190* 199*   BUN 14 16 16   CREATININE 0.6 0.6 0.6   MG 1.9 1.9 1.8   ANIONGAP 6* 7* 9   LABGLOM >90 >90 >90   CALCIUM 6.7* 7.0* 7.1*   PHOS 2.3* 2.5* 2.9     Hepatic:   Recent Labs     10/23/24  0029 10/25/24  0530   AST 8 9   ALT 7 6   ALKPHOS 79 64   BILITOT 0.1* 0.2*   BILIDIR <0.1 <0.1     Coagulation:   No results for input(s): \"APTT\", \"INR\" in the last 72 hours.    Invalid input(s): \"PROT\"        Radiology Review:    XR CHEST PORTABLE    Result Date: 10/11/2024  EXAMINATION: ONE XRAY VIEW OF THE CHEST 10/11/2024

## 2024-10-25 NOTE — PROGRESS NOTES
[facility] -   Infectious diseases evaluation  admission date 10/10/2024    reason for consultation:   Fever despite antibiotics    Impression :   Current:  Fever/leukocytosis likely abdominal source  Abdominal abscess  Hypotention  Status post exploratory laparotomy with removal of mesh and wound VAC application 10/16/2024 subsequently underwent exploratory laparotomy abdominal washout with wound VAC application 10/18/2024.   Status post robotic assisted laparoscopic repair of recurrent incisional hernia with mesh placement 10/1/2024  Status post ultrasound guided aspiration of abdominal wall fluid collection on 10/11/2024 no growth on culture  Acute respiratory failure required intubation  Obesity    HENCE:   Continue IV cefepime and Flagyl  Respiratory culture grew normal brenda on 10/19/2024  Respiratory panel with COVID-19 negative  The patient was treated with IV Zosyn 10/10/2024 through 10/17/2024  No growth on blood cultures from 10/16/2024  Follow blood cultures from 10/18/2024, no growth  Follow CBC and renal function  Discussed with  at the bedside      Infection Control Recommendations   [place] Precautions      Antimicrobial Stewardship Recommendations   Simplification of therapy  Targeted therapy      History of Present Illness:   Initial history:  Mandie Bustamante is a 69 y.o.-year-old female was seen at the ICU, intubated, sedated, unable to provide history that was obtained from chart review and nursing staff.  She is on 1 mcg/min of Levophed, right arm PICC line was placed 10/11/2024, on TPN.  Bar catheter in place with clear urine was placed on 10/10/2024  NG tube in place.  Status post robotic assisted laparoscopic repair of recurrent incisional hernia with mesh placement 10/1/2024 was complicated by fluid collection status post aspiration on 10/11/2024 with no growth on culture.  Status post exploratory laparotomy with removal of mesh and

## 2024-10-25 NOTE — PROGRESS NOTES
Comprehensive Nutrition Assessment    Type and Reason for Visit:  Reassess    Nutrition Recommendations/Plan:   Continue NPO status.  Continue TPN as  ordered.   Monitor TPN tolerance, weight, and labs. Adjust TPN as needed.      Malnutrition Assessment:  Malnutrition Status:  Moderate malnutrition (10/17/24 1147)    Context:  Acute Illness     Findings of the 6 clinical characteristics of malnutrition:  Energy Intake:  50% or less of estimated energy requirements for 5 or more days  Weight Loss:  Unable to assess     Body Fat Loss:  Unable to assess     Muscle Mass Loss:  Unable to assess    Fluid Accumulation:  Moderate to Severe     Strength:       Nutrition Assessment:    Patient remains intubated, on CPAP, status post robotic assisted laparoscopic incisional hernia repair with mesh 10/1/24, wound vac in place. On TPN and tolerating. Per RN TPN to continue. Reviewed labs and meds and adjusted lytes accorndingly.    Nutrition Related Findings:    Edema: +2 pitting BLE. +2 BUE and generalized. Bowel sounds absent. Wound Type: Surgical Incision       Current Nutrition Intake & Therapies:    Average Meal Intake: NPO  Average Supplements Intake: NPO  Current Parenteral Nutrition Orders:  Type and Formula: Premix Central   Lipids: 250ml  Duration: Continuous  Rate/Volume: 1560 ml  Current PN Order Provides: 1560 ml, 78 gm protein, 312 g dextrose, 100 ml lipids (200 calories) = 1573 calories (13 kcal/kg)  Goal PN Orders Provides: 250 ml lipids (500 kcal), PN at 65 ml/hr (1560 ml): 1373 kcal, 78 gm protein, 312 gm dextrose (total 1873 kcal with lipids)    Anthropometric Measures:  Height: 160 cm (5' 3\")  Ideal Body Weight (IBW): 115 lbs (52 kg)       Current Body Weight: 123.7 kg (272 lb 11.3 oz), 245.4 % IBW. Weight Source: Bed Scale  Current BMI (kg/m2): 48.3                          BMI Categories: Obese Class 3 (BMI 40.0 or greater)    Estimated Daily Nutrient Needs:  Energy Requirements Based On:

## 2024-10-25 NOTE — PROGRESS NOTES
Pulmonary Critical Care Progress Note    Patient seen for the follow up of Intra-abdominal abscess (HCC)     Subjective:    She is now extubated on 2 L oxygen.  She is having hallucination.  She still requires Precede low-dose for anxiety.  She had insomnia she had over 2 L out with diuresis.  She had dressing changed with stool colored liquid suctioned per RN  Examination:    Vitals: BP 99/64   Pulse 77   Temp 98.1 °F (36.7 °C) (Oral)   Resp 20   Ht 1.6 m (5' 3\")   Wt 123.7 kg (272 lb 11.2 oz)   SpO2 96%   BMI 48.31 kg/m²   SpO2  Av %  Min: 90 %  Max: 100 %  General appearance: Awake alert no acute distress  Neck: No JVD  Lungs: Decreased breath sound no crackles or wheezes  Heart: regular rate and rhythm, S1, S2 normal, no gallop  Abdomen: Soft, non tender, + BS dressing in place evidence of fecal material in container  Extremities: no cyanosis or clubbing.  1+ edema    LABs:    CBC:   Recent Labs     10/24/24  0500 10/25/24  0530   WBC 7.7 8.5   HGB 9.1* 8.5*   HCT 27.4* 25.5*    240     BMP:   Recent Labs     10/24/24  0500 10/25/24  0530    137   K 3.7 3.4*   CO2 24 26   BUN 16 16   CREATININE 0.6 0.6   LABGLOM >90 >90   GLUCOSE 190* 199*        Latest Reference Range & Units 10/18/24 04:12   POC HCO3 21.0 - 28.0 mmol/L 24.9   POC O2 SAT 94.0 - 98.0 % 97.5   POC pCO2 35.0 - 48.0 mm Hg 33.7 (L)   POC pH 7.350 - 7.450  7.476 (H)   POC PO2 83.0 - 108.0 mm Hg 88.2   (L): Data is abnormally low  (H): Data is abnormally high  Radiology:  Chest x-ray 10/24  1. Bibasilar airspace disease and small bilateral effusions.  Stable  cardiomegaly.  Mild pulmonary vascular congestion.  Findings favor CHF  related changes with underlying infiltrate not excluded.  Follow-up is  recommended to document resolution.  2. Tubes and right-sided PICC as above.    Chest x-ray 10/21         Chest x-ray 10/19 reviewed        Impression/recommendations:  Acute hypoxic respiratory insufficiency  /atelectasis/pulmonary edema  Oxygen by nasal cannula  Incentive spirometry every hour while awake  Sedation vacation  Lasix 40 IV twice daily; start Levophed if blood pressure decreases    Status post exploratory laparotomy with removal of mesh and wound VAC application/history of cholecystectomy  Surgery following/wound care  No plans for surgery for now  N.p.o./TPN at 3 times a week given shortage of IV fluids per [facility] policy     Decreased hemoglobin   Monitor hemoglobin hematocrit  Heparin 5000 every 12 hours/consider increasing to 8 hours based on hemoglobin trend    sepsis/septic shock/abdominal abscess  Off Zosyn/cefepime Flagyl IV check blood cultures  Monitor blood pressure off Levophed  ID on consult       Status post SHAI/urinary retention with history of suburethral sling  Nephrology on consult/on Lasix 20 IV twice daily hold for hypotension  Urology on consult     Insomnia/obesity suspected sleep apnea   Outpatient Sleep evaluation  Ambien 5 nightly as needed    discussed with RN   Discussed with  at bedside again today    Peptic ulcer disease prophylaxis Protonix  DVT prophylaxis EPC started heparin 5000 every 12 hour        David Coyne MD, MD, FCCP  Pulmonary Critical Care and Sleep Medicine,  [facility]  Cell: [phone]  Office: [phone]

## 2024-10-25 NOTE — PROGRESS NOTES
Physical Therapy  DATE: 10/25/2024    NAME: Mandie Bustamante  MRN: [id number]   : 1955    Patient not seen this date for Physical Therapy due to:      [] Cancel by RN or physician due to:    [] Hemodialysis    [] Critical Lab Value Level     [] Blood transfusion in progress    [] Acute or unstable cardiovascular status   _MAP < 55 or more than >115  _HR < 40 or > 130    [] Acute or unstable pulmonary status   -FiO2 > 60%   _RR < 5 or >40    _O2 sats < 85%    [] Strict Bedrest    [] Off Unit for surgery or procedure    [] Off Unit for testing       [] Pending imaging to R/O fracture    [] Refusal by Patient      [x] Other     RN Oliverio reports patient has an open abdominal wound packed with gauze.  Patient is not appropriate for mobility this date. PT continue to follow.   Therapist requested update activity order due order in chart from 10/10/24 indicating \"up with assist\".    Extensive history during current stay including:  Multiple abdominal surgeries since hospital admission on 10/10/24  OR dates:   10/1/2024: POD#20:Robotic assisted laparoscopic mesh hernioplasty  10/16/2024: POD#5: Diagnostic laparoscopy with conversion to exploratory laparotomy and explantation of mesh with wound VAC application and abdominal wash out.   10/18/2024: POD#3: Exploratory laparotomy with abdominal washout and wound VAC placement. She was kept intubated post op with plans to change out wound vac.  On 10/24  General surgery team took wound vac down and feculent material was found to be coming from the base of the wound. There was a significant amount of output.  Patient was packed with gauze.  Intubated 10/16. Extubated 10/24       [] PT being discontinued at this time. Patient independent. No further needs.     [] PT being discontinued at this time as the patient has been transferred to hospice care. No further needs.      Ruby Hannah, PT

## 2024-10-25 NOTE — PLAN OF CARE
Care Plan Note    Problem: Discharge Planning  Goal: Discharge to home or other facility with appropriate resources  Flowsheets (Taken 10/24/2024 2000)  Discharge to home or other facility with appropriate resources:   Identify barriers to discharge with patient and caregiver   Arrange for needed discharge resources and transportation as appropriate   Identify discharge learning needs (meds, wound care, etc)   Refer to discharge planning if patient needs post-hospital services based on physician order or complex needs related to functional status, cognitive ability or social support system  Note: Pt hopes to be discharged to home but will likely need ongoing care at SNF or rehab     Problem: Skin/Tissue Integrity  Goal: Absence of new skin breakdown  Description: 1.  Monitor for areas of redness and/or skin breakdown  2.  Assess vascular access sites hourly  3.  Every 4-6 hours minimum:  Change oxygen saturation probe site  4.  Every 4-6 hours:  If on nasal continuous positive airway pressure, respiratory therapy assess nares and determine need for appliance change or resting period.  Note: Pt turned q2h and propped with pillows/wedges     Problem: ABCDS Injury Assessment  Goal: Absence of physical injury  Flowsheets (Taken 10/24/2024 0837 by Alma Rosa Pinon, RN)  Absence of Physical Injury: Implement safety measures based on patient assessment  Note: Bed alarm on and pt instructed on using call light routinely     Problem: Safety - Adult  Goal: Free from fall injury  Flowsheets (Taken 10/24/2024 0837 by Alma Rosa Pinon, RN)  Free From Fall Injury: Instruct family/caregiver on patient safety  Note: Bed in lowest position, bed alarm on and room clear of debris and well lit     Problem: Metabolic/Fluid and Electrolytes - Adult  Goal: Electrolytes maintained within normal limits  Flowsheets (Taken 10/24/2024 2000)  Electrolytes maintained within normal limits:   Monitor labs and assess patient for signs and symptoms  needed   Apply continuous passive motion per provider or physical therapy orders to increase flexion toward goal   Instruct patient/family in ordered activity level     Problem: Nutrition Deficit:  Goal: Optimize nutritional status  Flowsheets (Taken 10/16/2024 1508 by Ewelina Butler, RD, LD)  Nutrient intake appropriate for improving, restoring, or maintaining nutritional needs:   Recommend, monitor, and adjust tube feedings and TPN/PPN based on assessed needs   Assess nutritional status and recommend course of action     Problem: Pain  Goal: Verbalizes/displays adequate comfort level or baseline comfort level  Flowsheets (Taken 10/24/2024 2000)  Verbalizes/displays adequate comfort level or baseline comfort level:   Encourage patient to monitor pain and request assistance   Assess pain using appropriate pain scale   Administer analgesics based on type and severity of pain and evaluate response   Implement non-pharmacological measures as appropriate and evaluate response   Consider cultural and social influences on pain and pain management   Notify Licensed Independent Practitioner if interventions unsuccessful or patient reports new pain  Note: Pain controlled on current regimen     Problem: Chronic Conditions and Co-morbidities  Goal: Patient's chronic conditions and co-morbidity symptoms are monitored and maintained or improved  Flowsheets (Taken 10/24/2024 2000)  Care Plan - Patient's Chronic Conditions and Co-Morbidity Symptoms are Monitored and Maintained or Improved:   Monitor and assess patient's chronic conditions and comorbid symptoms for stability, deterioration, or improvement   Collaborate with multidisciplinary team to address chronic and comorbid conditions and prevent exacerbation or deterioration   Update acute care plan with appropriate goals if chronic or comorbid symptoms are exacerbated and prevent overall improvement and discharge     Problem: Safety - Medical Restraint  Goal: Remains free

## 2024-10-25 NOTE — CARE COORDINATION
Discharge planning    Pt. Extubated 2 days ago. On precedex. Abdomen still open. Wound care following and packing.On TPN.  at bedside. [facility] following from afar.

## 2024-10-25 NOTE — PROGRESS NOTES
Occupational Therapy    DATE: 10/25/2024    NAME: Mandie Bustamante  MRN: [id number]   : 1955    Patient not seen this date for Occupational Therapy due to:      [] Cancel by RN or physician due to:    [] Hemodialysis    [] Critical Lab Value Level     [] Blood transfusion in progress    [] Acute or unstable cardiovascular status   _MAP < 55 or more than >115  _HR < 40 or > 130    [] Acute or unstable pulmonary status   -FiO2 > 60%   _RR < 5 or >40    _O2 sats < 85%    [] Strict Bedrest    [] Off Unit for surgery or procedure    [] Off Unit for testing       [] Pending imaging to R/O fracture    [] Refusal by Patient      [] Intubated    [x] Other: DOMINGO Duron reports patient has an open abdominal wound packed with gauze. Patient is not appropriate for mobility this date.  OT will continue to check back as able. Therapist requested an updated activity order due order in chart from 10/10/24 indicating \"up with assist\".     Extensive history during current stay including:  Multiple abdominal surgeries since hospital admission on 10/10/24  OR dates:   10/1/2024: POD#20:Robotic assisted laparoscopic mesh hernioplasty  10/16/2024: POD#5: Diagnostic laparoscopy with conversion to exploratory laparotomy and explantation of mesh with wound VAC application and abdominal wash out.   10/18/2024: POD#3: Exploratory laparotomy with abdominal washout and wound VAC placement. She was kept intubated post op with plans to change out wound vac.  On 10/24  General surgery team took wound vac down and feculent material was found to be coming from the base of the wound. There was a significant amount of output.  Patient was packed with gauze.  Intubated 10/16. Extubated 10/24                     [] OT being discontinued at this time. Patient independent. No further needs.     [] OT being discontinued at this time as the patient has been transferred to hospice care. No further needs.      Lorena Costa OTR/L

## 2024-10-25 NOTE — PROGRESS NOTES
2200 - PICC line dressing change completed using sterile technique. The line was not in securement device and external length was noted to be 7cm. There is no documentation confirming this information so CXR completed and shows tip over brachiocephalic region. Infusions held. Order placed.     2230 - no blood return noted in any lumen but still able to flush    2245 - Access RN at bedside for new line placement.     0030 - new triple lumen PICC placed in right cephalic vein. Blood return in each lumen. Patient tolerated procedure well.    Ruby Estrada RN

## 2024-10-25 NOTE — PROGRESS NOTES
Wound Ostomy Continence Nursing  Follow Up Visit      NAME:  Mandie Bustamante  MEDICAL RECORD NUMBER:  [id number]  AGE: 69 y.o.   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  in room and the patient is awake/alert. Previous dressing was placed by Dr. Man this morning and had not been changed since.    Review of Systems:  Constitutional: negative for chills and fevers  Respiratory: negative for cough and shortness of breath  Cardiovascular: negative for chest pain and palpitations  Gastrointestinal: negative for abdominal pain and vomiting. NPO.   Genitourinary:negative  Integument: abdominal wound  Endocrine: negative  Musculoskeletal:negative  Behavioral/Psych: negative  Pain: negative      Objective     Vitals:  BP (!) 95/59   Pulse 76   Temp 98.2 °F (36.8 °C) (Oral)   Resp 19   Ht 1.6 m (5' 3\")   Wt 123.7 kg (272 lb 11.2 oz)   SpO2 98%   BMI 48.31 kg/m²    TEMPERATURE:  Current - Temp: 98.2 °F (36.8 °C); Max - Temp  Av.7 °F (36.5 °C)  Min: 96.9 °F (36.1 °C)  Max: 98.6 °F (37 °C)    Ord Risk Score Rod Scale Score: 14    INTAKE/OUTPUT:      Intake/Output Summary (Last 24 hours) at 10/25/2024 1555  Last data filed at 10/25/2024 1055  Gross per 24 hour   Intake 2018.68 ml   Output 6050 ml   Net -4031.32 ml                 Physical Exam:  General Appearance: Intubated and sedated on ventilator, well-developed and well-nourished, in no acute distress  Head: normocephalic and atraumatic  Pulmonary/Chest: normal air movement, no respiratory distress with mechanical ventilation  Skin: Open abdominal wound to left lower quadrant. The wound is horizontally oriented. The wound depth appears to be filling in. There is minimal feculent material found in the base of the wound. This was irrigated with saline. Still unable to visualize a fistula in the base of

## 2024-10-25 NOTE — PLAN OF CARE
Problem: Discharge Planning  Goal: Discharge to home or other facility with appropriate resources  Outcome: Progressing     Problem: Skin/Tissue Integrity  Goal: Absence of new skin breakdown  Description: 1.  Monitor for areas of redness and/or skin breakdown  2.  Assess vascular access sites hourly  3.  Every 4-6 hours minimum:  Change oxygen saturation probe site  4.  Every 4-6 hours:  If on nasal continuous positive airway pressure, respiratory therapy assess nares and determine need for appliance change or resting period.  Outcome: Progressing     Problem: ABCDS Injury Assessment  Goal: Absence of physical injury  Outcome: Progressing     Problem: Safety - Adult  Goal: Free from fall injury  Outcome: Progressing     Problem: Metabolic/Fluid and Electrolytes - Adult  Goal: Electrolytes maintained within normal limits  Outcome: Progressing  Goal: Hemodynamic stability and optimal renal function maintained  Outcome: Progressing  Goal: Glucose maintained within prescribed range  Outcome: Progressing     Problem: Neurosensory - Adult  Goal: Achieves stable or improved neurological status  Outcome: Progressing  Goal: Absence of seizures  Outcome: Progressing  Goal: Remains free of injury related to seizures activity  Outcome: Progressing     Problem: Cardiovascular - Adult  Goal: Maintains optimal cardiac output and hemodynamic stability  Outcome: Progressing  Goal: Absence of cardiac dysrhythmias or at baseline  Outcome: Progressing     Problem: Respiratory - Adult  Goal: Achieves optimal ventilation and oxygenation  Outcome: Progressing     Problem: Gastrointestinal - Adult  Goal: Maintains or returns to baseline bowel function  Outcome: Progressing  Goal: Maintains adequate nutritional intake  Outcome: Progressing     Problem: Genitourinary - Adult  Goal: Urinary catheter remains patent  Outcome: Progressing     Problem: Skin/Tissue Integrity - Adult  Goal: Skin integrity remains intact  Outcome:  [FreeTextEntry1] : Stress echo 1/29/24 - No ischemia.  mild to mod MR Reassured her.   Continue current meds and regular exercise followed with Dr Mccann and was advised to continue Anastrozole.  Follow-up in 6 months

## 2024-10-25 NOTE — PROGRESS NOTES
Benjamin Almazan MD   Urology Progress Note            Subjective: Follow-up urinary retention neurogenic bladder    Patient Vitals for the past 24 hrs:   BP Temp Temp src Pulse Resp SpO2 Weight   10/25/24 0600 108/68 -- -- 80 27 92 % --   10/25/24 0500 (!) 90/53 -- -- 73 22 95 % --   10/25/24 0457 -- -- -- -- -- -- 123.7 kg (272 lb 11.2 oz)   10/25/24 0400 108/62 97 °F (36.1 °C) Temporal 85 22 90 % --   10/25/24 0347 -- -- -- -- 22 -- --   10/25/24 0212 -- -- -- 80 24 96 % --   10/25/24 0200 95/70 -- -- 85 30 96 % --   10/25/24 0113 -- -- -- -- 22 -- --   10/25/24 0100 (!) 104/58 -- -- 87 25 95 % --   10/25/24 0000 (!) 96/50 96.9 °F (36.1 °C) Temporal 95 28 96 % --   10/24/24 2300 (!) 104/56 -- -- 93 27 -- --   10/24/24 2200 120/79 -- -- 94 29 100 % --   10/24/24 2102 (!) 110/53 -- -- -- -- -- --   10/24/24 2100 (!) 110/53 -- -- 91 28 97 % --   10/24/24 2000 122/65 97.8 °F (36.6 °C) Temporal 95 20 99 % --   10/24/24 1900 (!) 126/55 -- -- (!) 101 20 97 % --   10/24/24 1800 108/63 -- -- 89 20 98 % --   10/24/24 1700 116/69 -- -- 91 23 97 % --   10/24/24 1527 (!) 105/58 97.1 °F (36.2 °C) Temporal 85 25 97 % --   10/24/24 1448 -- -- -- 88 25 98 % --   10/24/24 1445 -- -- -- 91 27 99 % --   10/24/24 1337 -- -- -- 75 23 98 % --   10/24/24 1303 -- -- -- 78 21 98 % --   10/24/24 1302 -- -- -- 79 21 98 % --   10/24/24 1100 (!) 95/58 -- -- 76 22 -- --   10/24/24 1058 -- -- -- 73 20 98 % --   10/24/24 1045 (!) 93/57 -- -- 75 18 98 % --   10/24/24 1044 (!) 93/57 96.8 °F (36 °C) Temporal 73 16 98 % --   10/24/24 1022 -- -- -- -- 25 -- --   10/24/24 1000 102/67 -- -- 82 24 99 % --   10/24/24 0900 103/65 -- -- 81 22 98 % --   10/24/24 0830 103/68 -- -- 78 28 98 % --   10/24/24 0815 -- 99.1 °F (37.3 °C) Axillary 90 25 98 % --   10/24/24 0800 115/83 -- -- 94 30 98 % --   10/24/24 0754 -- -- -- 82 22 98 % --       Intake/Output Summary (Last 24 hours) at 10/25/2024 0720  Last data filed at 10/25/2024  Dipika Almazan MD  7:20 AM 10/25/2024

## 2024-10-26 ENCOUNTER — APPOINTMENT (OUTPATIENT)
Dept: GENERAL RADIOLOGY | Age: 69
End: 2024-10-26
Attending: STUDENT IN AN ORGANIZED HEALTH CARE EDUCATION/TRAINING PROGRAM
Payer: COMMERCIAL

## 2024-10-26 PROBLEM — G93.41 ACUTE METABOLIC ENCEPHALOPATHY: Status: ACTIVE | Noted: 2024-10-26

## 2024-10-26 LAB
ANION GAP SERPL CALCULATED.3IONS-SCNC: 9 MMOL/L (ref 9–17)
BASOPHILS # BLD: 0.04 K/UL (ref 0–0.2)
BASOPHILS NFR BLD: 0 % (ref 0–2)
BUN SERPL-MCNC: 14 MG/DL (ref 8–23)
BUN/CREAT SERPL: 23 (ref 9–20)
CALCIUM SERPL-MCNC: 7.4 MG/DL (ref 8.6–10.4)
CHLORIDE SERPL-SCNC: 100 MMOL/L (ref 98–107)
CO2 SERPL-SCNC: 28 MMOL/L (ref 20–31)
CREAT SERPL-MCNC: 0.6 MG/DL (ref 0.5–0.9)
EOSINOPHIL # BLD: 0.51 K/UL (ref 0–0.44)
EOSINOPHILS RELATIVE PERCENT: 5 % (ref 1–4)
ERYTHROCYTE [DISTWIDTH] IN BLOOD BY AUTOMATED COUNT: 13 % (ref 11.8–14.4)
GFR, ESTIMATED: >90 ML/MIN/1.73M2
GLUCOSE BLD-MCNC: 118 MG/DL (ref 65–105)
GLUCOSE BLD-MCNC: 153 MG/DL (ref 65–105)
GLUCOSE BLD-MCNC: 231 MG/DL (ref 65–105)
GLUCOSE BLD-MCNC: 236 MG/DL (ref 65–105)
GLUCOSE SERPL-MCNC: 220 MG/DL (ref 70–99)
HCT VFR BLD AUTO: 27.2 % (ref 36.3–47.1)
HGB BLD-MCNC: 9.2 G/DL (ref 11.9–15.1)
IMM GRANULOCYTES # BLD AUTO: 0.25 K/UL (ref 0–0.3)
IMM GRANULOCYTES NFR BLD: 3 %
LYMPHOCYTES NFR BLD: 1.57 K/UL (ref 1.1–3.7)
LYMPHOCYTES RELATIVE PERCENT: 16 % (ref 24–43)
MAGNESIUM SERPL-MCNC: 2 MG/DL (ref 1.6–2.6)
MCH RBC QN AUTO: 29.3 PG (ref 25.2–33.5)
MCHC RBC AUTO-ENTMCNC: 33.8 G/DL (ref 28.4–34.8)
MCV RBC AUTO: 86.6 FL (ref 82.6–102.9)
MONOCYTES NFR BLD: 1.17 K/UL (ref 0.1–1.2)
MONOCYTES NFR BLD: 12 % (ref 3–12)
NEUTROPHILS NFR BLD: 64 % (ref 36–65)
NEUTS SEG NFR BLD: 6.62 K/UL (ref 1.5–8.1)
NRBC BLD-RTO: 0 PER 100 WBC
PHOSPHATE SERPL-MCNC: 3 MG/DL (ref 2.6–4.5)
PLATELET # BLD AUTO: 282 K/UL (ref 138–453)
PMV BLD AUTO: 9.6 FL (ref 8.1–13.5)
POTASSIUM SERPL-SCNC: 3.2 MMOL/L (ref 3.7–5.3)
RBC # BLD AUTO: 3.14 M/UL (ref 3.95–5.11)
SODIUM SERPL-SCNC: 137 MMOL/L (ref 135–144)
WBC OTHER # BLD: 10.2 K/UL (ref 3.5–11.3)

## 2024-10-26 PROCEDURE — 2580000003 HC RX 258

## 2024-10-26 PROCEDURE — 85025 COMPLETE CBC W/AUTO DIFF WBC: CPT

## 2024-10-26 PROCEDURE — 6360000002 HC RX W HCPCS: Performed by: INTERNAL MEDICINE

## 2024-10-26 PROCEDURE — 02HV33Z INSERTION OF INFUSION DEVICE INTO SUPERIOR VENA CAVA, PERCUTANEOUS APPROACH: ICD-10-PCS | Performed by: INTERNAL MEDICINE

## 2024-10-26 PROCEDURE — 94761 N-INVAS EAR/PLS OXIMETRY MLT: CPT

## 2024-10-26 PROCEDURE — 6360000002 HC RX W HCPCS: Performed by: NURSE PRACTITIONER

## 2024-10-26 PROCEDURE — 83735 ASSAY OF MAGNESIUM: CPT

## 2024-10-26 PROCEDURE — 99232 SBSQ HOSP IP/OBS MODERATE 35: CPT | Performed by: INTERNAL MEDICINE

## 2024-10-26 PROCEDURE — 93005 ELECTROCARDIOGRAM TRACING: CPT | Performed by: INTERNAL MEDICINE

## 2024-10-26 PROCEDURE — 2500000003 HC RX 250 WO HCPCS: Performed by: INTERNAL MEDICINE

## 2024-10-26 PROCEDURE — 6360000002 HC RX W HCPCS

## 2024-10-26 PROCEDURE — 80048 BASIC METABOLIC PNL TOTAL CA: CPT

## 2024-10-26 PROCEDURE — 82947 ASSAY GLUCOSE BLOOD QUANT: CPT

## 2024-10-26 PROCEDURE — 99233 SBSQ HOSP IP/OBS HIGH 50: CPT | Performed by: INTERNAL MEDICINE

## 2024-10-26 PROCEDURE — 2500000003 HC RX 250 WO HCPCS

## 2024-10-26 PROCEDURE — 2580000003 HC RX 258: Performed by: INTERNAL MEDICINE

## 2024-10-26 PROCEDURE — 6370000000 HC RX 637 (ALT 250 FOR IP)

## 2024-10-26 PROCEDURE — 84100 ASSAY OF PHOSPHORUS: CPT

## 2024-10-26 PROCEDURE — 2000000000 HC ICU R&B

## 2024-10-26 PROCEDURE — 71045 X-RAY EXAM CHEST 1 VIEW: CPT

## 2024-10-26 RX ORDER — DIPHENHYDRAMINE HYDROCHLORIDE, ZINC ACETATE 2; .1 G/100G; G/100G
CREAM TOPICAL 3 TIMES DAILY PRN
Status: DISCONTINUED | OUTPATIENT
Start: 2024-10-26 | End: 2024-11-08 | Stop reason: HOSPADM

## 2024-10-26 RX ADMIN — FUROSEMIDE 40 MG: 10 INJECTION, SOLUTION INTRAMUSCULAR; INTRAVENOUS at 19:31

## 2024-10-26 RX ADMIN — POTASSIUM CHLORIDE 20 MEQ: 29.8 INJECTION, SOLUTION INTRAVENOUS at 05:01

## 2024-10-26 RX ADMIN — DEXMEDETOMIDINE 0.8 MCG/KG/HR: 100 INJECTION, SOLUTION INTRAVENOUS at 12:00

## 2024-10-26 RX ADMIN — SODIUM CHLORIDE, PRESERVATIVE FREE 10 ML: 5 INJECTION INTRAVENOUS at 19:34

## 2024-10-26 RX ADMIN — INSULIN LISPRO 1 UNITS: 100 INJECTION, SOLUTION INTRAVENOUS; SUBCUTANEOUS at 13:15

## 2024-10-26 RX ADMIN — INSULIN LISPRO 1 UNITS: 100 INJECTION, SOLUTION INTRAVENOUS; SUBCUTANEOUS at 05:58

## 2024-10-26 RX ADMIN — INSULIN LISPRO 1 UNITS: 100 INJECTION, SOLUTION INTRAVENOUS; SUBCUTANEOUS at 00:09

## 2024-10-26 RX ADMIN — I.V. FAT EMULSION 250 ML: 20 EMULSION INTRAVENOUS at 18:13

## 2024-10-26 RX ADMIN — DIPHENHYDRAMINE HYDROCHLORIDE 25 MG: 50 INJECTION INTRAMUSCULAR; INTRAVENOUS at 18:18

## 2024-10-26 RX ADMIN — ANTI-FUNGAL POWDER MICONAZOLE NITRATE TALC FREE: 1.42 POWDER TOPICAL at 11:57

## 2024-10-26 RX ADMIN — POTASSIUM CHLORIDE 20 MEQ: 29.8 INJECTION, SOLUTION INTRAVENOUS at 06:06

## 2024-10-26 RX ADMIN — HEPARIN SODIUM 5000 UNITS: 5000 INJECTION INTRAVENOUS; SUBCUTANEOUS at 10:20

## 2024-10-26 RX ADMIN — ZIPRASIDONE MESYLATE 10 MG: 20 INJECTION, POWDER, LYOPHILIZED, FOR SOLUTION INTRAMUSCULAR at 22:26

## 2024-10-26 RX ADMIN — PANTOPRAZOLE SODIUM 40 MG: 40 INJECTION, POWDER, FOR SOLUTION INTRAVENOUS at 10:20

## 2024-10-26 RX ADMIN — CALCIUM GLUCONATE: 98 INJECTION, SOLUTION INTRAVENOUS at 18:12

## 2024-10-26 RX ADMIN — DIPHENHYDRAMINE HYDROCHLORIDE 25 MG: 50 INJECTION INTRAMUSCULAR; INTRAVENOUS at 00:37

## 2024-10-26 RX ADMIN — METRONIDAZOLE 500 MG: 500 INJECTION, SOLUTION INTRAVENOUS at 03:59

## 2024-10-26 RX ADMIN — METRONIDAZOLE 500 MG: 500 INJECTION, SOLUTION INTRAVENOUS at 12:12

## 2024-10-26 RX ADMIN — DEXMEDETOMIDINE 0.7 MCG/KG/HR: 100 INJECTION, SOLUTION INTRAVENOUS at 01:48

## 2024-10-26 RX ADMIN — CEFEPIME 2000 MG: 2 INJECTION, POWDER, FOR SOLUTION INTRAVENOUS at 07:26

## 2024-10-26 RX ADMIN — FUROSEMIDE 40 MG: 10 INJECTION, SOLUTION INTRAMUSCULAR; INTRAVENOUS at 10:20

## 2024-10-26 RX ADMIN — HYDROCORTISONE: 1 CREAM TOPICAL at 11:56

## 2024-10-26 NOTE — PROGRESS NOTES
End Of Shift Note  [facility]  Summary of shift: Pt had an uneventful night. Pt remains delirious, confused, and continues to hallucinate. Pt stated hallucinations as follows: \"spiders are on the wall, brown things are on the ceiling, my  was shot\". Writer unable to reorient patient successfully despite multiple attempts. Pt requested sleeping aids throughout the night - see previous notes. Writer increased precedex to 1.0mcg/kg/hr; pressures remain soft but stable. Pt had minimal sleep however is sleeping well at time of writing. Abdominal dressing remains clean, dry, and intact. Pt refused pain medications throughout the night. PRN K replacement ongoing. No needs expressed at time of writing.    Vitals:    Vitals:    10/26/24 0207 10/26/24 0229 10/26/24 0300 10/26/24 0400   BP: (!) 120/57  122/64 109/89   Pulse: 69 71 67 69   Resp: 26 27 26 25   Temp:    97.3 °F (36.3 °C)   TempSrc:    Temporal   SpO2: 94% 96% 92% 95%   Weight:    122.9 kg (271 lb)   Height:            I&O:   Intake/Output Summary (Last 24 hours) at 10/26/2024 0443  Last data filed at 10/26/2024 0400  Gross per 24 hour   Intake 709.62 ml   Output 9150 ml   Net -8440.38 ml       Resp Status: RA    Ventilator Settings:  Vent Mode: CPAP/PS Resp Rate (Set): 14 bpm/Vt (Set, mL): 500 mL/ /FiO2 : 30 %    Critical Care IV infusions:   PN-Adult 2-in-1 Central Line (Standard) 65 mL/hr at 10/25/24 1755    dexmedeTOMIDine (PRECEDEX) 1,000 mcg in sodium chloride 0.9 % 250 mL infusion 0.9 mcg/kg/hr (10/26/24 0207)    dextrose 5% and 0.45% NaCl with KCl 20 mEq Stopped (10/22/24 1038)    norepinephrine Stopped (10/23/24 1510)    dextrose      sodium chloride 50 mL/hr at 10/16/24 1623        LDA:   PICC 10/25/24 Right Cephalic (Active)   Number of days: 1       Urinary Catheter 10/10/24 Bar (Active)   Number of days: 15       Wound Abdomen Left;Lower (Active)   Number of days:        Incision 10/01/24 Abdomen Medial;Upper (Active)   Number of days: 24        Incision 10/16/24 Abdomen Lower;Medial (Active)   Number of days: 9

## 2024-10-26 NOTE — PROGRESS NOTES
End Of Shift Note  [facility]  Summary of shift: VSS through out shift. PICC line required replacement. Mentation improved today.    Vitals:    Vitals:    10/26/24 1600 10/26/24 1700 10/26/24 1800 10/26/24 1900   BP:  107/69 124/79 129/68   Pulse:  77 75 81   Resp:  23 27 23   Temp: 97.5 °F (36.4 °C)      TempSrc: Temporal      SpO2:       Weight:       Height:            I&O:   Intake/Output Summary (Last 24 hours) at 10/26/2024 1909  Last data filed at 10/26/2024 1800  Gross per 24 hour   Intake 620 ml   Output 6050 ml   Net -5430 ml       Resp Status: room air    Ventilator Settings:  Vent Mode: CPAP/PS Resp Rate (Set): 14 bpm/Vt (Set, mL): 500 mL/ /FiO2 : 30 %    Critical Care IV infusions:   PN-Adult 2-in-1 Central Line (Custom) 65 mL/hr at 10/26/24 1812    dexmedeTOMIDine (PRECEDEX) 1,000 mcg in sodium chloride 0.9 % 250 mL infusion 0.8 mcg/kg/hr (10/26/24 1200)    dextrose 5% and 0.45% NaCl with KCl 20 mEq Stopped (10/22/24 1038)    norepinephrine Stopped (10/23/24 1510)    dextrose      sodium chloride 50 mL/hr at 10/16/24 1623        LDA:   PICC 10/26/24 Right Cephalic (Active)   Number of days: 0       Urinary Catheter 10/10/24 Bar (Active)   Number of days: 16       Wound Abdomen Left;Lower (Active)   Number of days:        Incision 10/01/24 Abdomen Medial;Upper (Active)   Number of days: 25       Incision 10/16/24 Abdomen Lower;Medial (Active)   Number of days: 10      Mirian Reed RN

## 2024-10-26 NOTE — PROGRESS NOTES
General Surgery:  Daily Progress Note          PATIENT NAME: Mandie Bustamante     TODAY'S DATE: 10/26/2024, 9:06 AM    SUBJECTIVE:     Pt seen and examined at bedside.  No acute overnight events. Afebrile, vitals stable.  Did have hallucinations.  Noted decreased stool output from the wound yesterday by wound/ostomy nurse.    OBJECTIVE:   VITALS:  /63   Pulse 61   Temp 97.1 °F (36.2 °C) (Temporal)   Resp 18   Ht 1.6 m (5' 3\")   Wt 122.9 kg (271 lb)   SpO2 95%   BMI 48.01 kg/m²      INTAKE/OUTPUT:      Intake/Output Summary (Last 24 hours) at 10/26/2024 0906  Last data filed at 10/26/2024 0400  Gross per 24 hour   Intake --   Output 7300 ml   Net -7300 ml       PHYSICAL EXAM:  General Appearance: Intubated, sedated; grimaces to palpation over abdomen  HEENT:  Normocephalic, atraumatic, mucus membranes moist; OG tube to suction  Heart: Regular rate and rhythm  Lungs: normal effort with symmetric rise and fall of chest wall  Abdomen: Soft, distended, tender to palpation around incision.  Incisional wound with feculent output.  Extremities: Bilateral lower extremity pitting edema  Skin: Skin color, texture, turgor normal. No rashes or lesions.      Data:  CBC:   Recent Labs     10/24/24  0500 10/25/24  0530 10/26/24  0340   WBC 7.7 8.5 10.2   HGB 9.1* 8.5* 9.2*    240 282     Chemistry:   Recent Labs     10/24/24  0500 10/25/24  0530 10/26/24  0340    137 137   K 3.7 3.4* 3.2*    102 100   CO2 24 26 28   GLUCOSE 190* 199* 220*   BUN 16 16 14   CREATININE 0.6 0.6 0.6   MG 1.9 1.8 2.0   ANIONGAP 7* 9 9   LABGLOM >90 >90 >90   CALCIUM 7.0* 7.1* 7.4*   PHOS 2.5* 2.9 3.0     Hepatic:   Recent Labs     10/25/24  0530   AST 9   ALT 6   ALKPHOS 64   BILITOT 0.2*   BILIDIR <0.1     Coagulation:   No results for input(s): \"APTT\", \"INR\" in the last 72 hours.    Invalid input(s): \"PROT\"        Radiology Review:    XR CHEST PORTABLE    Result Date: 10/11/2024  EXAMINATION: ONE XRAY VIEW OF THE CHEST  lower lobe atelectasis. EG junction, stomach and duodenal sweep: Unremarkable Liver: Unremarkable Gallbladder: Surgically absent Biliary tree: Unremarkable Pancreas: Unremarkable for patient's age. Spleen: Unremarkable Kidneys and ureters: Unremarkable Adrenal glands: Unremarkable Retroperitoneal structures:  Unremarkable Small bowel and colon: Scattered air-fluid level seen throughout the large and small bowel with distention of the transverse colon with air-fluid levels.  There is a large air-fluid collection seen along the midline consistent with an abscess measuring 13.8 x 7 cm.  This is contiguous and extends with gas extending out into the subcutaneous soft tissues along the midline with infiltration suggesting infectious etiology and fat containing hernia. Appendix: Not seen Urinary bladder: Decompressed with Bar catheter Free fluid/air: None Lymph nodes: No enlarged lymph nodes Osseus structures: No destructive lesion Vasculature: No aneurysm Other: The uterus is surgically absent.     1. Large abscess with air-fluid collection seen along the peritoneum just below the rectus muscle.  Additional infiltration with gas and fluid seen extending in previous site of hernia suggesting recurrent hernia and or phlegmon.     XR CHEST PORTABLE    Result Date: 10/10/2024  EXAMINATION: ONE XRAY VIEW OF THE CHEST 10/10/2024 12:26 pm COMPARISON: 09/01/2023 HISTORY: ORDERING SYSTEM PROVIDED HISTORY: shortness of breath TECHNOLOGIST PROVIDED HISTORY: shortness of breath Reason for Exam: sob FINDINGS: Mild cardiomegaly.  Shallow lung volumes without acute consolidation.     Shallow lung volumes without acute consolidation.       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

## 2024-10-26 NOTE — PROGRESS NOTES
Pt refused heparin, hydrocortisone cream, antifungal powder, and anti-itch cream. Pt hesitant to RN care. Writer reoriented pt to care team and goals of care.

## 2024-10-26 NOTE — FLOWSHEET NOTE
10/26/24 0020   Treatment Team Notification   Reason for Communication Review case   Name of Team Member Notified Dr. Lyn   Treatment Team Role Consulting Provider   Method of Communication Call   Response No new orders     Notified MD of pt having continued delirium/hallucinations. Writer increased precedex to 0.7 mcg/kg/hr and pt is still having increased delirium. Bps are soft but stable. No new orders. MD advised writer to continue increasing precedex as needed to obtain RASS goal.

## 2024-10-26 NOTE — PROGRESS NOTES
Nutrition Assessment     Type and Reason for Visit: Reassess    Nutrition Recommendations/Plan:   NPO diet  Central, standard Clinimix TPN at 65 mL/hr (1560 mL total volume) and 250 mL of lipids  Monitor TPN rate, medical plan and labs     Malnutrition Assessment:  Malnutrition Status: Moderate malnutrition    Nutrition Assessment:  Patient was extubated yesterday (10/25) and is having hallucinations today. Patient is on central custom TPN due to EC fistula with deep large wound. Physician note indicates that stool ouput from wound has decreased. Central custom TPN will continue at 65 mL/hr (1560 mL total volume) and 250 mL of lipids. Insulin added to TPN bag for increased glucose levels. Monitor TPN rate, medical plan and labs.    Estimated Daily Nutrient Needs:  Energy (kcal):  8436-8688 kcal (12-15 kcal/kg) Weight Used for Energy Requirements: Current     Protein (g):  67-83 gm of protein (1.3-1.6 gm/kg) Weight Used for Protein Requirements: Ideal        Fluid (ml/day):  6562-9273 mL Method Used for Fluid Requirements: 1 ml/kcal    Nutrition Related Findings:   Edema: +2 BUE, +2 BLE. Absent bowel sounds. EC fistula Wound Type: Wound Vac, Surgical Incision    Current Nutrition Therapies:    Diet NPO  PN-Adult 2-in-1 Central Line (Standard)  PN-Adult 2-in-1 Central Line (Custom)    Anthropometric Measures:  Height: 160 cm (5' 3\")  Current Body Wt: 122.9 kg (271 lb)   BMI: 48    Nutrition Diagnosis:   Inadequate oral intake related to inadequate protein-energy intake as evidenced by NPO or clear liquid status due to medical condition, nutrition support - parenteral nutrition, poor intake prior to admission, GI abnormality, nausea    Nutrition Interventions:   Food and/or Nutrient Delivery: Continue NPO, Modify Parenteral Nutrition  Nutrition Education/Counseling: Education not indicated  Coordination of Nutrition Care: Continue to monitor while inpatient       Goals:  Previous Goal Met: Progressing toward

## 2024-10-26 NOTE — FLOWSHEET NOTE
10/25/24 2128   Treatment Team Notification   Reason for Communication Review case   Name of Team Member Notified Chrissie Schwartz NP   Treatment Team Role Advanced Practice Nurse   Method of Communication Secure Message   Response See orders     Writer reached out regarding pt request for sleep aid. Pt requests Ambien. NP concerned for further delirium/hallucinations. Orders for Atarax PO.    Pt unable to take Atarax due to NPO status. NP aware.

## 2024-10-26 NOTE — PROGRESS NOTES
[facility] -   Infectious diseases evaluation  admission date 10/10/2024    reason for consultation:   Fever despite antibiotics    Impression :   Current:  Fever/leukocytosis likely abdominal source  Abdominal abscess  Hypotention  Status post exploratory laparotomy with removal of mesh and wound VAC application 10/16/2024 subsequently underwent exploratory laparotomy abdominal washout with wound VAC application 10/18/2024.   Status post robotic assisted laparoscopic repair of recurrent incisional hernia with mesh placement 10/1/2024  Status post ultrasound guided aspiration of abdominal wall fluid collection on 10/11/2024 no growth on culture  Acute respiratory failure required intubation  Obesity    HENCE:   Discontinue IV cefepime and Flagyl  Respiratory culture grew normal brenda on 10/19/2024  Respiratory panel with COVID-19 negative  The patient was treated with IV Zosyn 10/10/2024 through 10/17/2024  No growth on blood cultures from 10/16/2024  Follow blood cultures from 10/18/2024, no growth  Follow CBC and renal function  Discussed with  at the bedside      Infection Control Recommendations   [place] Precautions      Antimicrobial Stewardship Recommendations   Simplification of therapy  Targeted therapy      History of Present Illness:   Initial history:  Mandie Bustamante is a 69 y.o.-year-old female was seen at the ICU, intubated, sedated, unable to provide history that was obtained from chart review and nursing staff.  She is on 1 mcg/min of Levophed, right arm PICC line was placed 10/11/2024, on TPN.  Bar catheter in place with clear urine was placed on 10/10/2024  NG tube in place.  Status post robotic assisted laparoscopic repair of recurrent incisional hernia with mesh placement 10/1/2024 was complicated by fluid collection status post aspiration on 10/11/2024 with no growth on culture.  Status post exploratory laparotomy with removal of mesh  REPAIR N/A 10/1/2024    ROBOTIC RECURRENT INCISIONAL HERNIA REPAIR with mesh performed by Gm Cummings MD at [facility] OR       Medications:      ziprasidone (GEODON) 10 mg in sterile water 0.5 mL injection  10 mg IntraMUSCular Nightly    fat emulsion  250 mL IntraVENous Daily    heparin (porcine)  5,000 Units SubCUTAneous BID    furosemide  40 mg IntraVENous Q12H    insulin lispro  0-4 Units SubCUTAneous Q6H    metroNIDAZOLE  500 mg IntraVENous Q8H    cefepime  2,000 mg IntraVENous q8h    miconazole   Topical BID    hydrocortisone   Topical BID    lidocaine PF  5 mL Tracheal Tube Once    sodium chloride flush  5-40 mL IntraVENous 2 times per day    pantoprazole (PROTONIX) 40 mg in sodium chloride (PF) 0.9 % 10 mL injection  40 mg IntraVENous Daily       Social History:     Social History     Socioeconomic History    Marital status:      Spouse name: Not on file    Number of children: Not on file    Years of education: Not on file    Highest education level: Not on file   Occupational History    Not on file   Tobacco Use    Smoking status: Never    Smokeless tobacco: Never   Vaping Use    Vaping status: Never Used   Substance and Sexual Activity    Alcohol use: Yes     Alcohol/week: 1.0 standard drink of alcohol     Types: 1 Shots of liquor per week     Comment: social    Drug use: Never    Sexual activity: Yes     Partners: Male   Other Topics Concern    Not on file   Social History Narrative    Not on file     Social Determinants of Health     Financial Resource Strain: Low Risk  (7/3/2024)    Overall Financial Resource Strain (CARDIA)     Difficulty of Paying Living Expenses: Not hard at all   Food Insecurity: No Food Insecurity (10/10/2024)    Hunger Vital Sign     Worried About Running Out of Food in the Last Year: Never true     Ran Out of Food in the Last Year: Never true   Transportation Needs: No Transportation Needs (10/10/2024)    PRAPARE - Transportation     Lack of Transportation (Medical): No

## 2024-10-26 NOTE — PLAN OF CARE
Problem: Discharge Planning  Goal: Discharge to home or other facility with appropriate resources  Outcome: Progressing     Problem: Skin/Tissue Integrity  Goal: Absence of new skin breakdown  Description: 1.  Monitor for areas of redness and/or skin breakdown  2.  Assess vascular access sites hourly  3.  Every 4-6 hours minimum:  Change oxygen saturation probe site  4.  Every 4-6 hours:  If on nasal continuous positive airway pressure, respiratory therapy assess nares and determine need for appliance change or resting period.  Outcome: Progressing     Problem: ABCDS Injury Assessment  Goal: Absence of physical injury  Outcome: Progressing     Problem: Safety - Adult  Goal: Free from fall injury  Outcome: Progressing     Problem: Metabolic/Fluid and Electrolytes - Adult  Goal: Electrolytes maintained within normal limits  Outcome: Progressing  Goal: Hemodynamic stability and optimal renal function maintained  Outcome: Progressing  Goal: Glucose maintained within prescribed range  Outcome: Progressing     Problem: Neurosensory - Adult  Goal: Achieves stable or improved neurological status  Outcome: Progressing  Goal: Absence of seizures  Outcome: Progressing  Goal: Remains free of injury related to seizures activity  Outcome: Progressing     Problem: Cardiovascular - Adult  Goal: Maintains optimal cardiac output and hemodynamic stability  Outcome: Progressing  Goal: Absence of cardiac dysrhythmias or at baseline  Outcome: Progressing     Problem: Respiratory - Adult  Goal: Achieves optimal ventilation and oxygenation  Outcome: Progressing     Problem: Gastrointestinal - Adult  Goal: Maintains or returns to baseline bowel function  Outcome: Progressing  Goal: Maintains adequate nutritional intake  Outcome: Progressing     Problem: Genitourinary - Adult  Goal: Urinary catheter remains patent  Outcome: Progressing     Problem: Skin/Tissue Integrity - Adult  Goal: Skin integrity remains intact  Outcome:  Progressing  Goal: Incisions, wounds, or drain sites healing without S/S of infection  Outcome: Progressing     Problem: Hematologic - Adult  Goal: Maintains hematologic stability  Outcome: Progressing     Problem: Musculoskeletal - Adult  Goal: Return mobility to safest level of function  Outcome: Progressing     Problem: Nutrition Deficit:  Goal: Optimize nutritional status  Outcome: Progressing  Flowsheets (Taken 10/26/2024 1213 by Ewelina Butler, RD, LD)  Nutrient intake appropriate for improving, restoring, or maintaining nutritional needs:   Recommend, monitor, and adjust tube feedings and TPN/PPN based on assessed needs   Assess nutritional status and recommend course of action     Problem: Pain  Goal: Verbalizes/displays adequate comfort level or baseline comfort level  Outcome: Progressing     Problem: Chronic Conditions and Co-morbidities  Goal: Patient's chronic conditions and co-morbidity symptoms are monitored and maintained or improved  Outcome: Progressing

## 2024-10-26 NOTE — PROGRESS NOTES
Pulmonary Critical Care Progress Note    Patient seen for the follow up of Intra-abdominal abscess (HCC)     Subjective:    She is currently on RA, staff reports continued confusion, on precedex at 0.8. TPN    Examination:    Vitals: /72   Pulse 62   Temp 97.1 °F (36.2 °C) (Temporal)   Resp 24   Ht 1.6 m (5' 3\")   Wt 122.9 kg (271 lb)   SpO2 91%   BMI 48.01 kg/m²   SpO2  Av.2 %  Min: 90 %  Max: 99 %  General appearance: Awake alert no acute distress, room air  Neck: No JVD  Lungs: Decreased breath sound no crackles or wheezes  Heart: regular rate and rhythm, S1, S2 normal, no gallop  Abdomen: Soft, non tender, + BS dressing in place  Extremities: no cyanosis or clubbing. Soft pitting pedal edema    LABs:    CBC:   Recent Labs     10/25/24  0530 10/26/24  0340   WBC 8.5 10.2   HGB 8.5* 9.2*   HCT 25.5* 27.2*    282     BMP:   Recent Labs     10/25/24  0530 10/26/24  0340    137   K 3.4* 3.2*   CO2 26 28   BUN 16 14   CREATININE 0.6 0.6   LABGLOM >90 >90   GLUCOSE 199* 220*        Latest Reference Range & Units 10/18/24 04:12   POC HCO3 21.0 - 28.0 mmol/L 24.9   POC O2 SAT 94.0 - 98.0 % 97.5   POC pCO2 35.0 - 48.0 mm Hg 33.7 (L)   POC pH 7.350 - 7.450  7.476 (H)   POC PO2 83.0 - 108.0 mm Hg 88.2   (L): Data is abnormally low  (H): Data is abnormally high  Radiology:  Chest x-ray 10/24  1. Bibasilar airspace disease and small bilateral effusions.  Stable  cardiomegaly.  Mild pulmonary vascular congestion.  Findings favor CHF  related changes with underlying infiltrate not excluded.  Follow-up is  recommended to document resolution.  2. Tubes and right-sided PICC as above.    Chest x-ray 10/21         Chest x-ray 10/19 reviewed        Impression/recommendations:  Acute hypoxic respiratory insufficiency - resolved   Status post exploratory laparotomy with removal of mesh and wound VAC application/history of cholecystectomy     Decreased hemoglobin   Monitor hemoglobin hematocrit      Status  post SHAI/urinary retention with history of suburethral sling     Insomnia/obesity suspected sleep apnea       Peptic ulcer disease prophylaxis Protonix  DVT prophylaxis EPC started heparin 5000 every 12 hour  This is a late note on patient seen by me earlier today.  Electronically signed by Kwame Lyn MD on 10/26/24 at 9:29 PM

## 2024-10-26 NOTE — PROGRESS NOTES
[facility]  Office: [phone]  Keenan Foster DO, Kevin Siegel DO, Carlos A Orta DO, Taye Morales DO, Maxx Madison MD, Meena Eckert MD, Efren Vo MD, Makayla Last MD,  Jerman Christian MD, Gina Bourgeois MD, Gladys Fry MD,  Anu Murillo DO, Jennifer Blanca MD, Oral Duncan MD, Harlan Foster DO, Katya Simmons MD,  Filiberto Slade DO, Elizabeth Alves MD, Samantha Pinto MD, Arlene Bryant MD, Bandar Barrios MD,  Kulwant Rodrigez MD, Michelle Dennison MD, Jinny Steel MD, Kathia Vargas MD, Gerardo Alvarez MD, Richard Johnson MD, Demetri Ibarra DO, Benjie Alvarenga MD, Shirley Waterhouse, CNP,  Georgette Villarreal CNP, Demetri Toure, CATHY,  Silvina Castano, CARINA, Tatiana Mckeon, CNP, Angelina Harper, CNP, Alma Rosa Peng, CNP, Rashmi Mccartney, CNP, IMAN ErvinC, IMAN ArmentaC, Stephanie Kaminski, CNP, Tono Yee, CNP,  Chrissie Schwartz, CNP, Sheri Jones, CNP,  Nadiya Rick, CNP, Cindi Lam, CNP         [facility]   IN-PATIENT SERVICE   [facility]    Progress Note    10/26/2024    1:43 PM    Name:   Mandie Bustamante  MRN:     [id number]     Acct:      [id number]   Room:   2030/2030-01  IP Day:  16  Admit Date:  10/10/2024  6:11 PM    PCP:   No primary care provider on file.  Code Status:  Full Code    Subjective:     C/C: Abdominal pain    Interval History Status: improved.     Patient with continued confusion and delirium.  No acute events overnight per staff.  Denied chest pain or shortness of breath.  Pleasantly confused on Monday complaints other than itching    Brief History:     Per prior documentation  \"Patient is a 69-year-old female who recently underwent an out patient hernia repair with mesh on 10/1/2024, she presents to the ED with abdominal pain on 10/10/2024. Work up in the ED revealed severe hyponatremia of 109, leukocytosis, and  CT abdomen pelvis without contrast was suggestive of large abscess with air-fluid collection seen along the  10/10/2024 Yes    Prediabetes (Chronic) 10/10/2024 Yes    Overview Signed 10/10/2024  8:47 PM by Gerardo Alvarez MD     Patient reportedly was in the prediabetic range according to labs in 2019 per chart review, however I am unable to see those labs.  But on recent blood work performed this month hemoglobin A1c was 6.3%.  Patient does complain of urinary frequency, urinalysis performed same time was negative for infection or signs of stone so could likely be from her hyperglycemia or potentially hypercalcemia which each were discussed in depth with the patient.  We also discussed cushionoid appearance and how that could relate to hyperglycemia as well.  Patient states we could possibly investigate this at later date.         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  Advised  to try to keep her awake and motivated throughout the day, minimize disruption of sleep at nighttime    Carlos A Orta,   10/26/2024  1:43 PM

## 2024-10-26 NOTE — PROCEDURES
PROCEDURE NOTE  Date: 10/26/2024   Name: Mandie Bustamante  YOB: 1955    Procedures  Picc placement note:  Dynamic Access RN procedure    Consent signed and obtained by proceduralist, from Arnulfo Bustamante, Spouse. See consent form in paper chart.    Prescribed IV Therapy = TPN, Precedex  Peripheral ultrasound assessment done. Plan for right cephalic vein insertion.   CVR measurement = 41 % (Linear CVR is preferred to be less than 45%).  Product type: Bard 5 fr triple lumen Power PICC.  History/Labs/Allergies Reviewed  Placed By: Jason Weiss - RN, [place]-BC (Dynamic Access)  Time out Performed using Two Identifiers  Lot # [id number]  Expiration date = 09-  Trimmed at 39 cm total  External catheter length 2 cm  Number of attempts 2  First attempt in the right brachial vein; PICC line will not advance past the axilla.  Special equipment used- Bard 3cg tip confirmation system, ultrasound, and micro-introducer (MST) technique   Catheter securement = adhesive 3M securement device  Dressing applied= Tegaderm CHG  Lidocaine administered intradermally conc.1%, approx 1 ml (Lidocaine Lot# - [id number] and Exp date - 09- )    Mirian Reed RN aware picc placed with ECG technology and is confirmed in the distal 1/3 SVC. Picc is immediately released for use. Rn aware new iv tubing required.     PICC education:     [ x ] Discussed with patient/Family or POA prior to procedure.  Risks and Benefits along with reason for procedure were discussed and teaching was reinforced with an education handout on line  insertion. [facility] FAQ Catheter Associated Blood Stream Infections and [facility] [id number] REV. 7/13 Nursing and Booklet left at bedside or in chart. Patient (Family or POA) acknowledged understanding of information taught and agreed to procedure.      [  ] Was not discussed with patient/family or POA due to pts medical status at time of procedure. pts family or POA not available to discuss line education. CDC FAQ Catheter  Associated Blood Stream Infections and [facility] [id number] REV. 7/13 Nursing and Booklet left at bedside or in chart.       Upload picture of vessel       Upload picture of ECG tip location documentation

## 2024-10-27 LAB
ANION GAP SERPL CALCULATED.3IONS-SCNC: 11 MMOL/L (ref 9–17)
BASOPHILS # BLD: 0.09 K/UL (ref 0–0.2)
BASOPHILS NFR BLD: 1 % (ref 0–2)
BUN SERPL-MCNC: 16 MG/DL (ref 8–23)
BUN/CREAT SERPL: 23 (ref 9–20)
CALCIUM SERPL-MCNC: 7.3 MG/DL (ref 8.6–10.4)
CHLORIDE SERPL-SCNC: 99 MMOL/L (ref 98–107)
CO2 SERPL-SCNC: 29 MMOL/L (ref 20–31)
CREAT SERPL-MCNC: 0.7 MG/DL (ref 0.5–0.9)
EOSINOPHIL # BLD: 0.8 K/UL (ref 0–0.44)
EOSINOPHILS RELATIVE PERCENT: 7 % (ref 1–4)
ERYTHROCYTE [DISTWIDTH] IN BLOOD BY AUTOMATED COUNT: 13 % (ref 11.8–14.4)
GFR, ESTIMATED: >90 ML/MIN/1.73M2
GLUCOSE BLD-MCNC: 159 MG/DL (ref 65–105)
GLUCOSE BLD-MCNC: 175 MG/DL (ref 65–105)
GLUCOSE BLD-MCNC: 177 MG/DL (ref 65–105)
GLUCOSE BLD-MCNC: 180 MG/DL (ref 65–105)
GLUCOSE BLD-MCNC: 184 MG/DL (ref 65–105)
GLUCOSE BLD-MCNC: 188 MG/DL (ref 65–105)
GLUCOSE SERPL-MCNC: 184 MG/DL (ref 70–99)
HCT VFR BLD AUTO: 29 % (ref 36.3–47.1)
HGB BLD-MCNC: 9.7 G/DL (ref 11.9–15.1)
IMM GRANULOCYTES # BLD AUTO: 0.45 K/UL (ref 0–0.3)
IMM GRANULOCYTES NFR BLD: 4 %
LYMPHOCYTES NFR BLD: 2.49 K/UL (ref 1.1–3.7)
LYMPHOCYTES RELATIVE PERCENT: 20 % (ref 24–43)
MCH RBC QN AUTO: 29.4 PG (ref 25.2–33.5)
MCHC RBC AUTO-ENTMCNC: 33.4 G/DL (ref 28.4–34.8)
MCV RBC AUTO: 87.9 FL (ref 82.6–102.9)
MONOCYTES NFR BLD: 1.44 K/UL (ref 0.1–1.2)
MONOCYTES NFR BLD: 12 % (ref 3–12)
NEUTROPHILS NFR BLD: 56 % (ref 36–65)
NEUTS SEG NFR BLD: 7.1 K/UL (ref 1.5–8.1)
NRBC BLD-RTO: 0 PER 100 WBC
PLATELET # BLD AUTO: 399 K/UL (ref 138–453)
PMV BLD AUTO: 9.9 FL (ref 8.1–13.5)
POTASSIUM SERPL-SCNC: 3.2 MMOL/L (ref 3.7–5.3)
POTASSIUM SERPL-SCNC: 3.7 MMOL/L (ref 3.7–5.3)
RBC # BLD AUTO: 3.3 M/UL (ref 3.95–5.11)
SODIUM SERPL-SCNC: 139 MMOL/L (ref 135–144)
WBC OTHER # BLD: 12.4 K/UL (ref 3.5–11.3)

## 2024-10-27 PROCEDURE — 2580000003 HC RX 258: Performed by: INTERNAL MEDICINE

## 2024-10-27 PROCEDURE — 2500000003 HC RX 250 WO HCPCS: Performed by: INTERNAL MEDICINE

## 2024-10-27 PROCEDURE — 6360000002 HC RX W HCPCS: Performed by: INTERNAL MEDICINE

## 2024-10-27 PROCEDURE — 2580000003 HC RX 258

## 2024-10-27 PROCEDURE — 2500000003 HC RX 250 WO HCPCS

## 2024-10-27 PROCEDURE — 6360000002 HC RX W HCPCS

## 2024-10-27 PROCEDURE — 6370000000 HC RX 637 (ALT 250 FOR IP)

## 2024-10-27 PROCEDURE — 84132 ASSAY OF SERUM POTASSIUM: CPT

## 2024-10-27 PROCEDURE — 85025 COMPLETE CBC W/AUTO DIFF WBC: CPT

## 2024-10-27 PROCEDURE — 80048 BASIC METABOLIC PNL TOTAL CA: CPT

## 2024-10-27 PROCEDURE — 2000000000 HC ICU R&B

## 2024-10-27 PROCEDURE — 99232 SBSQ HOSP IP/OBS MODERATE 35: CPT | Performed by: INTERNAL MEDICINE

## 2024-10-27 PROCEDURE — 6370000000 HC RX 637 (ALT 250 FOR IP): Performed by: INTERNAL MEDICINE

## 2024-10-27 PROCEDURE — 94761 N-INVAS EAR/PLS OXIMETRY MLT: CPT

## 2024-10-27 PROCEDURE — 6360000002 HC RX W HCPCS: Performed by: NURSE PRACTITIONER

## 2024-10-27 RX ORDER — KETOROLAC TROMETHAMINE 15 MG/ML
15 INJECTION, SOLUTION INTRAMUSCULAR; INTRAVENOUS ONCE
Status: COMPLETED | OUTPATIENT
Start: 2024-10-27 | End: 2024-10-27

## 2024-10-27 RX ADMIN — INSULIN LISPRO 1 UNITS: 100 INJECTION, SOLUTION INTRAVENOUS; SUBCUTANEOUS at 12:22

## 2024-10-27 RX ADMIN — I.V. FAT EMULSION 250 ML: 20 EMULSION INTRAVENOUS at 18:13

## 2024-10-27 RX ADMIN — ZIPRASIDONE MESYLATE 10 MG: 20 INJECTION, POWDER, LYOPHILIZED, FOR SOLUTION INTRAMUSCULAR at 23:47

## 2024-10-27 RX ADMIN — DEXMEDETOMIDINE 0.2 MCG/KG/HR: 100 INJECTION, SOLUTION INTRAVENOUS at 00:08

## 2024-10-27 RX ADMIN — FUROSEMIDE 40 MG: 10 INJECTION, SOLUTION INTRAMUSCULAR; INTRAVENOUS at 19:54

## 2024-10-27 RX ADMIN — PANTOPRAZOLE SODIUM 40 MG: 40 INJECTION, POWDER, FOR SOLUTION INTRAVENOUS at 08:31

## 2024-10-27 RX ADMIN — ZIPRASIDONE MESYLATE 10 MG: 20 INJECTION, POWDER, LYOPHILIZED, FOR SOLUTION INTRAMUSCULAR at 18:57

## 2024-10-27 RX ADMIN — INSULIN LISPRO 1 UNITS: 100 INJECTION, SOLUTION INTRAVENOUS; SUBCUTANEOUS at 05:40

## 2024-10-27 RX ADMIN — HEPARIN SODIUM 5000 UNITS: 5000 INJECTION INTRAVENOUS; SUBCUTANEOUS at 08:31

## 2024-10-27 RX ADMIN — SODIUM CHLORIDE, PRESERVATIVE FREE 10 ML: 5 INJECTION INTRAVENOUS at 19:55

## 2024-10-27 RX ADMIN — POTASSIUM CHLORIDE 20 MEQ: 29.8 INJECTION, SOLUTION INTRAVENOUS at 06:46

## 2024-10-27 RX ADMIN — POTASSIUM CHLORIDE: 2 INJECTION, SOLUTION, CONCENTRATE INTRAVENOUS at 18:20

## 2024-10-27 RX ADMIN — DEXMEDETOMIDINE 0.8 MCG/KG/HR: 100 INJECTION, SOLUTION INTRAVENOUS at 21:54

## 2024-10-27 RX ADMIN — HYDROCORTISONE: 1 CREAM TOPICAL at 08:55

## 2024-10-27 RX ADMIN — FUROSEMIDE 40 MG: 10 INJECTION, SOLUTION INTRAMUSCULAR; INTRAVENOUS at 08:31

## 2024-10-27 RX ADMIN — POTASSIUM CHLORIDE 20 MEQ: 29.8 INJECTION, SOLUTION INTRAVENOUS at 08:54

## 2024-10-27 RX ADMIN — KETOROLAC TROMETHAMINE 15 MG: 15 INJECTION, SOLUTION INTRAMUSCULAR; INTRAVENOUS at 04:46

## 2024-10-27 RX ADMIN — SODIUM CHLORIDE, PRESERVATIVE FREE 10 ML: 5 INJECTION INTRAVENOUS at 08:31

## 2024-10-27 ASSESSMENT — PAIN SCALES - GENERAL
PAINLEVEL_OUTOF10: 5
PAINLEVEL_OUTOF10: 6

## 2024-10-27 ASSESSMENT — PAIN DESCRIPTION - DESCRIPTORS: DESCRIPTORS: ACHING

## 2024-10-27 ASSESSMENT — PAIN DESCRIPTION - LOCATION
LOCATION: HEAD
LOCATION: ABDOMEN

## 2024-10-27 ASSESSMENT — PAIN DESCRIPTION - ORIENTATION: ORIENTATION: MID

## 2024-10-27 NOTE — PROGRESS NOTES
Nutrition Assessment     Type and Reason for Visit: Reassess    Nutrition Recommendations/Plan:   NPO diet  Central, standard Clinimix TPN at 65 mL/hr (1560 mL total volume) and 250 mL of lipids. Insulin: 10 units.   Monitor TPN rate, medical plan and labs     Malnutrition Assessment:  Malnutrition Status: Moderate malnutrition    Nutrition Assessment:  Patient is less confused today compared to yesterday. Bowel sounds are returning but due to EC fistula patient will remains NPO. Central custom TPN at 65 mL/hr (1560 mL total volume) and 100 mL of lipids will be continued. Potassium levels have gone down more although KCl replacement was given and more potassium was added to TPN bag yesterday. Patient given additional KCl replacement per sliding scale today and more has been added to todays TPN bag for this evening. Monitor TPN rate, labs and medical plan.    Estimated Daily Nutrient Needs:  Energy (kcal):  1543-1140 kcal (12-15 kcal/kg) Weight Used for Energy Requirements: Current     Protein (g):  67-83 gm of protein (1.3-1.6 gm/kg) Weight Used for Protein Requirements: Ideal        Fluid (ml/day):  7159-5025 mL Method Used for Fluid Requirements: 1 ml/kcal    Nutrition Related Findings:   Edema: +2 BUE, +2 BLE. Bowel sounds: hypoactive RUQ, LUQ, active RLQ and LLQ. EC fistula Wound Type: Wound Vac, Surgical Incision    Current Nutrition Therapies:    Diet NPO  PN-Adult 2-in-1 Central Line (Custom)  PN-Adult 2-in-1 Central Line (Custom)    Anthropometric Measures:  Height: 160 cm (5' 3\")  Current Body Wt: 122.9 kg (271 lb)   BMI: 48    Nutrition Diagnosis:   Inadequate oral intake related to inadequate protein-energy intake as evidenced by NPO or clear liquid status due to medical condition, nutrition support - parenteral nutrition, poor intake prior to admission, GI abnormality, nausea    Nutrition Interventions:   Food and/or Nutrient Delivery: Continue NPO, Modify Parenteral Nutrition  Nutrition

## 2024-10-27 NOTE — PROGRESS NOTES
Writer had a lengthy conversation with patient regarding her need to leave. Writer educated pt on importance of plan of care and \"slow, safe, and steady\" approach to the healing process. Pt states, \"I just want to get up, get in a wheelchair and leave\". Writer restated the plan of care. Pt still remains delusional intermittently.    Pt expressed need to see . Writer informed pt that her  will be at bedside sometime this morning. Writer discussed this with  at shift change on 10/26.

## 2024-10-27 NOTE — PROGRESS NOTES
Pulmonary Critical Care Progress Note    Patient seen for the follow up of Intra-abdominal abscess (HCC)     Subjective:    She is currently on RA, on precedex at 0.2. TPN infusing.  at bedside says she is still just a little confused but overall thinks she is doing much better mental status wise compared to a few days ago. Did not sleep much overnight    Examination:    Vitals: BP (!) 116/57   Pulse 77   Temp 97 °F (36.1 °C) (Tympanic)   Resp 30   Ht 1.6 m (5' 3\")   Wt 122 kg (269 lb)   SpO2 97%   BMI 47.65 kg/m²   SpO2  Av.6 %  Min: 90 %  Max: 99 %  General appearance: Awake alert no acute distress, room air  Neck: No JVD  Lungs: Decreased breath sound no crackles or wheezes  Heart: regular rate and rhythm, S1, S2 normal, no gallop  Abdomen: Soft, non tender, + BS dressing in place  Extremities: no cyanosis or clubbing. Soft pitting pedal edema    LABs:    CBC:   Recent Labs     10/26/24  0340 10/27/24  0500   WBC 10.2 12.4*   HGB 9.2* 9.7*   HCT 27.2* 29.0*    399     BMP:   Recent Labs     10/26/24  0340 10/27/24  0500    139   K 3.2* 3.2*   CO2 28 29   BUN 14 16   CREATININE 0.6 0.7   LABGLOM >90 >90   GLUCOSE 220* 184*        Latest Reference Range & Units 10/18/24 04:12   POC HCO3 21.0 - 28.0 mmol/L 24.9   POC O2 SAT 94.0 - 98.0 % 97.5   POC pCO2 35.0 - 48.0 mm Hg 33.7 (L)   POC pH 7.350 - 7.450  7.476 (H)   POC PO2 83.0 - 108.0 mm Hg 88.2   (L): Data is abnormally low  (H): Data is abnormally high  Radiology:  Chest x-ray 10/24  1. Bibasilar airspace disease and small bilateral effusions.  Stable  cardiomegaly.  Mild pulmonary vascular congestion.  Findings favor CHF  related changes with underlying infiltrate not excluded.  Follow-up is  recommended to document resolution.  2. Tubes and right-sided PICC as above.    Chest x-ray 10/21         Chest x-ray 10/19 reviewed        Impression/recommendations:  Acute hypoxic respiratory insufficiency - resolved   Status post

## 2024-10-27 NOTE — PROGRESS NOTES
End Of Shift Note  [facility]  Summary of shift: Pt had an uneventful night. Abdominal dressing changed and packed. Pt remains delirious however less hallucinations compared to yesterday. Pressures remain soft but stable. Pt received 10mg of Geodon with minimal effect. Pt started on precedex for agitation and it continues at 0.2mcg/kg/hr. K this morning 3.2. See previous note regarding pt's request to leave. No needs expressed at time of writing. Plan of care continues.     Vitals:    Vitals:    10/27/24 0500 10/27/24 0544 10/27/24 0555 10/27/24 0600   BP:   (!) 106/52    Pulse: 78  75 75   Resp:   24 17   Temp:       TempSrc:       SpO2: 94%  95% 97%   Weight:  122 kg (269 lb)     Height:            I&O:   Intake/Output Summary (Last 24 hours) at 10/27/2024 0607  Last data filed at 10/27/2024 0400  Gross per 24 hour   Intake 620 ml   Output 4250 ml   Net -3630 ml       Resp Status: RA    Ventilator Settings:  Vent Mode: CPAP/PS Resp Rate (Set): 14 bpm/Vt (Set, mL): 500 mL/ /FiO2 : 30 %    Critical Care IV infusions:   PN-Adult 2-in-1 Central Line (Custom) 65 mL/hr at 10/26/24 1812    dexmedeTOMIDine (PRECEDEX) 1,000 mcg in sodium chloride 0.9 % 250 mL infusion 0.2 mcg/kg/hr (10/27/24 0212)    dextrose 5% and 0.45% NaCl with KCl 20 mEq Stopped (10/22/24 1038)    norepinephrine Stopped (10/23/24 1510)    dextrose      sodium chloride 50 mL/hr at 10/16/24 1623        LDA:   PICC 10/26/24 Right Cephalic (Active)   Number of days: 0       Urinary Catheter 10/10/24 Bar (Active)   Number of days: 16       Wound Abdomen Left;Lower (Active)   Number of days:        Incision 10/01/24 Abdomen Medial;Upper (Active)   Number of days: 25       Incision 10/16/24 Abdomen Lower;Medial (Active)   Number of days: 10

## 2024-10-27 NOTE — PROGRESS NOTES
Pt had a large BM via rectum. Writer then observed abdominal dressing actively saturate with stool during BM. Moderate amount of stool in wound space. Wound cleaned repacked and new abdominal dressing applied. New linens and gown.

## 2024-10-27 NOTE — PROGRESS NOTES
[facility]  Office: [phone]  Keenan Foster DO, Kevin Siegel DO, Carlos A Orta DO, Taye Morales DO, Maxx Madison MD, Meena Eckert MD, Efren Vo MD, Makayla Last MD,  Jerman Christian MD, Gina Bourgeois MD, Gladys Fry MD,  Anu Murillo DO, Jennifer Blanca MD, Oral Duncan MD, Harlan Foster DO, Katya Simmons MD,  Filiberto Slade DO, Elizabeth Alves MD, Samantha Pinto MD, Arlene Bryant MD, Bandar Barrios MD,  Kulwant Rodrigez MD, Michelle Dennison MD, Jinny Steel MD, Kathia Vargas MD, Gerardo Alvarez MD, Richard Johnson MD, Demetri Ibarra DO, Benjie Alvarenga MD, Shirley Waterhouse, CNP,  Georgette Villarreal CNP, Demetri Toure, CNP,  Silvina Castano, CARINA, Tatiana Mckeon, CNP, nAgelina Harper, CNP, Alma Rosa Peng, CNP, Rashmi Mccartney, CNP, IMAN ErvinC, IMAN ArmentaC, Stephanie Kaminski, CNP, Tono Yee, CNP,  Chrissie Schwartz, CNP, Sheri Jones, CNP,  Nadiya Rick, CNP, Cindi Lam, CNP         [facility]   IN-PATIENT SERVICE   [facility]    Progress Note    10/27/2024    7:23 AM    Name:   Mandie Bustamante  MRN:     [id number]     Acct:      [id number]   Room:   2030/2030-01  IP Day:  17  Admit Date:  10/10/2024  6:11 PM    PCP:   No primary care provider on file.  Code Status:  Full Code    Subjective:     C/C: Abdominal pain    Interval History Status: improved.     Patient continues to improve, mentation is better today, more alert and interactive.  Denies chest pain, shortness of breath, nausea or vomiting, fevers or chills.  Complains of lower abdominal pain.    Brief History:     Per prior documentation  \"Patient is a 69-year-old female who recently underwent an out patient hernia repair with mesh on 10/1/2024, she presents to the ED with abdominal pain on 10/10/2024. Work up in the ED revealed severe hyponatremia of 109, leukocytosis, and  CT abdomen pelvis without contrast was suggestive of large abscess with air-fluid collection seen

## 2024-10-27 NOTE — PROGRESS NOTES
End Of Shift Note  [facility]  Summary of shift: patient was calm in the morning, but became increasingly delusional and agitated in the afternoon, demanding to leave the hospital, refusing treatment such as vital signs, attempting to remove cardiac monitor. Precedex increased, virtual  initiated. Moved to another room per 's request and patient became calmer briefly after the move. One episode of stool leaking from abdominal dressing, wound irrigated with normal saline and dressing changed. One small soft BM per rectum today. Continues on TPN. Potassum replaced per order.     Vitals:    Vitals:    10/27/24 1600 10/27/24 1631 10/27/24 1700 10/27/24 1800   BP:  105/75 110/60 113/71   Pulse: 80 78 83 67   Resp: 27 20 22 29   Temp:    97.5 °F (36.4 °C)   TempSrc:    Temporal   SpO2:   94% 95%   Weight:       Height:            I&O:   Intake/Output Summary (Last 24 hours) at 10/27/2024 1916  Last data filed at 10/27/2024 1903  Gross per 24 hour   Intake 1586.22 ml   Output 3160 ml   Net -1573.78 ml       Resp Status: respirations easy on room air.     Ventilator Settings:  Vent Mode: CPAP/PS Resp Rate (Set): 14 bpm/Vt (Set, mL): 500 mL/ /FiO2 : 30 %    Critical Care IV infusions:   PN-Adult 2-in-1 Central Line (Custom) 65 mL/hr at 10/27/24 1903    dexmedeTOMIDine (PRECEDEX) 1,000 mcg in sodium chloride 0.9 % 250 mL infusion 1 mcg/kg/hr (10/27/24 1646)    dextrose 5% and 0.45% NaCl with KCl 20 mEq Stopped (10/22/24 1038)    norepinephrine Stopped (10/23/24 1510)    dextrose      sodium chloride 50 mL/hr at 10/16/24 1623        LDA:   PICC 10/26/24 Right Cephalic (Active)   Number of days: 1       Urinary Catheter 10/10/24 Bar (Active)   Number of days: 17       Wound Abdomen Left;Lower (Active)   Number of days:        Incision 10/01/24 Abdomen Medial;Upper (Active)   Number of days: 26       Incision 10/16/24 Abdomen Lower;Medial (Active)   Number of days: 11

## 2024-10-27 NOTE — PROGRESS NOTES
General Surgery:  Daily Progress Note          PATIENT NAME: Mandie Bustamante     TODAY'S DATE: 10/27/2024, 7:27 AM    SUBJECTIVE:     Pt seen and examined at bedside.  No acute overnight events. Afebrile, vitals stable. Dressing changed last night by nursing.     OBJECTIVE:   VITALS:  BP (!) 106/52   Pulse 75   Temp 97.7 °F (36.5 °C)   Resp 17   Ht 1.6 m (5' 3\")   Wt 122 kg (269 lb)   SpO2 97%   BMI 47.65 kg/m²      INTAKE/OUTPUT:      Intake/Output Summary (Last 24 hours) at 10/27/2024 0727  Last data filed at 10/27/2024 0400  Gross per 24 hour   Intake 620 ml   Output 4250 ml   Net -3630 ml       PHYSICAL EXAM:  General Appearance: Intubated, sedated; grimaces to palpation over abdomen  HEENT:  Normocephalic, atraumatic, mucus membranes moist; OG tube to suction  Heart: Regular rate and rhythm  Lungs: normal effort with symmetric rise and fall of chest wall  Abdomen: Soft, distended, tender to palpation around incision.  Incisional wound with feculent output.  Extremities: Bilateral lower extremity pitting edema  Skin: Skin color, texture, turgor normal. No rashes or lesions.      Data:  CBC:   Recent Labs     10/25/24  0530 10/26/24  0340 10/27/24  0500   WBC 8.5 10.2 12.4*   HGB 8.5* 9.2* 9.7*    282 399     Chemistry:   Recent Labs     10/25/24  0530 10/26/24  0340 10/27/24  0500    137 139   K 3.4* 3.2* 3.2*    100 99   CO2 26 28 29   GLUCOSE 199* 220* 184*   BUN 16 14 16   CREATININE 0.6 0.6 0.7   MG 1.8 2.0  --    ANIONGAP 9 9 11   LABGLOM >90 >90 >90   CALCIUM 7.1* 7.4* 7.3*   PHOS 2.9 3.0  --      Hepatic:   Recent Labs     10/25/24  0530   AST 9   ALT 6   ALKPHOS 64   BILITOT 0.2*   BILIDIR <0.1     Coagulation:   No results for input(s): \"APTT\", \"INR\" in the last 72 hours.    Invalid input(s): \"PROT\"        Radiology Review:    No new imaging to review      ASSESSMENT:  Active Hospital Problems    Diagnosis Date Noted    Acute metabolic encephalopathy [G93.41] 10/26/2024

## 2024-10-27 NOTE — PROGRESS NOTES
Arnulfo notified of patient's agitation and demands to leave the hospital.  spoke to patient by phone, and states that he will be in to visit patient within 1/2 hour.

## 2024-10-27 NOTE — PLAN OF CARE
Problem: Discharge Planning  Goal: Discharge to home or other facility with appropriate resources  Outcome: Progressing     Problem: Skin/Tissue Integrity  Goal: Absence of new skin breakdown  Description: 1.  Monitor for areas of redness and/or skin breakdown  2.  Assess vascular access sites hourly  3.  Every 4-6 hours minimum:  Change oxygen saturation probe site  4.  Every 4-6 hours:  If on nasal continuous positive airway pressure, respiratory therapy assess nares and determine need for appliance change or resting period.  Outcome: Progressing     Problem: ABCDS Injury Assessment  Goal: Absence of physical injury  Outcome: Progressing     Problem: Safety - Adult  Goal: Free from fall injury  Outcome: Progressing     Problem: Metabolic/Fluid and Electrolytes - Adult  Goal: Electrolytes maintained within normal limits  Outcome: Progressing  Goal: Glucose maintained within prescribed range  Outcome: Progressing     Problem: Neurosensory - Adult  Goal: Achieves stable or improved neurological status  Outcome: Progressing     Problem: Respiratory - Adult  Goal: Achieves optimal ventilation and oxygenation  Outcome: Progressing     Problem: Gastrointestinal - Adult  Goal: Maintains or returns to baseline bowel function  Outcome: Progressing     Problem: Genitourinary - Adult  Goal: Urinary catheter remains patent  Outcome: Progressing     Problem: Skin/Tissue Integrity - Adult  Goal: Skin integrity remains intact  Outcome: Progressing     Problem: Hematologic - Adult  Goal: Maintains hematologic stability  Outcome: Progressing     Problem: Musculoskeletal - Adult  Goal: Return mobility to safest level of function  Outcome: Progressing     Problem: Nutrition Deficit:  Goal: Optimize nutritional status  Outcome: Progressing     Problem: Pain  Goal: Verbalizes/displays adequate comfort level or baseline comfort level  Outcome: Progressing     Problem: Chronic Conditions and Co-morbidities  Goal: Patient's chronic

## 2024-10-27 NOTE — PROGRESS NOTES
Patient becomes insistent that she get out of bed, attempts to get out of bed to leave the hospital, removes cardiac monitor, pulse ox probe, BP cuff, Oriented to self and year. Patient assisted to lie safely in bed, re-oriented, virtual  initiated, Precedex IV increased.

## 2024-10-28 LAB
ALBUMIN SERPL-MCNC: 2.5 G/DL (ref 3.5–5.2)
ALP SERPL-CCNC: 75 U/L (ref 35–104)
ALT SERPL-CCNC: 6 U/L (ref 5–33)
ANION GAP SERPL CALCULATED.3IONS-SCNC: 10 MMOL/L (ref 9–17)
AST SERPL-CCNC: 11 U/L
BASOPHILS # BLD: 0.14 K/UL (ref 0–0.2)
BASOPHILS NFR BLD: 1 % (ref 0–2)
BILIRUB DIRECT SERPL-MCNC: <0.1 MG/DL
BILIRUB INDIRECT SERPL-MCNC: ABNORMAL MG/DL (ref 0–1)
BILIRUB SERPL-MCNC: 0.2 MG/DL (ref 0.3–1.2)
BUN SERPL-MCNC: 19 MG/DL (ref 8–23)
BUN/CREAT SERPL: 32 (ref 9–20)
CALCIUM SERPL-MCNC: 7.7 MG/DL (ref 8.6–10.4)
CHLORIDE SERPL-SCNC: 101 MMOL/L (ref 98–107)
CO2 SERPL-SCNC: 28 MMOL/L (ref 20–31)
CREAT SERPL-MCNC: 0.6 MG/DL (ref 0.5–0.9)
EKG ATRIAL RATE: 77 BPM
EKG P AXIS: 38 DEGREES
EKG P-R INTERVAL: 178 MS
EKG Q-T INTERVAL: 374 MS
EKG QRS DURATION: 88 MS
EKG QTC CALCULATION (BAZETT): 423 MS
EKG R AXIS: 28 DEGREES
EKG T AXIS: 0 DEGREES
EKG VENTRICULAR RATE: 77 BPM
EOSINOPHIL # BLD: 1.36 K/UL (ref 0–0.44)
EOSINOPHILS RELATIVE PERCENT: 10 % (ref 1–4)
ERYTHROCYTE [DISTWIDTH] IN BLOOD BY AUTOMATED COUNT: 13.2 % (ref 11.8–14.4)
GFR, ESTIMATED: >90 ML/MIN/1.73M2
GLUCOSE BLD-MCNC: 117 MG/DL (ref 65–105)
GLUCOSE BLD-MCNC: 172 MG/DL (ref 65–105)
GLUCOSE BLD-MCNC: 173 MG/DL (ref 65–105)
GLUCOSE BLD-MCNC: 203 MG/DL (ref 65–105)
GLUCOSE BLD-MCNC: 205 MG/DL (ref 65–105)
GLUCOSE SERPL-MCNC: 202 MG/DL (ref 70–99)
HCT VFR BLD AUTO: 26 % (ref 36.3–47.1)
HGB BLD-MCNC: 8.5 G/DL (ref 11.9–15.1)
IMM GRANULOCYTES # BLD AUTO: 0.82 K/UL (ref 0–0.3)
IMM GRANULOCYTES NFR BLD: 6 %
LYMPHOCYTES NFR BLD: 3.26 K/UL (ref 1.1–3.7)
LYMPHOCYTES RELATIVE PERCENT: 24 % (ref 24–43)
MAGNESIUM SERPL-MCNC: 2.3 MG/DL (ref 1.6–2.6)
MCH RBC QN AUTO: 29.4 PG (ref 25.2–33.5)
MCHC RBC AUTO-ENTMCNC: 32.7 G/DL (ref 28.4–34.8)
MCV RBC AUTO: 90 FL (ref 82.6–102.9)
MONOCYTES NFR BLD: 1.63 K/UL (ref 0.1–1.2)
MONOCYTES NFR BLD: 12 % (ref 3–12)
NEUTROPHILS NFR BLD: 47 % (ref 36–65)
NEUTS SEG NFR BLD: 6.39 K/UL (ref 1.5–8.1)
NRBC BLD-RTO: 0 PER 100 WBC
PHOSPHATE SERPL-MCNC: 3.2 MG/DL (ref 2.6–4.5)
PLATELET # BLD AUTO: 436 K/UL (ref 138–453)
PMV BLD AUTO: 9.8 FL (ref 8.1–13.5)
POTASSIUM SERPL-SCNC: 3.9 MMOL/L (ref 3.7–5.3)
PROT SERPL-MCNC: 5.6 G/DL (ref 6.4–8.3)
RBC # BLD AUTO: 2.89 M/UL (ref 3.95–5.11)
SODIUM SERPL-SCNC: 139 MMOL/L (ref 135–144)
WBC OTHER # BLD: 13.6 K/UL (ref 3.5–11.3)

## 2024-10-28 PROCEDURE — 6360000002 HC RX W HCPCS: Performed by: STUDENT IN AN ORGANIZED HEALTH CARE EDUCATION/TRAINING PROGRAM

## 2024-10-28 PROCEDURE — 6360000002 HC RX W HCPCS

## 2024-10-28 PROCEDURE — 82947 ASSAY GLUCOSE BLOOD QUANT: CPT

## 2024-10-28 PROCEDURE — 6370000000 HC RX 637 (ALT 250 FOR IP)

## 2024-10-28 PROCEDURE — 80048 BASIC METABOLIC PNL TOTAL CA: CPT

## 2024-10-28 PROCEDURE — 93010 ELECTROCARDIOGRAM REPORT: CPT | Performed by: INTERNAL MEDICINE

## 2024-10-28 PROCEDURE — 97530 THERAPEUTIC ACTIVITIES: CPT

## 2024-10-28 PROCEDURE — 6360000002 HC RX W HCPCS: Performed by: INTERNAL MEDICINE

## 2024-10-28 PROCEDURE — 83735 ASSAY OF MAGNESIUM: CPT

## 2024-10-28 PROCEDURE — 6370000000 HC RX 637 (ALT 250 FOR IP): Performed by: STUDENT IN AN ORGANIZED HEALTH CARE EDUCATION/TRAINING PROGRAM

## 2024-10-28 PROCEDURE — 99232 SBSQ HOSP IP/OBS MODERATE 35: CPT | Performed by: INTERNAL MEDICINE

## 2024-10-28 PROCEDURE — 2500000003 HC RX 250 WO HCPCS: Performed by: INTERNAL MEDICINE

## 2024-10-28 PROCEDURE — 85025 COMPLETE CBC W/AUTO DIFF WBC: CPT

## 2024-10-28 PROCEDURE — 97164 PT RE-EVAL EST PLAN CARE: CPT

## 2024-10-28 PROCEDURE — 2580000003 HC RX 258: Performed by: INTERNAL MEDICINE

## 2024-10-28 PROCEDURE — 2580000003 HC RX 258

## 2024-10-28 PROCEDURE — 6370000000 HC RX 637 (ALT 250 FOR IP): Performed by: INTERNAL MEDICINE

## 2024-10-28 PROCEDURE — 99232 SBSQ HOSP IP/OBS MODERATE 35: CPT | Performed by: NURSE PRACTITIONER

## 2024-10-28 PROCEDURE — 84100 ASSAY OF PHOSPHORUS: CPT

## 2024-10-28 PROCEDURE — 80076 HEPATIC FUNCTION PANEL: CPT

## 2024-10-28 PROCEDURE — 2000000000 HC ICU R&B

## 2024-10-28 PROCEDURE — 2500000003 HC RX 250 WO HCPCS

## 2024-10-28 RX ORDER — MICONAZOLE NITRATE 20 MG/G
CREAM TOPICAL PRN
Status: DISCONTINUED | OUTPATIENT
Start: 2024-10-28 | End: 2024-10-28

## 2024-10-28 RX ORDER — FLUCONAZOLE 2 MG/ML
400 INJECTION, SOLUTION INTRAVENOUS EVERY 24 HOURS
Status: DISCONTINUED | OUTPATIENT
Start: 2024-10-28 | End: 2024-10-29

## 2024-10-28 RX ORDER — FLUCONAZOLE 2 MG/ML
400 INJECTION, SOLUTION INTRAVENOUS EVERY 24 HOURS
Status: DISCONTINUED | OUTPATIENT
Start: 2024-10-28 | End: 2024-10-28

## 2024-10-28 RX ORDER — FLUCONAZOLE 2 MG/ML
400 INJECTION, SOLUTION INTRAVENOUS EVERY 24 HOURS
Status: COMPLETED | OUTPATIENT
Start: 2024-10-28 | End: 2024-11-01

## 2024-10-28 RX ORDER — MICONAZOLE NITRATE 20 MG/G
CREAM TOPICAL 2 TIMES DAILY
Status: DISCONTINUED | OUTPATIENT
Start: 2024-10-28 | End: 2024-11-08 | Stop reason: HOSPADM

## 2024-10-28 RX ORDER — FUROSEMIDE 10 MG/ML
40 INJECTION INTRAMUSCULAR; INTRAVENOUS DAILY
Status: DISCONTINUED | OUTPATIENT
Start: 2024-10-29 | End: 2024-11-06

## 2024-10-28 RX ADMIN — MICONAZOLE NITRATE: 20 CREAM TOPICAL at 20:46

## 2024-10-28 RX ADMIN — MICONAZOLE NITRATE: 20 CREAM TOPICAL at 10:17

## 2024-10-28 RX ADMIN — FLUCONAZOLE 400 MG: 400 INJECTION, SOLUTION INTRAVENOUS at 10:16

## 2024-10-28 RX ADMIN — HEPARIN SODIUM 5000 UNITS: 5000 INJECTION INTRAVENOUS; SUBCUTANEOUS at 20:45

## 2024-10-28 RX ADMIN — ANTI-FUNGAL POWDER MICONAZOLE NITRATE TALC FREE: 1.42 POWDER TOPICAL at 10:17

## 2024-10-28 RX ADMIN — HEPARIN SODIUM 5000 UNITS: 5000 INJECTION INTRAVENOUS; SUBCUTANEOUS at 09:29

## 2024-10-28 RX ADMIN — FLUCONAZOLE 400 MG: 2 INJECTION, SOLUTION INTRAVENOUS at 07:49

## 2024-10-28 RX ADMIN — PANTOPRAZOLE SODIUM 40 MG: 40 INJECTION, POWDER, FOR SOLUTION INTRAVENOUS at 09:29

## 2024-10-28 RX ADMIN — INSULIN LISPRO 1 UNITS: 100 INJECTION, SOLUTION INTRAVENOUS; SUBCUTANEOUS at 07:07

## 2024-10-28 RX ADMIN — SODIUM CHLORIDE, PRESERVATIVE FREE 10 ML: 5 INJECTION INTRAVENOUS at 09:36

## 2024-10-28 RX ADMIN — ANTI-FUNGAL POWDER MICONAZOLE NITRATE TALC FREE: 1.42 POWDER TOPICAL at 20:44

## 2024-10-28 RX ADMIN — INSULIN LISPRO 1 UNITS: 100 INJECTION, SOLUTION INTRAVENOUS; SUBCUTANEOUS at 01:53

## 2024-10-28 RX ADMIN — DEXMEDETOMIDINE 1.2 MCG/KG/HR: 100 INJECTION, SOLUTION INTRAVENOUS at 07:29

## 2024-10-28 RX ADMIN — FLUCONAZOLE 400 MG: 400 INJECTION, SOLUTION INTRAVENOUS at 12:27

## 2024-10-28 RX ADMIN — FUROSEMIDE 40 MG: 10 INJECTION, SOLUTION INTRAMUSCULAR; INTRAVENOUS at 09:33

## 2024-10-28 RX ADMIN — HYDROCORTISONE: 1 CREAM TOPICAL at 20:44

## 2024-10-28 RX ADMIN — POTASSIUM CHLORIDE: 2 INJECTION, SOLUTION, CONCENTRATE INTRAVENOUS at 18:18

## 2024-10-28 RX ADMIN — DIPHENHYDRAMINE HYDROCHLORIDE, ZINC ACETATE: 2; .1 CREAM TOPICAL at 07:08

## 2024-10-28 RX ADMIN — ZIPRASIDONE MESYLATE 10 MG: 20 INJECTION, POWDER, LYOPHILIZED, FOR SOLUTION INTRAMUSCULAR at 20:45

## 2024-10-28 RX ADMIN — I.V. FAT EMULSION 250 ML: 20 EMULSION INTRAVENOUS at 18:24

## 2024-10-28 RX ADMIN — SODIUM CHLORIDE, PRESERVATIVE FREE 10 ML: 5 INJECTION INTRAVENOUS at 20:45

## 2024-10-28 ASSESSMENT — PAIN SCALES - GENERAL
PAINLEVEL_OUTOF10: 0
PAINLEVEL_OUTOF10: 0

## 2024-10-28 NOTE — PROGRESS NOTES
Physical Therapy  Facility/Department: [facility]  Physical Therapy Re-Assessment    Name: Mandie Bustamante  : 1955  MRN: [id number]  Date of Service: 10/28/2024    Discharge Recommendations:  Patient would benefit from continued therapy after discharge. Pt currently functioning below baseline.  Recommend daily inpatient skilled therapy at time of discharge to maximize long term outcomes and prevent re-admission. Please refer to AM-PAC score for current level of function.      HPI (per chart):   Hernia repair 10/1/24, Explantation of mesh 10/16/24, Ex Lap with wash out on 10/18/24, Extubated 10/24/24  Patient Diagnosis(es): The encounter diagnosis was Edema, unspecified type.  Past Medical History:  has a past medical history of Allergic rhinitis, Anemia, Arthritis, Autoimmune disorder (HCC), Cataracts, bilateral, GERD (gastroesophageal reflux disease), Headache, Hypercalcemia, Hyperparathyroidism (HCC), Hypertension, Obesity, and Osteoarthritis.  Past Surgical History:  has a past surgical history that includes Parathyroid gland surgery (2023); Total vaginal hysterectomy (); Appendectomy; Urethra surgery (); Foot surgery (Left, ); shoulder surgery (Right, 10/2000); Foot surgery (Left, 2010); Shoulder arthroscopy (Right, 2011); Shoulder arthroscopy (Right, 2012); Shoulder arthroscopy (Left, 2012); Cholecystectomy; Bunionectomy (Left, 2016); Foot surgery (Left, 2017); parathyroidectomy (2022); hernia repair (2022); Umbilical hernia repair; Hysterectomy, total abdominal (1986); Hysterectomy, vaginal (1986); Upper gastrointestinal endoscopy (); ventral hernia repair (N/A, 10/1/2024); laparoscopy (N/A, 10/16/2024); and laparotomy (N/A, 10/18/2024).    Assessment  Body Structures, Functions, Activity Limitations Requiring Skilled Therapeutic Intervention: Decreased functional mobility ;Decreased ADL status;Decreased strength;Decreased safe awareness;Decreased    Balance  Sitting - Static: Fair;-  Sitting - Dynamic: Poor;+  Standing - Static: Poor (marlyn chavez)  Exercise Treatment: AROM bilateral LEs in sitting and reclined x 5 repetitions. Patient required mod assist x 2 to sit upright from leaning against recliner, once up patient required min assist and BUE support to remain upright. Sat upright without back support and with min assist x 3 repetitions x ~3 minutes each.       OutComes Score    AM-PAC - Mobility    AM-PAC Basic Mobility - Inpatient   How much help is needed turning from your back to your side while in a flat bed without using bedrails?: Total  How much help is needed moving from lying on your back to sitting on the side of a flat bed without using bedrails?: Total  How much help is needed moving to and from a bed to a chair?: Total  How much help is needed standing up from a chair using your arms?: A Lot  How much help is needed walking in hospital room?: Total  How much help is needed climbing 3-5 steps with a railing?: Total  AM-PAC Inpatient Mobility Raw Score : 7  AM-PAC Inpatient T-Scale Score : 26.42  Mobility Inpatient CMS 0-100% Score: 92.36  Mobility Inpatient CMS G-Code Modifier : CM      Goals  Short Term Goals  Time Frame for Short Term Goals: 12 visits  Short Term Goal 1: Pt to roll in perform bed mobility with max assist x 1.  Short Term Goal 2: Patient will demo good seated balance.  Short Term Goal 3: Pt to actively participate in at least 30 minutes of physical therapy for ther act & ther ex  Short Term Goal 4: Pt to be indep w/ pressure relief techniques in order to maintain skin integrity and prevent pressure injuries  Short Term Goal 5: Patient will perform transfers with min assist x 2 with marlyn chavez or with RW.  Patient Goals   Patient Goals : To feel better, to go home, to return to PLOF       Education  Patient Education  Education Given To: Patient  Education Provided: Role of Therapy;Plan of Care;Transfer Training  Education

## 2024-10-28 NOTE — PROGRESS NOTES
Occupational Therapy  Facility/Department: [facility]  Occupational Therapy Re-Assessment    Name: Mandie Bustamante  : 1955  MRN: [id number]  Date of Service: 10/28/2024    Discharge Recommendations:  Patient would benefit from continued therapy after discharge     HPI per chart: Mandie Bustamante is a 69 y.o. Non- / non  female who presents with No chief complaint on file.  and is admitted to the hospital for the management of Hyponatremia.  Patient with past medical history of hypertension, osteoarthritis, GERD, hypercalcemia and hyperparathyroidism s/p parathyroidectomy, autoimmune disorder and morbid obesity, who recently underwent robotic assisted laparoscopic incisional hernia repair with mesh on 10/1/2024 with surgery, now presented from outlying facility for management of hyponatremia and abdominal abscess.  At [facility] patient presented with abdominal pain and not feeling well along with poor appetite.  As per patient she also developed lower extremity swelling and abdominal distention.  Her abdominal pain did not improve and eventually patient ended up in the ER.  Lab work in the ER suggestive of hyponatremia with sodium 109, potassium 3.4, glucose 162, calcium 7.2.  Elevated proBNP 1017, troponin 13.  Elevated alk phos 113.  White count 21.9, hemoglobin 12.5.  UA unremarkable.  Blood cultures were drawn.  CT abdomen pelvis without contrast was suggestive of large abscess with air-fluid collection seen along the peritoneum just below the rectus muscle.  There was concern about gas and fluid seen extending the previous site of hernia suggesting recurrent hernia or phlegmon.  Patient was having urinary retention and Bar was placed with 500 mL urine output.  Nephrology was consulted for hyponatremia and the ER reached out to surgery for further recommendations.  Nephrology started her on D5 half-normal saline at 75 mL/h.  As per the discussion between surgeon and the ER at [place],  Clinical Factors: Pt completed hair brushing while seated with difficulties reaching back of head, reporting she is too weak. Required Mod A to brush back of head. Pt also applied lotion to inner thighs with Mod A reporting she is too weak to do it and needs help.  UE Bathing: Setup;Maximum assistance  LE Bathing: Setup;Dependent/Total;Maximum assistance  UE Dressing: Setup;Moderate assistance  LE Dressing: Dependent/Total  Toileting: Dependent/Total    Functional Mobility Skilled Clinical Factors: Pt trialed Miranda Lee, able to achieve 75% standing with Max A X2 . Pt required increased time and encouragement, Pt verbalized feeling like she was going to fall while seated. Pt having difficulty sustaining unsupported sitting d/t this and weakness. Encouraged to keep eyes open. BP stable.  Once standing, Pt with shaking legs and was able to tolerate stand for ~3 seconds before retiring to recliner.    Additional Comments: Pt's ADL performance is limited by cognition (max cues for initiation/sequencing/sustaining tasks), weakness, fatigue, self-limiting behaviors, and pain. Pt would be able to complete simple ADLs seated with increased time and set up assistance. Pt educated on benefits of OOB activities, importance of completing ADLs to promote independence, and positioning techniques.    Bed Mobility- Not assessed. Staff had used [place] lift to transfer pt into recliner this date.       Cognition  Overall Cognitive Status: Exceptions  Arousal/Alertness: Delayed responses to stimuli  Following Commands: Follows one step commands with repetition;Follows one step commands with increased time  Attention Span: Attends with cues to redirect;Difficulty dividing attention  Memory: Decreased short term memory;Decreased recall of recent events  Safety Judgement: Decreased awareness of need for safety;Decreased awareness of need for assistance  Problem Solving: Decreased awareness of errors;Assistance required to correct errors

## 2024-10-28 NOTE — PROGRESS NOTES
General Surgery:  Daily Progress Note          PATIENT NAME: Mandie Butsamante     TODAY'S DATE: 10/28/2024, 6:39 AM    SUBJECTIVE:     Pt seen and examined at bedside.  No acute overnight events. Afebrile, vitals stable. Dressing changed last night by nursing around 6 PM.  Patient remains confused.  Dressing changed at bedside this morning with decreased output in wound bed.  Some superficial erythema with flaking is noted on the superior and inferior portions of the wound without any deep involvement seen in the wound bed, with characteristics of yeast infection.     OBJECTIVE:   VITALS:  BP 97/63   Pulse 61   Temp 96.8 °F (36 °C) (Temporal)   Resp 26   Ht 1.6 m (5' 3\")   Wt 122 kg (269 lb)   SpO2 97%   BMI 47.65 kg/m²      INTAKE/OUTPUT:      Intake/Output Summary (Last 24 hours) at 10/28/2024 0639  Last data filed at 10/28/2024 0625  Gross per 24 hour   Intake 2314.89 ml   Output 3010 ml   Net -695.11 ml       PHYSICAL EXAM:  General Appearance: Intubated, sedated; grimaces to palpation over abdomen  HEENT:  Normocephalic, atraumatic, mucus membranes moist; OG tube to suction  Heart: Regular rate and rhythm  Lungs: normal effort with symmetric rise and fall of chest wall  Abdomen: Soft, distended, tender to palpation around incision.  Incisional wound with feculent output.  Extremities: Bilateral lower extremity pitting edema  Skin: Skin color, texture, turgor normal. Superficial erythema with flaking is noted on the superior and inferior portions of the wound without any deep involvement seen in the wound bed.       Data:  CBC:   Recent Labs     10/26/24  0340 10/27/24  0500 10/28/24  0445   WBC 10.2 12.4* 13.6*   HGB 9.2* 9.7* 8.5*    399 436     Chemistry:   Recent Labs     10/26/24  0340 10/27/24  0500 10/27/24  1345 10/28/24  0445    139  --  139   K 3.2* 3.2* 3.7 3.9    99  --  101   CO2 28 29  --  28   GLUCOSE 220* 184*  --  202*   BUN 14 16  --  19   CREATININE 0.6 0.7  --  0.6    MG 2.0  --   --  2.3   ANIONGAP 9 11  --  10   LABGLOM >90 >90  --  >90   CALCIUM 7.4* 7.3*  --  7.7*   PHOS 3.0  --   --  3.2     Hepatic:   Recent Labs     10/28/24  0445   AST 11   ALT 6   ALKPHOS 75   BILITOT 0.2*   BILIDIR <0.1     Coagulation:   No results for input(s): \"APTT\", \"INR\" in the last 72 hours.    Invalid input(s): \"PROT\"        Radiology Review:    XR CHEST PORTABLE    Result Date: 10/26/2024  1. No significant interval change. 2. Right-sided PICC with tip position cavoatrial junction.          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 female status post robotic assisted laparoscopic incisional hernia repair with mesh 10/1/24  S/p from exploratory laparotomy and wound VAC placement 10/18/24  EC fistula present.     Plan:   Continue medical management and supportive care per ICU and primary team.  Continue TPN and keep n.p.o.  Rash is noted by flaky erythema with superficial portions of the wound without deep involvement, will start antifungal cream today  Continue antibiotics -leukocytosis to 13.6 today from 12.4 yesterday  Recommend out of bed to chair  EC fistula - keep NPO/TPN and local wound care  Appreciate assistance from wound/ostomy team with wound care mgmt for better control of the fistula and deep/large

## 2024-10-28 NOTE — PROGRESS NOTES
End Of Shift Note  [facility]  Summary of shift: Pt had an uneventful night. Pt received 2 doses of Geodon and was able to sleep a total of 8 hours overnight. Precedex continues at 1.2mcg/kg/hr. Currently pt is agitated and fixated on leaving. Dressing changed this morning - possible fungal growth around sarita-wound area - orders for micotin cream to be applied with each dressing change.  Vitals:    Vitals:    10/28/24 0200 10/28/24 0300 10/28/24 0400 10/28/24 0504   BP: (!) 103/57 (!) 100/59 (!) 99/56 97/63   Pulse: 58 58 55 67   Resp: 18 18 19 (!) 31   Temp:   96.8 °F (36 °C)    TempSrc:   Temporal    SpO2: 95% 97% 97%    Weight:       Height:            I&O:   Intake/Output Summary (Last 24 hours) at 10/28/2024 0628  Last data filed at 10/28/2024 0625  Gross per 24 hour   Intake 2314.89 ml   Output 3010 ml   Net -695.11 ml       Resp Status: RA    Ventilator Settings:  Vent Mode: CPAP/PS Resp Rate (Set): 14 bpm/Vt (Set, mL): 500 mL/ /FiO2 : 30 %    Critical Care IV infusions:   PN-Adult 2-in-1 Central Line (Custom) 65 mL/hr at 10/28/24 0625    dexmedeTOMIDine (PRECEDEX) 1,000 mcg in sodium chloride 0.9 % 250 mL infusion 1.2 mcg/kg/hr (10/28/24 0518)    dextrose 5% and 0.45% NaCl with KCl 20 mEq Stopped (10/22/24 1038)    norepinephrine Stopped (10/23/24 1510)    dextrose      sodium chloride 50 mL/hr at 10/16/24 1623        LDA:   PICC 10/26/24 Right Cephalic (Active)   Number of days: 1       Urinary Catheter 10/10/24 Bar (Active)   Number of days: 17       Wound Abdomen Left;Lower (Active)   Number of days:        Incision 10/01/24 Abdomen Medial;Upper (Active)   Number of days: 26       Incision 10/16/24 Abdomen Lower;Medial (Active)   Number of days: 11

## 2024-10-28 NOTE — CARE COORDINATION
Social Work-Met with patient and  to discuss dc options. Discussed short term rehab. They are agreeable.                    Post Acute Facility/Agency List     Provided spouse with the following list, the list includes the overall star ratings obtained from CMS per the Medicare Web site (www.Medicare.gov):     [] Long Term Acute Care Facilities  [] Acute Inpatient Rehabilitation Facilities  [x] Skilled Nursing Facilities  [] Hospice Facilities  [] Home Care    Provided verbal instructions on how to utilize the QR Code to obtain additional detailed star ratings from www.Medicare.gov     offered to print and provide the detailed list:    []Accepted   [x]Declined    Patient and  will review list and notify  of choice. JHJed

## 2024-10-28 NOTE — PROGRESS NOTES
Comprehensive Nutrition Assessment    Type and Reason for Visit:  Reassess    Nutrition Recommendations/Plan:   Continue TPN with custom lytes, insulin 10 units.   1650 ml with 250 ml lipids  NPO at this time  RD to follow/monitor.      Malnutrition Assessment:  Malnutrition Status:  Moderate malnutrition (10/17/24 1147)    Context:  Acute Illness     Findings of the 6 clinical characteristics of malnutrition:  Energy Intake:  50% or less of estimated energy requirements for 5 or more days  Weight Loss:  Unable to assess     Body Fat Loss:  Unable to assess     Muscle Mass Loss:  Unable to assess    Fluid Accumulation:  Moderate to Severe     Strength:       Nutrition Assessment:    Patient continues to be less confused, was extubated over the weekend. Patient with EC fistula, remains NPO. Central custom TPN at 65 ml/hr (1560 ml total volume) and 250 ml of lipids will be continued. Patients lytes are stable. She now has a fungal infection around wound, 10/27 stool was reported leaking from wound. Labs, meds, PMH reviewed.    Nutrition Related Findings:    +2 UE and +1 LE edema. Glucose 202, 10 units of insulin in TPN. LBM 10/27, hypoactive bs. Wound Type: Wound Vac, Surgical Incision       Current Nutrition Intake & Therapies:    Average Meal Intake: NPO  Average Supplements Intake: NPO  Current Parenteral Nutrition Orders:  Type and Formula: Premix Central   Lipids: 250ml  Duration: Continuous  Rate/Volume: 1560 ml  Current PN Order Provides: 1560 ml, 78 gm protein, 312 g dextrose, 100 ml lipids (200 calories) = 1573 calories (13 kcal/kg)  Goal PN Orders Provides: 250 ml lipids (500 kcal), PN at 65 ml/hr (1560 ml): 1373 kcal, 78 gm protein, 312 gm dextrose (total 1873 kcal with lipids)    Anthropometric Measures:  Height: 160 cm (5' 3\")  Ideal Body Weight (IBW): 115 lbs (52 kg)       Current Body Weight: 117.9 kg (260 lb), 235.7 % IBW. Weight Source: Bed Scale  Current BMI (kg/m2): 46.1

## 2024-10-28 NOTE — PROGRESS NOTES
Spiritual Health History and Assessment/Progress Note  [facility]    (P) Spiritual/Emotional Needs,  ,  ,      Name: Mandie Bustamante MRN: [id number]    Age: 69 y.o.     Sex: female   Language: English   Confucianist: Orthodoxy   Intra-abdominal abscess (HCC)     Date: 10/28/2024            Total Time Calculated: (P) 21 min              Spiritual Assessment continued in [facility]        Referral/Consult From: (P) Rounding   Encounter Overview/Reason: (P) Spiritual/Emotional Needs  Service Provided For: (P) Patient and family together    Patient and spouse have strong Orthodoxy elaine and value prayer.    Elaine, Belief, Meaning:   Patient has beliefs or practices that help with coping during difficult times  Family/Friends have beliefs or practices that help with coping during difficult times      Importance and Influence:  Patient has spiritual/personal beliefs that influence decisions regarding their health  Family/Friends have spiritual/personal beliefs that influence decisions regarding the patient's health    Community:  Patient feels well-supported. Support system includes: Spouse/Partner  Family/Friends feel well-supported. Support system includes: Spouse/Partner    Assessment and Plan of Care:     Patient Interventions include: Facilitated expression of thoughts and feelings, Explored spiritual coping/struggle/distress, Affirmed coping skills/support systems, and Facilitated life review and/ or legacy  Family/Friends Interventions include: Facilitated expression of thoughts and feelings, Explored spiritual coping/struggle/distress, Affirmed coping skills/support systems, and Facilitated life review and/or legacy    Patient Plan of Care: Spiritual Care available upon further referral  Family/Friends Plan of Care: Spiritual Care available upon further referral    Electronically signed by Chaplain Ashly on 10/28/2024 at 1:47 PM

## 2024-10-28 NOTE — PROGRESS NOTES
10/28/2024 0747  Last data filed at 10/28/2024 0625  Gross per 24 hour   Intake 2314.89 ml   Output 3010 ml   Net -695.11 ml       Labs:  Hematology:  Recent Labs     10/26/24  0340 10/27/24  0500 10/28/24  0445   WBC 10.2 12.4* 13.6*   RBC 3.14* 3.30* 2.89*   HGB 9.2* 9.7* 8.5*   HCT 27.2* 29.0* 26.0*   MCV 86.6 87.9 90.0   MCH 29.3 29.4 29.4   MCHC 33.8 33.4 32.7   RDW 13.0 13.0 13.2    399 436   MPV 9.6 9.9 9.8     Chemistry:  Recent Labs     10/26/24  0340 10/27/24  0500 10/27/24  1345 10/28/24  0445    139  --  139   K 3.2* 3.2* 3.7 3.9    99  --  101   CO2 28 29  --  28   GLUCOSE 220* 184*  --  202*   BUN 14 16  --  19   CREATININE 0.6 0.7  --  0.6   MG 2.0  --   --  2.3   ANIONGAP 9 11  --  10   LABGLOM >90 >90  --  >90   CALCIUM 7.4* 7.3*  --  7.7*   PHOS 3.0  --   --  3.2     Recent Labs     10/27/24  0520 10/27/24  0801 10/27/24  1156 10/27/24  1419 10/27/24  1819 10/28/24  0445 10/28/24  0631   AST  --   --   --   --   --  11  --    ALT  --   --   --   --   --  6  --    ALKPHOS  --   --   --   --   --  75  --    BILITOT  --   --   --   --   --  0.2*  --    BILIDIR  --   --   --   --   --  <0.1  --    POCGLU 188* 184* 180* 159* 177*  --  205*     ABG:  Lab Results   Component Value Date/Time    POCPH 7.496 10/24/2024 02:50 PM    POCPCO2 32.3 10/24/2024 02:50 PM    POCPO2 101.5 10/24/2024 02:50 PM    POCHCO3 24.9 10/24/2024 02:50 PM    NBEA 0.4 10/23/2024 04:12 AM    PBEA 2.0 10/24/2024 02:50 PM    HUXJ7EEV 98.4 10/24/2024 02:50 PM    FIO2 30.0 10/24/2024 02:50 PM     Lab Results   Component Value Date/Time    SPECIAL Site: Respiratory 10/19/2024 07:25 PM     Lab Results   Component Value Date/Time    CULTURE NORMAL RESPIRATORY CELINE RARE GROWTH 10/19/2024 07:25 PM       Radiology:  XR CHEST PORTABLE    Result Date: 10/24/2024  Right PICC tip projects over the region of the right brachiocephalic vein.     XR CHEST PORTABLE    Result Date: 10/24/2024  1. Bibasilar airspace disease and  10/10/2024 Yes    Prediabetes (Chronic) 10/10/2024 Yes    Overview Signed 10/10/2024  8:47 PM by Gerardo Alvarez MD     Patient reportedly was in the prediabetic range according to labs in 2019 per chart review, however I am unable to see those labs.  But on recent blood work performed this month hemoglobin A1c was 6.3%.  Patient does complain of urinary frequency, urinalysis performed same time was negative for infection or signs of stone so could likely be from her hyperglycemia or potentially hypercalcemia which each were discussed in depth with the patient.  We also discussed cushionoid appearance and how that could relate to hyperglycemia as well.  Patient states we could possibly investigate this at later date.         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    Abdominal wall ulcer, with fat layer exposed (HCC) 10/24/2024 Yes    Acute metabolic encephalopathy 10/26/2024 No       Plan:        Continue local wound care, dressing changes per surgery  Wean Precedex as able  Continue TPN for nutritional support  PT and OT, mobilize during the day.  Shades open and lights on during the day, dark environment at night and minimize nocturnal disturbances to improve sleep-wake cycle.  GI and DVT prophylaxis  Trend labs, correct electrolytes as needed  See orders for details    Carlos A Orta, DO  10/28/2024  7:47 AM

## 2024-10-28 NOTE — PROGRESS NOTES
Pulmonary Critical Care Progress Note    Patient seen for the follow up of Intra-abdominal abscess (HCC)     Subjective:    She is on oxygen 2 L nasal cannula..  She is having less hallucination.  She is off Precedex.  She is on TPN.  She has significant erythema around wound that is being packed still has stool output from wound    examination:    Vitals: /74   Pulse 77   Temp 97.9 °F (36.6 °C) (Temporal)   Resp 21   Ht 1.6 m (5' 3\")   Wt 117.9 kg (260 lb)   SpO2 97%   BMI 46.06 kg/m²   SpO2  Av.1 %  Min: 91 %  Max: 98 %  General appearance: Awake alert no acute distress  Neck: No JVD  Lungs: Decreased breath sound no crackles or wheezes  Heart: regular rate and rhythm, S1, S2 normal, no gallop  Abdomen: Soft, non tender, + BS dressing in place evidence of fecal material in container  Extremities: no cyanosis or clubbing.  1+ edema    LABs:    CBC:   Recent Labs     10/27/24  0500 10/28/24  0445   WBC 12.4* 13.6*   HGB 9.7* 8.5*   HCT 29.0* 26.0*    436     BMP:   Recent Labs     10/27/24  0500 10/27/24  1345 10/28/24  0445     --  139   K 3.2* 3.7 3.9   CO2 29  --  28   BUN 16  --  19   CREATININE 0.7  --  0.6   LABGLOM >90  --  >90   GLUCOSE 184*  --  202*        Latest Reference Range & Units 10/18/24 04:12   POC HCO3 21.0 - 28.0 mmol/L 24.9   POC O2 SAT 94.0 - 98.0 % 97.5   POC pCO2 35.0 - 48.0 mm Hg 33.7 (L)   POC pH 7.350 - 7.450  7.476 (H)   POC PO2 83.0 - 108.0 mm Hg 88.2   (L): Data is abnormally low  (H): Data is abnormally high  Radiology:  Chest x-ray 10/26 reviewed  1. No significant interval change.  2. Right-sided PICC with tip position cavoatrial junction.       Chest x-ray 10/24  1. Bibasilar airspace disease and small bilateral effusions.  Stable  cardiomegaly.  Mild pulmonary vascular congestion.  Findings favor CHF  related changes with underlying infiltrate not excluded.  Follow-up is  recommended to document resolution.  2. Tubes and right-sided PICC as

## 2024-10-28 NOTE — PROGRESS NOTES
Benjamin Almazan MD   Urology Progress Note            Subjective:  urology following for  neurogenic bladder    Patient Vitals for the past 24 hrs:   BP Temp Temp src Pulse Resp SpO2 Weight   10/28/24 0400 (!) 99/56 96.8 °F (36 °C) Temporal 55 19 97 % --   10/28/24 0300 (!) 100/59 -- -- 58 18 97 % --   10/28/24 0200 (!) 103/57 -- -- 58 18 95 % --   10/28/24 0100 113/60 -- -- 77 24 -- --   10/28/24 0000 (!) 90/52 -- -- 67 23 92 % --   10/27/24 2300 (!) 99/57 -- -- 74 22 93 % --   10/27/24 2225 (!) 97/54 -- -- 64 18 95 % --   10/27/24 2200 -- -- -- 56 17 91 % --   10/27/24 2100 (!) 88/49 -- -- 56 17 95 % --   10/27/24 2000 (!) 114/58 98.6 °F (37 °C) Oral 67 22 95 % --   10/27/24 1900 (!) 115/56 -- -- 70 25 95 % --   10/27/24 1800 113/71 97.5 °F (36.4 °C) Temporal 67 29 95 % --   10/27/24 1700 110/60 -- -- 83 22 94 % --   10/27/24 1631 105/75 -- -- 78 20 -- --   10/27/24 1600 -- -- -- 80 27 -- --   10/27/24 1500 -- -- -- 88 19 95 % --   10/27/24 1416 130/60 -- -- 80 22 -- --   10/27/24 1300 113/61 -- -- 80 23 96 % --   10/27/24 1200 (!) 111/59 96.8 °F (36 °C) Temporal 76 25 95 % --   10/27/24 1100 111/68 -- -- 75 22 94 % --   10/27/24 1000 (!) 100/54 -- -- 75 20 91 % --   10/27/24 0900 104/67 -- -- 74 20 97 % --   10/27/24 0756 (!) 116/57 97 °F (36.1 °C) Tympanic 77 30 97 % --   10/27/24 0734 116/67 -- -- 80 26 97 % --   10/27/24 0600 -- -- -- 75 17 97 % --   10/27/24 0555 (!) 106/52 -- -- 75 24 95 % --   10/27/24 0544 -- -- -- -- -- -- 122 kg (269 lb)   10/27/24 0500 -- -- -- 78 -- 94 % --       Intake/Output Summary (Last 24 hours) at 10/28/2024 0452  Last data filed at 10/28/2024 0400  Gross per 24 hour   Intake 1586.22 ml   Output 3010 ml   Net -1423.78 ml       Recent Labs     10/25/24  0530 10/26/24  0340 10/27/24  0500   WBC 8.5 10.2 12.4*   HGB 8.5* 9.2* 9.7*   HCT 25.5* 27.2* 29.0*   MCV 88.2 86.6 87.9    282 399     Recent Labs     10/25/24  0530 10/26/24  0340

## 2024-10-28 NOTE — PROGRESS NOTES
End Of Shift Note  [facility]  Summary of shift: Patient began shift on 1.2 mcg/hr of precedex which was weaned off per orders by noon. Patient was able to get up to the chair three times throughout the shift. She also used the marlyn steady to get onto the commode. She had one moderate bowel movement with a brown colored gel consistency from rectum and then liquid brown stool from the abdominal wound. The dressing was changed early this morning by surgery and then modified early in the morning removing some packing to ensure it didn't touch the skin. It was changed again when it became soiled with stool and was flushed out with saline and suctioned out. The surrounding skin is red, flaky and there is concern for fungal infections. Diflucan was started this morning and topical micanazole applied with dressing changes. Patient became more oriented as the shift went on. At the end of this shift she is able to answer all orientation questions but still has confused conversations and some hallucinations. Bedside shift report given to oncoming RN.     Vitals:    Vitals:    10/28/24 1700 10/28/24 1800 10/28/24 1830 10/28/24 1900   BP: 120/84 111/88  130/88   Pulse: 95 92  (!) 101   Resp: (!) 35 25  (!) 31   Temp:   98.7 °F (37.1 °C)    TempSrc:   Oral    SpO2:       Weight:       Height:            I&O:   Intake/Output Summary (Last 24 hours) at 10/28/2024 1949  Last data filed at 10/28/2024 1818  Gross per 24 hour   Intake 3346.31 ml   Output 3550 ml   Net -203.69 ml       Resp Status: RA    Ventilator Settings:      Critical Care IV infusions:   PN-Adult 2-in-1 Central Line (Custom) 65 mL/hr at 10/28/24 1818    dexmedeTOMIDine (PRECEDEX) 1,000 mcg in sodium chloride 0.9 % 250 mL infusion 0.4 mcg/kg/hr (10/28/24 1024)    dextrose 5% and 0.45% NaCl with KCl 20 mEq Stopped (10/22/24 1038)    norepinephrine Stopped (10/23/24 1510)    dextrose      sodium chloride 50 mL/hr at 10/16/24 1623        LDA:   PICC 10/26/24 Right

## 2024-10-28 NOTE — PROGRESS NOTES
[facility] -   Infectious diseases evaluation  admission date 10/10/2024    reason for consultation:   Fever despite antibiotics    Impression :   Current:  Fever/leukocytosis likely abdominal source  Abdominal abscess  Hypotention  Status post exploratory laparotomy with removal of mesh and wound VAC application 10/16/2024 subsequently underwent exploratory laparotomy abdominal washout with wound VAC application 10/18/2024.   Status post robotic assisted laparoscopic repair of recurrent incisional hernia with mesh placement 10/1/2024  Status post ultrasound guided aspiration of abdominal wall fluid collection on 10/11/2024 no growth on culture  Acute respiratory failure required intubation, extubated  Obesity    HENCE:   The patient was started on Diflucan this morning per general surgery due to concern for fungal infection surrounding the abdominal wound  The patient received IV cefepime and Flagyl 10/17/2024 through 10/26/2024  The patient received IV Zosyn 10/10/2024 through 10/17/2024  Wound care/dressings per the surgical team.  I did again discuss with the nursing staff do not have the wet part of the wet to dry dressing extend onto the healthy part of the skin, I did cut the excess part of the gauze that was extending to the healthy skin  No growth on blood cultures  Continue supportive care      Infection Control Recommendations   Rolette Precautions      Antimicrobial Stewardship Recommendations   Simplification of therapy  Targeted therapy      History of Present Illness:   Initial history:  Mandie Bustamante is a 69 y.o.-year-old female was seen at the ICU, intubated, sedated, unable to provide history that was obtained from chart review and nursing staff.  She is on 1 mcg/min of Levophed, right arm PICC line was placed 10/11/2024, on TPN.  Bar catheter in place with clear urine was placed on 10/10/2024  NG tube in place.  Status post robotic assisted  10/27/24  0500 10/27/24  1345 10/28/24  0445    139  --  139   K 3.2* 3.2* 3.7 3.9    99  --  101   CO2 28 29  --  28   BUN 14 16  --  19   CREATININE 0.6 0.7  --  0.6   MG 2.0  --   --  2.3     Hepatic Function Panel:   Recent Labs     10/28/24  0445   BILIDIR <0.1   IBILI Can not be calculated   BILITOT 0.2*   ALKPHOS 75   ALT 6   AST 11     No results for input(s): \"RPR\" in the last 72 hours.  No results for input(s): \"HIV\" in the last 72 hours.  No results for input(s): \"BC\" in the last 72 hours.  Lab Results   Component Value Date/Time    CREATININE 0.6 10/28/2024 04:45 AM    GLUCOSE 202 10/28/2024 04:45 AM       Detailed results:    No new imaging    Thank you for allowing us to participate in the care of this patient.Please call with questions.    This note is created with the assistance of a speech recognition program.  While intending to generate adocument that actually reflects the content of the visit, the document can still have some errors including those of syntax and sound a like substitutions which may escape proof reading.  It such instances, actual meaningcan be extrapolated by contextual diversion.    COSTA PAGAN - [facility]  Office: [phone]  Perfect serve / office [phone]

## 2024-10-28 NOTE — FLOWSHEET NOTE
10/28/24 0600   Treatment Team Notification   Reason for Communication Review case   Name of Team Member Notified Dr. Man   Treatment Team Role Consulting Provider   Method of Communication Face to face   Response See orders     Verbal orders for Micotin 2% cream to be applied to sarita-abdominal wound area.

## 2024-10-29 ENCOUNTER — APPOINTMENT (OUTPATIENT)
Dept: CT IMAGING | Age: 69
End: 2024-10-29
Attending: STUDENT IN AN ORGANIZED HEALTH CARE EDUCATION/TRAINING PROGRAM
Payer: COMMERCIAL

## 2024-10-29 LAB
ANION GAP SERPL CALCULATED.3IONS-SCNC: 12 MMOL/L (ref 9–17)
BASOPHILS # BLD: 0.19 K/UL (ref 0–0.2)
BASOPHILS NFR BLD: 1 % (ref 0–2)
BUN SERPL-MCNC: 19 MG/DL (ref 8–23)
BUN/CREAT SERPL: 32 (ref 9–20)
CALCIUM SERPL-MCNC: 7.7 MG/DL (ref 8.6–10.4)
CHLORIDE SERPL-SCNC: 103 MMOL/L (ref 98–107)
CO2 SERPL-SCNC: 24 MMOL/L (ref 20–31)
CREAT SERPL-MCNC: 0.6 MG/DL (ref 0.5–0.9)
EOSINOPHIL # BLD: 1.16 K/UL (ref 0–0.44)
EOSINOPHILS RELATIVE PERCENT: 6 % (ref 1–4)
ERYTHROCYTE [DISTWIDTH] IN BLOOD BY AUTOMATED COUNT: 13.6 % (ref 11.8–14.4)
GFR, ESTIMATED: >90 ML/MIN/1.73M2
GLUCOSE BLD-MCNC: 161 MG/DL (ref 65–105)
GLUCOSE BLD-MCNC: 175 MG/DL (ref 65–105)
GLUCOSE BLD-MCNC: 206 MG/DL (ref 65–105)
GLUCOSE SERPL-MCNC: 176 MG/DL (ref 70–99)
HCT VFR BLD AUTO: 29.4 % (ref 36.3–47.1)
HGB BLD-MCNC: 9.5 G/DL (ref 11.9–15.1)
IMM GRANULOCYTES # BLD AUTO: 1.74 K/UL (ref 0–0.3)
IMM GRANULOCYTES NFR BLD: 9 %
LYMPHOCYTES NFR BLD: 4.05 K/UL (ref 1.1–3.7)
LYMPHOCYTES RELATIVE PERCENT: 21 % (ref 24–43)
MCH RBC QN AUTO: 29.3 PG (ref 25.2–33.5)
MCHC RBC AUTO-ENTMCNC: 32.3 G/DL (ref 28.4–34.8)
MCV RBC AUTO: 90.7 FL (ref 82.6–102.9)
MONOCYTES NFR BLD: 11 % (ref 3–12)
MONOCYTES NFR BLD: 2.12 K/UL (ref 0.1–1.2)
NEUTROPHILS NFR BLD: 52 % (ref 36–65)
NEUTS SEG NFR BLD: 10.04 K/UL (ref 1.5–8.1)
NRBC BLD-RTO: 0 PER 100 WBC
PLATELET # BLD AUTO: 490 K/UL (ref 138–453)
PMV BLD AUTO: 9.6 FL (ref 8.1–13.5)
POTASSIUM SERPL-SCNC: 3.7 MMOL/L (ref 3.7–5.3)
RBC # BLD AUTO: 3.24 M/UL (ref 3.95–5.11)
SODIUM SERPL-SCNC: 139 MMOL/L (ref 135–144)
WBC OTHER # BLD: 19.3 K/UL (ref 3.5–11.3)

## 2024-10-29 PROCEDURE — 6370000000 HC RX 637 (ALT 250 FOR IP)

## 2024-10-29 PROCEDURE — 97110 THERAPEUTIC EXERCISES: CPT

## 2024-10-29 PROCEDURE — 99233 SBSQ HOSP IP/OBS HIGH 50: CPT | Performed by: INTERNAL MEDICINE

## 2024-10-29 PROCEDURE — 2000000000 HC ICU R&B

## 2024-10-29 PROCEDURE — 82947 ASSAY GLUCOSE BLOOD QUANT: CPT

## 2024-10-29 PROCEDURE — 6360000002 HC RX W HCPCS: Performed by: STUDENT IN AN ORGANIZED HEALTH CARE EDUCATION/TRAINING PROGRAM

## 2024-10-29 PROCEDURE — 97530 THERAPEUTIC ACTIVITIES: CPT

## 2024-10-29 PROCEDURE — 6360000002 HC RX W HCPCS

## 2024-10-29 PROCEDURE — 6360000002 HC RX W HCPCS: Performed by: INTERNAL MEDICINE

## 2024-10-29 PROCEDURE — 2580000003 HC RX 258

## 2024-10-29 PROCEDURE — 80048 BASIC METABOLIC PNL TOTAL CA: CPT

## 2024-10-29 PROCEDURE — 85025 COMPLETE CBC W/AUTO DIFF WBC: CPT

## 2024-10-29 PROCEDURE — 2580000003 HC RX 258: Performed by: INTERNAL MEDICINE

## 2024-10-29 PROCEDURE — 2500000003 HC RX 250 WO HCPCS

## 2024-10-29 PROCEDURE — 97112 NEUROMUSCULAR REEDUCATION: CPT

## 2024-10-29 PROCEDURE — 74176 CT ABD & PELVIS W/O CONTRAST: CPT

## 2024-10-29 PROCEDURE — 99232 SBSQ HOSP IP/OBS MODERATE 35: CPT | Performed by: INTERNAL MEDICINE

## 2024-10-29 PROCEDURE — 6360000002 HC RX W HCPCS: Performed by: NURSE PRACTITIONER

## 2024-10-29 RX ORDER — METRONIDAZOLE 500 MG/100ML
500 INJECTION, SOLUTION INTRAVENOUS EVERY 8 HOURS
Status: DISCONTINUED | OUTPATIENT
Start: 2024-10-29 | End: 2024-11-02

## 2024-10-29 RX ADMIN — HYDROCORTISONE: 1 CREAM TOPICAL at 10:57

## 2024-10-29 RX ADMIN — MICONAZOLE NITRATE: 20 CREAM TOPICAL at 10:56

## 2024-10-29 RX ADMIN — METRONIDAZOLE 500 MG: 5 INJECTION, SOLUTION INTRAVENOUS at 19:30

## 2024-10-29 RX ADMIN — MICONAZOLE NITRATE: 20 CREAM TOPICAL at 21:11

## 2024-10-29 RX ADMIN — HYDROCORTISONE: 1 CREAM TOPICAL at 21:11

## 2024-10-29 RX ADMIN — I.V. FAT EMULSION 250 ML: 20 EMULSION INTRAVENOUS at 18:22

## 2024-10-29 RX ADMIN — PANTOPRAZOLE SODIUM 40 MG: 40 INJECTION, POWDER, FOR SOLUTION INTRAVENOUS at 09:21

## 2024-10-29 RX ADMIN — POTASSIUM CHLORIDE: 2 INJECTION, SOLUTION, CONCENTRATE INTRAVENOUS at 18:27

## 2024-10-29 RX ADMIN — ANTI-FUNGAL POWDER MICONAZOLE NITRATE TALC FREE: 1.42 POWDER TOPICAL at 21:10

## 2024-10-29 RX ADMIN — DIPHENHYDRAMINE HYDROCHLORIDE 25 MG: 50 INJECTION INTRAMUSCULAR; INTRAVENOUS at 00:38

## 2024-10-29 RX ADMIN — WATER 2000 MG: 1 INJECTION INTRAMUSCULAR; INTRAVENOUS; SUBCUTANEOUS at 19:30

## 2024-10-29 RX ADMIN — INSULIN LISPRO 1 UNITS: 100 INJECTION, SOLUTION INTRAVENOUS; SUBCUTANEOUS at 12:46

## 2024-10-29 RX ADMIN — FLUCONAZOLE 400 MG: 400 INJECTION, SOLUTION INTRAVENOUS at 10:53

## 2024-10-29 RX ADMIN — HEPARIN SODIUM 5000 UNITS: 5000 INJECTION INTRAVENOUS; SUBCUTANEOUS at 09:21

## 2024-10-29 RX ADMIN — SODIUM CHLORIDE, PRESERVATIVE FREE 10 ML: 5 INJECTION INTRAVENOUS at 09:22

## 2024-10-29 RX ADMIN — FLUCONAZOLE 400 MG: 2 INJECTION, SOLUTION INTRAVENOUS at 08:18

## 2024-10-29 RX ADMIN — HEPARIN SODIUM 5000 UNITS: 5000 INJECTION INTRAVENOUS; SUBCUTANEOUS at 21:11

## 2024-10-29 RX ADMIN — FLUCONAZOLE 400 MG: 400 INJECTION, SOLUTION INTRAVENOUS at 13:22

## 2024-10-29 RX ADMIN — FUROSEMIDE 40 MG: 10 INJECTION, SOLUTION INTRAMUSCULAR; INTRAVENOUS at 09:21

## 2024-10-29 NOTE — PROGRESS NOTES
Physical Therapy  Facility/Department: [facility]  Daily Treatment Note  NAME: Mandie Bustamante  : 1955  MRN: [id number]    Date of Service: 10/29/2024    Discharge Recommendations:  Patient would benefit from continued therapy after discharge   Pt currently functioning below baseline.  Recommend daily inpatient skilled therapy at time of discharge to maximize long term outcomes and prevent re-admission. Please refer to AM-PAC score for current level of function.     Patient Diagnosis(es): The encounter diagnosis was Edema, unspecified type.    Assessment  Assessment: During patient's 2nd session today focused supine padmini LE AROM and log roll technique as our goal tomorrow is to perform bed mobility to EOB, standing from EOB with marlyn stedy and transfer to recliner in marlyn stedy. Patient does not like having maxi richmond lift behind her in chair. Explained we needed to be sure she will not need it if she fatigues in the chair, so we will need to perform STS several time with consistency that she is able to stand several time with marlyn stedy.  Activity Tolerance: Patient limited by endurance;Patient limited by fatigue    Plan  Physical Therapy Plan  General Plan: 5-7 times per week  Specific Instructions for Next Treatment: Improve transfers with marlyn stedy, seated activities reaching out of NURIS.  Current Treatment Recommendations: Strengthening;ROM;Patient/Caregiver education & training;Cognitive reorientation;Positioning;Balance training;Transfer training;Endurance training;Gait training;Stair training;Neuromuscular re-education;Home exercise program;Safety education & training;Equipment evaluation, education, & procurement;Co-Treatment;Therapeutic activities    Restrictions  Restrictions/Precautions  Restrictions/Precautions: General Precautions, Up as Tolerated, Fall Risk  Required Braces or Orthoses?: No  Position Activity Restriction  Other position/activity restrictions: RUE PICC, simons cath, telemetry, cont  steps with a railing?: Total  AM-PAC Inpatient Mobility Raw Score : 8  AM-PAC Inpatient T-Scale Score : 28.52  Mobility Inpatient CMS 0-100% Score: 86.62  Mobility Inpatient CMS G-Code Modifier : CM         Therapy Time   Individual Concurrent Group Co-treatment   Time In 1422         Time Out 1452         Minutes 30                 Shandra Gresham, PTA

## 2024-10-29 NOTE — PROGRESS NOTES
Occupational Therapy  Facility/Department: [facility]  Rehabilitation Occupational Therapy Daily Treatment Note    Date: 10/29/24  Patient Name: Mandie Bustamante       Room:   MRN: [id number]  Account: [id number]   : 1955  (69 y.o.) Gender: female      DOMINGO Aguilar reports patient is medically stable for therapy treatment this date. Chart reviewed prior to treatment and patient is agreeable for therapy.  All lines intact and patient positioned comfortably at end of treatment.  All patient needs addressed prior to ending therapy session.      Pt currently functioning below baseline.  Recommend daily inpatient skilled therapy at time of discharge to maximize long term outcomes and prevent re-admission. Please refer to AM-PAC score for current level of function.               Past Medical History:  has a past medical history of Allergic rhinitis, Anemia, Arthritis, Autoimmune disorder (HCC), Cataracts, bilateral, GERD (gastroesophageal reflux disease), Headache, Hypercalcemia, Hyperparathyroidism (HCC), Hypertension, Obesity, and Osteoarthritis.  Past Surgical History:   has a past surgical history that includes Parathyroid gland surgery (2023); Total vaginal hysterectomy (); Appendectomy; Urethra surgery (); Foot surgery (Left, ); shoulder surgery (Right, 10/2000); Foot surgery (Left, 2010); Shoulder arthroscopy (Right, 2011); Shoulder arthroscopy (Right, 2012); Shoulder arthroscopy (Left, 2012); Cholecystectomy; Bunionectomy (Left, 2016); Foot surgery (Left, 2017); parathyroidectomy (2022); hernia repair (2022); Umbilical hernia repair; Hysterectomy, total abdominal (1986); Hysterectomy, vaginal (1986); Upper gastrointestinal endoscopy (); ventral hernia repair (N/A, 10/1/2024); laparoscopy (N/A, 10/16/2024); and laparotomy (N/A, 10/18/2024).    Restrictions  Restrictions/Precautions: General Precautions, Up as Tolerated, Fall Risk  Other

## 2024-10-29 NOTE — PROGRESS NOTES
Comprehensive Nutrition Assessment    Type and Reason for Visit:  Reassess    Nutrition Recommendations/Plan:   Continue TPN, at goal  RD to monitor/follow daily  Monitor lytes and replete as needed     Malnutrition Assessment:  Malnutrition Status:  Moderate malnutrition (10/17/24 1147)    Context:  Acute Illness     Findings of the 6 clinical characteristics of malnutrition:  Energy Intake:  50% or less of estimated energy requirements for 5 or more days  Weight Loss:  Unable to assess     Body Fat Loss:  Unable to assess     Muscle Mass Loss:  Unable to assess    Fluid Accumulation:  Moderate to Severe     Strength:       Nutrition Assessment:    Patient now hallucinating, still some confusion. Central custom TPN continues at 65 ml/hr with 250 ml lipids. 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 Lytes stable. Fungal infection around wound. Labs, meds, PMH reviewed.    Nutrition Related Findings:    Weight down significantly today (almost 30#); +2 UE and +1 LE edema. Glucose elevated, 10 units of insulin in TPN. LBM 10/27, hypoactive BS. Current TPN meets patients needs although protein is limited due to Clinimix solution: only 0.7 g/kg protein per BW, 1.3 g/kg IBW) Wound Type: Wound Vac, Surgical Incision       Current Nutrition Intake & Therapies:    Average Meal Intake: NPO  Average Supplements Intake: NPO  Current Parenteral Nutrition Orders:  Type and Formula: Premix Central   Lipids: 250ml  Duration: Continuous  Rate/Volume: 1560 ml  Current PN Order Provides: 1560 ml, 78 gm protein, 312 g dextrose, 100 ml lipids (200 calories) = 1573 calories (13 kcal/kg)  Goal PN Orders Provides: 250 ml lipids (500 kcal), PN at 65 ml/hr (1560 ml): 1373 kcal, 78 gm protein, 312 gm dextrose (total 1873 kcal with lipids)    Anthropometric Measures:  Height: 160 cm (5' 3\")  Ideal Body Weight (IBW): 115 lbs (52 kg)       Current Body Weight: 105.7 kg (233 lb), 235.7 % IBW. Weight Source: Bed Scale  Current

## 2024-10-29 NOTE — PROGRESS NOTES
End Of Shift Note  [facility]  Summary of shift: Patient had improved mentation today. Continues on TPN. Was up to chair for 2 hours. No stool drainage from abdominal dressing. Has 3 BM per rectum today. Continues to have some delusional thoughts. Pleasant.     Vitals:    Vitals:    10/29/24 1500 10/29/24 1600 10/29/24 1742 10/29/24 1900   BP: 97/77 130/67 (!) 146/69 (!) 147/65   Pulse: 86 89 91 91   Resp: 29 26 26 27   Temp:   98.2 °F (36.8 °C)    TempSrc:   Oral    SpO2: 98% 99%  98%   Weight:       Height:            I&O:   Intake/Output Summary (Last 24 hours) at 10/29/2024 1939  Last data filed at 10/29/2024 1854  Gross per 24 hour   Intake 3287.36 ml   Output 2500 ml   Net 787.36 ml       Resp Status: respirations easy at rest on room air    Ventilator Settings:  Vent Mode: CPAP/PS Resp Rate (Set): 14 bpm/Vt (Set, mL): 500 mL/ /FiO2 : 30 %    Critical Care IV infusions:   PN-Adult 2-in-1 Central Line (Custom) 65 mL/hr at 10/29/24 1854    dexmedeTOMIDine (PRECEDEX) 1,000 mcg in sodium chloride 0.9 % 250 mL infusion Stopped (10/28/24 1112)    dextrose 5% and 0.45% NaCl with KCl 20 mEq Stopped (10/22/24 1038)    norepinephrine Stopped (10/23/24 1510)    dextrose      sodium chloride 50 mL/hr at 10/16/24 1623        LDA:   PICC 10/26/24 Right Cephalic (Active)   Number of days: 3       Urinary Catheter 10/10/24 Bar (Active)   Number of days: 19       Wound Abdomen Left;Lower (Active)   Number of days:        Incision 10/01/24 Abdomen Medial;Upper (Active)   Number of days: 28       Incision 10/16/24 Abdomen Lower;Medial (Active)   Number of days: 13

## 2024-10-29 NOTE — PROGRESS NOTES
Pulmonary Critical Care Progress Note    Patient seen for the follow up of Intra-abdominal abscess (HCC)     Subjective:    She is weaned off oxygen..  She is having less hallucination.  She is off Precedex.  She is on TPN.  She has significant erythema around wound that is being packed still has stool output from wound    examination:    Vitals: BP (!) 142/61   Pulse 91   Temp 97.5 °F (36.4 °C) (Temporal)   Resp 27   Ht 1.6 m (5' 3\")   Wt 106 kg (233 lb 9.6 oz)   SpO2 98%   BMI 41.38 kg/m²   SpO2  Av.5 %  Min: 97 %  Max: 98 %  General appearance: Awake alert no acute distress  Neck: No JVD  Lungs: Decreased breath sound no crackles or wheezes  Heart: regular rate and rhythm, S1, S2 normal, no gallop  Abdomen: Soft, non tender, + BS dressing in place evidence of fecal material in container  Extremities: no cyanosis or clubbing.  1+ edema    LABs:    CBC:   Recent Labs     10/28/24  0445 10/29/24  0552   WBC 13.6* 19.3*   HGB 8.5* 9.5*   HCT 26.0* 29.4*    490*     BMP:   Recent Labs     10/28/24  0445 10/29/24  0552    139   K 3.9 3.7   CO2  24   BUN 19 19   CREATININE 0.6 0.6   LABGLOM >90 >90   GLUCOSE 202* 176*        Latest Reference Range & Units 10/18/24 04:12   POC HCO3 21.0 - 28.0 mmol/L 24.9   POC O2 SAT 94.0 - 98.0 % 97.5   POC pCO2 35.0 - 48.0 mm Hg 33.7 (L)   POC pH 7.350 - 7.450  7.476 (H)   POC PO2 83.0 - 108.0 mm Hg 88.2   (L): Data is abnormally low  (H): Data is abnormally high  Radiology:  Chest x-ray 10/26 reviewed  1. No significant interval change.  2. Right-sided PICC with tip position cavoatrial junction.       Chest x-ray 10/24  1. Bibasilar airspace disease and small bilateral effusions.  Stable  cardiomegaly.  Mild pulmonary vascular congestion.  Findings favor CHF  related changes with underlying infiltrate not excluded.  Follow-up is  recommended to document resolution.  2. Tubes and right-sided PICC as above.    Chest x-ray 10/21         Chest x-ray 10/19

## 2024-10-29 NOTE — PROGRESS NOTES
Physical Therapy  Facility/Department: [facility]  Daily Treatment Note  NAME: Mandie Bustamante  : 1955  MRN: [id number]    Date of Service: 10/29/2024    Discharge Recommendations:  Patient would benefit from continued therapy after discharge      Pt currently functioning below baseline.  Recommend daily inpatient skilled therapy at time of discharge to maximize long term outcomes and prevent re-admission. Please refer to AM-PAC score for current level of function.     Patient Diagnosis(es): The encounter diagnosis was Edema, unspecified type.    Assessment  Assessment: Patient able to improve STS from recliner to MOD-MAX x 2 A within marlyn stedy. Patient limited by anxiety but progressed with encouragement from writer, GALINDO and her  who was present. Discussed of progression to performing bedmobility, transfer from EOB to recliner in marlyn stedy next session. Patient is significantly below her baseline and agreeable for PT treatment.  Activity Tolerance: Patient limited by endurance;Patient limited by fatigue    Plan  Physical Therapy Plan  Specific Instructions for Next Treatment: Improve transfers with marlyn stedy, seated activities reaching out of NURIS.  Current Treatment Recommendations: Strengthening;ROM;Patient/Caregiver education & training;Cognitive reorientation;Positioning;Balance training;Transfer training;Endurance training;Gait training;Stair training;Neuromuscular re-education;Home exercise program;Safety education & training;Equipment evaluation, education, & procurement;Co-Treatment;Therapeutic activities    Restrictions  Restrictions/Precautions  Restrictions/Precautions: General Precautions, Up as Tolerated, Fall Risk  Required Braces or Orthoses?: No  Position Activity Restriction  Other position/activity restrictions: RUE PICC, simons cath, telemetry, cont SPO2 moniter, 46 BMI     Subjective   Subjective  Subjective: Patient in bed and agreeable for PT treatment. RN present to assist with    Patient Goals : To feel better, to go home, to return to PLOF    Education  Patient Education  Education Given To: Patient  Education Provided: Role of Therapy;Plan of Care;Transfer Training;Energy Conservation  Education Provided Comments: Importance of working on transfers, performing AROM throughout the day.  Education Method: Verbal;Demonstration  Barriers to Learning: Cognition (Anixety)  Education Outcome: Continued education needed    AM-PAC - Mobility    AM-PAC Basic Mobility - Inpatient   How much help is needed turning from your back to your side while in a flat bed without using bedrails?: A Lot  How much help is needed moving from lying on your back to sitting on the side of a flat bed without using bedrails?: Total  How much help is needed moving to and from a bed to a chair?: Total  How much help is needed standing up from a chair using your arms?: A Lot  How much help is needed walking in hospital room?: Total  How much help is needed climbing 3-5 steps with a railing?: Total  AM-PAC Inpatient Mobility Raw Score : 8  AM-PAC Inpatient T-Scale Score : 28.52  Mobility Inpatient CMS 0-100% Score: 86.62  Mobility Inpatient CMS G-Code Modifier : CM         Therapy Time   Individual Concurrent Group Co-treatment   Time In 1118         Time Out 1214         Minutes 56                 Shandra Gresham, PTA

## 2024-10-29 NOTE — PROGRESS NOTES
Benjamin Almazan MD   Urology Progress Note            Subjective:  follow-up urinary retention    Patient Vitals for the past 24 hrs:   BP Temp Temp src Pulse Resp SpO2 Weight   10/29/24 0541 -- -- -- -- -- -- 106 kg (233 lb 9.6 oz)   10/29/24 0500 (!) 142/61 -- -- 88 28 -- --   10/29/24 0400 (!) 147/66 99 °F (37.2 °C) Oral 92 26 -- --   10/29/24 0300 -- -- -- 92 26 -- --   10/29/24 0200 (!) 137/58 -- -- 92 30 -- --   10/29/24 0100 -- -- -- 92 26 -- --   10/29/24 0000 -- -- -- 100 28 -- --   10/28/24 2346 126/80 98.8 °F (37.1 °C) Temporal 100 30 98 % --   10/28/24 2300 131/61 -- -- 100 28 -- --   10/28/24 2200 -- -- -- 99 (!) 34 -- --   10/28/24 2100 -- -- -- (!) 104 27 -- --   10/28/24 2026 (!) 147/72 99.3 °F (37.4 °C) Oral (!) 104 28 -- --   10/28/24 2000 -- -- -- (!) 105 -- -- --   10/28/24 1900 130/88 -- -- (!) 101 (!) 31 -- --   10/28/24 1830 -- 98.7 °F (37.1 °C) Oral -- -- -- --   10/28/24 1800 111/88 -- -- 92 25 -- --   10/28/24 1700 120/84 -- -- 95 (!) 35 -- --   10/28/24 1600 (!) 156/118 -- -- 80 23 -- --   10/28/24 1500 (!) 142/84 -- -- 81 21 -- --   10/28/24 1400 139/74 -- -- 77 21 97 % --   10/28/24 1300 (!) 107/57 -- -- 71 16 98 % --   10/28/24 1200 109/61 97.9 °F (36.6 °C) Temporal 63 27 97 % --   10/28/24 1100 110/64 -- -- 62 21 96 % --   10/28/24 1000 (!) 81/54 -- -- 63 20 (!) 73 % --   10/28/24 0953 107/68 -- -- -- 20 96 % --   10/28/24 0900 106/61 -- -- 59 24 94 % --   10/28/24 0800 -- 96.8 °F (36 °C) Temporal 56 -- -- --   10/28/24 0711 (!) 95/57 -- -- 59 20 94 % --   10/28/24 0700 -- -- -- 62 27 -- --   10/28/24 0600 -- -- -- 61 26 -- 117.9 kg (260 lb)       Intake/Output Summary (Last 24 hours) at 10/29/2024 0556  Last data filed at 10/29/2024 0000  Gross per 24 hour   Intake 3836.1 ml   Output 1900 ml   Net 1936.1 ml       Recent Labs     10/27/24  0500 10/28/24  0445   WBC 12.4* 13.6*   HGB 9.7* 8.5*   HCT 29.0* 26.0*   MCV 87.9 90.0    436     Recent Labs

## 2024-10-29 NOTE — PROGRESS NOTES
General Surgery:  Daily Progress Note          PATIENT NAME: Mandie Bustamante     TODAY'S DATE: 10/29/2024, 7:20 AM    SUBJECTIVE:     Pt seen and examined at bedside.  No acute overnight events. Afebrile, vitals stable. Dressing changed last night by nursing.  Patient remains confused and states that the water was causing all of these issues.  Dressing changed at bedside this morning.  Superficial erythema around wound similar to day before.  Patient appears more alert    OBJECTIVE:   VITALS:  BP (!) 142/61   Pulse 91   Temp 99 °F (37.2 °C) (Oral)   Resp 27   Ht 1.6 m (5' 3\")   Wt 106 kg (233 lb 9.6 oz)   SpO2 98%   BMI 41.38 kg/m²      INTAKE/OUTPUT:      Intake/Output Summary (Last 24 hours) at 10/29/2024 0720  Last data filed at 10/29/2024 0600  Gross per 24 hour   Intake 3107.43 ml   Output 2200 ml   Net 907.43 ml       PHYSICAL EXAM:  General Appearance: Intubated, sedated; grimaces to palpation over abdomen  HEENT:  Normocephalic, atraumatic, mucus membranes moist; OG tube to suction  Heart: Regular rate and rhythm  Lungs: normal effort with symmetric rise and fall of chest wall  Abdomen: Soft, distended, tender to palpation around incision.  Incisional wound with feculent output.  Extremities: Bilateral lower extremity pitting edema  Skin: Skin color, texture, turgor normal. Superficial erythema with flaking is noted on the superior and inferior portions of the wound without any deep involvement seen in the wound bed.       Data:  CBC:   Recent Labs     10/27/24  0500 10/28/24  0445 10/29/24  0552   WBC 12.4* 13.6* 19.3*   HGB 9.7* 8.5* 9.5*    436 490*     Chemistry:   Recent Labs     10/27/24  0500 10/27/24  1345 10/28/24  0445 10/29/24  0552     --  139 139   K 3.2* 3.7 3.9 3.7   CL 99  --  101 103   CO2 29 -- 28 24   GLUCOSE 184*  --  202* 176*   BUN 16  --  19 19   CREATININE 0.7  --  0.6 0.6   MG  --   --  2.3  --    ANIONGAP 11  --  10 12   LABGLOM >90  --  >90 >90   CALCIUM 7.3*

## 2024-10-29 NOTE — PROGRESS NOTES
End Of Shift Note  [facility]    Summary of shift: Patient did not sleep at all throughout shift. She is still having hallucinations. Patient kicked many people out of her room overnight. Patient called several staff member names. Patient reassured and attempted to reorient. Patient received Geodon and Benadryl. Complains that she is still having a reaction to something. Patient had dressing change around 0130, after patient calls out for dressing saturation. Dressing noted to have light green watery drainage. Surgery at bedside this AM for dressing change.    Vitals:    Vitals:    10/29/24 0400 10/29/24 0500 10/29/24 0541 10/29/24 0600   BP: (!) 147/66 (!) 142/61     Pulse: 92 88  91   Resp: 26 28  27   Temp: 99 °F (37.2 °C)      TempSrc: Oral      SpO2:       Weight:   106 kg (233 lb 9.6 oz)    Height:            I&O:   Intake/Output Summary (Last 24 hours) at 10/29/2024 0627  Last data filed at 10/29/2024 0000  Gross per 24 hour   Intake 3107.43 ml   Output 1900 ml   Net 1207.43 ml       Resp Status: RA    Ventilator Settings:  Vent Mode: CPAP/PS Resp Rate (Set): 14 bpm/Vt (Set, mL): 500 mL/ /FiO2 : 30 %    Critical Care IV infusions:   PN-Adult 2-in-1 Central Line (Custom) 65 mL/hr at 10/28/24 1818    dexmedeTOMIDine (PRECEDEX) 1,000 mcg in sodium chloride 0.9 % 250 mL infusion Stopped (10/28/24 1112)    dextrose 5% and 0.45% NaCl with KCl 20 mEq Stopped (10/22/24 1038)    norepinephrine Stopped (10/23/24 1510)    dextrose      sodium chloride 50 mL/hr at 10/16/24 1623        LDA:   PICC 10/26/24 Right Cephalic (Active)   Number of days: 2       Urinary Catheter 10/10/24 Bar (Active)   Number of days: 18       Wound Abdomen Left;Lower (Active)   Number of days:        Incision 10/01/24 Abdomen Medial;Upper (Active)   Number of days: 27       Incision 10/16/24 Abdomen Lower;Medial (Active)   Number of days: 12

## 2024-10-29 NOTE — PLAN OF CARE
Problem: Discharge Planning  Goal: Discharge to home or other facility with appropriate resources  Outcome: Progressing     Problem: Skin/Tissue Integrity  Goal: Absence of new skin breakdown  Description: 1.  Monitor for areas of redness and/or skin breakdown  2.  Assess vascular access sites hourly  3.  Every 4-6 hours minimum:  Change oxygen saturation probe site  4.  Every 4-6 hours:  If on nasal continuous positive airway pressure, respiratory therapy assess nares and determine need for appliance change or resting period.  Outcome: Progressing     Problem: ABCDS Injury Assessment  Goal: Absence of physical injury  Outcome: Progressing  Flowsheets (Taken 10/29/2024 1900 by Saul Dela Cruz, RN)  Absence of Physical Injury: Implement safety measures based on patient assessment     Problem: Safety - Adult  Goal: Free from fall injury  Outcome: Progressing  Flowsheets (Taken 10/29/2024 1900 by Saul Dela Cruz, RN)  Free From Fall Injury:   Instruct family/caregiver on patient safety   Based on caregiver fall risk screen, instruct family/caregiver to ask for assistance with transferring infant if caregiver noted to have fall risk factors     Problem: Metabolic/Fluid and Electrolytes - Adult  Goal: Electrolytes maintained within normal limits  Outcome: Progressing     Problem: Metabolic/Fluid and Electrolytes - Adult  Goal: Hemodynamic stability and optimal renal function maintained  Outcome: Progressing     Problem: Metabolic/Fluid and Electrolytes - Adult  Goal: Glucose maintained within prescribed range  Outcome: Progressing     Problem: Neurosensory - Adult  Goal: Achieves stable or improved neurological status  Outcome: Progressing     Problem: Cardiovascular - Adult  Goal: Maintains optimal cardiac output and hemodynamic stability  Outcome: Progressing     Problem: Respiratory - Adult  Goal: Achieves optimal ventilation and oxygenation  Outcome: Progressing

## 2024-10-29 NOTE — PROGRESS NOTES
[facility]  Office: [phone]  Keenan Foster DO, Kevin Siegel DO, Carlos A Orta DO, Taye Morales DO, Maxx Madison MD, Meena Eckert MD, Efren Vo MD, Makayla Last MD,  Jerman Christian MD, Gina Bourgeois MD, Gladys Fry MD,  Anu Murillo DO, Jennifer Blanca MD, Oral Duncan MD, Harlan Foster DO, Katya Simmons MD,  Filiberto Slade DO, Elizabeth Alves MD, Samantha Pinto MD, Arlene Bryant MD, Bandar Barrios MD,  Kulwant Rodrigez MD, Michelle Dennison MD, Jinny Steel MD, Kathia Vargas MD, Gerardo Alvarez MD, Richard Johnson MD, Demetri Ibarra DO, Benjie Alvarenga MD, Shirley Waterhouse, CNP,  Georgette Villarreal CNP, Demetri Toure, CATHY,  Silvina Castano, CARINA, Tatiana Mckeon, CNP, Angelina Harper, CNP, Alma Rosa Peng, CNP, Rashmi Mccartney, CNP, IMAN ErvinC, IMAN ArmentaC, Stephanie Kaminski, CNP, Tono Yee, CNP,  Chrissie Schwartz, CNP, Sheri Jones, CNP,  Nadiya Rick, CNP, Cindi Lam, CNP         [facility]   IN-PATIENT SERVICE   [facility]    Progress Note    10/29/2024    11:06 AM    Name:   Mandie Bustamante  MRN:     [id number]     Acct:      [id number]   Room:   2033/2033-01  IP Day:  19  Admit Date:  10/10/2024  6:11 PM    PCP:   No primary care provider on file.  Code Status:  Full Code    Subjective:     C/C: abd pain  Interval History Status: improved.       Taken back to OR 10/18 and apparently had copious drainage from the multiple fistulas now seen contaminating wound    Has had some hallucinations, remains confused-she realizes she isn't thinking clearly    Brief History:     Per my partner:  \"Mandie Bustamante is a 69 y.o. Non- / non  female who presents with No chief complaint on file.   and is admitted to the hospital for the management of Hyponatremia.     recently underwent robotic assisted laparoscopic incisional hernia repair with mesh on 10/1/2024 with surgery, now presented from outlying facility for management of

## 2024-10-29 NOTE — PROGRESS NOTES
[facility]   IN-PATIENT SERVICE   [facility]    Progress Note    10/29/2024    8:37 AM    Name:   Mandie Bustamante  MRN:     [id number]     Acct:      [id number]   Room:   2033/2033-01   Day:  19  Admit Date:  10/10/2024  6:11 PM    PCP:   No primary care provider on file.  Code Status:  Full Code    Subjective:     C/C: Abdominal pain    Interval History Status: improved.     Patient is alert and oriented. Per nursing she has been hallucinating. The patient says that she is still \"messed up from what is in the water\". She denies any chest pain, shortness of breath, nausea, vomiting, or abdominal pain.     Brief History:     Patient is a 69-year-old female who recently underwent an out patient hernia repair with mesh on 10/1/2024, she presents to the ED with abdominal pain on 10/10/2024. Work up in the ED revealed severe hyponatremia of 109, leukocytosis, and  CT abdomen pelvis without contrast was suggestive of large abscess with air-fluid collection seen along the peritoneum just below the rectus muscle. There was concern about gas and fluid seen extending the previous site of hernia suggesting recurrent hernia or phlegmon. A simons was placed for urinary retention.     Surgery performed bedside US guided aspiration of fluid collection on 10/11/2024. Patient had continued leukocytosis so a CT abdomen/pelvis was ordered which revealed increased air-fluid level. Surgery took patient to OR for ex-lap, patient left open with wound vac due to distention, and remained intubated after surgery. Plans to take back to OR Friday 10/18/2024.     Back to the OR on 10/18/2024 for a washout and wound vac placement. 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 She was kept intubated post op with plans to change out wound vac on 10/21/2024    Extubated 10/24    Review of Systems:     Review of Systems   Constitutional:  Negative for chills, diaphoresis and

## 2024-10-30 LAB
ALBUMIN SERPL-MCNC: 2.7 G/DL (ref 3.5–5.2)
ALP SERPL-CCNC: 90 U/L (ref 35–104)
ALT SERPL-CCNC: 8 U/L (ref 5–33)
ANION GAP SERPL CALCULATED.3IONS-SCNC: 10 MMOL/L (ref 9–17)
AST SERPL-CCNC: 15 U/L
BASOPHILS # BLD: 0.32 K/UL (ref 0–0.2)
BASOPHILS NFR BLD: 2 %
BILIRUB DIRECT SERPL-MCNC: <0.1 MG/DL
BILIRUB INDIRECT SERPL-MCNC: ABNORMAL MG/DL (ref 0–1)
BILIRUB SERPL-MCNC: 0.2 MG/DL (ref 0.3–1.2)
BUN SERPL-MCNC: 16 MG/DL (ref 8–23)
BUN/CREAT SERPL: 27 (ref 9–20)
CALCIUM SERPL-MCNC: 7.6 MG/DL (ref 8.6–10.4)
CHLORIDE SERPL-SCNC: 103 MMOL/L (ref 98–107)
CO2 SERPL-SCNC: 25 MMOL/L (ref 20–31)
CREAT SERPL-MCNC: 0.6 MG/DL (ref 0.5–0.9)
EOSINOPHIL # BLD: 1.61 K/UL (ref 0–0.4)
EOSINOPHILS RELATIVE PERCENT: 10 % (ref 1–4)
ERYTHROCYTE [DISTWIDTH] IN BLOOD BY AUTOMATED COUNT: 14.2 % (ref 11.8–14.4)
GFR, ESTIMATED: >90 ML/MIN/1.73M2
GLUCOSE BLD-MCNC: 171 MG/DL (ref 65–105)
GLUCOSE BLD-MCNC: 179 MG/DL (ref 65–105)
GLUCOSE BLD-MCNC: 189 MG/DL (ref 65–105)
GLUCOSE SERPL-MCNC: 197 MG/DL (ref 70–99)
HCT VFR BLD AUTO: 28.8 % (ref 36.3–47.1)
HGB BLD-MCNC: 9.2 G/DL (ref 11.9–15.1)
IMM GRANULOCYTES # BLD AUTO: 1.45 K/UL (ref 0–0.3)
IMM GRANULOCYTES NFR BLD: 9 %
LYMPHOCYTES NFR BLD: 3.22 K/UL (ref 1–4.8)
LYMPHOCYTES RELATIVE PERCENT: 20 % (ref 24–44)
MAGNESIUM SERPL-MCNC: 2.3 MG/DL (ref 1.6–2.6)
MCH RBC QN AUTO: 29.9 PG (ref 25.2–33.5)
MCHC RBC AUTO-ENTMCNC: 31.9 G/DL (ref 28.4–34.8)
MCV RBC AUTO: 93.5 FL (ref 82.6–102.9)
MONOCYTES NFR BLD: 13 % (ref 1–7)
MONOCYTES NFR BLD: 2.09 K/UL (ref 0.2–0.8)
MORPHOLOGY: ABNORMAL
NEUTROPHILS NFR BLD: 46 % (ref 36–66)
NEUTS SEG NFR BLD: 7.41 K/UL (ref 1.8–7.7)
NRBC BLD-RTO: 0 PER 100 WBC
PHOSPHATE SERPL-MCNC: 3.3 MG/DL (ref 2.6–4.5)
PLATELET # BLD AUTO: 521 K/UL (ref 138–453)
PMV BLD AUTO: 9.7 FL (ref 8.1–13.5)
POTASSIUM SERPL-SCNC: 4.2 MMOL/L (ref 3.7–5.3)
PROT SERPL-MCNC: 5.5 G/DL (ref 6.4–8.3)
RBC # BLD AUTO: 3.08 M/UL (ref 3.95–5.11)
SODIUM SERPL-SCNC: 138 MMOL/L (ref 135–144)
WBC OTHER # BLD: 16.1 K/UL (ref 3.5–11.3)

## 2024-10-30 PROCEDURE — 80076 HEPATIC FUNCTION PANEL: CPT

## 2024-10-30 PROCEDURE — 2580000003 HC RX 258

## 2024-10-30 PROCEDURE — 2580000003 HC RX 258: Performed by: INTERNAL MEDICINE

## 2024-10-30 PROCEDURE — 97535 SELF CARE MNGMENT TRAINING: CPT

## 2024-10-30 PROCEDURE — 6360000002 HC RX W HCPCS: Performed by: INTERNAL MEDICINE

## 2024-10-30 PROCEDURE — 99232 SBSQ HOSP IP/OBS MODERATE 35: CPT | Performed by: INTERNAL MEDICINE

## 2024-10-30 PROCEDURE — 6370000000 HC RX 637 (ALT 250 FOR IP)

## 2024-10-30 PROCEDURE — 83735 ASSAY OF MAGNESIUM: CPT

## 2024-10-30 PROCEDURE — 2060000000 HC ICU INTERMEDIATE R&B

## 2024-10-30 PROCEDURE — 85025 COMPLETE CBC W/AUTO DIFF WBC: CPT

## 2024-10-30 PROCEDURE — 80048 BASIC METABOLIC PNL TOTAL CA: CPT

## 2024-10-30 PROCEDURE — 6360000002 HC RX W HCPCS

## 2024-10-30 PROCEDURE — 82947 ASSAY GLUCOSE BLOOD QUANT: CPT

## 2024-10-30 PROCEDURE — 84100 ASSAY OF PHOSPHORUS: CPT

## 2024-10-30 PROCEDURE — 6370000000 HC RX 637 (ALT 250 FOR IP): Performed by: STUDENT IN AN ORGANIZED HEALTH CARE EDUCATION/TRAINING PROGRAM

## 2024-10-30 PROCEDURE — 97530 THERAPEUTIC ACTIVITIES: CPT

## 2024-10-30 PROCEDURE — 2500000003 HC RX 250 WO HCPCS

## 2024-10-30 RX ADMIN — METRONIDAZOLE 500 MG: 5 INJECTION, SOLUTION INTRAVENOUS at 02:06

## 2024-10-30 RX ADMIN — ACETAMINOPHEN 650 MG: 650 SUPPOSITORY RECTAL at 10:19

## 2024-10-30 RX ADMIN — HYDROCORTISONE: 1 CREAM TOPICAL at 12:55

## 2024-10-30 RX ADMIN — HEPARIN SODIUM 5000 UNITS: 5000 INJECTION INTRAVENOUS; SUBCUTANEOUS at 20:10

## 2024-10-30 RX ADMIN — PANTOPRAZOLE SODIUM 40 MG: 40 INJECTION, POWDER, FOR SOLUTION INTRAVENOUS at 10:19

## 2024-10-30 RX ADMIN — INSULIN LISPRO 1 UNITS: 100 INJECTION, SOLUTION INTRAVENOUS; SUBCUTANEOUS at 13:08

## 2024-10-30 RX ADMIN — METRONIDAZOLE 500 MG: 5 INJECTION, SOLUTION INTRAVENOUS at 19:11

## 2024-10-30 RX ADMIN — SODIUM CHLORIDE, PRESERVATIVE FREE 10 ML: 5 INJECTION INTRAVENOUS at 20:10

## 2024-10-30 RX ADMIN — WATER 2000 MG: 1 INJECTION INTRAMUSCULAR; INTRAVENOUS; SUBCUTANEOUS at 19:04

## 2024-10-30 RX ADMIN — METRONIDAZOLE 500 MG: 5 INJECTION, SOLUTION INTRAVENOUS at 11:17

## 2024-10-30 RX ADMIN — SODIUM CHLORIDE, PRESERVATIVE FREE 10 ML: 5 INJECTION INTRAVENOUS at 09:00

## 2024-10-30 RX ADMIN — I.V. FAT EMULSION 250 ML: 20 EMULSION INTRAVENOUS at 18:02

## 2024-10-30 RX ADMIN — HEPARIN SODIUM 5000 UNITS: 5000 INJECTION INTRAVENOUS; SUBCUTANEOUS at 10:18

## 2024-10-30 RX ADMIN — FUROSEMIDE 40 MG: 10 INJECTION, SOLUTION INTRAMUSCULAR; INTRAVENOUS at 10:18

## 2024-10-30 RX ADMIN — POTASSIUM CHLORIDE: 2 INJECTION, SOLUTION, CONCENTRATE INTRAVENOUS at 17:56

## 2024-10-30 RX ADMIN — INSULIN LISPRO 1 UNITS: 100 INJECTION, SOLUTION INTRAVENOUS; SUBCUTANEOUS at 06:31

## 2024-10-30 RX ADMIN — MICONAZOLE NITRATE: 20 CREAM TOPICAL at 12:54

## 2024-10-30 RX ADMIN — FLUCONAZOLE 400 MG: 2 INJECTION, SOLUTION INTRAVENOUS at 10:17

## 2024-10-30 ASSESSMENT — PAIN SCALES - GENERAL
PAINLEVEL_OUTOF10: 0
PAINLEVEL_OUTOF10: 3
PAINLEVEL_OUTOF10: 0

## 2024-10-30 ASSESSMENT — PAIN DESCRIPTION - LOCATION: LOCATION: HEAD

## 2024-10-30 ASSESSMENT — PAIN DESCRIPTION - DESCRIPTORS: DESCRIPTORS: ACHING

## 2024-10-30 ASSESSMENT — PAIN - FUNCTIONAL ASSESSMENT: PAIN_FUNCTIONAL_ASSESSMENT: ACTIVITIES ARE NOT PREVENTED

## 2024-10-30 NOTE — PROGRESS NOTES
Physical Therapy  Facility/Department: [facility]  Daily Treatment Note  NAME: Mandie Bustamante  : 1955  MRN: [id number]    Date of Service: 10/30/2024    Discharge Recommendations:  Patient would benefit from continued therapy after discharge    Pt currently functioning below baseline.  Recommend daily inpatient skilled therapy at time of discharge to maximize long term outcomes and prevent re-admission. Please refer to AM-PAC score for current level of function.     Patient Diagnosis(es): The encounter diagnosis was Edema, unspecified type.    Assessment  Assessment: Patient able to complete bed mob with MOD x 2 A and improved log roll technique. Then several STS from varrious height within marlyn stedy with MOD x 2 A for safety, noted improve tolerane and ability to follow directions. Seemed less anxious today and happy to see improvements. Discussed progression to ambulating with RW as she continues to improve. Patient is below her baseline and would benefit from continued skilled PT services to optimize return to PLOF.  Activity Tolerance: Patient limited by fatigue;Patient limited by endurance    Plan  Physical Therapy Plan  General Plan: 5-7 times per week  Specific Instructions for Next Treatment: Progress to standing with RW, pregait and small steps.  Current Treatment Recommendations: Strengthening;ROM;Patient/Caregiver education & training;Cognitive reorientation;Positioning;Balance training;Transfer training;Endurance training;Gait training;Stair training;Neuromuscular re-education;Home exercise program;Safety education & training;Equipment evaluation, education, & procurement;Co-Treatment;Therapeutic activities    Restrictions  Restrictions/Precautions  Restrictions/Precautions: General Precautions, Up as Tolerated, Fall Risk  Required Braces or Orthoses?: No  Position Activity Restriction  Other position/activity restrictions: RUE PICC, simons cath, telemetry, cont SPO2 moniter, 46 BMI     Subjective

## 2024-10-30 NOTE — PROGRESS NOTES
Aunt        Vitals:  BP (!) 169/68   Pulse 82   Temp 98.4 °F (36.9 °C) (Oral)   Resp 23   Ht 1.6 m (5' 3\")   Wt 106 kg (233 lb 9.6 oz)   SpO2 99%   BMI 41.38 kg/m²   Temp (24hrs), Av °F (36.7 °C), Min:97.5 °F (36.4 °C), Max:98.4 °F (36.9 °C)    Recent Labs     10/29/24  1227 10/29/24  1834 10/30/24  0003 10/30/24  1230   POCGLU 206* 175* 179* 189*       I/O (24Hr):    Intake/Output Summary (Last 24 hours) at 10/30/2024 1240  Last data filed at 10/30/2024 1042  Gross per 24 hour   Intake 3922.25 ml   Output 1450 ml   Net 2472.25 ml       Labs:  Hematology:  Recent Labs     10/28/24  0445 10/29/24  0552 10/30/24  0601   WBC 13.6* 19.3* 16.1*   RBC 2.89* 3.24* 3.08*   HGB 8.5* 9.5* 9.2*   HCT 26.0* 29.4* 28.8*   MCV 90.0 90.7 93.5   MCH 29.4 29.3 29.9   MCHC 32.7 32.3 31.9   RDW 13.2 13.6 14.2    490* 521*   MPV 9.8 9.6 9.7     Chemistry:  Recent Labs     10/28/24  0445 10/29/24  0552 10/30/24  0601    139 138   K 3.9 3.7 4.2    103 103   CO2 28 24 25   GLUCOSE 202* 176* 197*   BUN 19 19 16   CREATININE 0.6 0.6 0.6   MG 2.3  --  2.3   ANIONGAP 10 12 10   LABGLOM >90 >90 >90   CALCIUM 7.7* 7.7* 7.6*   PHOS 3.2  --  3.3     Recent Labs     10/28/24  0445 10/28/24  0631 10/28/24  2315 10/29/24  0541 10/29/24  1227 10/29/24  1834 10/30/24  0003 10/30/24  0601 10/30/24  1230   AST 11  --   --   --   --   --   --  15  --    ALT 6  --   --   --   --   --   --  8  --    ALKPHOS 75  --   --   --   --   --   --  90  --    BILITOT 0.2*  --   --   --   --   --   --  0.2*  --    BILIDIR <0.1  --   --   --   --   --   --  <0.1  --    POCGLU  --    < > 172* 161* 206* 175* 179*  --  189*    < > = values in this interval not displayed.     ABG:  Lab Results   Component Value Date/Time    POCPH 7.496 10/24/2024 02:50 PM    POCPCO2 32.3 10/24/2024 02:50 PM    POCPO2 101.5 10/24/2024 02:50 PM    POCHCO3 24.9 10/24/2024 02:50 PM    NBEA 0.4 10/23/2024 04:12 AM    PBEA 2.0 10/24/2024 02:50 PM    DQFM7FNB 98.4  10/24/2024 02:50 PM    FIO2 30.0 10/24/2024 02:50 PM     Lab Results   Component Value Date/Time    SPECIAL Site: Respiratory 10/19/2024 07:25 PM     Lab Results   Component Value Date/Time    CULTURE NORMAL RESPIRATORY CELINE RARE GROWTH 10/19/2024 07:25 PM       Radiology:  FL SMALL BOWEL FOLLOW THROUGH ONLY    Result Date: 10/14/2024  Delayed transit time to the terminal ileum of between between the region of 1.5-3.5 hours hours.  Findings can be seen with an ileus or partial small bowel obstruction.     XR ABDOMEN (KUB) (SINGLE AP VIEW)    Result Date: 10/14/2024  1. No acute cardiopulmonary process. 2. Dilated loops of bowel which appears to be primarily colon.     XR CHEST PORTABLE    Result Date: 10/14/2024  1. No acute cardiopulmonary process. 2. Dilated loops of bowel which appears to be primarily colon.     XR CHEST PORTABLE    Result Date: 10/12/2024  1. Right-sided PICC distal tip overlying the cavoatrial junction, stable. 2. Low lung volume exam.  Mild pulmonary vascular congestion.  Stable cardiomegaly.  Mild bibasilar airspace opacity, atelectasis and/or infiltrate with trace bilateral effusions.  Findings overall favor mild CHF related change.     XR CHEST PORTABLE    Result Date: 10/11/2024  1. PICC line on the right with the tip in the right atrium. 2. Findings suggesting an ileus.     CT ABDOMEN PELVIS WO CONTRAST Additional Contrast? None    Result Date: 10/10/2024  1. Large abscess with air-fluid collection seen along the peritoneum just below the rectus muscle.  Additional infiltration with gas and fluid seen extending in previous site of hernia suggesting recurrent hernia and or phlegmon.     XR CHEST PORTABLE    Result Date: 10/10/2024  Shallow lung volumes without acute consolidation.       Physical Examination:        General appearance:  no distress  Mental Status:  oriented to self and date but not place  Lungs:  clear to auscultation bilaterally, normal effort  Heart:  regular rate and

## 2024-10-30 NOTE — CARE COORDINATION
Discharge planning    Spoke with Beth at [facility]. Starting precert for the [facility] at [place].  informed. Pt. Is off precedex. Wound care following for packing. On TPN.

## 2024-10-30 NOTE — PROGRESS NOTES
Occupational Therapy  Facility/Department: [facility]  Rehabilitation Occupational Therapy Daily Treatment Note    Date: 10/30/24  Patient Name: Mandie Bustamante       Room:   MRN: [id number]  Account: [id number]   : 1955  (69 y.o.) Gender: female                    Past Medical History:  has a past medical history of Allergic rhinitis, Anemia, Arthritis, Autoimmune disorder (HCC), Cataracts, bilateral, GERD (gastroesophageal reflux disease), Headache, Hypercalcemia, Hyperparathyroidism (HCC), Hypertension, Obesity, and Osteoarthritis.  Past Surgical History:   has a past surgical history that includes Parathyroid gland surgery (2023); Total vaginal hysterectomy (); Appendectomy; Urethra surgery (); Foot surgery (Left, ); shoulder surgery (Right, 10/2000); Foot surgery (Left, 2010); Shoulder arthroscopy (Right, 2011); Shoulder arthroscopy (Right, 2012); Shoulder arthroscopy (Left, 2012); Cholecystectomy; Bunionectomy (Left, 2016); Foot surgery (Left, 2017); parathyroidectomy (2022); hernia repair (2022); Umbilical hernia repair; Hysterectomy, total abdominal (1986); Hysterectomy, vaginal (1986); Upper gastrointestinal endoscopy (); ventral hernia repair (N/A, 10/1/2024); laparoscopy (N/A, 10/16/2024); and laparotomy (N/A, 10/18/2024).    Restrictions  Restrictions/Precautions: General Precautions, Up as Tolerated, Fall Risk  Other position/activity restrictions: RUE PICC, simnos cath, telemetry, cont SPO2 moniter, 46 BMI  Required Braces or Orthoses?: No    Subjective  Subjective: First attempt to see pt at 0922, pt requesting meds for headache and needs RN. RN aware and requested therapy check back later. Second attempt at 1317, pt in bed and agreeable to therapy.  Restrictions/Precautions: General Precautions;Up as Tolerated;Fall Risk             Objective     Cognition  Overall Cognitive Status: Exceptions  Arousal/Alertness: Appropriate

## 2024-10-30 NOTE — PROGRESS NOTES
Nutrition Assessment     Type and Reason for Visit: Reassess    Nutrition Recommendations/Plan:   NPO diet  Central, custom clinimix TPN at 65 mL/hr (1560 mL total volume) and 250 mL lipids  Monitor TPN rate, GI function and labs     Malnutrition Assessment:  Malnutrition Status: Moderate malnutrition    Nutrition Assessment:  Patient is still confused but overall, mentation is improving. RN reports no stool has been noted from fistula over the past 24 hours. Patient remains NPO and TPN at 65 mL/hr (1560 mL total volume) and 250 mL of lipids will continue. Monitor TPN rate, tolerance, GI function and labs.    Estimated Daily Nutrient Needs:  Energy (kcal):  8153-8468 kcal (12-15 kcal/kg) Weight Used for Energy Requirements: Current     Protein (g):  67-83 gm of protein (1.3-1.6 gm/kg) Weight Used for Protein Requirements: Ideal        Fluid (ml/day):  1880-3350 mL Method Used for Fluid Requirements: 1 ml/kcal    Nutrition Related Findings:   Edema: +2 BUE, +1 BLE. Absent bowel sounds. Confused. Not stool from fistula for the last 24 hours. Wound Type: Wound Vac, Surgical Incision    Current Nutrition Therapies:    Diet NPO  PN-Adult 2-in-1 Central Line (Custom)  PN-Adult 2-in-1 Central Line (Custom)    Anthropometric Measures:  Height: 160 cm (5' 3\")  Current Body Wt: 106 kg (233 lb 11 oz)   BMI: 41.4        Nutrition Diagnosis:   Inadequate oral intake related to inadequate protein-energy intake as evidenced by NPO or clear liquid status due to medical condition, nutrition support - parenteral nutrition, poor intake prior to admission, GI abnormality, nausea    Nutrition Interventions:   Food and/or Nutrient Delivery: Continue NPO, Continue Current Parenteral Nutrition  Nutrition Education/Counseling: Education not indicated  Coordination of Nutrition Care: Continue to monitor while inpatient       Goals:  Goals: Meet at least 75% of estimated needs, Tolerate nutrition support at goal rate     Previous Goal Met:

## 2024-10-30 NOTE — CARE COORDINATION
Post Acute Facility/Agency List     Provided spouse with the following list, the list includes the overall star ratings obtained from CMS per the Medicare Web site (www.Medicare.gov):     [x] Long Term Acute Care Facilities  [] Acute Inpatient Rehabilitation Facilities  [] Skilled Nursing Facilities  [] Hospice Facilities  [] Home Care    Provided verbal instructions on how to utilize the QR Code to obtain additional detailed star ratings from www.Medicare.gov     offered to print and provide the detailed list:    []Accepted   [x]Declined    Precert started today.

## 2024-10-30 NOTE — PROGRESS NOTES
[facility]   IN-PATIENT SERVICE   [facility]    Progress Note    10/30/2024    8:25 AM    Name:   Mandie Bustamante  MRN:     [id number]     Acct:      [id number]   Room:   2033/2033-01   Day:  20  Admit Date:  10/10/2024  6:11 PM    PCP:   No primary care provider on file.  Code Status:  Full Code    Subjective:     C/C: Abdominal pain    Interval History Status: improved.     She denies any shortness of breath, chest pain, nausea, or vomiting. She feels like her thoughts are racing and is having trouble sleeping. She denies abdominal pain.      Brief History:     Patient is a 69-year-old female who recently underwent an out patient hernia repair with mesh on 10/1/2024, she presents to the ED with abdominal pain on 10/10/2024. Work up in the ED revealed severe hyponatremia of 109, leukocytosis, and  CT abdomen pelvis without contrast was suggestive of large abscess with air-fluid collection seen along the peritoneum just below the rectus muscle. There was concern about gas and fluid seen extending the previous site of hernia suggesting recurrent hernia or phlegmon. A simons was placed for urinary retention.     Surgery performed bedside US guided aspiration of fluid collection on 10/11/2024. Patient had continued leukocytosis so a CT abdomen/pelvis was ordered which revealed increased air-fluid level. Surgery took patient to OR for ex-lap, patient left open with wound vac due to distention, and remained intubated after surgery. Plans to take back to OR Friday 10/18/2024.     Back to the OR on 10/18/2024 for a washout and wound vac placement. 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 She was kept intubated post op with plans to change out wound vac on 10/21/2024    Extubated 10/24    Review of Systems:     Review of Systems   Constitutional:  Negative for chills, diaphoresis and fever.   Respiratory:  Negative for cough, chest tightness  Colon Cancer Mother     Osteoporosis Mother     Alzheimer's Disease Mother     Migraines Mother     Neuropathy Mother     Cancer Mother     Obesity Mother     High Cholesterol Father     High Blood Pressure Father     Heart Disease Father     Migraines Father     Diabetes Father     Arthritis Father     Hearing Loss Father     Stroke Father     Heart Attack Father     Rheum Arthritis Father     Migraines Sister     Diabetes Sister     Arthritis Sister         N/A    Rheum Arthritis Sister     Diabetes Brother     Arthritis Brother     Tuberculosis Maternal Grandmother     Tuberculosis Maternal Grandfather     Diabetes Paternal Grandmother     Obesity Paternal Grandmother     Obesity Maternal Aunt         N/A    Obesity Paternal Aunt        Vitals:  BP (!) 152/72   Pulse 84   Temp 98.4 °F (36.9 °C) (Oral)   Resp 22   Ht 1.6 m (5' 3\")   Wt 106 kg (233 lb 9.6 oz)   SpO2 96%   BMI 41.38 kg/m²   Temp (24hrs), Av.9 °F (36.6 °C), Min:97.5 °F (36.4 °C), Max:98.4 °F (36.9 °C)    Recent Labs     10/29/24  0541 10/29/24  1227 10/29/24  1834 10/30/24  0003   POCGLU 161* 206* 175* 179*       I/O (24Hr):    Intake/Output Summary (Last 24 hours) at 10/30/2024 0825  Last data filed at 10/30/2024 0606  Gross per 24 hour   Intake 3922.25 ml   Output 2600 ml   Net 1322.25 ml       Labs:  Hematology:  Recent Labs     10/28/24  0445 10/29/24  0552 10/30/24  0601   WBC 13.6* 19.3* 16.1*   RBC 2.89* 3.24* 3.08*   HGB 8.5* 9.5* 9.2*   HCT 26.0* 29.4* 28.8*   MCV 90.0 90.7 93.5   MCH 29.4 29.3 29.9   MCHC 32.7 32.3 31.9   RDW 13.2 13.6 14.2    490* 521*   MPV 9.8 9.6 9.7     Chemistry:  Recent Labs     10/28/24  0445 10/29/24  0552 10/30/24  0601    139 138   K 3.9 3.7 4.2    103 103   CO2 28 24 25   GLUCOSE 202* 176* 197*   BUN 19 19 16   CREATININE 0.6 0.6 0.6   MG 2.3  --  2.3   ANIONGAP 10 12 10   LABGLOM >90 >90 >90   CALCIUM 7.7* 7.7* 7.6*   PHOS 3.2  --  3.3     Recent Labs     10/28/24  9897

## 2024-10-30 NOTE — PROGRESS NOTES
Benjamin Almazan MD   Urology Progress Note            Subjective: Follow-up urinary retention neurogenic bladder    Patient Vitals for the past 24 hrs:   BP Temp Temp src Pulse Resp SpO2   10/30/24 0600 (!) 146/61 -- -- 80 27 95 %   10/30/24 0500 (!) 154/75 -- -- 81 27 94 %   10/30/24 0400 (!) 143/68 97.7 °F (36.5 °C) Temporal 84 29 95 %   10/30/24 0300 133/72 -- -- 78 21 96 %   10/30/24 0200 (!) 143/67 -- -- 80 27 95 %   10/30/24 0100 (!) 154/67 -- -- 83 29 96 %   10/30/24 0000 (!) 152/71 97.5 °F (36.4 °C) Temporal 86 29 95 %   10/29/24 2300 (!) 156/69 -- -- 86 25 95 %   10/29/24 2200 (!) 143/70 -- -- 89 23 97 %   10/29/24 2100 136/67 -- -- 91 (!) 31 97 %   10/29/24 2000 (!) 147/59 97.8 °F (36.6 °C) Temporal 95 27 97 %   10/29/24 1900 (!) 147/65 -- -- 91 27 98 %   10/29/24 1742 (!) 146/69 98.2 °F (36.8 °C) Oral 91 26 --   10/29/24 1600 130/67 -- -- 89 26 99 %   10/29/24 1500 97/77 -- -- 86 29 98 %   10/29/24 1400 139/62 -- -- 86 27 98 %   10/29/24 1300 128/67 -- -- 89 25 96 %   10/29/24 1200 (!) 143/77 97.5 °F (36.4 °C) Temporal 97 23 96 %   10/29/24 1130 (!) 146/67 -- -- 93 23 95 %   10/29/24 0800 -- 97.7 °F (36.5 °C) Temporal 88 (!) 31 94 %       Intake/Output Summary (Last 24 hours) at 10/30/2024 0633  Last data filed at 10/30/2024 0606  Gross per 24 hour   Intake 3922.25 ml   Output 2600 ml   Net 1322.25 ml       Recent Labs     10/28/24  0445 10/29/24  0552 10/30/24  0601   WBC 13.6* 19.3* 16.1*   HGB 8.5* 9.5* 9.2*   HCT 26.0* 29.4* 28.8*   MCV 90.0 90.7 93.5    490* 521*     Recent Labs     10/28/24  0445 10/29/24  0552 10/30/24  0601    139 138   K 3.9 3.7 4.2    103 103   CO2 28 24 25   PHOS 3.2  --  3.3   BUN 19 19 16   CREATININE 0.6 0.6 0.6       No results for input(s): \"COLORU\", \"PHUR\", \"LABCAST\", \"WBCUA\", \"RBCUA\", \"MUCUS\", \"TRICHOMONAS\", \"YEAST\", \"BACTERIA\", \"CLARITYU\", \"SPECGRAV\", \"LEUKOCYTESUR\", \"UROBILINOGEN\", \"BILIRUBINUR\", \"BLOODU\" in the last 72

## 2024-10-30 NOTE — PROGRESS NOTES
General Surgery:  Daily Progress Note          PATIENT NAME: Mandie Bustamante     TODAY'S DATE: 10/30/2024, 8:40 AM    SUBJECTIVE:     Pt seen and examined at bedside.  No acute overnight events. Afebrile, vitals stable. Dressing changed last night by nursing.  Patient remains confused, confuses hospital with home  Dressing changed at bedside this morning.  Superficial erythema around wound similar to day before.      OBJECTIVE:   VITALS:  BP (!) 152/72   Pulse 84   Temp 98.4 °F (36.9 °C) (Oral)   Resp 22   Ht 1.6 m (5' 3\")   Wt 106 kg (233 lb 9.6 oz)   SpO2 96%   BMI 41.38 kg/m²      INTAKE/OUTPUT:      Intake/Output Summary (Last 24 hours) at 10/30/2024 0840  Last data filed at 10/30/2024 0606  Gross per 24 hour   Intake 3922.25 ml   Output 2600 ml   Net 1322.25 ml       PHYSICAL EXAM:  General Appearance: Intubated, sedated; grimaces to palpation over abdomen  HEENT:  Normocephalic, atraumatic, mucus membranes moist; OG tube to suction  Heart: Regular rate and rhythm  Lungs: normal effort with symmetric rise and fall of chest wall  Abdomen: Soft, distended, tender to palpation around incision.  Incisional wound with feculent output.  Extremities: Bilateral lower extremity pitting edema  Skin: Skin color, texture, turgor normal. Superficial erythema with flaking is noted on the superior and inferior portions of the wound without any deep involvement seen in the wound bed.       Data:  CBC:   Recent Labs     10/28/24  0445 10/29/24  0552 10/30/24  0601   WBC 13.6* 19.3* 16.1*   HGB 8.5* 9.5* 9.2*    490* 521*     Chemistry:   Recent Labs     10/28/24  0445 10/29/24  0552 10/30/24  0601    139 138   K 3.9 3.7 4.2    103 103   CO2 28 24 25   GLUCOSE 202* 176* 197*   BUN 19 19 16   CREATININE 0.6 0.6 0.6   MG 2.3  --  2.3   ANIONGAP 10 12 10   LABGLOM >90 >90 >90   CALCIUM 7.7* 7.7* 7.6*   PHOS 3.2  --  3.3     Hepatic:   Recent Labs     10/28/24  0445 10/30/24  0601   AST 11 15   ALT 6 8    ALKPHOS 75 90   BILITOT 0.2* 0.2*   BILIDIR <0.1 <0.1     Coagulation:   No results for input(s): \"APTT\", \"INR\" in the last 72 hours.    Invalid input(s): \"PROT\"        Radiology Review:    XR CHEST PORTABLE    Result Date: 10/26/2024  1. No significant interval change. 2. Right-sided PICC with tip position cavoatrial junction.          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 female status post robotic assisted laparoscopic incisional hernia repair with mesh 10/1/24  S/p from exploratory laparotomy and wound VAC placement 10/18/24  EC fistula present.     Plan:   Continue medical management and supportive care per ICU and primary team.  Continue TPN and keep n.p.o.  Rash is noted by flaky erythema with superficial portions of the wound without deep involvement, possibly reactionary   Continue antibiotics as indicated by primary team -leukocytosis down to 16.1 from 19.3  Recommend out of bed to chair again with ambulation as able  EC fistula - keep NPO/TPN and local wound care  Appreciate assistance from wound/ostomy team with wound care mgmt for better control of the fistula and deep/large wound  Continue Adaptic to the base of the wound followed by wet-to-dry dressing paper tape.  Surgery to perform a.m. dressing change and to

## 2024-10-31 LAB
ANION GAP SERPL CALCULATED.3IONS-SCNC: 11 MMOL/L (ref 9–17)
BUN SERPL-MCNC: 14 MG/DL (ref 8–23)
BUN/CREAT SERPL: 23 (ref 9–20)
CALCIUM SERPL-MCNC: 7.6 MG/DL (ref 8.6–10.4)
CHLORIDE SERPL-SCNC: 100 MMOL/L (ref 98–107)
CO2 SERPL-SCNC: 25 MMOL/L (ref 20–31)
CREAT SERPL-MCNC: 0.6 MG/DL (ref 0.5–0.9)
GFR, ESTIMATED: >90 ML/MIN/1.73M2
GLUCOSE BLD-MCNC: 158 MG/DL (ref 65–105)
GLUCOSE BLD-MCNC: 165 MG/DL (ref 65–105)
GLUCOSE BLD-MCNC: 165 MG/DL (ref 65–105)
GLUCOSE BLD-MCNC: 182 MG/DL (ref 65–105)
GLUCOSE BLD-MCNC: 224 MG/DL (ref 65–105)
GLUCOSE SERPL-MCNC: 171 MG/DL (ref 70–99)
POTASSIUM SERPL-SCNC: 4.2 MMOL/L (ref 3.7–5.3)
SODIUM SERPL-SCNC: 136 MMOL/L (ref 135–144)

## 2024-10-31 PROCEDURE — 2580000003 HC RX 258

## 2024-10-31 PROCEDURE — 99232 SBSQ HOSP IP/OBS MODERATE 35: CPT | Performed by: INTERNAL MEDICINE

## 2024-10-31 PROCEDURE — 2580000003 HC RX 258: Performed by: INTERNAL MEDICINE

## 2024-10-31 PROCEDURE — 97530 THERAPEUTIC ACTIVITIES: CPT

## 2024-10-31 PROCEDURE — 6370000000 HC RX 637 (ALT 250 FOR IP): Performed by: STUDENT IN AN ORGANIZED HEALTH CARE EDUCATION/TRAINING PROGRAM

## 2024-10-31 PROCEDURE — 6360000002 HC RX W HCPCS

## 2024-10-31 PROCEDURE — 2500000003 HC RX 250 WO HCPCS

## 2024-10-31 PROCEDURE — 6370000000 HC RX 637 (ALT 250 FOR IP)

## 2024-10-31 PROCEDURE — 94761 N-INVAS EAR/PLS OXIMETRY MLT: CPT

## 2024-10-31 PROCEDURE — 2060000000 HC ICU INTERMEDIATE R&B

## 2024-10-31 PROCEDURE — 99233 SBSQ HOSP IP/OBS HIGH 50: CPT | Performed by: INTERNAL MEDICINE

## 2024-10-31 PROCEDURE — 6360000002 HC RX W HCPCS: Performed by: INTERNAL MEDICINE

## 2024-10-31 PROCEDURE — 82947 ASSAY GLUCOSE BLOOD QUANT: CPT

## 2024-10-31 PROCEDURE — 6370000000 HC RX 637 (ALT 250 FOR IP): Performed by: NURSE PRACTITIONER

## 2024-10-31 PROCEDURE — 80048 BASIC METABOLIC PNL TOTAL CA: CPT

## 2024-10-31 RX ORDER — ACETAMINOPHEN 325 MG/1
650 TABLET ORAL EVERY 4 HOURS PRN
Status: DISCONTINUED | OUTPATIENT
Start: 2024-10-31 | End: 2024-11-08 | Stop reason: HOSPADM

## 2024-10-31 RX ADMIN — HYDROCORTISONE: 1 CREAM TOPICAL at 08:26

## 2024-10-31 RX ADMIN — ACETAMINOPHEN 650 MG: 325 TABLET ORAL at 20:42

## 2024-10-31 RX ADMIN — FLUCONAZOLE 400 MG: 2 INJECTION, SOLUTION INTRAVENOUS at 08:33

## 2024-10-31 RX ADMIN — CALCIUM GLUCONATE: 98 INJECTION, SOLUTION INTRAVENOUS at 18:35

## 2024-10-31 RX ADMIN — PANTOPRAZOLE SODIUM 40 MG: 40 INJECTION, POWDER, FOR SOLUTION INTRAVENOUS at 08:26

## 2024-10-31 RX ADMIN — METRONIDAZOLE 500 MG: 5 INJECTION, SOLUTION INTRAVENOUS at 03:02

## 2024-10-31 RX ADMIN — METRONIDAZOLE 500 MG: 5 INJECTION, SOLUTION INTRAVENOUS at 18:26

## 2024-10-31 RX ADMIN — SODIUM CHLORIDE, PRESERVATIVE FREE 10 ML: 5 INJECTION INTRAVENOUS at 08:25

## 2024-10-31 RX ADMIN — HEPARIN SODIUM 5000 UNITS: 5000 INJECTION INTRAVENOUS; SUBCUTANEOUS at 20:42

## 2024-10-31 RX ADMIN — MICONAZOLE NITRATE: 20 CREAM TOPICAL at 08:27

## 2024-10-31 RX ADMIN — METRONIDAZOLE 500 MG: 5 INJECTION, SOLUTION INTRAVENOUS at 13:08

## 2024-10-31 RX ADMIN — HEPARIN SODIUM 5000 UNITS: 5000 INJECTION INTRAVENOUS; SUBCUTANEOUS at 08:26

## 2024-10-31 RX ADMIN — I.V. FAT EMULSION 100 ML: 20 EMULSION INTRAVENOUS at 18:29

## 2024-10-31 RX ADMIN — ONDANSETRON 4 MG: 2 INJECTION, SOLUTION INTRAMUSCULAR; INTRAVENOUS at 13:06

## 2024-10-31 RX ADMIN — SODIUM CHLORIDE, PRESERVATIVE FREE 10 ML: 5 INJECTION INTRAVENOUS at 20:28

## 2024-10-31 RX ADMIN — ANTI-FUNGAL POWDER MICONAZOLE NITRATE TALC FREE: 1.42 POWDER TOPICAL at 08:26

## 2024-10-31 RX ADMIN — WATER 2000 MG: 1 INJECTION INTRAMUSCULAR; INTRAVENOUS; SUBCUTANEOUS at 18:21

## 2024-10-31 RX ADMIN — FUROSEMIDE 40 MG: 10 INJECTION, SOLUTION INTRAMUSCULAR; INTRAVENOUS at 08:26

## 2024-10-31 ASSESSMENT — PAIN DESCRIPTION - LOCATION: LOCATION: HEAD

## 2024-10-31 ASSESSMENT — PAIN DESCRIPTION - FREQUENCY: FREQUENCY: INTERMITTENT

## 2024-10-31 ASSESSMENT — PAIN SCALES - WONG BAKER: WONGBAKER_NUMERICALRESPONSE: NO HURT

## 2024-10-31 ASSESSMENT — PAIN SCALES - GENERAL
PAINLEVEL_OUTOF10: 4
PAINLEVEL_OUTOF10: 0
PAINLEVEL_OUTOF10: 0
PAINLEVEL_OUTOF10: 2

## 2024-10-31 ASSESSMENT — PAIN DESCRIPTION - ONSET: ONSET: PROGRESSIVE

## 2024-10-31 ASSESSMENT — PAIN DESCRIPTION - ORIENTATION: ORIENTATION: MID

## 2024-10-31 ASSESSMENT — PAIN DESCRIPTION - DESCRIPTORS: DESCRIPTORS: ACHING

## 2024-10-31 ASSESSMENT — PAIN - FUNCTIONAL ASSESSMENT: PAIN_FUNCTIONAL_ASSESSMENT: ACTIVITIES ARE NOT PREVENTED

## 2024-10-31 ASSESSMENT — PAIN DESCRIPTION - PAIN TYPE: TYPE: ACUTE PAIN

## 2024-10-31 NOTE — PROGRESS NOTES
General Surgery:  Daily Progress Note          PATIENT NAME: Mandie Bustamante     TODAY'S DATE: 10/31/2024, 6:47 AM    SUBJECTIVE:     Pt seen and examined at bedside.  No acute overnight events. Afebrile, vitals stable. Dressing changed last night by nursing.  Dressing changed at bedside this morning.  Superficial erythema around wound similar to day before.      OBJECTIVE:   VITALS:  /60   Pulse 73   Temp 97.3 °F (36.3 °C) (Temporal)   Resp 21   Ht 1.6 m (5' 3\")   Wt 106 kg (233 lb 9.6 oz)   SpO2 97%   BMI 41.38 kg/m²      INTAKE/OUTPUT:      Intake/Output Summary (Last 24 hours) at 10/31/2024 0647  Last data filed at 10/31/2024 0600  Gross per 24 hour   Intake 758.53 ml   Output 2775 ml   Net -2016.47 ml       PHYSICAL EXAM:  General Appearance: Intubated, sedated; grimaces to palpation over abdomen  HEENT:  Normocephalic, atraumatic, mucus membranes moist; OG tube to suction  Heart: Regular rate and rhythm  Lungs: normal effort with symmetric rise and fall of chest wall  Abdomen: Soft, distended, tender to palpation around incision.  Incisional wound with feculent output.  Extremities: Bilateral lower extremity pitting edema  Skin: Skin color, texture, turgor normal. Superficial erythema with flaking is noted on the superior and inferior portions of the wound without any deep involvement seen in the wound bed.  Healthy granulation tissue seen on the margins of the wound.      Data:  CBC:   Recent Labs     10/29/24  0552 10/30/24  0601   WBC 19.3* 16.1*   HGB 9.5* 9.2*   * 521*     Chemistry:   Recent Labs     10/29/24  0552 10/30/24  0601    138   K 3.7 4.2    103   CO2 24 25   GLUCOSE 176* 197*   BUN 19 16   CREATININE 0.6 0.6   MG  --  2.3   ANIONGAP 12 10   LABGLOM >90 >90   CALCIUM 7.7* 7.6*   PHOS  --  3.3     Hepatic:   Recent Labs     10/30/24  0601   AST 15   ALT 8   ALKPHOS 90   BILITOT 0.2*   BILIDIR <0.1     Coagulation:   No results for input(s): \"APTT\", \"INR\" in the

## 2024-10-31 NOTE — PROGRESS NOTES
Occupational Therapy  Facility/Department: [facility]  Rehabilitation Occupational Therapy Daily Treatment Note    Date: 10/31/24  Patient Name: Mandie Bustamante       Room:   MRN: [id number]  Account: [id number]   : 1955  (69 y.o.) Gender: female      DOMINGO Myers reports patient is medically stable for therapy treatment this date. Chart reviewed prior to treatment and patient is agreeable for therapy.  All lines intact and patient positioned comfortably at end of treatment.  All patient needs addressed prior to ending therapy session.      Pt currently functioning below baseline.  Recommend daily inpatient skilled therapy at time of discharge to maximize long term outcomes and prevent re-admission. Please refer to AM-PAC score for current level of function.               Past Medical History:  has a past medical history of Allergic rhinitis, Anemia, Arthritis, Autoimmune disorder (HCC), Cataracts, bilateral, GERD (gastroesophageal reflux disease), Headache, Hypercalcemia, Hyperparathyroidism (HCC), Hypertension, Obesity, and Osteoarthritis.  Past Surgical History:   has a past surgical history that includes Parathyroid gland surgery (2023); Total vaginal hysterectomy (); Appendectomy; Urethra surgery (); Foot surgery (Left, ); shoulder surgery (Right, 10/2000); Foot surgery (Left, 2010); Shoulder arthroscopy (Right, 2011); Shoulder arthroscopy (Right, 2012); Shoulder arthroscopy (Left, 2012); Cholecystectomy; Bunionectomy (Left, 2016); Foot surgery (Left, 2017); parathyroidectomy (2022); hernia repair (2022); Umbilical hernia repair; Hysterectomy, total abdominal (1986); Hysterectomy, vaginal (1986); Upper gastrointestinal endoscopy (); ventral hernia repair (N/A, 10/1/2024); laparoscopy (N/A, 10/16/2024); and laparotomy (N/A, 10/18/2024).    Restrictions  Restrictions/Precautions: General Precautions, Up as Tolerated, Fall Risk  Other  Strengthening;Balance training;Cognitive reorientation;Safety education & training;Patient/Caregiver education & training;Equipment evaluation, education, & procurement;Self-Care / ADL;Cognitive/Perceptual training;Co-Treatment;Pain management;ROM;Functional mobility training;Endurance training;Positioning    Goals  Patient Goals   Patient goals : Go home!  Short Term Goals  Time Frame for Short Term Goals: By discharge, to demo  Short Term Goal 1: rolling in bed to Min A with use of bedrail as needed to complete pressure relief.  Short Term Goal 2: supine <> sit with Min A using bedrails and verbal cues as needed.  Short Term Goal 3: increased unsupported sitting pia > 15 min with CGA using AD/bedrails as needed in order to inc pariticipation with ADLs/prep for transfers  Short Term Goal 4: demo ADL transfers with Miranda Stedy and Mod A x2 to increase independence with mobility and ADLs.  Short Term Goal 5: grooming task to SBA with use of AD/verbal cues as needed for initiation/sequencing  Long Term Goals  Long Term Goal 1: Caregiver/pt to be I with pressure relief/skin integrity edu, recommendations for discharge/AE, condition specfic edu, surgical protocol, fall prevention edu with use of handouts as needed.  Long Term Goal 2: Pt to demo tolerance for OT reassessment of ADL transfers and functional mobility when appropriate to add goals to POC.    AM-PAC Score        AM-[facility] Inpatient Daily Activity Raw Score: 11 (10/31/24 1147)  AM-PAC Inpatient ADL T-Scale Score : 29.04 (10/31/24 1147)  ADL Inpatient CMS 0-100% Score: 70.42 (10/31/24 1147)  ADL Inpatient CMS G-Code Modifier : CL (10/31/24 1147)      Therapy Time   Individual Concurrent Group Co-treatment   Time In 1124         Time Out 1147         Minutes 23               Co-treatment with PT warranted secondary to decreased safety and independence requiring 2 skilled therapy professionals to address individual discipline's goals. OT addressing preparation for

## 2024-10-31 NOTE — PROGRESS NOTES
Comprehensive Nutrition Assessment    Type and Reason for Visit:  Reassess    Nutrition Recommendations/Plan:   Continue TPN and lipids, monitor lytes  May need to increase insulin in TPN as dextrose increasing  NPO  RD to follow/monitor.     Malnutrition Assessment:  Malnutrition Status:  Moderate malnutrition (10/17/24 1147)    Context:  Acute Illness     Findings of the 6 clinical characteristics of malnutrition:  Energy Intake:  50% or less of estimated energy requirements for 5 or more days  Weight Loss:  Unable to assess     Body Fat Loss:  Unable to assess     Muscle Mass Loss:  Unable to assess    Fluid Accumulation:  Moderate to Severe      Nutrition Assessment:    Patient mentation continues to improve. RN reports no stool noted from fistula, possible wound vac placement tomorrow. Patient remains NPO, will increase rate of PN today to allow more protein, lower lipids (1920 ml total volume) and 100 ml of lipids. Labs, meds, PMH reviewed.    Nutrition Related Findings:    LBm 10/29, +1 UE and +2 LE edema. No stool from fistula. Weight stable. Wound Type: Wound Vac, Surgical Incision       Current Nutrition Intake & Therapies:    Average Meal Intake: NPO  Average Supplements Intake: NPO  Current Parenteral Nutrition Orders:  Type and Formula: Premix Central   Lipids: 100ml  Duration: Continuous  Rate/Volume: 1920  Current PN Order Provides: 1920 ml, 96 g protein, 384 g dextrose, 100 ml lipids = 1890 kcal      Anthropometric Measures:  Height: 160 cm (5' 3\")  Ideal Body Weight (IBW): 115 lbs (52 kg)       Current Body Weight: 106 kg (233 lb 11 oz), 203.2 % IBW. Weight Source: Bed Scale  Current BMI (kg/m2): 41.4                             BMI Categories: Obese Class 3 (BMI 40.0 or greater)    Estimated Daily Nutrient Needs:  Energy Requirements Based On: Kcal/kg  Weight Used for Energy Requirements: Current  Energy (kcal/day): 9646-4244 kcal (12-15 kcal/kg)  Weight Used for Protein Requirements:

## 2024-10-31 NOTE — PROGRESS NOTES
[facility]  Office: [phone]  Keenan Foster DO, Kevin Siegel DO, Carlos A Orta DO, Taye Morales DO, Maxx Madison MD, Meena Eckert MD, Efren Vo MD, Makayla Last MD,  Jerman Christian MD, Gina Bourgeois MD, Gladys Fry MD,  Anu Murillo DO, Jennifer Blanca MD, Oral Duncan MD, Harlan Foster DO, Katya Simmons MD,  Filiberto Slade DO, Elizabeth Alves MD, Samantha Pinto MD, Arlene Bryant MD, Bandar Barrios MD,  Kulwant Rodrigez MD, Michelle Dennison MD, Jinny Steel MD, Kathia Vargas MD, Gerardo Alvarez MD, Richard Johnson MD, Demetri Ibarra DO, Benjie Alvarenga MD, Shirley Waterhouse, CNP,  Georgette Villarreal CNP, Demetri Toure, CATHY,  Silvina Castano, CARINA, Tatiana Mckeon, CNP, Angelina Harper, CNP, Alma Rosa Peng, CNP, Rashmi Mccartney, CNP, IMAN ErvinC, IMAN ArmentaC, Stephanie Kaminski, CNP, Tono Yee, CNP,  Chrissie Schwartz, CNP, Sheri Jones, CNP,  Nadiya Rick, CNP, Cindi Lam, CNP         [facility]   IN-PATIENT SERVICE   [facility]    Progress Note    10/31/2024    4:19 PM    Name:   Mandie Bustamante  MRN:     [id number]     Acct:      [id number]   Room:   2033/2033-01  IP Day:  21  Admit Date:  10/10/2024  6:11 PM    PCP:   No primary care provider on file.  Code Status:  Full Code    Subjective:     C/C: abd pain  Interval History Status: improved.       Taken back to OR 10/18 and apparently had copious drainage from the multiple fistulas now seen contaminating wound    Confusion improving     at bedside-he sees she is making progress    He relates no stool in wound in last 3 days    Brief History:     Per my partner:  \"Mandie M Bebout is a 69 y.o. Non- / non  female who presents with No chief complaint on file.   and is admitted to the hospital for the management of Hyponatremia.     recently underwent robotic assisted laparoscopic incisional hernia repair with mesh on 10/1/2024 with surgery, now presented from

## 2024-10-31 NOTE — PROGRESS NOTES
Benjamin Almazan MD   Urology Progress Note            Subjective: Follow-up neurogenic bladder urinary retention    Patient Vitals for the past 24 hrs:   BP Temp Temp src Pulse Resp SpO2 Height   10/31/24 0400 130/60 97.3 °F (36.3 °C) Temporal 73 21 97 % --   10/31/24 0307 -- -- -- 75 21 96 % --   10/31/24 0000 (!) 113/98 97 °F (36.1 °C) Temporal 82 26 97 % --   10/30/24 2000 (!) 145/71 97.7 °F (36.5 °C) Temporal 87 17 -- --   10/30/24 1634 (!) 149/73 -- -- 82 26 -- --   10/30/24 1600 -- 97.3 °F (36.3 °C) Temporal -- -- -- --   10/30/24 1546 -- -- -- -- -- -- 1.6 m (5' 3\")       Intake/Output Summary (Last 24 hours) at 10/31/2024 1533  Last data filed at 10/31/2024 0649  Gross per 24 hour   Intake 1028.79 ml   Output 975 ml   Net 53.79 ml       Recent Labs     10/29/24  0552 10/30/24  0601   WBC 19.3* 16.1*   HGB 9.5* 9.2*   HCT 29.4* 28.8*   MCV 90.7 93.5   * 521*     Recent Labs     10/29/24  0552 10/30/24  0601 10/31/24  0440    138 136   K 3.7 4.2 4.2    103 100   CO2 24 25 25   PHOS  --  3.3  --    BUN 19 16 14   CREATININE 0.6 0.6 0.6       No results for input(s): \"COLORU\", \"PHUR\", \"LABCAST\", \"WBCUA\", \"RBCUA\", \"MUCUS\", \"TRICHOMONAS\", \"YEAST\", \"BACTERIA\", \"CLARITYU\", \"SPECGRAV\", \"LEUKOCYTESUR\", \"UROBILINOGEN\", \"BILIRUBINUR\", \"BLOODU\" in the last 72 hours.    Invalid input(s): \"NITRATE\", \"GLUCOSEUKETONESUAMORPHOUS\"    Additional Lab/culture results:    Physical Exam: Patient with neurogenic bladder dysfunction, urinary retention, considering the patient's general status I have recommended to maintain the Bar indwelling until the patient is able to get out of bed  General Surgery notes and wound care plans reviewed      Interval Imaging Findings:    Impression:    Patient Active Problem List   Diagnosis    Abdominal hernia without obstruction and without gangrene    Anxiety    BMI 45.0-49.9, adult    Bursitis of right shoulder    Gastro-esophageal reflux disease  without esophagitis    History of parathyroidectomy    Essential hypertension    Incisional hernia, without obstruction or gangrene    Incisional pain    Mixed dyslipidemia    Morbid (severe) obesity due to excess calories    Other abnormal glucose    Pain in bone fixation device (HCC)    Primary hyperparathyroidism (HCC)    Primary insomnia    Right shoulder pain    Seasonal allergic rhinitis    Sensitive skin    Vitamin D insufficiency    Elevated fasting glucose    Hyponatremia    Hypokalemia    Elevated brain natriuretic peptide (BNP) level    Leukocytosis    DJD (degenerative joint disease), ankle and foot, right    Prediabetes    Intra-abdominal abscess (HCC)    Edema    Colocutaneous fistula    Abdominal wall ulcer, with fat layer exposed (HCC)    Acute metabolic encephalopathy       Plan: Maintain indwelling Bar    Benjamin Almazan MD  3:33 PM 10/31/2024

## 2024-10-31 NOTE — PROGRESS NOTES
Physical Therapy  Facility/Department: [facility]  Rehabilitation Physical Therapy Treatment Note    NAME: Mandie Bustamante  : 1955 (69 y.o.)  MRN: [id number]  CODE STATUS: Full Code    Date of Service: 10/31/24       Restrictions:  Restrictions/Precautions: General Precautions, Up as Tolerated, Fall Risk  Position Activity Restriction  Other position/activity restrictions: RUE PICC, simons cath, telemetry, cont SPO2 moniter, 46 BMI     SUBJECTIVE  Subjective  Subjective: pt in bedside chair upon arrival agreeable to PT               OBJECTIVE  Cognition  Overall Cognitive Status: Exceptions  Arousal/Alertness: Appropriate responses to stimuli;Delayed responses to stimuli  Following Commands: Follows one step commands with repetition;Follows one step commands with increased time  Attention Span: Attends with cues to redirect;Difficulty attending to directions;Difficulty dividing attention  Memory: Decreased short term memory;Decreased recall of recent events  Safety Judgement: Decreased awareness of need for safety;Decreased awareness of need for assistance  Problem Solving: Decreased awareness of errors;Assistance required to correct errors made;Assistance required to identify errors made;Assistance required to implement solutions;Assistance required to generate solutions  Insights: Decreased awareness of deficits  Initiation: Requires cues for all  Sequencing: Requires cues for all  Orientation  Overall Orientation Status: Within Functional Limits  Orientation Level: Oriented to place;Oriented to time;Oriented to person;Disoriented to situation    Functional Mobility  Transfers  Surface: From chair with arms;To chair with arms  Additional Factors: Verbal cues;Hand placement cues  Device: Walker  Sit to Stand  Assistance Level: Moderate assistance;Requires x 2 assistance  Stand to Sit  Assistance Level: Moderate assistance;Requires x 2 assistance  Pt able to complete 3 sit to stands from chair with MOD A x 2

## 2024-10-31 NOTE — PROGRESS NOTES
[facility]   IN-PATIENT SERVICE   [facility]    Progress Note    10/31/2024    10:56 AM    Name:   Mandie Bustamante  MRN:     [id number]     Acct:      [id number]   Room:   2033/2033-01   Day:  21  Admit Date:  10/10/2024  6:11 PM    PCP:   No primary care provider on file.  Code Status:  Full Code    Subjective:     C/C: Abdominal pain    Interval History Status: improved.     She has been able to rest more overnight. Her abdomen is causing her some discomfort. She denies any chest pain, shortness of breath, nausea, or vomiting. She remains NPO on TPN.      Brief History:     Patient is a 69-year-old female who recently underwent an out patient hernia repair with mesh on 10/1/2024, she presents to the ED with abdominal pain on 10/10/2024. Work up in the ED revealed severe hyponatremia of 109, leukocytosis, and  CT abdomen pelvis without contrast was suggestive of large abscess with air-fluid collection seen along the peritoneum just below the rectus muscle. There was concern about gas and fluid seen extending the previous site of hernia suggesting recurrent hernia or phlegmon. A simons was placed for urinary retention.     Surgery performed bedside US guided aspiration of fluid collection on 10/11/2024. Patient had continued leukocytosis so a CT abdomen/pelvis was ordered which revealed increased air-fluid level. Surgery took patient to OR for ex-lap, patient left open with wound vac due to distention, and remained intubated after surgery. Plans to take back to OR Friday 10/18/2024.     Back to the OR on 10/18/2024 for a washout and wound vac placement. 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 She was kept intubated post op with plans to change out wound vac on 10/21/2024    Extubated 10/24    Review of Systems:     Review of Systems   Constitutional:  Negative for chills, diaphoresis and fever.   Respiratory:  Negative for cough, chest  insulin lispro  0-4 Units SubCUTAneous Q6H    miconazole   Topical BID    hydrocortisone   Topical BID    lidocaine PF  5 mL Tracheal Tube Once    sodium chloride flush  5-40 mL IntraVENous 2 times per day    pantoprazole (PROTONIX) 40 mg in sodium chloride (PF) 0.9 % 10 mL injection  40 mg IntraVENous Daily     Continuous Infusions:    PN-Adult 2-in-1 Central Line (Custom)      PN-Adult 2-in-1 Central Line (Custom) 65 mL/hr at 10/30/24 1756    dextrose 5% and 0.45% NaCl with KCl 20 mEq Stopped (10/22/24 1038)    dextrose      sodium chloride 5 mL/hr at 10/31/24 0259     PRN Meds: ziprasidone (GEODON) 10 mg in sterile water 0.5 mL injection, diphenhydrAMINE-zinc acetate, calcium gluconate, sodium phosphate 19.5 mmol in sodium chloride 0.9 % 250 mL IVPB **OR** sodium phosphate 39 mmol in sodium chloride 0.9 % 250 mL IVPB, acetaminophen **OR** acetaminophen, midazolam, glucose, dextrose bolus **OR** dextrose bolus, glucagon (rDNA), dextrose, HYDROmorphone, polyethyl glycol-propyl glycol 0.4-0.3 %, potassium chloride, labetalol, sodium chloride flush, sodium chloride, potassium chloride **OR** potassium alternative oral replacement **OR** potassium chloride, magnesium sulfate, [DISCONTINUED] ondansetron **OR** ondansetron    Data:     Past Medical History:   has a past medical history of Allergic rhinitis, Anemia, Arthritis, Autoimmune disorder (HCC), Cataracts, bilateral, GERD (gastroesophageal reflux disease), Headache, Hypercalcemia, Hyperparathyroidism (HCC), Hypertension, Obesity, and Osteoarthritis.    Social History:   reports that she has never smoked. She has never used smokeless tobacco. She reports current alcohol use of about 1.0 standard drink of alcohol per week. She reports that she does not use drugs.     Family History:   Family History   Problem Relation Age of Onset    High Cholesterol Mother     Colon Cancer Mother     Osteoporosis Mother     Alzheimer's Disease Mother     Migraines Mother

## 2024-10-31 NOTE — PROGRESS NOTES
Pulmonary Critical Care Progress Note    Patient seen for the follow up of Intra-abdominal abscess (HCC)     Subjective:    She is still on room air.  She is n.p.o. on TPN.  She has improved mental status.  Less hallucination.  She has ambulated to chair.    Examination:    Vitals: /60   Pulse 73   Temp 97.5 °F (36.4 °C) (Temporal)   Resp 21   Ht 1.6 m (5' 3\")   Wt 106 kg (233 lb 9.6 oz)   SpO2 97%   BMI 41.38 kg/m²   SpO2  Av.7 %  Min: 96 %  Max: 97 %  General appearance: alert and cooperative with exam  Neck: No JVD  Lungs: Decreased breath sound no crackles or wheeze  Heart: regular rate and rhythm, S1, S2 normal, no gallop  Abdomen: Soft, non tender, + BS clean dressing  Extremities: no cyanosis or clubbing. No significant edema    LABs:     Latest Reference Range & Units 10/30/24 06:01   Sodium 135 - 144 mmol/L 138   Potassium 3.7 - 5.3 mmol/L 4.2   Chloride 98 - 107 mmol/L 103   CARBON DIOXIDE 20 - 31 mmol/L 25   BUN,BUNPL 8 - 23 mg/dL 16   Creatinine 0.5 - 0.9 mg/dL 0.6   Bun/Cre 9 - 20  27 (H)   Anion Gap 9 - 17 mmol/L 10   Est, Glom Filt Rate >60 mL/min/1.73m2 >90   Magnesium 1.6 - 2.6 mg/dL 2.3   Glucose 70 - 99 mg/dL 197 (H)   Calcium 8.6 - 10.4 mg/dL 7.6 (L)   Phosphorus 2.6 - 4.5 mg/dL 3.3   Total Protein 6.4 - 8.3 g/dL 5.5 (L)   Albumin 3.5 - 5.2 g/dL 2.7 (L)   Alkaline Phosphatase 35 - 104 U/L 90   ALT 5 - 33 U/L 8   AST <32 U/L 15   Total Bilirubin 0.3 - 1.2 mg/dL 0.2 (L)   Bilirubin, Direct <0.3 mg/dL <0.1   (H): Data is abnormally high  (L): Data is abnormally low     Latest Reference Range & Units 10/30/24 06:01   WBC 3.5 - 11.3 k/uL 16.1 (H)   RBC 3.95 - 5.11 m/uL 3.08 (L)   Hemoglobin Quant 11.9 - 15.1 g/dL 9.2 (L)   Hematocrit 36.3 - 47.1 % 28.8 (L)   MCV 82.6 - 102.9 fL 93.5   MCH 25.2 - 33.5 pg 29.9   MCHC 28.4 - 34.8 g/dL 31.9   MPV 8.1 - 13.5 fL 9.7   RDW 11.8 - 14.4 % 14.2   Platelet Count 138 - 453 k/uL 521 (H)   Neutrophils % 36 - 66 % 46   Lymphocyte % 24 - 44 % 20 (L)  IV twice daily hold for hypotension  Urology on consult     Insomnia/obesity suspected sleep apnea   Outpatient Sleep evaluation  On Geodon 10 mg IM nightly     Physical therapy  Discussed with  at bedside     Peptic ulcer disease prophylaxis Protonix  DVT prophylaxis EPC started heparin 5000 every 12 hour                   David Coyne MD, MD, [facility]  Pulmonary Critical Care and Sleep Medicine,  [facility]  Cell: [phone]  Office: [phone]

## 2024-10-31 NOTE — PROGRESS NOTES
[facility] -   Infectious diseases evaluation  admission date 10/10/2024    reason for consultation:   Fever despite antibiotics    Impression :   Current:  Fever resolved  Leukocytosis  Abdominal abscess  EC fistula  Status post exploratory laparotomy with removal of mesh and wound VAC application 10/16/2024 subsequently underwent exploratory laparotomy abdominal washout with wound VAC application 10/18/2024.   Status post robotic assisted laparoscopic repair of recurrent incisional hernia with mesh placement 10/1/2024  Status post ultrasound guided aspiration of abdominal wall fluid collection on 10/11/2024 no growth on culture  Acute respiratory failure required intubation, extubated  Obesity    HENCE:   Diflucan   IV ceftriaxone and Flagyl  Repeat CT abdomen from 10/29/2024 reviewed  The patient received IV cefepime and Flagyl 10/17/2024 through 10/26/2024  The patient received IV Zosyn 10/10/2024 through 10/17/2024  No growth on blood cultures  Continue supportive care      Infection Control Recommendations   [place] Precautions      Antimicrobial Stewardship Recommendations   Simplification of therapy  Targeted therapy      History of Present Illness:   Initial history:  Mandie Bustamante is a 69 y.o.-year-old female was seen at the ICU, intubated, sedated, unable to provide history that was obtained from chart review and nursing staff.  She is on 1 mcg/min of Levophed, right arm PICC line was placed 10/11/2024, on TPN.  Bar catheter in place with clear urine was placed on 10/10/2024  NG tube in place.  Status post robotic assisted laparoscopic repair of recurrent incisional hernia with mesh placement 10/1/2024 was complicated by fluid collection status post aspiration on 10/11/2024 with no growth on culture.  Status post exploratory laparotomy with removal of mesh and wound VAC application 10/16/2024 subsequently underwent exploratory laparotomy abdominal  07/2016    Hardware removed Sept 2022    CHOLECYSTECTOMY      FOOT SURGERY Left 1989    cyst removal    FOOT SURGERY Left 03/2010    cyst removal cheilectomy    FOOT SURGERY Left 07/2017    nerve removal in surgical area    HERNIA REPAIR  08/2022    Umbilical    HYSTERECTOMY, TOTAL ABDOMINAL (CERVIX REMOVED)  09/1986    HYSTERECTOMY, VAGINAL  09/1986    LAPAROSCOPY N/A 10/16/2024    DIAGNOSTIC LAPAROSCOPY EXPLORATORY LAPAROTOMY EXPLANTATION OF MESH AND WOUND VAC APPLICATION performed by Gm Cummings MD at [facility] OR    LAPAROTOMY N/A 10/18/2024    LAPAROTOMY EXPLORATORY, ABDOMENAL WASH OUT, WOUND VAC PLACEMENT performed by Gm Cummings MD at [facility] OR    PARATHYROID GLAND SURGERY  05/03/2023    Ectopic Parathyroid gland from sternum Mini sternotomy    PARATHYROIDECTOMY  05/2022    SHOULDER ARTHROSCOPY Right 08/2011    SHOULDER ARTHROSCOPY Right 04/2012    SHOULDER ARTHROSCOPY Left 05/2012    SHOULDER SURGERY Right 10/2000    arthroscopy/bone spur    TOTAL VAGINAL HYSTERECTOMY  1986    Partial 1981    UMBILICAL HERNIA REPAIR      UPPER GASTROINTESTINAL ENDOSCOPY  2014    URETHRA SURGERY  1986    Repair    VENTRAL HERNIA REPAIR N/A 10/1/2024    ROBOTIC RECURRENT INCISIONAL HERNIA REPAIR with mesh performed by Gm Cummings MD at [facility] OR       Medications:      fat emulsion  100 mL IntraVENous Daily    cefTRIAXone (ROCEPHIN) IV  2,000 mg IntraVENous Q24H    metroNIDAZOLE  500 mg IntraVENous Q8H    miconazole   Topical BID    fluconazole  400 mg IntraVENous Q24H    furosemide  40 mg IntraVENous Daily    heparin (porcine)  5,000 Units SubCUTAneous BID    insulin lispro  0-4 Units SubCUTAneous Q6H    miconazole   Topical BID    hydrocortisone   Topical BID    lidocaine PF  5 mL Tracheal Tube Once    sodium chloride flush  5-40 mL IntraVENous 2 times per day    pantoprazole (PROTONIX) 40 mg in sodium chloride (PF) 0.9 % 10 mL injection  40 mg IntraVENous Daily       Social History:     Social History     Socioeconomic

## 2024-10-31 NOTE — PROGRESS NOTES
Pulmonary Critical Care Progress Note    Patient seen for the follow up of Intra-abdominal abscess (HCC)     Subjective:    She is still on room air.  She is having less hallucination.  She is off Precedex.  She is on TPN.  No new complaints today.    examination:    Vitals: /60   Pulse 73   Temp 97.5 °F (36.4 °C) (Temporal)   Resp 21   Ht 1.6 m (5' 3\")   Wt 106 kg (233 lb 9.6 oz)   SpO2 97%   BMI 41.38 kg/m²   SpO2  Av.7 %  Min: 96 %  Max: 97 %  General appearance: Awake alert no acute distress  Neck: No JVD  Lungs: Decreased breath sound no crackles or wheezes  Heart: regular rate and rhythm, S1, S2 normal, no gallop  Abdomen: Soft, non tender, + BS dressing in place evidence of fecal material in container  Extremities: no cyanosis or clubbing.  1+ edema    LABs:    CBC:   Recent Labs     10/29/24  0552 10/30/24  0601   WBC 19.3* 16.1*   HGB 9.5* 9.2*   HCT 29.4* 28.8*   * 521*     BMP:   Recent Labs     10/30/24  0601 10/31/24  0440    136   K 4.2 4.2   CO2 25 25   BUN 16 14   CREATININE 0.6 0.6   LABGLOM >90 >90   GLUCOSE 197* 171*        Latest Reference Range & Units 10/18/24 04:12   POC HCO3 21.0 - 28.0 mmol/L 24.9   POC O2 SAT 94.0 - 98.0 % 97.5   POC pCO2 35.0 - 48.0 mm Hg 33.7 (L)   POC pH 7.350 - 7.450  7.476 (H)   POC PO2 83.0 - 108.0 mm Hg 88.2   (L): Data is abnormally low  (H): Data is abnormally high  Radiology:  Chest x-ray 10/26 reviewed  1. No significant interval change.  2. Right-sided PICC with tip position cavoatrial junction.       Chest x-ray 10/24  1. Bibasilar airspace disease and small bilateral effusions.  Stable  cardiomegaly.  Mild pulmonary vascular congestion.  Findings favor CHF  related changes with underlying infiltrate not excluded.  Follow-up is  recommended to document resolution.  2. Tubes and right-sided PICC as above.    Chest x-ray 10/21         Chest x-ray 10/19 reviewed      CT abdomen pelvis 10/29  1. Large soft tissue defect within the

## 2024-11-01 ENCOUNTER — APPOINTMENT (OUTPATIENT)
Dept: GENERAL RADIOLOGY | Age: 69
End: 2024-11-01
Attending: STUDENT IN AN ORGANIZED HEALTH CARE EDUCATION/TRAINING PROGRAM
Payer: COMMERCIAL

## 2024-11-01 LAB
ALBUMIN SERPL-MCNC: 2.9 G/DL (ref 3.5–5.2)
ALBUMIN SERPL-MCNC: NORMAL G/DL (ref 3.5–5.2)
ALP SERPL-CCNC: 91 U/L (ref 35–104)
ALP SERPL-CCNC: NORMAL U/L (ref 35–104)
ALT SERPL-CCNC: 11 U/L (ref 5–33)
ALT SERPL-CCNC: NORMAL U/L (ref 5–33)
ANION GAP SERPL CALCULATED.3IONS-SCNC: 11 MMOL/L (ref 9–17)
ANION GAP SERPL CALCULATED.3IONS-SCNC: NORMAL MMOL/L
AST SERPL-CCNC: 14 U/L
AST SERPL-CCNC: NORMAL U/L
BILIRUB DIRECT SERPL-MCNC: 0.1 MG/DL
BILIRUB DIRECT SERPL-MCNC: NORMAL MG/DL
BILIRUB INDIRECT SERPL-MCNC: 0.1 MG/DL (ref 0–1)
BILIRUB SERPL-MCNC: 0.2 MG/DL (ref 0.3–1.2)
BILIRUB SERPL-MCNC: NORMAL MG/DL (ref 0.3–1.2)
BUN SERPL-MCNC: 14 MG/DL (ref 8–23)
BUN SERPL-MCNC: NORMAL MG/DL (ref 8–23)
BUN/CREAT SERPL: 23 (ref 9–20)
BUN/CREAT SERPL: NORMAL (ref 9–20)
CALCIUM SERPL-MCNC: 7.8 MG/DL (ref 8.6–10.4)
CALCIUM SERPL-MCNC: NORMAL MG/DL (ref 8.6–10.4)
CHLORIDE SERPL-SCNC: 100 MMOL/L (ref 98–107)
CHLORIDE SERPL-SCNC: NORMAL MMOL/L (ref 98–107)
CO2 SERPL-SCNC: 24 MMOL/L (ref 20–31)
CO2 SERPL-SCNC: NORMAL MMOL/L (ref 20–31)
CREAT SERPL-MCNC: 0.6 MG/DL (ref 0.5–0.9)
CREAT SERPL-MCNC: NORMAL MG/DL (ref 0.5–0.9)
GFR, ESTIMATED: >90 ML/MIN/1.73M2
GFR, ESTIMATED: NORMAL ML/MIN/1.73M2
GLUCOSE BLD-MCNC: 126 MG/DL (ref 65–105)
GLUCOSE BLD-MCNC: 164 MG/DL (ref 65–105)
GLUCOSE BLD-MCNC: 174 MG/DL (ref 65–105)
GLUCOSE BLD-MCNC: 190 MG/DL (ref 65–105)
GLUCOSE SERPL-MCNC: 160 MG/DL (ref 70–99)
GLUCOSE SERPL-MCNC: NORMAL MG/DL (ref 70–99)
MAGNESIUM SERPL-MCNC: 2 MG/DL (ref 1.6–2.6)
MAGNESIUM SERPL-MCNC: NORMAL MG/DL (ref 1.6–2.6)
PHOSPHATE SERPL-MCNC: 3.7 MG/DL (ref 2.6–4.5)
PHOSPHATE SERPL-MCNC: NORMAL MG/DL (ref 2.6–4.5)
POTASSIUM SERPL-SCNC: 4.7 MMOL/L (ref 3.7–5.3)
POTASSIUM SERPL-SCNC: NORMAL MMOL/L (ref 3.7–5.3)
PROT SERPL-MCNC: 6 G/DL (ref 6.4–8.3)
PROT SERPL-MCNC: NORMAL G/DL (ref 6.4–8.3)
SODIUM SERPL-SCNC: 135 MMOL/L (ref 135–144)
SODIUM SERPL-SCNC: NORMAL MMOL/L (ref 135–144)

## 2024-11-01 PROCEDURE — 2580000003 HC RX 258

## 2024-11-01 PROCEDURE — 99232 SBSQ HOSP IP/OBS MODERATE 35: CPT | Performed by: HOSPITALIST

## 2024-11-01 PROCEDURE — 6360000002 HC RX W HCPCS

## 2024-11-01 PROCEDURE — 97530 THERAPEUTIC ACTIVITIES: CPT

## 2024-11-01 PROCEDURE — 6360000002 HC RX W HCPCS: Performed by: INTERNAL MEDICINE

## 2024-11-01 PROCEDURE — 84100 ASSAY OF PHOSPHORUS: CPT

## 2024-11-01 PROCEDURE — 80048 BASIC METABOLIC PNL TOTAL CA: CPT

## 2024-11-01 PROCEDURE — 2580000003 HC RX 258: Performed by: INTERNAL MEDICINE

## 2024-11-01 PROCEDURE — 6370000000 HC RX 637 (ALT 250 FOR IP)

## 2024-11-01 PROCEDURE — 82947 ASSAY GLUCOSE BLOOD QUANT: CPT

## 2024-11-01 PROCEDURE — 71045 X-RAY EXAM CHEST 1 VIEW: CPT

## 2024-11-01 PROCEDURE — 2500000003 HC RX 250 WO HCPCS

## 2024-11-01 PROCEDURE — 97164 PT RE-EVAL EST PLAN CARE: CPT

## 2024-11-01 PROCEDURE — 83735 ASSAY OF MAGNESIUM: CPT

## 2024-11-01 PROCEDURE — 97116 GAIT TRAINING THERAPY: CPT

## 2024-11-01 PROCEDURE — 80076 HEPATIC FUNCTION PANEL: CPT

## 2024-11-01 PROCEDURE — 2060000000 HC ICU INTERMEDIATE R&B

## 2024-11-01 PROCEDURE — 97606 NEG PRS WND THER DME>50 SQCM: CPT

## 2024-11-01 PROCEDURE — 94761 N-INVAS EAR/PLS OXIMETRY MLT: CPT

## 2024-11-01 RX ORDER — DIPHENHYDRAMINE HYDROCHLORIDE 50 MG/ML
25 INJECTION INTRAMUSCULAR; INTRAVENOUS NIGHTLY PRN
Status: DISCONTINUED | OUTPATIENT
Start: 2024-11-01 | End: 2024-11-05

## 2024-11-01 RX ORDER — LANOLIN ALCOHOL/MO/W.PET/CERES
6 CREAM (GRAM) TOPICAL NIGHTLY PRN
Status: DISCONTINUED | OUTPATIENT
Start: 2024-11-01 | End: 2024-11-05

## 2024-11-01 RX ADMIN — SODIUM CHLORIDE, PRESERVATIVE FREE 10 ML: 5 INJECTION INTRAVENOUS at 21:34

## 2024-11-01 RX ADMIN — SODIUM CHLORIDE: 9 INJECTION, SOLUTION INTRAVENOUS at 02:45

## 2024-11-01 RX ADMIN — PANTOPRAZOLE SODIUM 40 MG: 40 INJECTION, POWDER, FOR SOLUTION INTRAVENOUS at 08:44

## 2024-11-01 RX ADMIN — ANTI-FUNGAL POWDER MICONAZOLE NITRATE TALC FREE: 1.42 POWDER TOPICAL at 08:45

## 2024-11-01 RX ADMIN — POTASSIUM CHLORIDE: 2 INJECTION, SOLUTION, CONCENTRATE INTRAVENOUS at 18:12

## 2024-11-01 RX ADMIN — HEPARIN SODIUM 5000 UNITS: 5000 INJECTION INTRAVENOUS; SUBCUTANEOUS at 08:44

## 2024-11-01 RX ADMIN — DIPHENHYDRAMINE HYDROCHLORIDE 25 MG: 50 INJECTION INTRAMUSCULAR; INTRAVENOUS at 23:41

## 2024-11-01 RX ADMIN — MICONAZOLE NITRATE: 20 CREAM TOPICAL at 21:33

## 2024-11-01 RX ADMIN — WATER 2000 MG: 1 INJECTION INTRAMUSCULAR; INTRAVENOUS; SUBCUTANEOUS at 17:46

## 2024-11-01 RX ADMIN — METRONIDAZOLE 500 MG: 5 INJECTION, SOLUTION INTRAVENOUS at 18:04

## 2024-11-01 RX ADMIN — ALTEPLASE 1 MG: 2.2 INJECTION, POWDER, LYOPHILIZED, FOR SOLUTION INTRAVENOUS at 06:01

## 2024-11-01 RX ADMIN — DIPHENHYDRAMINE HYDROCHLORIDE, ZINC ACETATE: 2; .1 CREAM TOPICAL at 08:44

## 2024-11-01 RX ADMIN — FUROSEMIDE 40 MG: 10 INJECTION, SOLUTION INTRAMUSCULAR; INTRAVENOUS at 08:44

## 2024-11-01 RX ADMIN — METRONIDAZOLE 500 MG: 5 INJECTION, SOLUTION INTRAVENOUS at 02:46

## 2024-11-01 RX ADMIN — I.V. FAT EMULSION 100 ML: 20 EMULSION INTRAVENOUS at 18:05

## 2024-11-01 RX ADMIN — HEPARIN SODIUM 5000 UNITS: 5000 INJECTION INTRAVENOUS; SUBCUTANEOUS at 21:32

## 2024-11-01 RX ADMIN — SODIUM CHLORIDE, PRESERVATIVE FREE 10 ML: 5 INJECTION INTRAVENOUS at 08:47

## 2024-11-01 RX ADMIN — METRONIDAZOLE 500 MG: 5 INJECTION, SOLUTION INTRAVENOUS at 11:22

## 2024-11-01 RX ADMIN — FLUCONAZOLE 400 MG: 2 INJECTION, SOLUTION INTRAVENOUS at 08:41

## 2024-11-01 RX ADMIN — MICONAZOLE NITRATE: 20 CREAM TOPICAL at 08:45

## 2024-11-01 RX ADMIN — DIPHENHYDRAMINE HYDROCHLORIDE, ZINC ACETATE: 2; .1 CREAM TOPICAL at 04:48

## 2024-11-01 ASSESSMENT — PAIN SCALES - GENERAL
PAINLEVEL_OUTOF10: 0
PAINLEVEL_OUTOF10: 0

## 2024-11-01 NOTE — PROGRESS NOTES
Occupational Therapy  Facility/Department: [facility]  Daily Treatment Note  NAME: Mandie Bustamante  : 1955  MRN: [id number]    Date of Service: 2024    RN reports patient is medically stable for therapy treatment this date.    Chart reviewed prior to treatment and patient is agreeable for therapy.  All lines intact and patient positioned comfortably at end of treatment.  All patient needs addressed prior to ending therapy session.      Discharge Recommendations:  Patient would benefit from continued therapy after discharge  Pt currently functioning below baseline.  Recommend daily inpatient skilled therapy at time of discharge to maximize long term outcomes and prevent re-admission. Please refer to AM-PAC score for current level of function.  OT Equipment Recommendations  Equipment Needed: Yes  Mobility Devices: ADL Assistive Devices  ADL Assistive Devices: Emergency Alert System;Reacher;Long-handled Shoe Horn;Long-handled Sponge;Sock-Aid Hard    Patient Diagnosis(es): The encounter diagnosis was Edema, unspecified type.     Assessment   Assessment: Pt tolerated session fair and is progressing toward goals able to complete bed mobility with increased time and CGA. Pt needing min Ax2 for STS at EOB with RW. Pt with RW needing Mod A x2 for ambualtion from EOB to bedside chair. Skilled OT indicated to increase safety and IND with all functional tasks to ensure a safe return to PLOF.  Activity Tolerance: Patient limited by fatigue;Patient limited by endurance  Discharge Recommendations: Patient would benefit from continued therapy after discharge  Equipment Needed: Yes  Mobility Devices: ADL Assistive Devices     Plan  Occupational Therapy Plan  Times Per Week: 4-5x/wk 1x/day as pia  Specific Instructions for Next Treatment: Pt may benefit from SLUMS exam  Current Treatment Recommendations: Strengthening;Balance training;Cognitive reorientation;Safety education & training;Patient/Caregiver education &    Minutes 17             Co-treatment with PT warranted secondary to decreased safety and independence requiring 2 skilled therapy professionals to address individual discipline's goals. OT addressing preparation for ADL transfer, sitting balance for increased ADL performance, sitting/activity tolerance, functional reaching, environmental safety/scanning, fall prevention, functional mobility for ADL transfers, ability to sequence and follow directions, bed mobility tech, and functional UE strength.    MARIANNA Muhammad, OTR/L

## 2024-11-01 NOTE — PROGRESS NOTES
Benjamin Almazan MD   Urology Progress Note            Subjective: Follow-up neurogenic bladder urinary retention    Patient Vitals for the past 24 hrs:   BP Temp Temp src Pulse Resp SpO2   11/01/24 0400 102/74 98.6 °F (37 °C) Temporal 77 18 97 %   11/01/24 0237 -- -- -- 76 17 98 %   11/01/24 0000 119/61 97.6 °F (36.4 °C) Temporal 86 24 97 %   10/31/24 2000 (!) 127/55 97.1 °F (36.2 °C) Temporal 85 24 96 %   10/31/24 1800 137/65 -- -- 86 27 --   10/31/24 1600 -- 97.5 °F (36.4 °C) Temporal 98 27 --   10/31/24 1200 (!) 147/95 -- -- 87 -- --   10/31/24 0800 125/80 -- -- 79 26 --       Intake/Output Summary (Last 24 hours) at 11/1/2024 0535  Last data filed at 11/1/2024 0407  Gross per 24 hour   Intake 929.22 ml   Output 3425 ml   Net -2495.78 ml       Recent Labs     10/29/24  0552 10/30/24  0601   WBC 19.3* 16.1*   HGB 9.5* 9.2*   HCT 29.4* 28.8*   MCV 90.7 93.5   * 521*     Recent Labs     10/30/24  0601 10/31/24  0440 11/01/24  0448    136 131*   K 4.2 4.2 PENDING    100 98   CO2 25 25 24   PHOS 3.3  --  5.9*   BUN 16 14 15   CREATININE 0.6 0.6 0.6       No results for input(s): \"COLORU\", \"PHUR\", \"LABCAST\", \"WBCUA\", \"RBCUA\", \"MUCUS\", \"TRICHOMONAS\", \"YEAST\", \"BACTERIA\", \"CLARITYU\", \"SPECGRAV\", \"LEUKOCYTESUR\", \"UROBILINOGEN\", \"BILIRUBINUR\", \"BLOODU\" in the last 72 hours.    Invalid input(s): \"NITRATE\", \"GLUCOSEUKETONESUAMORPHOUS\"    Additional Lab/culture results:    Physical Exam: Patient improving gradually  : Remove the Bar in a.m. for a void trial  Monitor voiding symptoms and postvoid residual  Interval Imaging Findings:    Impression:    Patient Active Problem List   Diagnosis    Abdominal hernia without obstruction and without gangrene    Anxiety    BMI 45.0-49.9, adult    Bursitis of right shoulder    Gastro-esophageal reflux disease without esophagitis    History of parathyroidectomy    Essential hypertension    Incisional hernia, without obstruction or gangrene     Incisional pain    Mixed dyslipidemia    Morbid (severe) obesity due to excess calories    Other abnormal glucose    Pain in bone fixation device (HCC)    Primary hyperparathyroidism (HCC)    Primary insomnia    Right shoulder pain    Seasonal allergic rhinitis    Sensitive skin    Vitamin D insufficiency    Elevated fasting glucose    Hyponatremia    Hypokalemia    Elevated brain natriuretic peptide (BNP) level    Leukocytosis    DJD (degenerative joint disease), ankle and foot, right    Prediabetes    Intra-abdominal abscess (HCC)    Edema    Colocutaneous fistula    Abdominal wall ulcer, with fat layer exposed (HCC)    Acute metabolic encephalopathy       Plan: SUZETTE Almazan MD  5:35 AM 11/1/2024

## 2024-11-01 NOTE — PROGRESS NOTES
Physical Therapy  Facility/Department: [facility] CV[facility]  Daily Treatment Note  NAME: aMndie Bustamante  : 1955  MRN: [id number]    Date of Service: 2024    Discharge Recommendations:  Patient would benefit from continued therapy after discharge      Pt currently functioning below baseline.  Recommend daily inpatient skilled therapy at time of discharge to maximize long term outcomes and prevent re-admission. Please refer to AM-PAC score for current level of function.     Patient Diagnosis(es): The encounter diagnosis was Edema, unspecified type.    Assessment  Assessment: Patient requested assistance back to bed with reports of increase fatigued and pain in ABD. Marlyn stedy used for safety of patient and staff. Patient performed STS MIN x 2 A within marlyn stedy and transfer from recliner to EOB. Sat EOB x 5 mins for mgmt of lines and prep for bed mobility. Sit to supine log roll with MOD x 2 A and scooting into midline of bed, required boost to HOB with DEP x 2 A. Patient progressing toward STGs with improved endurance, strength and ability to follow directions easily. Patient would benefit from continued skilled PT services.  Activity Tolerance: Patient limited by endurance    Plan  Physical Therapy Plan  General Plan: 5-7 times per week  Specific Instructions for Next Treatment: Increase amb distance  Current Treatment Recommendations: Strengthening;ROM;Patient/Caregiver education & training;Cognitive reorientation;Positioning;Balance training;Transfer training;Endurance training;Gait training;Neuromuscular re-education;Home exercise program;Safety education & training;Equipment evaluation, education, & procurement;Co-Treatment;Therapeutic activities    Restrictions  Restrictions/Precautions  Restrictions/Precautions: General Precautions, Up as Tolerated, Fall Risk  Required Braces or Orthoses?: No  Position Activity Restriction  Other position/activity restrictions: RUE PICC, simons cath, telemetry, cont SPO2  A Little  How much help is needed moving to and from a bed to a chair?: A Lot  How much help is needed standing up from a chair using your arms?: A Lot  How much help is needed walking in hospital room?: Total  How much help is needed climbing 3-5 steps with a railing?: Total  AM-PAC Inpatient Mobility Raw Score : 12  AM-PAC Inpatient T-Scale Score : 35.33  Mobility Inpatient CMS 0-100% Score: 68.66  Mobility Inpatient CMS G-Code Modifier : CL         Therapy Time   Individual Concurrent Group Co-treatment   Time In 1303         Time Out 1326         Minutes 23         Timed Code Treatment Minutes: 15 Minutes       Shandra Gresham, PTA

## 2024-11-01 NOTE — CARDIO/PULMONARY
[facility] ICU Quality Flow/Interdisciplinary Rounds Progress Note    Quality Flow Rounds held on November 1, 2024 at 0930    Disciplines Attending:  Bedside Nurse, , , and Nursing Unit Leadership    Anticipated Discharge Date:   11/1/2024    Anticipated Discharge Disposition: LTACH    Readmission Risk              Risk of Unplanned Readmission:  31           Discussed patient goal for the day, patient clinical progression, and barriers to discharge.  The following Goal(s) of the Day/Commitment(s) have been identified:       Received call from Beth at  [facility].  Auth pending.  PS sent to Angelina Man, General surgery, Read immediately.    Placing wound vac today per DOMINGO Myers.  Uses Miranda Steady to move patient.      Olga Melendez  November 1, 2024

## 2024-11-01 NOTE — PROGRESS NOTES
Comprehensive Nutrition Assessment    Type and Reason for Visit:  Reassess    Nutrition Recommendations/Plan:   Continue TPN at 80 ml/hr, 1920 ml total volume, lipids at 100 ml, custom lytes  NPO  Monitor TPN rate, GI function, and labs     Malnutrition Assessment:  Malnutrition Status:  Moderate malnutrition (10/17/24 1147)    Context:  Acute Illness     Findings of the 6 clinical characteristics of malnutrition:  Energy Intake:  50% or less of estimated energy requirements for 5 or more days  Weight Loss:  Unable to assess     Body Fat Loss:  Unable to assess     Muscle Mass Loss:  Unable to assess    Fluid Accumulation:  Moderate to Severe     Strength:       Nutrition Assessment:    Patient continues with EC fistula, no stool from wound/fistula at this time; skin around wound is improving, wound vac placement today. Patient remains NPO. Tolerating TPN well. Labs, meds, PMH reviewed.    Nutrition Related Findings:    Unable to weight patient today due to bed scale, LBM 10/31, +1 UE and +2 LE edema, EC fistula Wound Type: Wound Vac, Surgical Incision       Current Nutrition Intake & Therapies:    Average Meal Intake: NPO  Average Supplements Intake: NPO  Current Parenteral Nutrition Orders:  Type and Formula: Premix Central   Lipids: 100ml  Duration: Continuous  Rate/Volume: 1920  Current PN Order Provides: 1920 ml, 96 g protein, 384 g dextrose, 100 ml lipids = 1890 kcal  Goal PN Orders Provides: 250 ml lipids (500 kcal), PN at 65 ml/hr (1560 ml): 1373 kcal, 78 gm protein, 312 gm dextrose (total 1873 kcal with lipids)    Anthropometric Measures:  Height: 160 cm (5' 3\")  Ideal Body Weight (IBW): 115 lbs (52 kg)       Current Body Weight: 105.7 kg (233 lb), 203.2 % IBW. Weight Source: Bed Scale  Current BMI (kg/m2): 41.3                             BMI Categories: Obese Class 3 (BMI 40.0 or greater)    Estimated Daily Nutrient Needs:  Energy Requirements Based On: Kcal/kg  Weight Used for Energy Requirements:  Current  Energy (kcal/day): 5106-9350 kcal (12-15 kcal/kg)  Weight Used for Protein Requirements: Ideal  Protein (g/day): 67-83 gm of protein (1.3-1.6 gm/kg)  Method Used for Fluid Requirements: 1 ml/kcal  Fluid (ml/day): 3748-6388 mL    Nutrition Diagnosis:   Inadequate oral intake related to inadequate protein-energy intake as evidenced by NPO or clear liquid status due to medical condition, nutrition support - parenteral nutrition, poor intake prior to admission, GI abnormality, nausea    Nutrition Interventions:   Food and/or Nutrient Delivery: Continue NPO, Continue Current Parenteral Nutrition  Nutrition Education/Counseling: Education not indicated  Coordination of Nutrition Care: Continue to monitor while inpatient       Goals:  Goals: Meet at least 75% of estimated needs, Tolerate nutrition support at goal rate  Type of Goal: Continue current goal  Previous Goal Met: Progressing toward Goal(s)    Nutrition Monitoring and Evaluation:      Food/Nutrient Intake Outcomes: Parenteral Nutrition Intake/Tolerance  Physical Signs/Symptoms Outcomes: Biochemical Data, Fluid Status or Edema, Weight, Skin, GI Status    Discharge Planning:    Too soon to determine     Gill Carter RD  Contact: [phone]

## 2024-11-01 NOTE — FLOWSHEET NOTE
10/31/24 2033   Treatment Team Notification   Reason for Communication Review case   Name of Team Member Notified Michael   Treatment Team Role Advanced Practice Nurse   Method of Communication Secure Message   Response Waiting for response     Pt requesting tylenol for a HA. Orders currently only available for OG or rectal. Writer reached out to provider for tylenol PO. Waiting for orders at this time.

## 2024-11-01 NOTE — PLAN OF CARE
Problem: Discharge Planning  Goal: Discharge to home or other facility with appropriate resources  Outcome: Progressing     Problem: Skin/Tissue Integrity  Goal: Absence of new skin breakdown  Description: 1.  Monitor for areas of redness and/or skin breakdown  2.  Assess vascular access sites hourly  3.  Every 4-6 hours minimum:  Change oxygen saturation probe site  4.  Every 4-6 hours:  If on nasal continuous positive airway pressure, respiratory therapy assess nares and determine need for appliance change or resting period.  Outcome: Progressing     Problem: ABCDS Injury Assessment  Goal: Absence of physical injury  Outcome: Progressing     Problem: Safety - Adult  Goal: Free from fall injury  Outcome: Progressing     Problem: Metabolic/Fluid and Electrolytes - Adult  Goal: Electrolytes maintained within normal limits  Outcome: Progressing  Goal: Hemodynamic stability and optimal renal function maintained  Outcome: Progressing  Goal: Glucose maintained within prescribed range  Outcome: Progressing     Problem: Neurosensory - Adult  Goal: Achieves stable or improved neurological status  Outcome: Progressing  Goal: Absence of seizures  Outcome: Progressing  Goal: Remains free of injury related to seizures activity  Outcome: Progressing     Problem: Cardiovascular - Adult  Goal: Maintains optimal cardiac output and hemodynamic stability  Outcome: Progressing  Goal: Absence of cardiac dysrhythmias or at baseline  Outcome: Progressing     Problem: Respiratory - Adult  Goal: Achieves optimal ventilation and oxygenation  Outcome: Progressing     Problem: Gastrointestinal - Adult  Goal: Maintains or returns to baseline bowel function  Outcome: Progressing  Goal: Maintains adequate nutritional intake  Outcome: Progressing     Problem: Genitourinary - Adult  Goal: Urinary catheter remains patent  Outcome: Progressing     Problem: Skin/Tissue Integrity - Adult  Goal: Skin integrity remains intact  Outcome:  Progressing  Flowsheets (Taken 11/1/2024 0522 by Chely Harkins, RN)  Skin Integrity Remains Intact:   Monitor for areas of redness and/or skin breakdown   Every 4-6 hours minimum: Change oxygen saturation probe site   Assess vascular access sites hourly   Every 4-6 hours: If on nasal continuous positive airway pressure, respiratory therapy assesses nares and determine need for appliance change or resting period  Goal: Incisions, wounds, or drain sites healing without S/S of infection  Outcome: Progressing  Flowsheets (Taken 11/1/2024 0522 by Chely Harkins, RN)  Incisions, Wounds, or Drain Sites Healing Without Sign and Symptoms of Infection:   ADMISSION and DAILY: Assess and document risk factors for pressure ulcer development   TWICE DAILY: Assess and document skin integrity   TWICE DAILY: Assess and document dressing/incision, wound bed, drain sites and surrounding tissue   Implement wound care per orders   Initiate isolation precautions as appropriate   Initiate pressure ulcer prevention bundle as indicated     Problem: Hematologic - Adult  Goal: Maintains hematologic stability  Outcome: Progressing     Problem: Musculoskeletal - Adult  Goal: Return mobility to safest level of function  Outcome: Progressing     Problem: Nutrition Deficit:  Goal: Optimize nutritional status  Outcome: Progressing     Problem: Pain  Goal: Verbalizes/displays adequate comfort level or baseline comfort level  Outcome: Progressing     Problem: Chronic Conditions and Co-morbidities  Goal: Patient's chronic conditions and co-morbidity symptoms are monitored and maintained or improved  Outcome: Progressing

## 2024-11-01 NOTE — PROGRESS NOTES
to Sit: Contact guard assistance  Scooting: Contact guard assistance  Bed Mobility Comments: Patient required manual and verbal cues for initiating and sequencing bed mobility. Patient required increased time and effort to complete transfers.  Transfers  Sit to Stand: Minimal Assistance;2 Person Assistance  Stand to Sit: Minimal Assistance;2 Person Assistance  Comment: RW with transfers. Patient educated to push up from chair to stand instead of pulling up on walker, and to reach back for chair before sitting instead of hanging on to walker. Patient educated to back all the way up to chair before sitting and to use walker all the way to chair instead of pushing walker aside when approaching chair. Patient initially with posterior lean and required manual and verbal cues to improve.  Ambulation  Surface: Level tile  Device: Rolling Walker  Assistance: Moderate assistance;2 Person assistance  Quality of Gait: Unsteady gait, requires verbal cues for initiation and and assist to manuever RW. Very fatigued by the time she got to the chair, and needed encouraged not to sit until she was close enough to chair.  Gait Deviations: Slow Donna;Increased NURIS;Decreased step length;Decreased step height;Shuffles  Distance: 3 feet bed to chair     Balance  Sitting - Static: Fair;+  Sitting - Dynamic: Fair;-  Standing - Static: Fair;-  Standing - Dynamic: Fair;-  Comments: Standing balance with RW, posterior lean in sitting and standing, required cues to correct          OutComes Score  Balance Score: 1 (11/01/24 1320)  Gait Score: 2 (11/01/24 1320)        Tinetti Total Score: 3 (11/01/24 1320)                                   AM-PAC - Mobility    AM-PAC Basic Mobility - Inpatient   How much help is needed turning from your back to your side while in a flat bed without using bedrails?: A Little  How much help is needed moving from lying on your back to sitting on the side of a flat bed without using bedrails?: A Little  How much    Minutes 26         Timed Code Treatment Minutes: 15 Minutes       Marleen Allen, PT

## 2024-11-01 NOTE — PROGRESS NOTES
[facility]  Progress Note      NAME:  Mandie Bustamante  MEDICAL RECORD NUMBER:  [id number]  AGE: 69 y.o.   GENDER: female  : 1955  TODAY'S DATE:  2024    [facility] nursing requested to apply NPWT to abdominal wound. Wound vac dressing of white foam to wound base and black foam to fill cavity. Will plan for vac change either Monday or Tuesday next week. Patient relates that she is planning to discharge to [facility] and awaiting pre certification.     If patient discharges   - remove vac dressing   - cleanse wound with normal saline   - pack wound with saline moistened gauze and change every 12 hours       MILAGRO updated       Measurements:  Negative Pressure Wound Therapy Abdomen Medial (Active)   $ Standard NPWT >50 sq cm PER TX $ Yes 24 1654   Wound Type Surgical 24 1654   Unit Type [employer] Ulta 24 1654   Dressing Type White Foam;Black Foam 24 1654   Number of pieces used 4 24 1654   Cycle Continuous 24 1654   Target Pressure (mmHg) 100 24 1654   Canister changed? No 24 1654   Dressing Status New dressing applied 24 1654   Dressing Changed Changed/New 24 1654   Dressing Change Due 24 1654   Number of days: 0       Wound Abdomen Left;Lower (Active)   Wound Image   10/24/24 1552   Wound Etiology Surgical 24 1654   Dressing Status New dressing applied 24 1654   Wound Cleansed Irrigated with saline 24 1654   Dressing/Treatment Negative pressure wound therapy 24 1654   Dressing Change Due 24 1654   Wound Length (cm) 2.8 cm 24 1654   Wound Width (cm) 14.6 cm 24 1654   Wound Depth (cm) 3.6 cm 24 1654   Wound Surface Area (cm^2) 40.88 cm^2 24 1654   Change in Wound Size % (l*w) 14.83 24 1654   Wound Volume (cm^3) 147.168 cm^3 24 1654   Wound Assessment Pink/red 24 1654   Drainage Amount Moderate (25-50%) 24   Drainage Description

## 2024-11-01 NOTE — PROGRESS NOTES
[facility]  Office: [phone]  Keenan Foster DO, Kevin Siegel DO, Carlos A Orta DO, Taye Morales DO, Maxx Madison MD, Meena Eckert MD, Efren Vo MD, Makayla Last MD,  Jerman Christian MD, Gina Bourgeois MD, Gladys Fry MD,  Anu Murillo DO, Jennifer Blanca MD, Oral Duncan MD, Harlan Foster DO, Katya Simmons MD,  Filiberto Slade DO, Elizabteh Alves MD, Samantha Pinto MD, Arlene Bryant MD, Bandar Barrios MD,  Kulwant Rodrigez MD, Michelle Dennison MD, Jinny Steel MD, Kathia Vargas MD, Gerardo Alvarez MD, Richard Johnson MD, Demetri Ibarra DO, Benjie Alvarenga MD, Shirley Waterhouse, CNP,  Georgette Villarreal CNP, Demetri Toure, CATHY,  Silvina Castano, CARINA, Tatiana Mckeon, CNP, Angelina Harper, CNP, Alma Rosa Peng, CNP, Rashmi Mccartney, CNP, Kimberley Grant PA-C, Eleanor Garg PA-C, Stephanie Kaminski, CATHY, Tono Yee, CNP,  Chrissie Schwartz, CNP, Sheri Jones, CNP,  Nadiya Rick, CNP, Cindi Lam, CNP         [facility]   IN-PATIENT SERVICE   [facility]    Progress Note    11/1/2024    10:00 AM    Name:   Mandie Bustamante  MRN:     [id number]     Acct:      [id number]   Room:   2033/2033-01  IP Day:  22  Admit Date:  10/10/2024  6:11 PM    PCP:   No primary care provider on file.  Code Status:  Full Code    Subjective:     Patient seen in follow-up for abdominal pain secondary to abdominal abscess with multiple fistulas.  Patient states \"I am doing okay\"    Chart reviewed, patient had a complicated past medical history.  The patient underwent robotic incisional hernia repair with mesh placement on October 1 of this year.  The patient presented from an outlying facility with hyponatremia and was found to have multiple abdominal abscesses.  The patient was brought to [facility] and where on October 16 she underwent exploratory surgery with medics explantation and wound VAC placement.  She subsequently underwent repeat evaluation in the operating room on October 18 for  this point in time  Acute metabolic encephalopathy  Resolved, secondary to #1  Hyponatremia  Resolved  Hyperglycemia  Continue insulin sliding scale  Hemoglobin A1c reported to be 6.3%    Discharge to LTAC once arrangements made    Medical Decision Making: Williams Foster DO  11/1/2024  10:00 AM

## 2024-11-01 NOTE — PROGRESS NOTES
General Surgery:  Daily Progress Note          PATIENT NAME: Mandie Bustamante     TODAY'S DATE: 11/1/2024, 7:26 AM    SUBJECTIVE:     Pt seen and examined at bedside.  No acute overnight events. Afebrile, vitals stable. Much more alert and oriented. Overall doing well. HAving bowel function.    OBJECTIVE:   VITALS:  /74   Pulse 77   Temp 98.6 °F (37 °C) (Temporal)   Resp 18   Ht 1.6 m (5' 3\")   Wt 106 kg (233 lb 9.6 oz)   SpO2 97%   BMI 41.38 kg/m²      INTAKE/OUTPUT:      Intake/Output Summary (Last 24 hours) at 11/1/2024 0726  Last data filed at 11/1/2024 0407  Gross per 24 hour   Intake 298.96 ml   Output 3075 ml   Net -2776.04 ml       PHYSICAL EXAM:  General Appearance: awake, alert, no acute distress  HEENT:  Normocephalic, atraumatic, mucus membranes moist  Heart: Regular rate and rhythm  Lungs: normal effort with symmetric rise and fall of chest wall  Abdomen: Soft, distended, tender to palpation around incision.  Incisional wound with feculent output.  Extremities: Bilateral lower extremity pitting edema  Skin: Skin color, texture, turgor normal. Superficial erythema around wound improving Healthy granulation tissue seen on the margins of the wound.                Data:  CBC:   Recent Labs     10/30/24  0601   WBC 16.1*   HGB 9.2*   *     Chemistry:   Recent Labs     10/30/24  0601 10/31/24  0440 11/01/24  0448    136 Unable to perform testing: Results suspect due to history of previous lab results.   K 4.2 4.2 Unable to perform testing: Results suspect due to history of previous lab results.    100 Unable to perform testing: Results suspect due to history of previous lab results.   CO2 25 25 Unable to perform testing: Results suspect due to history of previous lab results.   GLUCOSE 197* 171* Unable to perform testing: Results suspect due to history of previous lab results.   BUN 16 14 Unable to perform testing: Results suspect due to history of previous lab results.  Gastro-esophageal reflux disease without esophagitis [K21.9] 11/21/2015       69 y.o. female status post robotic assisted laparoscopic incisional hernia repair with mesh 10/1/24  S/p from exploratory laparotomy and wound VAC placement 10/18/24  EC fistula present.     Plan:   Continue medical management and supportive care per ICU and primary team.  Continue TPN and keep n.p.o.  Wound rash improving significantly  Continue antibiotics as indicated by primary team  Recommend out of bed to chair again with ambulation as able  EC fistula - keep NPO/TPN and local wound care  Appreciate assistance from wound/ostomy team with wound care mgmt for better control of the fistula and deep/large wound  Will ask wound/ostomy to assist with placing black foam wound vac today    Electronically signed by Angelina Man DO  on 11/1/2024 at 7:26 AM

## 2024-11-01 NOTE — DISCHARGE INSTR - COC
Description Serosanguinous 11/01/24 1654   Odor None 11/01/24 1654   Lola-wound Assessment Dry/flaky;Warm 11/01/24 1654   Margins Defined edges 11/01/24 1654   Wound Thickness Description not for Pressure Injury Full thickness 11/01/24 0800   Number of days:        Incision 10/01/24 Abdomen Medial;Upper (Active)   Dressing Status Other (Comment) 11/01/24 0800   Incision Cleansed Not Cleansed 11/01/24 0800   Dressing/Treatment Open to air 11/01/24 0800   Closure Staples;Open to air 11/01/24 0800   Margins Approximated 11/01/24 0800   Incision Assessment Other (Comment) 11/01/24 0800   Drainage Amount None (dry) 11/01/24 0800   Drainage Description Other (Comment) 11/01/24 0800   Odor None 11/01/24 0800   Lola-incision Assessment Warm;Intact 11/01/24 0800   Number of days: 31       Incision 10/16/24 Abdomen Lower;Medial (Active)   Dressing Status Clean;Dry;Intact 11/01/24 0800   Dressing Change Due 10/29/24 10/30/24 1400   Incision Cleansed Not Cleansed 11/01/24 0800   Dressing/Treatment Tegaderm/transparent film dressing 10/30/24 0400   Closure Staples;Open to air 11/01/24 0800   Margins Approximated 11/01/24 0800   Drainage Amount None (dry) 11/01/24 0800   Drainage Description Serosanguinous 10/30/24 0400   Odor None 11/01/24 0800   Lola-incision Assessment Warm;Intact 11/01/24 0800   Number of days: 16       Abdominal wound care - NPWT - with white foam to wound base, fill remaining wound with black foam. Suction at 100mmHg, continuous. Change each Monday, Wednesday, Friday. If suction fails - remove vac dressing,  cleanse wound with normal saline, pack wound with saline moistened gauze and change every 12 hours      Elimination:  Continence:   Bowel: Yes  Bladder: Yes  Urinary Catheter: None   Colostomy/Ileostomy/Ileal Conduit: No       Date of Last BM: 11/7/2024    Intake/Output Summary (Last 24 hours) at 11/1/2024 1701  Last data filed at 11/1/2024 0800  Gross per 24 hour   Intake 298.96 ml   Output 1575 ml  she requires Home Care for greater 30 days.     Update Admission H&P: No change in H&P    PHYSICIAN SIGNATURE:  Electronically signed by Carlos A Orta DO on 11/7/24 at 3:52 PM EST

## 2024-11-01 NOTE — PROGRESS NOTES
End Of Shift Note  [facility]  Summary of shift: Uneventful shift. Wound vac dressing placed per wound care. Pt up to chair for a few hours. Plan is [facility].     Vitals:    Vitals:    11/01/24 0400 11/01/24 0800 11/01/24 1200 11/01/24 1600   BP: 102/74 129/67 132/73 124/65   Pulse: 77 85 92 82   Resp: 18 28 24 27   Temp: 98.6 °F (37 °C) 96.9 °F (36.1 °C) 97.7 °F (36.5 °C) 97.1 °F (36.2 °C)   TempSrc: Temporal Temporal Temporal Temporal   SpO2: 97% 95%     Weight:       Height:            I&O:   Intake/Output Summary (Last 24 hours) at 11/1/2024 1848  Last data filed at 11/1/2024 1700  Gross per 24 hour   Intake 298.96 ml   Output 2625 ml   Net -2326.04 ml       Resp Status: RA    Ventilator Settings:  Vent Mode: CPAP/PS Resp Rate (Set): 14 bpm/Vt (Set, mL): 500 mL/ /FiO2 : 30 %    Critical Care IV infusions:   PN-Adult 2-in-1 Central Line (Custom) 85.08 mL/hr at 11/01/24 1812    dextrose 5% and 0.45% NaCl with KCl 20 mEq Stopped (10/22/24 1038)    dextrose      sodium chloride 5 mL/hr at 11/01/24 0245        LDA:   PICC 10/26/24 Right Cephalic (Active)   Number of days: 6       Negative Pressure Wound Therapy Abdomen Medial (Active)   Number of days: 0       Urinary Catheter 10/10/24 Bar (Active)   Number of days: 22       Wound Abdomen Left;Lower (Active)   Number of days:        Incision 10/01/24 Abdomen Medial;Upper (Active)   Number of days: 31       Incision 10/16/24 Abdomen Lower;Medial (Active)   Number of days: 16

## 2024-11-01 NOTE — PROGRESS NOTES
Pulmonary Critical Care Progress Note    Patient seen for the follow up of Intra-abdominal abscess (HCC)     Subjective:    She is still on room air.  She is having less hallucination.  She is off Precedex.  She is on TPN.  No new complaints today.    examination:    Vitals: /65   Pulse 82   Temp 97.1 °F (36.2 °C) (Temporal)   Resp 27   Ht 1.6 m (5' 3\")   Wt 106 kg (233 lb 9.6 oz)   SpO2 95%   BMI 41.38 kg/m²   SpO2  Av.6 %  Min: 95 %  Max: 98 %  General appearance: Awake alert no acute distress  Neck: No JVD  Lungs: Decreased breath sound no crackles or wheezes  Heart: regular rate and rhythm, S1, S2 normal, no gallop  Abdomen: Soft, non tender, + BS dressing in place evidence of fecal material in container  Extremities: no cyanosis or clubbing.  1+ edema    LABs:    CBC:   Recent Labs     10/30/24  0601   WBC 16.1*   HGB 9.2*   HCT 28.8*   *     BMP:   Recent Labs     24  0448 24  0710   NA Unable to perform testing: Results suspect due to history of previous lab results. 135   K Unable to perform testing: Results suspect due to history of previous lab results. 4.7   CO2 Unable to perform testing: Results suspect due to history of previous lab results. 24   BUN Unable to perform testing: Results suspect due to history of previous lab results. 14   CREATININE Unable to perform testing: Results suspect due to history of previous lab results. 0.6   LABGLOM Can not be calculated >90   GLUCOSE Unable to perform testing: Results suspect due to history of previous lab results. 160*        Latest Reference Range & Units 10/18/24 04:12   POC HCO3 21.0 - 28.0 mmol/L 24.9   POC O2 SAT 94.0 - 98.0 % 97.5   POC pCO2 35.0 - 48.0 mm Hg 33.7 (L)   POC pH 7.350 - 7.450  7.476 (H)   POC PO2 83.0 - 108.0 mm Hg 88.2   (L): Data is abnormally low  (H): Data is abnormally high  Radiology:  Chest x-ray   Minimal fibrotic/atelectatic changes in the lateral portion of base of left  lung.  Rest of  consult       Status post SHAI/urinary retention with history of suburethral sling  Nephrology on consult/on Lasix 20 IV twice daily hold for hypotension  Urology on consult     Insomnia/obesity suspected sleep apnea   Outpatient Sleep evaluation  On Geodon 10 mg IM nightly    Physical therapy  Discussed with RN  Rehab discharge planning    Peptic ulcer disease prophylaxis Protonix  DVT prophylaxis EPC started heparin 5000 every 12 hour        David Coyne MD, MD, [facility]  Pulmonary Critical Care and Sleep Medicine,  [facility]  Cell: [phone]  Office: [phone]

## 2024-11-02 LAB
ANION GAP SERPL CALCULATED.3IONS-SCNC: 8 MMOL/L (ref 9–17)
BUN SERPL-MCNC: 16 MG/DL (ref 8–23)
BUN/CREAT SERPL: 23 (ref 9–20)
CALCIUM SERPL-MCNC: 8.2 MG/DL (ref 8.6–10.4)
CHLORIDE SERPL-SCNC: 100 MMOL/L (ref 98–107)
CO2 SERPL-SCNC: 26 MMOL/L (ref 20–31)
CREAT SERPL-MCNC: 0.7 MG/DL (ref 0.5–0.9)
ERYTHROCYTE [DISTWIDTH] IN BLOOD BY AUTOMATED COUNT: 14.1 % (ref 11.8–14.4)
GFR, ESTIMATED: >90 ML/MIN/1.73M2
GLUCOSE BLD-MCNC: 180 MG/DL (ref 65–105)
GLUCOSE BLD-MCNC: 225 MG/DL (ref 65–105)
GLUCOSE SERPL-MCNC: 202 MG/DL (ref 70–99)
HCT VFR BLD AUTO: 31 % (ref 36.3–47.1)
HGB BLD-MCNC: 9.7 G/DL (ref 11.9–15.1)
MCH RBC QN AUTO: 28.8 PG (ref 25.2–33.5)
MCHC RBC AUTO-ENTMCNC: 31.3 G/DL (ref 28.4–34.8)
MCV RBC AUTO: 92 FL (ref 82.6–102.9)
NRBC BLD-RTO: 0 PER 100 WBC
PLATELET # BLD AUTO: 442 K/UL (ref 138–453)
PMV BLD AUTO: 9.4 FL (ref 8.1–13.5)
POTASSIUM SERPL-SCNC: 5.1 MMOL/L (ref 3.7–5.3)
RBC # BLD AUTO: 3.37 M/UL (ref 3.95–5.11)
SODIUM SERPL-SCNC: 134 MMOL/L (ref 135–144)
WBC OTHER # BLD: 13.5 K/UL (ref 3.5–11.3)

## 2024-11-02 PROCEDURE — 6360000002 HC RX W HCPCS

## 2024-11-02 PROCEDURE — 2500000003 HC RX 250 WO HCPCS

## 2024-11-02 PROCEDURE — 2580000003 HC RX 258

## 2024-11-02 PROCEDURE — 6360000002 HC RX W HCPCS: Performed by: INTERNAL MEDICINE

## 2024-11-02 PROCEDURE — 6370000000 HC RX 637 (ALT 250 FOR IP)

## 2024-11-02 PROCEDURE — 99232 SBSQ HOSP IP/OBS MODERATE 35: CPT | Performed by: HOSPITALIST

## 2024-11-02 PROCEDURE — 97530 THERAPEUTIC ACTIVITIES: CPT

## 2024-11-02 PROCEDURE — 94761 N-INVAS EAR/PLS OXIMETRY MLT: CPT

## 2024-11-02 PROCEDURE — 85027 COMPLETE CBC AUTOMATED: CPT

## 2024-11-02 PROCEDURE — 80048 BASIC METABOLIC PNL TOTAL CA: CPT

## 2024-11-02 PROCEDURE — 82947 ASSAY GLUCOSE BLOOD QUANT: CPT

## 2024-11-02 PROCEDURE — 97110 THERAPEUTIC EXERCISES: CPT

## 2024-11-02 PROCEDURE — 99233 SBSQ HOSP IP/OBS HIGH 50: CPT | Performed by: INTERNAL MEDICINE

## 2024-11-02 PROCEDURE — 36415 COLL VENOUS BLD VENIPUNCTURE: CPT

## 2024-11-02 PROCEDURE — 6370000000 HC RX 637 (ALT 250 FOR IP): Performed by: HOSPITALIST

## 2024-11-02 PROCEDURE — 2060000000 HC ICU INTERMEDIATE R&B

## 2024-11-02 RX ORDER — ZOLPIDEM TARTRATE 5 MG/1
2.5 TABLET ORAL NIGHTLY PRN
Status: DISCONTINUED | OUTPATIENT
Start: 2024-11-02 | End: 2024-11-08 | Stop reason: HOSPADM

## 2024-11-02 RX ADMIN — METRONIDAZOLE 500 MG: 5 INJECTION, SOLUTION INTRAVENOUS at 03:24

## 2024-11-02 RX ADMIN — HEPARIN SODIUM 5000 UNITS: 5000 INJECTION INTRAVENOUS; SUBCUTANEOUS at 08:47

## 2024-11-02 RX ADMIN — FUROSEMIDE 40 MG: 10 INJECTION, SOLUTION INTRAMUSCULAR; INTRAVENOUS at 08:47

## 2024-11-02 RX ADMIN — POTASSIUM CHLORIDE: 2 INJECTION, SOLUTION, CONCENTRATE INTRAVENOUS at 18:09

## 2024-11-02 RX ADMIN — INSULIN LISPRO 1 UNITS: 100 INJECTION, SOLUTION INTRAVENOUS; SUBCUTANEOUS at 12:29

## 2024-11-02 RX ADMIN — SODIUM CHLORIDE, PRESERVATIVE FREE 10 ML: 5 INJECTION INTRAVENOUS at 21:17

## 2024-11-02 RX ADMIN — SODIUM CHLORIDE, PRESERVATIVE FREE 10 ML: 5 INJECTION INTRAVENOUS at 08:50

## 2024-11-02 RX ADMIN — I.V. FAT EMULSION 100 ML: 20 EMULSION INTRAVENOUS at 18:05

## 2024-11-02 RX ADMIN — PANTOPRAZOLE SODIUM 40 MG: 40 INJECTION, POWDER, FOR SOLUTION INTRAVENOUS at 08:47

## 2024-11-02 RX ADMIN — ZOLPIDEM TARTRATE 2.5 MG: 5 TABLET ORAL at 21:15

## 2024-11-02 ASSESSMENT — PAIN DESCRIPTION - LOCATION: LOCATION: HAND

## 2024-11-02 ASSESSMENT — PAIN SCALES - GENERAL: PAINLEVEL_OUTOF10: 3

## 2024-11-02 ASSESSMENT — PAIN DESCRIPTION - ORIENTATION: ORIENTATION: POSTERIOR

## 2024-11-02 NOTE — PLAN OF CARE
2358 by Amparo Guzman RN  Outcome: Progressing  Flowsheets (Taken 11/1/2024 2000)  Absence of seizures:   Monitor for seizure activity.  If seizure occurs, document type and location of movements and any associated apnea   Diagnostic studies as ordered  11/1/2024 1849 by Lucia Richard RN  Outcome: Progressing  Goal: Remains free of injury related to seizures activity  11/1/2024 2358 by Amparo Guzman RN  Outcome: Progressing  Flowsheets (Taken 11/1/2024 2000)  Remains free of injury related to seizure activity:   Maintain airway, patient safety  and administer oxygen as ordered   Monitor patient for seizure activity, document and report duration and description of seizure to Licensed Independent Practitioner   Instruct patient/family to call for assistance with activity based on assessment  11/1/2024 1849 by Lucia Richard RN  Outcome: Progressing     Problem: Cardiovascular - Adult  Goal: Maintains optimal cardiac output and hemodynamic stability  11/1/2024 2358 by Amparo Guzman RN  Outcome: Progressing  Flowsheets (Taken 11/1/2024 2000)  Maintains optimal cardiac output and hemodynamic stability:   Monitor blood pressure and heart rate   Monitor urine output and notify Licensed Independent Practitioner for values outside of normal range   Assess for signs of decreased cardiac output  11/1/2024 1849 by Lucia Richard RN  Outcome: Progressing  Goal: Absence of cardiac dysrhythmias or at baseline  11/1/2024 2358 by Amparo Guzman RN  Outcome: Progressing  11/1/2024 1849 by Lucia Richard RN  Outcome: Progressing     Problem: Respiratory - Adult  Goal: Achieves optimal ventilation and oxygenation  11/1/2024 2358 by Amparo Guzman RN  Outcome: Progressing  Flowsheets (Taken 11/1/2024 2000)  Achieves optimal ventilation and oxygenation:   Assess for changes in respiratory status   Assess for changes in mentation and behavior   Respiratory therapy support as indicated  11/1/2024 1849 by    Problem: Pain  Goal: Verbalizes/displays adequate comfort level or baseline comfort level  11/1/2024 1849 by Lucia Richard, RN  Outcome: Progressing     Problem: Chronic Conditions and Co-morbidities  Goal: Patient's chronic conditions and co-morbidity symptoms are monitored and maintained or improved  11/1/2024 2358 by Amparo Guzman, RN  Outcome: Progressing  Flowsheets (Taken 11/1/2024 2000)  Care Plan - Patient's Chronic Conditions and Co-Morbidity Symptoms are Monitored and Maintained or Improved:   Monitor and assess patient's chronic conditions and comorbid symptoms for stability, deterioration, or improvement   Collaborate with multidisciplinary team to address chronic and comorbid conditions and prevent exacerbation or deterioration   Update acute care plan with appropriate goals if chronic or comorbid symptoms are exacerbated and prevent overall improvement and discharge  11/1/2024 1849 by Lucia Richard, RN  Outcome: Progressing

## 2024-11-02 NOTE — PROGRESS NOTES
Physical Therapy  Facility/Department: [facility]  Daily Treatment Note  NAME: Mandie Bustamante  : 1955  MRN: [id number]    Date of Service: 2024    Discharge Recommendations:  Patient would benefit from continued therapy after discharge   Patient Diagnosis(es): The encounter diagnosis was Edema, unspecified type.  Assessment  Assessment: Pt continues with deficits in strength and endurance requiring min to mod A for bed mobility and min A with use of a marlyn stedy for transfers OOB. Pt will require 2 person assistance when working on ambulation.  Max encouragement for participation this session and pt was educated on benefits of being OOB and contiued mobility throughout admission to progress towards long and short term goals.  Activity Tolerance: Patient limited by endurance;Patient limited by fatigue  Plan  Physical Therapy Plan  General Plan: 5-7 times per week  Specific Instructions for Next Treatment: Increase amb distance  Current Treatment Recommendations: Strengthening;ROM;Patient/Caregiver education & training;Cognitive reorientation;Positioning;Balance training;Transfer training;Endurance training;Gait training;Neuromuscular re-education;Home exercise program;Safety education & training;Equipment evaluation, education, & procurement;Co-Treatment;Therapeutic activities  Restrictions  Restrictions/Precautions  Restrictions/Precautions: General Precautions, Up as Tolerated, Fall Risk  Required Braces or Orthoses?: No  Position Activity Restriction  Other position/activity restrictions: RUE PICC, simons cath, telemetry, cont SPO2 moniter, 46 BMI   Subjective   Subjective  Subjective: With encouragment pt agreeable to PT session (Nurse gives approval for PT session)  Orientation  Overall Orientation Status: Within Functional Limits  Orientation Level: Oriented X4  Objective  Bed Mobility Training  Bed Mobility Training: Yes  Overall Level of Assistance: Moderate assistance  Interventions: Safety

## 2024-11-02 NOTE — PROGRESS NOTES
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 Wound vac remains in place over abdominal fistula. Diet advanced to Clear liquids this morning. Patient ate 26-50 at breakfast this morning. Will start Ensure Clear 2x/day. Continue central, custom TPN at 80 mL/hr (1920 mL total volume) and 100 mL of lipids. Monitor oral intake, diet tolerance, wound status and  labs.    Estimated Daily Nutrient Needs:  Energy (kcal):  1370--1908 kcal (13-18 kcal/kg) Weight Used for Energy Requirements: Current     Protein (g):  67-83 gm of protein (1.3-1.6 gm/kg) Weight Used for Protein Requirements: Ideal        Fluid (ml/day):  0622-9292 mL Method Used for Fluid Requirements: 1 ml/kcal    Nutrition Related Findings:   EC fistula. Active bowel sounds. Unable to determine edema status Wound Type: Wound Vac, Surgical Incision    Current Nutrition Therapies:    PN-Adult 2-in-1 Central Line (Custom)  ADULT DIET; Clear Liquid    Anthropometric Measures:  Height: 160 cm (5' 3\")  Current Body Wt: 106 kg (233 lb 11 oz)   BMI: 41.4        Nutrition Diagnosis:   Inadequate oral intake related to inadequate protein-energy intake as evidenced by NPO or clear liquid status due to medical condition, nutrition support - parenteral nutrition, poor intake prior to admission, GI abnormality, nausea    Nutrition Interventions:   Food and/or Nutrient Delivery: Continue NPO, Continue Current Parenteral Nutrition  Nutrition Education/Counseling: Education/Counseling not indicated  Coordination of Nutrition Care: Continue to

## 2024-11-02 NOTE — PROGRESS NOTES
[facility]  Office: [phone]  Keenan Foster DO, Kevin Siegel DO, Carlos A Orta DO, Taye Morales DO, Maxx Madison MD, Meena Eckert MD, Efren Vo MD, Makayla Last MD,  Jerman Christian MD, Gina Bourgeois MD, Gladys Fry MD,  Anu Murillo DO, Jennifer Blanca MD, Oral Duncan MD, Harlan Foster DO, Katya Simmons MD,  Filiberto Slade DO, Elizabeth Alves MD, Samantha Pinto MD, Arlene Bryant MD, Bandar Barrios MD,  Kulwant Rodrigez MD, Michelle Dennison MD, Jinny Steel MD, Kathia Vargas MD, Gerardo Alvarez MD, Richard Johnson MD, Demetri Ibarra DO, Benjie Alvarenga MD, Shirley Waterhouse, CNP,  Georgette Villarreal CNP, Demetri Toure, CATHY,  Silvina Castano, CARINA, Tatiana Mckeon, CNP, Angelina Harper, CNP, Alma Rosa Peng, CNP, Rashmi Mccartney, CNP, Kimberley Grant PA-C, Eleanor Garg PA-C, Stephanie Kaminski, CATHY, Tono Yee, CATHY,  Chrissie Schwartz, CNP, Sheri Jones, CNP,  Nadiya Rick, CATHY, Cindi Lam, CNP         [facility]   IN-PATIENT SERVICE   [facility]    Progress Note    11/2/2024    10:04 AM    Name:   Mandie Bustamante  MRN:     [id number]     Acct:      [id number]   Room:   2033/2033-01   Day:  23  Admit Date:  10/10/2024  6:11 PM    PCP:   No primary care provider on file.  Code Status:  Full Code    Subjective:     Patient seen in follow-up for abdominal pain secondary to abdominal abscess with multiple fistulous, patient states \"I did not sleep well\"    Patient states that she did not sleep particularly well.  She has multiple drug allergies and is sensitive to medications.  She and her  tell me that she does reasonably well with the Ambien however typically takes 2.5 mg.  We will trial this and see if this is effective in helping her sleep.  The patient is otherwise doing reasonably well.  She is fatigued from not sleeping but denies any particular complaints outside of this.  Her pain is under control.  Clear liquid diet has been initiated  FIO2 30.0 10/24/2024 02:50 PM     Lab Results   Component Value Date/Time    SPECIAL Site: Respiratory 10/19/2024 07:25 PM     Lab Results   Component Value Date/Time    CULTURE NORMAL RESPIRATORY CELINE RARE GROWTH 10/19/2024 07:25 PM       Radiology:  XR CHEST PORTABLE    Result Date: 11/1/2024  Minimal fibrotic/atelectatic changes in the lateral portion of base of left lung.  Rest of lung fields are clear. Stable normal cardiac size without evidence of CHF.     CT ABDOMEN PELVIS WO CONTRAST Additional Contrast? Radiologist Recommendation    Result Date: 10/29/2024  1. Large soft tissue defect within the anterior abdominal wall with extensive graying of the subcutaneous fat adjacent. There is no appreciable focal fluid collection. 2. Mild wall thickening of the transverse colon as it traverses near the surgical site of the anterior abdominal wall. There is a small focus of air within the rectus musculature immediately adjacent to the transverse colon, can not exclude possible small fistulous tract. 3. Fluid-filled rectosigmoid colon.  Findings may represent acute diarrheal illness.       Physical Examination:     General appearance:  alert, cooperative and no distress  Mental Status:  oriented to person, place and time and normal affect  Lungs:  clear to auscultation bilaterally, normal effort  Heart:  regular rate and rhythm, no murmur  Abdomen:  soft, mild tenderness, nondistended, normal bowel sounds, no masses, hepatomegaly, splenomegaly.  Wound VAC in place  Extremities:  no edema, redness, tenderness in the calves  Skin:  no gross lesions, rashes, induration    Assessment:     Hospital Problems             Last Modified POA    * (Principal) Intra-abdominal abscess (HCC) 10/22/2024 Yes    Anxiety 10/10/2024 Yes    BMI 45.0-49.9, adult 10/24/2024 Yes    Gastro-esophageal reflux disease without esophagitis 10/10/2024 Yes    Essential hypertension (Chronic) 10/10/2024 Yes    Hyponatremia 10/22/2024 Yes

## 2024-11-02 NOTE — PROGRESS NOTES
This writer explained to patient the risks and benefits of removing simons catheter. Patient was told how the purwick/external catheter works and/or benefits of a bedside commode. Patient refused the order by Dr Almazan to remove simons in AM for void trial. Simons care performed and will attempt to remove again in afternoon after patient gets rest.

## 2024-11-02 NOTE — PROGRESS NOTES
prophylaxis EPC started heparin 5000 every 12 hour        David Coyne MD, MD, [facility]  Pulmonary Critical Care and Sleep Medicine,  [facility]  Cell: [phone]  Office: [phone]

## 2024-11-02 NOTE — PROGRESS NOTES
End Of Shift Note  [facility]  Summary of shift: Pt is doing well clinically. Wound vac placed to abdomen, minimal drainage noted. Bar to be emoved todayl On TPN with lipids. Tolerating sips of liquids. Having difficulty sleeping for extended periods of time    Vitals:    Vitals:    11/01/24 2345 11/02/24 0000 11/02/24 0327 11/02/24 0400   BP: 126/61 126/61  130/66   Pulse: 86  89 78   Resp: 24 29 20   Temp:  97.1 °F (36.2 °C)  97.5 °F (36.4 °C)   TempSrc:  Temporal  Temporal   SpO2: (!) 81%  95% 97%   Weight:       Height:            I&O:   Intake/Output Summary (Last 24 hours) at 11/2/2024 0518  Last data filed at 11/2/2024 0000  Gross per 24 hour   Intake 4799.81 ml   Output 2750 ml   Net 2049.81 ml       Resp Status: room air    Ventilator Settings:  Vent Mode: CPAP/PS Resp Rate (Set): 14 bpm/Vt (Set, mL): 500 mL/ /FiO2 : 30 %    Critical Care IV infusions:   PN-Adult 2-in-1 Central Line (Custom) 85.1 mL/hr at 11/01/24 2300    dextrose 5% and 0.45% NaCl with KCl 20 mEq Stopped (10/22/24 1038)    dextrose      sodium chloride 5 mL/hr at 11/01/24 0245        LDA:   PICC 10/26/24 Right Cephalic (Active)   Number of days: 6       Negative Pressure Wound Therapy Abdomen Medial (Active)   Number of days: 0       Urinary Catheter 10/10/24 Bar (Active)   Number of days: 22       Wound Abdomen Left;Lower (Active)   Number of days:        Incision 10/01/24 Abdomen Medial;Upper (Active)   Number of days: 31       Incision 10/16/24 Abdomen Lower;Medial (Active)   Number of days: 16

## 2024-11-02 NOTE — PROGRESS NOTES
Surgical Progress Note      Chief complaint: Wound VAC in place.  The patient denies any abdominal pain.  She continues to have bowel function.  She has not had any documented fevers.  Disposition planning to rehab    Patient doing fairly well    Vitals:    11/02/24 0400   BP: 130/66   Pulse: 78   Resp: 20   Temp: 97.5 °F (36.4 °C)   SpO2: 97%        Review of Systems - History obtained from chart review and the patient    Exam: General Appearance: alert and oriented to person, place and time and in no acute distress  Skin: warm and dry, no rash or erythema  Head: normocephalic and atraumatic  Eyes: pupils equal, round, and reactive to light, extraocular eye movements intact, conjunctivae normal  ENT: hearing grossly normal bilaterally  Neck: neck supple and non tender without mass, no thyromegaly or thyroid nodules, no cervical lymphadenopathy   Pulmonary/Chest: clear to auscultation bilaterally- no wheezes, rales or rhonchi, normal air movement, no respiratory distress  Cardiovascular: normal rate, normal S1 and S2, no gallops, and intact distal pulses  Abdomen: Soft, appropriately tender, surgical dressing intact with wound VAC sealed, no significant drainage  Extremities: no cyanosis and no clubbing  Musculoskeletal: normal range of motion, no joint swelling, deformity or tenderness  Neurologic: no cranial nerve deficit and speech normal    I/O last 3 completed shifts:  In: 299 [IV Piggyback:299]  Out: 5225 [Urine:5225]    Hemoglobin   Date Value Ref Range Status   10/30/2024 9.2 (L) 11.9 - 15.1 g/dL Final     Hematocrit   Date Value Ref Range Status   10/30/2024 28.8 (L) 36.3 - 47.1 % Final     WBC   Date Value Ref Range Status   10/30/2024 16.1 (H) 3.5 - 11.3 k/uL Final     Sodium   Date Value Ref Range Status   11/01/2024 135 135 - 144 mmol/L Final     Potassium   Date Value Ref Range Status   11/01/2024 4.7 3.7 - 5.3 mmol/L Final     Chloride   Date Value Ref Range Status   11/01/2024 100 98 - 107 mmol/L  Final     CO2   Date Value Ref Range Status   11/01/2024 24 20 - 31 mmol/L Final     Glucose   Date Value Ref Range Status   11/01/2024 160 (H) 70 - 99 mg/dL Final       Assessment/Plan: Status post explantation of mesh, likely reactive process to colon adherent to the undersurface of the mesh.  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  The patient is awaiting a rehab bed.  Will continue with the wound VAC.  I would slowly start her diet and see how she does.  I would be cautious to advance her diet too quickly until we are sure that this fistula has closed however.        Gm Cummings MD MD  11/2/2024 6:42 AM

## 2024-11-03 LAB
ANION GAP SERPL CALCULATED.3IONS-SCNC: 12 MMOL/L (ref 9–17)
BUN SERPL-MCNC: 18 MG/DL (ref 8–23)
BUN/CREAT SERPL: 26 (ref 9–20)
CALCIUM SERPL-MCNC: 8.5 MG/DL (ref 8.6–10.4)
CHLORIDE SERPL-SCNC: 98 MMOL/L (ref 98–107)
CO2 SERPL-SCNC: 23 MMOL/L (ref 20–31)
CREAT SERPL-MCNC: 0.7 MG/DL (ref 0.5–0.9)
GFR, ESTIMATED: >90 ML/MIN/1.73M2
GLUCOSE BLD-MCNC: 191 MG/DL (ref 65–105)
GLUCOSE BLD-MCNC: 205 MG/DL (ref 65–105)
GLUCOSE BLD-MCNC: 214 MG/DL (ref 65–105)
GLUCOSE BLD-MCNC: 219 MG/DL (ref 65–105)
GLUCOSE BLD-MCNC: 250 MG/DL (ref 65–105)
GLUCOSE SERPL-MCNC: 202 MG/DL (ref 70–99)
POTASSIUM SERPL-SCNC: 4.9 MMOL/L (ref 3.7–5.3)
SODIUM SERPL-SCNC: 133 MMOL/L (ref 135–144)

## 2024-11-03 PROCEDURE — 2060000000 HC ICU INTERMEDIATE R&B

## 2024-11-03 PROCEDURE — 99232 SBSQ HOSP IP/OBS MODERATE 35: CPT | Performed by: INTERNAL MEDICINE

## 2024-11-03 PROCEDURE — 99232 SBSQ HOSP IP/OBS MODERATE 35: CPT | Performed by: HOSPITALIST

## 2024-11-03 PROCEDURE — 80048 BASIC METABOLIC PNL TOTAL CA: CPT

## 2024-11-03 PROCEDURE — 2580000003 HC RX 258

## 2024-11-03 PROCEDURE — 36415 COLL VENOUS BLD VENIPUNCTURE: CPT

## 2024-11-03 PROCEDURE — 6370000000 HC RX 637 (ALT 250 FOR IP)

## 2024-11-03 PROCEDURE — 82947 ASSAY GLUCOSE BLOOD QUANT: CPT

## 2024-11-03 PROCEDURE — 6360000002 HC RX W HCPCS

## 2024-11-03 PROCEDURE — 6370000000 HC RX 637 (ALT 250 FOR IP): Performed by: HOSPITALIST

## 2024-11-03 PROCEDURE — 2500000003 HC RX 250 WO HCPCS

## 2024-11-03 PROCEDURE — 94761 N-INVAS EAR/PLS OXIMETRY MLT: CPT

## 2024-11-03 PROCEDURE — 6360000002 HC RX W HCPCS: Performed by: INTERNAL MEDICINE

## 2024-11-03 RX ADMIN — MICONAZOLE NITRATE: 20 CREAM TOPICAL at 10:19

## 2024-11-03 RX ADMIN — SODIUM CHLORIDE, PRESERVATIVE FREE 10 ML: 5 INJECTION INTRAVENOUS at 10:48

## 2024-11-03 RX ADMIN — ZOLPIDEM TARTRATE 2.5 MG: 5 TABLET ORAL at 20:27

## 2024-11-03 RX ADMIN — INSULIN LISPRO 1 UNITS: 100 INJECTION, SOLUTION INTRAVENOUS; SUBCUTANEOUS at 18:41

## 2024-11-03 RX ADMIN — FUROSEMIDE 40 MG: 10 INJECTION, SOLUTION INTRAMUSCULAR; INTRAVENOUS at 10:19

## 2024-11-03 RX ADMIN — HEPARIN SODIUM 5000 UNITS: 5000 INJECTION INTRAVENOUS; SUBCUTANEOUS at 10:19

## 2024-11-03 RX ADMIN — HYDROCORTISONE: 1 CREAM TOPICAL at 20:26

## 2024-11-03 RX ADMIN — INSULIN LISPRO 1 UNITS: 100 INJECTION, SOLUTION INTRAVENOUS; SUBCUTANEOUS at 23:59

## 2024-11-03 RX ADMIN — PANTOPRAZOLE SODIUM 40 MG: 40 INJECTION, POWDER, FOR SOLUTION INTRAVENOUS at 10:19

## 2024-11-03 RX ADMIN — I.V. FAT EMULSION 100 ML: 20 EMULSION INTRAVENOUS at 17:48

## 2024-11-03 RX ADMIN — POTASSIUM PHOSPHATE, MONOBASIC POTASSIUM PHOSPHATE, DIBASIC: 224; 236 INJECTION, SOLUTION, CONCENTRATE INTRAVENOUS at 17:47

## 2024-11-03 RX ADMIN — SODIUM CHLORIDE, PRESERVATIVE FREE 10 ML: 5 INJECTION INTRAVENOUS at 20:27

## 2024-11-03 RX ADMIN — INSULIN LISPRO 1 UNITS: 100 INJECTION, SOLUTION INTRAVENOUS; SUBCUTANEOUS at 07:29

## 2024-11-03 RX ADMIN — ANTI-FUNGAL POWDER MICONAZOLE NITRATE TALC FREE: 1.42 POWDER TOPICAL at 10:19

## 2024-11-03 RX ADMIN — HEPARIN SODIUM 5000 UNITS: 5000 INJECTION INTRAVENOUS; SUBCUTANEOUS at 20:28

## 2024-11-03 RX ADMIN — INSULIN LISPRO 2 UNITS: 100 INJECTION, SOLUTION INTRAVENOUS; SUBCUTANEOUS at 12:10

## 2024-11-03 RX ADMIN — HYDROCORTISONE: 1 CREAM TOPICAL at 10:24

## 2024-11-03 RX ADMIN — INSULIN LISPRO 1 UNITS: 100 INJECTION, SOLUTION INTRAVENOUS; SUBCUTANEOUS at 00:30

## 2024-11-03 NOTE — PROGRESS NOTES
PM    [id number] 98.4 10/24/2024 02:50 PM    FIO2 30.0 10/24/2024 02:50 PM     Lab Results   Component Value Date/Time    SPECIAL Site: Respiratory 10/19/2024 07:25 PM     Lab Results   Component Value Date/Time    CULTURE NORMAL RESPIRATORY CELINE RARE GROWTH 10/19/2024 07:25 PM       Radiology:  XR CHEST PORTABLE    Result Date: 11/1/2024  Minimal fibrotic/atelectatic changes in the lateral portion of base of left lung.  Rest of lung fields are clear. Stable normal cardiac size without evidence of CHF.     CT ABDOMEN PELVIS WO CONTRAST Additional Contrast? Radiologist Recommendation    Result Date: 10/29/2024  1. Large soft tissue defect within the anterior abdominal wall with extensive graying of the subcutaneous fat adjacent. There is no appreciable focal fluid collection. 2. Mild wall thickening of the transverse colon as it traverses near the surgical site of the anterior abdominal wall. There is a small focus of air within the rectus musculature immediately adjacent to the transverse colon, can not exclude possible small fistulous tract. 3. Fluid-filled rectosigmoid colon.  Findings may represent acute diarrheal illness.       Physical Examination:     General appearance:  alert, cooperative and no distress  Mental Status:  oriented to person, place and time and normal affect  Lungs:  clear to auscultation bilaterally, normal effort  Heart:  regular rate and rhythm, no murmur  Abdomen:  soft, mild tenderness, nondistended, normal bowel sounds, no masses, hepatomegaly, splenomegaly, wound VAC in place  Extremities:  no edema, redness, tenderness in the calves  Skin:  no gross lesions, rashes, induration    Assessment:     Hospital Problems             Last Modified POA    * (Principal) Intra-abdominal abscess (HCC) 10/22/2024 Yes    Anxiety 10/10/2024 Yes    BMI 45.0-49.9, adult 10/24/2024 Yes    Gastro-esophageal reflux disease without esophagitis 10/10/2024 Yes    Essential hypertension (Chronic) 10/10/2024 Yes     Hyponatremia 10/22/2024 Yes    Hypokalemia 10/10/2024 Yes    Elevated brain natriuretic peptide (BNP) level 10/10/2024 Yes    Leukocytosis 10/10/2024 Yes    Prediabetes (Chronic) 10/10/2024 Yes    Overview Signed 10/10/2024  8:47 PM by Gerardo Alvarez MD     Patient reportedly was in the prediabetic range according to labs in 2019 per chart review, however I am unable to see those labs.  But on recent blood work performed this month hemoglobin A1c was 6.3%.  Patient does complain of urinary frequency, urinalysis performed same time was negative for infection or signs of stone so could likely be from her hyperglycemia or potentially hypercalcemia which each were discussed in depth with the patient.  We also discussed cushionoid appearance and how that could relate to hyperglycemia as well.  Patient states we could possibly investigate this at later date.         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    Abdominal wall ulcer, with fat layer exposed (HCC) 10/24/2024 Yes    Acute metabolic encephalopathy 10/26/2024 No       Plan:     Intra-abdominal abscess/EC fistula  Status post mesh explant  Wound VAC  Clinically improving  Continue wound care  Continue TPN  Patient has completed antibiotic course  Essential hypertension  Vitals trend stable off antihypertensive medications  Continue to monitor and reinitiate medications as indicated  Acute metabolic encephalopathy  Resolving, secondary to #1  Hyponatremia  Mild, check morning labs  Lasix initiated  Hyperglycemia  Secondary to TPN  Continue sliding scale  Insomnia  Continue Ambien    Discharge planning to LTAC once arrangements made    Medical Decision Making: Williams Foster DO  11/3/2024  9:55 AM

## 2024-11-03 NOTE — PROGRESS NOTES
Abdominal:      General: Bowel sounds are normal.      Palpations: Abdomen is soft.      Comments: There is a wound VAC in the anterior abdominal wall   Musculoskeletal:      Cervical back: Normal range of motion and neck supple.   Skin:     General: Skin is warm and dry.   Neurological:      Mental Status: She is alert and oriented to person, place, and time.         Laboratory data:   I have independently reviewed the followinglabs:  CBC with Differential:   Recent Labs     11/02/24  0716   WBC 13.5*   HGB 9.7*   HCT 31.0*        BMP:   Recent Labs     11/01/24 0448 11/01/24  0710 11/02/24  0716 11/03/24  0339   NA Unable to perform testing: Results suspect due to history of previous lab results. 135 134* 133*   K Unable to perform testing: Results suspect due to history of previous lab results. 4.7 5.1 4.9   CL Unable to perform testing: Results suspect due to history of previous lab results. 100 100 98   CO2 Unable to perform testing: Results suspect due to history of previous lab results. 24 26 23   BUN Unable to perform testing: Results suspect due to history of previous lab results. 14 16 18   CREATININE Unable to perform testing: Results suspect due to history of previous lab results. 0.6 0.7 0.7   MG Unable to perform testing: Results suspect due to history of previous lab results. 2.0  --   --      Hepatic Function Panel:   Recent Labs     11/01/24 0448 11/01/24  0710   BILIDIR Unable to perform testing: Results suspect due to history of previous lab results. 0.1   IBILI  --  0.1   BILITOT Unable to perform testing: Results suspect due to history of previous lab results. 0.2*   ALKPHOS Unable to perform testing: Results suspect due to history of previous lab results. 91   ALT Unable to perform testing: Results suspect due to history of previous lab results. 11   AST Unable to perform testing: Results suspect due to history of previous lab results. 14         No results found for: \"PROCAL\"  Lab  [[id number]] Collected: 10/18/24 1732   Order Status: Completed Specimen: Blood Updated: 10/23/24 2234    Specimen Description .BLOOD    Special Requests Site: Blood    Culture NO GROWTH 5 DAYS   Culture, Blood 1 [[id number]] Collected: 10/18/24 1732   Order Status: Completed Specimen: Blood Updated: 10/23/24 2233    Specimen Description .BLOOD    Special Requests Site: Blood    Culture NO GROWTH 5 DAYS   Culture, Blood 1 [[id number]] Collected: 10/16/24 0929   Order Status: Completed Specimen: Blood Updated: 10/21/24 1313    Specimen Description .BLOOD    Special Requests LT AC 10ML    Culture NO GROWTH 5 DAYS   Culture, Blood 2 [[id number]] Collected: 10/16/24 0948   Order Status: Completed Specimen: Blood Updated: 10/21/24 1313    Specimen Description .BLOOD    Special Requests Site: Blood    Culture NO GROWTH 5 DAYS   Culture, Respiratory (with Gram Stain) [[id number]] Collected: 10/19/24 1925   Order Status: Completed Specimen: Respiratory from Endotracheal Updated: 10/21/24 1033    Specimen Description .ENDOTRACHEAL    Special Requests Site: Respiratory    Direct Exam <10 NEUTROPHILS/LPF     < 10 EPITHELIAL CELLS/LPF     NO ORGANISMS SEEN    Culture NORMAL RESPIRATORY CELINE RARE GROWTH   Respiratory Panel, Molecular, with COVID-19 (Restricted: peds pts or suitable admitted adults) [[id number]] Collected: 10/19/24 1925   Order Status: Completed Specimen: Nasopharyngeal Swab Updated: 10/19/24 2319    Specimen Description .NASOPHARYNGEAL SWAB    Adenovirus PCR Not Detected    Coronavirus 229E PCR Not Detected    Coronavirus HKU1 PCR Not Detected    Coronavirus NL63 PCR Not Detected    Coronavirus OC43 PCR Not Detected    SARS-CoV-2, PCR Not Detected    Human Metapneumovirus PCR Not Detected    Rhino/Enterovirus PCR Not Detected    Influenza A by PCR Not Detected    Influenza B by PCR Not Detected    Parainfluenza 1 PCR Not Detected    Parainfluenza 2 PCR Not Detected    Parainfluenza 3 PCR Not Detected

## 2024-11-03 NOTE — PROGRESS NOTES
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 Patient tolerated Clear liquids yesterday and diet was advanced to Full liquids this morning (11/3). Will modify oral nutrition supplement to Ensure Plus High Protein 2x/day. Central, custom TPN rate will be decreased to 65 mL/hr (1560 mL total volume) and 100 mL of lipids now that diet is advancing. Monitor oral intake, diet tolerance, diet advancement, wound status and  labs.    Nutrition Related Findings:    EC fistula. Active bowel sounds. Unable to determine edema status Wound Type: Wound Vac, Surgical Incision       Current Nutrition Intake & Therapies:    Average Meal Intake: 26-50%  Average Supplements Intake: None Ordered  PN-Adult 2-in-1 Central Line (Custom)  ADULT ORAL NUTRITION SUPPLEMENT; Breakfast, Dinner; Clear Liquid Oral Supplement  ADULT DIET; Full Liquid  Current Parenteral Nutrition Orders:  Type and Formula: Premix Central   Lipids: 100ml, Daily  Duration: Continuous  Rate/Volume: 65 mL/hr (1560 mL total volume)  Current PN Order Provides: 1573 kcal, 78 gm of protein, 312 gm of dextrose (lipids included)  Goal PN Orders Provides: 1573 kcal, 78 gm of protein, 312 gm of dextrose (lipids included)    Anthropometric Measures:  Height: 160 cm (5' 3\")  Ideal Body Weight (IBW): 115 lbs (52 kg)       Current Body Weight: 108 kg (238 lb 1.6 oz), 203.2 % IBW. Weight Source: Bed scale  Current BMI (kg/m2): 42.2

## 2024-11-03 NOTE — PROGRESS NOTES
Surgical Progress Note      Chief complaint: Wound VAC in place.  The patient denies any abdominal pain.  She continues to have bowel function.  She has not had any documented fevers.  Anxious for rehab bed.  She did start some liquids yesterday and seemed to take small quantities without any increased fistula output.    Patient doing fairly well    Vitals:    11/03/24 0400   BP: 120/66   Pulse: (!) 109   Resp:    Temp: 97.1 °F (36.2 °C)   SpO2: 98%        Review of Systems - History obtained from chart review and the patient    Exam: General Appearance: alert and oriented to person, place and time and in no acute distress  Skin: warm and dry, no rash or erythema  Head: normocephalic and atraumatic  Eyes: pupils equal, round, and reactive to light, extraocular eye movements intact, conjunctivae normal  ENT: hearing grossly normal bilaterally  Neck: neck supple and non tender without mass, no thyromegaly or thyroid nodules, no cervical lymphadenopathy   Pulmonary/Chest: clear to auscultation bilaterally- no wheezes, rales or rhonchi, normal air movement, no respiratory distress  Cardiovascular: normal rate, normal S1 and S2, no gallops, and intact distal pulses  Abdomen: Soft, appropriately tender, surgical dressing intact with wound VAC sealed, no significant drainage  Extremities: no cyanosis and no clubbing  Musculoskeletal: normal range of motion, no joint swelling, deformity or tenderness  Neurologic: no cranial nerve deficit and speech normal    I/O last 3 completed shifts:  In: 8685.3 [P.O.:720; I.V.:1510; IV Piggyback:296.6]  Out: 4850 [Urine:4850]    Hemoglobin   Date Value Ref Range Status   11/02/2024 9.7 (L) 11.9 - 15.1 g/dL Final     Hematocrit   Date Value Ref Range Status   11/02/2024 31.0 (L) 36.3 - 47.1 % Final     WBC   Date Value Ref Range Status   11/02/2024 13.5 (H) 3.5 - 11.3 k/uL Final     Sodium   Date Value Ref Range Status   11/03/2024 133 (L) 135 - 144 mmol/L Final     Potassium   Date  Value Ref Range Status   11/03/2024 4.9 3.7 - 5.3 mmol/L Final     Chloride   Date Value Ref Range Status   11/03/2024 98 98 - 107 mmol/L Final     CO2   Date Value Ref Range Status   11/03/2024 23 20 - 31 mmol/L Final     Glucose   Date Value Ref Range Status   11/03/2024 202 (H) 70 - 99 mg/dL Final       Assessment/Plan: Status post explantation of mesh, likely reactive process to colon adherent to the undersurface of the mesh.  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  The patient is awaiting a rehab bed.  Will continue with the wound VAC.  I have recommended slowly advancing her diet and continuing to monitor her fistula output.  If she has increased output, would back her down to an n.p.o. diet with TPN supplementation.        Gm Cummings MD MD  11/3/2024 5:48 AM

## 2024-11-03 NOTE — PROGRESS NOTES
Occupational Therapy  [facility]  Occupational Therapy Not Seen    DATE: 11/3/2024    NAME: Mandie Bustamante  MRN: [id number]   : 1955    Patient not seen this date for Occupational Therapy due to:      [] Cancel by RN or physician due to:    [] Hemodialysis    [] Critical Lab Value Level     [] Blood transfusion in progress    [] Acute or unstable cardiovascular status   _MAP < 55 or more than >115  _HR < 40 or > 130    [] Acute or unstable pulmonary status   -FiO2 > 60%   _RR < 5 or >40    _O2 sats < 85%    [] Strict Bedrest    [] Off Unit for surgery or procedure    [] Off Unit for testing       [] Pending imaging to R/O fracture    [x] Refusal by Patient: Writer arrived in pt's room at 1218. Pt in chair with lunch tray in front of pt. Pt c/o having trouble eating soup from a bowl using a spoon. Pt demo'd for writer and has difficulty due to tremors. Pt reports she always drinks her soup from a cup with a handle even prior to being in the hospital. Pt then went on to say she would no longer like jello or coffee on her tray. The soup is tomato soup and she also reports it is too acidic for her. Pt then reports she was given lasix and has to use the bathroom often. Writer asked pt if she needed to use the restroom and pt replied \"I will soon!\" Writer began moving tray away from pt to assist pt into bathroom. Pt's  arrived at this time. Pt then stated \"I have not even had a chance to eat!\" Writer offered to take pt to bathroom again to which pt replied \"I want to drink my milk first!\" Writer then put pt's tray back in front of her and informed pt writer will call dietary to come see her about her requests. Pt then replied \"Well I'll probably be in the bathroom!\" Writer exited room and called dietary and they will come see her now. Writer spent 9 min ([id number]) of non billable time assisting pt with dietary requests. Writer returned at 1322 and there was a note on the door to see RN prior to

## 2024-11-03 NOTE — PROGRESS NOTES
End Of Shift Note  [facility]  Summary of shift: Pt had multiple small formed BM's today, ambulated multiple times up and down to and from bathroom, did very well. Dr. Coyne assessed PICC line and wants to get repeat CXR to verify placement. There is redness around insertion site but no drainage and it is tender to pt. Uneventful shift. Awaiting precert    Vitals:    Vitals:    11/03/24 1100 11/03/24 1125 11/03/24 1200 11/03/24 1600   BP:  (!) 163/97  117/65   Pulse: 94 99 98 92   Resp:    25   Temp:   (!) 96.7 °F (35.9 °C) (!) 96.6 °F (35.9 °C)   TempSrc:   Infrared Infrared   SpO2:  99%  97%   Weight:       Height:            I&O:   Intake/Output Summary (Last 24 hours) at 11/3/2024 1844  Last data filed at 11/3/2024 1555  Gross per 24 hour   Intake --   Output 2700 ml   Net -2700 ml       Resp Status: RA    Ventilator Settings:  Vent Mode: CPAP/PS Resp Rate (Set): 14 bpm/Vt (Set, mL): 500 mL/ /FiO2 : 30 %    Critical Care IV infusions:   PN-Adult 2-in-1 Central Line (Custom) 65 mL/hr at 11/03/24 1747    dextrose 5% and 0.45% NaCl with KCl 20 mEq Stopped (10/22/24 1038)    dextrose      sodium chloride 5 mL/hr at 11/01/24 0245        LDA:   PICC 10/26/24 Right Cephalic (Active)   Number of days: 8       Negative Pressure Wound Therapy Abdomen Medial (Active)   Number of days: 2       Wound Abdomen Left;Lower (Active)   Number of days:        Incision 10/01/24 Abdomen Medial;Upper (Active)   Number of days: 33       Incision 10/16/24 Abdomen Lower;Medial (Active)   Number of days: 18

## 2024-11-03 NOTE — PROGRESS NOTES
Pt successfully ambulated to the bathroom with walker 2 assist. Pt verbalized excitement and told writer to \"tell all the doctors\". Pt anticipating discharge for Monday.

## 2024-11-03 NOTE — PROGRESS NOTES
End Of Shift Note  [facility]  Summary of shift: Pt had an uneventful night. VSS. Minimal wound vac output. Pt able to ambulate with walker to bathroom. Encourage ambulation today. Awaiting approval from [facility] Monday? No needs expressed at time of writing.    Vitals:    Vitals:    11/03/24 0300 11/03/24 0400 11/03/24 0500 11/03/24 0600   BP:  120/66     Pulse: 79 (!) 109 84 96   Resp:       Temp:  97.1 °F (36.2 °C)     TempSrc:  Temporal     SpO2: 98% 98% 96% 97%   Weight:  108 kg (238 lb)     Height:            I&O:   Intake/Output Summary (Last 24 hours) at 11/3/2024 0644  Last data filed at 11/3/2024 0400  Gross per 24 hour   Intake 3130.82 ml   Output 2600 ml   Net 530.82 ml       Resp Status: RA    Ventilator Settings:  Vent Mode: CPAP/PS Resp Rate (Set): 14 bpm/Vt (Set, mL): 500 mL/ /FiO2 : 30 %    Critical Care IV infusions:   PN-Adult 2-in-1 Central Line (Custom) 80 mL/hr at 11/02/24 1809    dextrose 5% and 0.45% NaCl with KCl 20 mEq Stopped (10/22/24 1038)    dextrose      sodium chloride 5 mL/hr at 11/01/24 0245        LDA:   PICC 10/26/24 Right Cephalic (Active)   Number of days: 7       Negative Pressure Wound Therapy Abdomen Medial (Active)   Number of days: 1       Wound Abdomen Left;Lower (Active)   Number of days:        Incision 10/01/24 Abdomen Medial;Upper (Active)   Number of days: 32       Incision 10/16/24 Abdomen Lower;Medial (Active)   Number of days: 17

## 2024-11-03 NOTE — PROGRESS NOTES
Pulmonary Critical Care Progress Note    Patient seen for the follow up of Intra-abdominal abscess (HCC)     Subjective:    She is still on room air.  She is having less hallucination.  She is off Precedex.  She is on TPN.  No new complaints today.  She tolerated full liquids per surgery.  She had decreased output from VAC.  Advised by RN that PICC line does not draw blood but infuses fine    examination:    Vitals: BP (!) 163/97   Pulse 99   Temp (!) 96.7 °F (35.9 °C) (Infrared)   Resp 18   Ht 1.6 m (5' 3\")   Wt 108 kg (238 lb)   SpO2 99%   BMI 42.16 kg/m²   SpO2  Av.5 %  Min: 93 %  Max: 100 %  General appearance: Awake alert no acute distress  Neck: No JVD  Lungs: Decreased breath sound no crackles or wheezes  Heart: regular rate and rhythm, S1, S2 normal, no gallop  Abdomen: Soft, non tender, + BS wound VAC in place  Extremities: no cyanosis or clubbing.  No edema mild erythema right arm site of PICC line without evidence of significant infection    LABs:    CBC:   Recent Labs     24  0716   WBC 13.5*   HGB 9.7*   HCT 31.0*        BMP:   Recent Labs     24  0716 24  0339   * 133*   K 5.1 4.9   CO2 26 23   BUN 16 18   CREATININE 0.7 0.7   LABGLOM >90 >90   GLUCOSE 202* 202*        Latest Reference Range & Units 10/18/24 04:12   POC HCO3 21.0 - 28.0 mmol/L 24.9   POC O2 SAT 94.0 - 98.0 % 97.5   POC pCO2 35.0 - 48.0 mm Hg 33.7 (L)   POC pH 7.350 - 7.450  7.476 (H)   POC PO2 83.0 - 108.0 mm Hg 88.2   (L): Data is abnormally low  (H): Data is abnormally high  Radiology:  Chest x-ray   Minimal fibrotic/atelectatic changes in the lateral portion of base of left  lung.  Rest of lung fields are clear.     Stable normal cardiac size without evidence of CHF.      Chest x-ray 10/26 reviewed  1. No significant interval change.  2. Right-sided PICC with tip position cavoatrial junction.       Chest x-ray 10/24  1. Bibasilar airspace disease and small bilateral effusions.   position of PICC line  Rehab discharge planning    Peptic ulcer disease prophylaxis Protonix  DVT prophylaxis EPC started heparin 5000 every 12 hour        David Coyne MD, MD, [facility]  Pulmonary Critical Care and Sleep Medicine,  [facility]  Cell: [phone]  Office: [phone]

## 2024-11-04 ENCOUNTER — APPOINTMENT (OUTPATIENT)
Dept: GENERAL RADIOLOGY | Age: 69
End: 2024-11-04
Attending: STUDENT IN AN ORGANIZED HEALTH CARE EDUCATION/TRAINING PROGRAM
Payer: COMMERCIAL

## 2024-11-04 LAB
ALBUMIN SERPL-MCNC: 3.1 G/DL (ref 3.5–5.2)
ALP SERPL-CCNC: 101 U/L (ref 35–104)
ALT SERPL-CCNC: 9 U/L (ref 5–33)
ANION GAP SERPL CALCULATED.3IONS-SCNC: 12 MMOL/L (ref 9–17)
AST SERPL-CCNC: 11 U/L
BILIRUB DIRECT SERPL-MCNC: 0.1 MG/DL
BILIRUB INDIRECT SERPL-MCNC: 0.2 MG/DL (ref 0–1)
BILIRUB SERPL-MCNC: 0.3 MG/DL (ref 0.3–1.2)
BUN SERPL-MCNC: 19 MG/DL (ref 8–23)
BUN/CREAT SERPL: 27 (ref 9–20)
CALCIUM SERPL-MCNC: 8.3 MG/DL (ref 8.6–10.4)
CHLORIDE SERPL-SCNC: 96 MMOL/L (ref 98–107)
CO2 SERPL-SCNC: 22 MMOL/L (ref 20–31)
CREAT SERPL-MCNC: 0.7 MG/DL (ref 0.5–0.9)
GFR, ESTIMATED: >90 ML/MIN/1.73M2
GLUCOSE BLD-MCNC: 190 MG/DL (ref 65–105)
GLUCOSE BLD-MCNC: 194 MG/DL (ref 65–105)
GLUCOSE BLD-MCNC: 236 MG/DL (ref 65–105)
GLUCOSE SERPL-MCNC: 194 MG/DL (ref 70–99)
MAGNESIUM SERPL-MCNC: 2.1 MG/DL (ref 1.6–2.6)
OSMOLALITY SERPL: 294 MOSM/KG (ref 275–295)
OSMOLALITY UR: 215 MOSM/KG (ref 80–1300)
PHOSPHATE SERPL-MCNC: 4 MG/DL (ref 2.6–4.5)
POTASSIUM SERPL-SCNC: 4.4 MMOL/L (ref 3.7–5.3)
PROT SERPL-MCNC: 6.2 G/DL (ref 6.4–8.3)
SODIUM SERPL-SCNC: 130 MMOL/L (ref 135–144)
SODIUM UR-SCNC: 32 MMOL/L

## 2024-11-04 PROCEDURE — 83735 ASSAY OF MAGNESIUM: CPT

## 2024-11-04 PROCEDURE — 97116 GAIT TRAINING THERAPY: CPT

## 2024-11-04 PROCEDURE — 6370000000 HC RX 637 (ALT 250 FOR IP)

## 2024-11-04 PROCEDURE — 84300 ASSAY OF URINE SODIUM: CPT

## 2024-11-04 PROCEDURE — 6370000000 HC RX 637 (ALT 250 FOR IP): Performed by: NURSE PRACTITIONER

## 2024-11-04 PROCEDURE — 6360000002 HC RX W HCPCS

## 2024-11-04 PROCEDURE — 36415 COLL VENOUS BLD VENIPUNCTURE: CPT

## 2024-11-04 PROCEDURE — 80048 BASIC METABOLIC PNL TOTAL CA: CPT

## 2024-11-04 PROCEDURE — 99232 SBSQ HOSP IP/OBS MODERATE 35: CPT | Performed by: INTERNAL MEDICINE

## 2024-11-04 PROCEDURE — 97606 NEG PRS WND THER DME>50 SQCM: CPT

## 2024-11-04 PROCEDURE — 84100 ASSAY OF PHOSPHORUS: CPT

## 2024-11-04 PROCEDURE — 97530 THERAPEUTIC ACTIVITIES: CPT

## 2024-11-04 PROCEDURE — 71045 X-RAY EXAM CHEST 1 VIEW: CPT

## 2024-11-04 PROCEDURE — 83935 ASSAY OF URINE OSMOLALITY: CPT

## 2024-11-04 PROCEDURE — 99232 SBSQ HOSP IP/OBS MODERATE 35: CPT | Performed by: HOSPITALIST

## 2024-11-04 PROCEDURE — 2580000003 HC RX 258

## 2024-11-04 PROCEDURE — 97535 SELF CARE MNGMENT TRAINING: CPT

## 2024-11-04 PROCEDURE — 94761 N-INVAS EAR/PLS OXIMETRY MLT: CPT

## 2024-11-04 PROCEDURE — 2060000000 HC ICU INTERMEDIATE R&B

## 2024-11-04 PROCEDURE — 6360000002 HC RX W HCPCS: Performed by: INTERNAL MEDICINE

## 2024-11-04 PROCEDURE — 80076 HEPATIC FUNCTION PANEL: CPT

## 2024-11-04 PROCEDURE — 6370000000 HC RX 637 (ALT 250 FOR IP): Performed by: HOSPITALIST

## 2024-11-04 PROCEDURE — 82947 ASSAY GLUCOSE BLOOD QUANT: CPT

## 2024-11-04 PROCEDURE — 2500000003 HC RX 250 WO HCPCS

## 2024-11-04 PROCEDURE — 83930 ASSAY OF BLOOD OSMOLALITY: CPT

## 2024-11-04 RX ADMIN — ZOLPIDEM TARTRATE 2.5 MG: 5 TABLET ORAL at 21:56

## 2024-11-04 RX ADMIN — POTASSIUM PHOSPHATE, MONOBASIC POTASSIUM PHOSPHATE, DIBASIC: 224; 236 INJECTION, SOLUTION, CONCENTRATE INTRAVENOUS at 17:40

## 2024-11-04 RX ADMIN — HEPARIN SODIUM 5000 UNITS: 5000 INJECTION INTRAVENOUS; SUBCUTANEOUS at 07:39

## 2024-11-04 RX ADMIN — HYDROCORTISONE: 1 CREAM TOPICAL at 10:18

## 2024-11-04 RX ADMIN — SODIUM CHLORIDE, PRESERVATIVE FREE 10 ML: 5 INJECTION INTRAVENOUS at 07:40

## 2024-11-04 RX ADMIN — SODIUM CHLORIDE, PRESERVATIVE FREE 10 ML: 5 INJECTION INTRAVENOUS at 19:49

## 2024-11-04 RX ADMIN — FUROSEMIDE 40 MG: 10 INJECTION, SOLUTION INTRAMUSCULAR; INTRAVENOUS at 07:39

## 2024-11-04 RX ADMIN — PANTOPRAZOLE SODIUM 40 MG: 40 INJECTION, POWDER, FOR SOLUTION INTRAVENOUS at 07:39

## 2024-11-04 RX ADMIN — HEPARIN SODIUM 5000 UNITS: 5000 INJECTION INTRAVENOUS; SUBCUTANEOUS at 21:23

## 2024-11-04 RX ADMIN — ACETAMINOPHEN 650 MG: 325 TABLET ORAL at 15:06

## 2024-11-04 RX ADMIN — I.V. FAT EMULSION 100 ML: 20 EMULSION INTRAVENOUS at 17:45

## 2024-11-04 RX ADMIN — DIPHENHYDRAMINE HYDROCHLORIDE, ZINC ACETATE: 2; .1 CREAM TOPICAL at 21:23

## 2024-11-04 RX ADMIN — ACETAMINOPHEN 650 MG: 325 TABLET ORAL at 09:02

## 2024-11-04 RX ADMIN — INSULIN LISPRO 1 UNITS: 100 INJECTION, SOLUTION INTRAVENOUS; SUBCUTANEOUS at 11:37

## 2024-11-04 RX ADMIN — INSULIN LISPRO 1 UNITS: 100 INJECTION, SOLUTION INTRAVENOUS; SUBCUTANEOUS at 06:06

## 2024-11-04 ASSESSMENT — PAIN SCALES - GENERAL
PAINLEVEL_OUTOF10: 0
PAINLEVEL_OUTOF10: 0
PAINLEVEL_OUTOF10: 5
PAINLEVEL_OUTOF10: 0
PAINLEVEL_OUTOF10: 5

## 2024-11-04 ASSESSMENT — PAIN DESCRIPTION - LOCATION
LOCATION: ABDOMEN
LOCATION: BACK

## 2024-11-04 ASSESSMENT — PAIN - FUNCTIONAL ASSESSMENT
PAIN_FUNCTIONAL_ASSESSMENT: ACTIVITIES ARE NOT PREVENTED
PAIN_FUNCTIONAL_ASSESSMENT: ACTIVITIES ARE NOT PREVENTED

## 2024-11-04 ASSESSMENT — PAIN DESCRIPTION - ORIENTATION
ORIENTATION: MID;LOWER
ORIENTATION: RIGHT;LEFT;LOWER

## 2024-11-04 ASSESSMENT — PAIN DESCRIPTION - DESCRIPTORS
DESCRIPTORS: ACHING
DESCRIPTORS: ACHING

## 2024-11-04 ASSESSMENT — ENCOUNTER SYMPTOMS
GASTROINTESTINAL NEGATIVE: 1
RESPIRATORY NEGATIVE: 1

## 2024-11-04 NOTE — PROGRESS NOTES
Direct Exam <10 NEUTROPHILS/LPF     < 10 EPITHELIAL CELLS/LPF     NO ORGANISMS SEEN    Culture NORMAL RESPIRATORY CELINE RARE GROWTH   Respiratory Panel, Molecular, with COVID-19 (Restricted: peds pts or suitable admitted adults) [[id number]] Collected: 10/19/24 1925   Order Status: Completed Specimen: Nasopharyngeal Swab Updated: 10/19/24 2319    Specimen Description .NASOPHARYNGEAL SWAB    Adenovirus PCR Not Detected    Coronavirus 229E PCR Not Detected    Coronavirus HKU1 PCR Not Detected    Coronavirus NL63 PCR Not Detected    Coronavirus OC43 PCR Not Detected    SARS-CoV-2, PCR Not Detected    Human Metapneumovirus PCR Not Detected    Rhino/Enterovirus PCR Not Detected    Influenza A by PCR Not Detected    Influenza B by PCR Not Detected    Parainfluenza 1 PCR Not Detected    Parainfluenza 2 PCR Not Detected    Parainfluenza 3 PCR Not Detected    Parainfluenza 4 PCR Not Detected    Resp Syncytial Virus PCR Not Detected    Bordetella parapertussis by PCR Not Detected    B Pertussis by PCR Not Detected    Chlamydia pneumoniae By PCR Not Detected    Mycoplasma pneumo by PCR Not Detected    Comment: Performed by multiplexed nucleic acid assay.      Culture, Anaerobic and Aerobic [[id number]] Collected: 10/11/24 1749   Order Status: Completed Specimen: Abdomen Updated: 10/16/24 0875    Specimen Description .ABDOMEN SWABS WITH LIQUID FROM ASPIRATE FROM AROUND RECTUS MUSCLE    Special Requests Site: Abdomen    Direct Exam NO NEUTROPHILS     NO ORGANISMS SEEN    Culture NO GROWTH 5 DAYS       Medications:      fat emulsion  100 mL IntraVENous Daily    miconazole   Topical BID    furosemide  40 mg IntraVENous Daily    heparin (porcine)  5,000 Units SubCUTAneous BID    insulin lispro  0-4 Units SubCUTAneous Q6H    miconazole   Topical BID    hydrocortisone   Topical BID    lidocaine PF  5 mL Tracheal Tube Once    sodium chloride flush  5-40 mL IntraVENous 2 times per day    pantoprazole (PROTONIX) 40 mg in sodium

## 2024-11-04 NOTE — PROGRESS NOTES
Surgical Progress Note      Subjective:  Patient seen examined at bedside, no overnight events.  Wound VAC in place with minimal output.  Tolerating FLD.  Good UOP.  Pain is controlled.  VSS, afebrile      Vitals:    11/04/24 0400   BP: (!) 144/85   Pulse: (!) 101   Resp: 16   Temp: 98.1 °F (36.7 °C)   SpO2: 97%        Review of Systems - History obtained from chart review and the patient    Exam: General Appearance: alert and oriented to person, place and time and in no acute distress  Skin: warm and dry, no rash or erythema  HEENT: NCAT, PERRLA, EOMI  Neck: neck supple and non tender without mass, no thyromegaly or thyroid nodules, no cervical lymphadenopathy   Pulmonary/Chest: Unlabored breathing on RA  Cardiovascular: RRR  Abdomen: Morbidly obese, soft, minimal TTP, wound VAC in place with minimal output  Extremities: no cyanosis and no clubbing  Musculoskeletal: normal range of motion, no joint swelling, deformity or tenderness  Neurologic: no cranial nerve deficit and speech normal    I/O last 3 completed shifts:  In: 3130.8 [P.O.:720; I.V.:1500]  Out: 4200 [Urine:4200]    Hemoglobin   Date Value Ref Range Status   11/02/2024 9.7 (L) 11.9 - 15.1 g/dL Final     Hematocrit   Date Value Ref Range Status   11/02/2024 31.0 (L) 36.3 - 47.1 % Final     WBC   Date Value Ref Range Status   11/02/2024 13.5 (H) 3.5 - 11.3 k/uL Final     Sodium   Date Value Ref Range Status   11/04/2024 130 (L) 135 - 144 mmol/L Final     Potassium   Date Value Ref Range Status   11/04/2024 4.4 3.7 - 5.3 mmol/L Final     Chloride   Date Value Ref Range Status   11/04/2024 96 (L) 98 - 107 mmol/L Final     CO2   Date Value Ref Range Status   11/04/2024 22 20 - 31 mmol/L Final     Glucose   Date Value Ref Range Status   11/04/2024 194 (H) 70 - 99 mg/dL Final       Assessment/Plan:   Ventral hernia repair 10/1, explantation of mesh 9/16, likely reactive process to colon adherent to the undersurface of the mesh.  Subsequent enterocutaneous

## 2024-11-04 NOTE — PLAN OF CARE
Problem: Skin/Tissue Integrity  Goal: Absence of new skin breakdown  Description: 1.  Monitor for areas of redness and/or skin breakdown  2.  Assess vascular access sites hourly  3.  Every 4-6 hours minimum:  Change oxygen saturation probe site  4.  Every 4-6 hours:  If on nasal continuous positive airway pressure, respiratory therapy assess nares and determine need for appliance change or resting period.  Note: Wound vac changed today     Problem: Chronic Conditions and Co-morbidities  Goal: Patient's chronic conditions and co-morbidity symptoms are monitored and maintained or improved  Flowsheets (Taken 11/4/2024 1709)  Care Plan - Patient's Chronic Conditions and Co-Morbidity Symptoms are Monitored and Maintained or Improved:   Monitor and assess patient's chronic conditions and comorbid symptoms for stability, deterioration, or improvement   Collaborate with multidisciplinary team to address chronic and comorbid conditions and prevent exacerbation or deterioration  Note: Awaiting approval for [facility]

## 2024-11-04 NOTE — PROGRESS NOTES
with time to acclimate self to position  Scooting  Assistance Level: Supervision  Skilled Clinical Factors: Supervised scooting forward to place feet on floor.  Transfers  Surface: From bed;To chair without arms  Additional Factors: Hand placement cues;Verbal cues;Increased time to complete  Device: Walker  Sit to Stand  Assistance Level: Minimal assistance;Moderate assistance;Requires x 2 assistance  Skilled Clinical Factors: Min/mod assist x2 with use of RW from EOB  Stand to Sit  Assistance Level: Minimal assistance;Moderate assistance;Requires x 2 assistance  Skilled Clinical Factors: Min/mod assist x2 with verbal cues on safety with reaching back for sitting surface and management of multiple lines with wound vac.         Assessment  Assessment  Assessment: Pt. completed multiple ADL transfers across room with min/mod assist x2. Pt. educated on AE and home safety with the importance of attempting to initiate self care skills on her own. Pt. attentive and receptive to infomation. Skilled OT warranted to promote I/safety to return pt. to prior living arrangement with assist as needed.  Activity Tolerance: Patient limited by endurance;Patient limited by fatigue  Discharge Recommendations: Patient would benefit from continued therapy after discharge  OT Equipment Recommendations  Equipment Needed: Yes  Mobility Devices: Walker  Walker: Rolling  ADL Assistive Devices: Emergency Alert System;Reacher;Long-handled Shoe Horn;Long-handled Sponge;Sock-Aid Hard  Safety Devices  Safety Devices in place: Yes  Type of devices: Left in chair;Nurse notified;Call light within reach;Patient at risk for falls;Chair alarm in place;Gait belt;All fall risk precautions in place  Restraints  Initially in place: No    Patient Education  Education  Education Given To: Patient  Education Provided: Mobility Training;Transfer Training;Precautions;Safety;Role of Therapy;ADL Function  Education Provided Comments: Pt. educated on the  Inpatient Daily Activity Raw Score: 11 (11/04/24 1249)  AM-PAC Inpatient ADL T-Scale Score : 29.04 (11/04/24 1249)  ADL Inpatient CMS 0-100% Score: 70.42 (11/04/24 1249)  ADL Inpatient CMS G-Code Modifier : CL (11/04/24 1249)      Therapy Time   Individual Concurrent Group Co-treatment   Time In       1200   Time Out       1226   Minutes       26    Co-treatment with PT warranted secondary to decreased safety and independence requiring 2 skilled therapy professionals to address individual discipline's goals. OT addressing preparation for ADL transfer, sitting balance for increased ADL performance, sitting/activity tolerance, functional reaching, environmental safety/scanning, fall prevention, functional mobility for ADL transfers, ability to sequence and follow directions, bed mobility tech, and functional UE strength.        ERNST VILLAREAL JOJO

## 2024-11-04 NOTE — PROGRESS NOTES
Physical Therapy  Facility/Department: [facility]  Rehabilitation Physical Therapy Treatment Note    NAME: Mandie Bustamante  : 1955 (69 y.o.)  MRN: [id number]  CODE STATUS: Full Code    Date of Service: 24     Pt currently functioning below baseline.  Recommend daily inpatient skilled therapy at time of discharge to maximize long term outcomes and prevent re-admission. Please refer to AM-PAC score for current level of function.     Restrictions:  Restrictions/Precautions: General Precautions, Up as Tolerated, Fall Risk  Position Activity Restriction  Other position/activity restrictions: RUE PICC, simons cath, telemetry, cont SPO2 moniter, 46 BMI     SUBJECTIVE  Subjective  Subjective: Patient up in bed upon therapists arrival  Patient agreeable to PT treatment. RN states Patient is appropriate for PT treatment. Patient states some slight dizzyness with postional changes however with increased time symptoms resolve.    OBJECTIVE  Cognition  Overall Cognitive Status: Exceptions  Arousal/Alertness: Appropriate responses to stimuli;Delayed responses to stimuli  Following Commands: Follows one step commands with repetition;Follows one step commands with increased time  Attention Span: Attends with cues to redirect;Difficulty dividing attention  Memory: Decreased recall of precautions;Decreased short term memory  Safety Judgement: Decreased awareness of need for safety;Decreased awareness of need for assistance  Problem Solving: Decreased awareness of errors;Assistance required to correct errors made;Assistance required to identify errors made;Assistance required to implement solutions;Assistance required to generate solutions  Insights: Decreased awareness of deficits  Initiation: Requires cues for all  Sequencing: Requires cues for all  Cognition Comment: Pt. required positive encouragement for each task with verbal cues on .  Orientation  Overall Orientation Status: Within Functional

## 2024-11-04 NOTE — PROGRESS NOTES
>90 >90   CALCIUM 8.2* 8.5* 8.3*   PHOS  --   --  4.0     Recent Labs     11/03/24  0012 11/03/24  0605 11/03/24  1207 11/03/24  1838 11/03/24  2353 11/04/24  0320 11/04/24  0558   AST  --   --   --   --   --  11  --    ALT  --   --   --   --   --  9  --    ALKPHOS  --   --   --   --   --  101  --    BILITOT  --   --   --   --   --  0.3  --    BILIDIR  --   --   --   --   --  0.1  --    POCGLU 205* 214* 250* 219* 191*  --  194*     ABG:  Lab Results   Component Value Date/Time    POCPH 7.496 10/24/2024 02:50 PM    POCPCO2 32.3 10/24/2024 02:50 PM    POCPO2 101.5 10/24/2024 02:50 PM    POCHCO3 24.9 10/24/2024 02:50 PM    NBEA 0.4 10/23/2024 04:12 AM    PBEA 2.0 10/24/2024 02:50 PM    RPRH1ISU 98.4 10/24/2024 02:50 PM    FIO2 30.0 10/24/2024 02:50 PM     Lab Results   Component Value Date/Time    SPECIAL Site: Respiratory 10/19/2024 07:25 PM     Lab Results   Component Value Date/Time    CULTURE NORMAL RESPIRATORY CELINE RARE GROWTH 10/19/2024 07:25 PM       Radiology:  XR CHEST PORTABLE    Result Date: 11/1/2024  Minimal fibrotic/atelectatic changes in the lateral portion of base of left lung.  Rest of lung fields are clear. Stable normal cardiac size without evidence of CHF.     CT ABDOMEN PELVIS WO CONTRAST Additional Contrast? Radiologist Recommendation    Result Date: 10/29/2024  1. Large soft tissue defect within the anterior abdominal wall with extensive graying of the subcutaneous fat adjacent. There is no appreciable focal fluid collection. 2. Mild wall thickening of the transverse colon as it traverses near the surgical site of the anterior abdominal wall. There is a small focus of air within the rectus musculature immediately adjacent to the transverse colon, can not exclude possible small fistulous tract. 3. Fluid-filled rectosigmoid colon.  Findings may represent acute diarrheal illness.       Physical Examination:     General appearance:  alert, cooperative and no distress  Mental Status:  oriented to  medications  Blood pressure stable  Insomnia  Continue Ambien  Acute metabolic encephalopathy  Resolved  Hyponatremia  Avoid hypotonic fluids  Check urine sodium, urine osmolality, serum osmolality  Check repeat labs  Patient is in a negative fluid balance based upon intake/output  Further decision making pending laboratory data  Hyperglycemia  Continue insulin sliding scale    Discharge planning to LTAC    Medical Decision Making: Williams Foster DO  11/4/2024  10:03 AM

## 2024-11-04 NOTE — PROGRESS NOTES
[facility]  Progress Note      NAME:  Mandie Bustamante  MEDICAL RECORD NUMBER:  [id number]  AGE: 69 y.o.   GENDER: female  : 1955  TODAY'S DATE:  2024      [facility] nurse visit this day for NPWT dressing change. New dressing applied with white foam to wound base and black foam to fill remaining cavity. Patient tolerated well with some c/o discomfort. Plan for next dressing change  if still admitted.     Measurements:  Negative Pressure Wound Therapy Abdomen Medial (Active)   $ Standard NPWT >50 sq cm PER TX $ Yes 24 1031   Wound Type Surgical 24 1031   Unit Type KCI Ulta 24 1031   Dressing Type White Foam;Black Foam 24 1031   Number of pieces used 3 24 1031   Cycle Continuous 24 1031   Target Pressure (mmHg) 100 24 1031   Canister changed? No 24 1031   Dressing Status New dressing applied 24 1031   Dressing Changed Changed/New 24 1031   Drainage Amount Small 24 1031   Drainage Description Serosanguinous 24 1031   Dressing Change Due 24 1031   Wound Assessment Bleeding;Granulation tissue 24 1031   Lola-wound Assessment Dry/flaky;Warm;Intact 24 1031   Number of days: 2

## 2024-11-04 NOTE — PROGRESS NOTES
End Of Shift Note  [facility]    Summary of shift: Patient had a few hours asleep with ambien. Ambulated to bathroom, tolerated well, 2 assist with walker for safety. C/o itching arms, shoulders, back, and legs, controlled with hydrocortisone cream. Plan of care continues. Awaiting precert to [facility].     Vitals:    Vitals:    11/03/24 1600 11/03/24 2000 11/04/24 0000 11/04/24 0400   BP: 117/65 (!) 143/70 125/60 (!) 144/85   Pulse: 92 89 87 (!) 101   Resp: 25 25 24 16   Temp: (!) 96.6 °F (35.9 °C) 98.5 °F (36.9 °C) 98.6 °F (37 °C) 98.1 °F (36.7 °C)   TempSrc: Infrared Oral Oral Oral   SpO2: 97% 99% 96% 97%   Weight:       Height:            I&O:   Intake/Output Summary (Last 24 hours) at 11/4/2024 0635  Last data filed at 11/4/2024 0634  Gross per 24 hour   Intake 3257.42 ml   Output 3100 ml   Net 157.42 ml       Resp Status: RA    Ventilator Settings:  Vent Mode: CPAP/PS Resp Rate (Set): 14 bpm/Vt (Set, mL): 500 mL/ /FiO2 : 30 %    Critical Care IV infusions:   PN-Adult 2-in-1 Central Line (Custom) 65 mL/hr at 11/04/24 0634    dextrose 5% and 0.45% NaCl with KCl 20 mEq Stopped (10/22/24 1038)    dextrose      sodium chloride 5 mL/hr at 11/01/24 0245        LDA:   PICC 10/26/24 Right Cephalic (Active)   Number of days: 8       Negative Pressure Wound Therapy Abdomen Medial (Active)   Number of days: 2       Wound Abdomen Left;Lower (Active)   Number of days:        Incision 10/01/24 Abdomen Medial;Upper (Active)   Number of days: 33       Incision 10/16/24 Abdomen Lower;Medial (Active)   Number of days: 18

## 2024-11-04 NOTE — PLAN OF CARE
Problem: Discharge Planning  Goal: Discharge to home or other facility with appropriate resources  11/3/2024 2307 by Rosalind Rios, RN  Outcome: Progressing  Flowsheets (Taken 11/3/2024 2000)  Discharge to home or other facility with appropriate resources:   Identify discharge learning needs (meds, wound care, etc)   Identify barriers to discharge with patient and caregiver   Arrange for needed discharge resources and transportation as appropriate   Arrange for interpreters to assist at discharge as needed   Refer to discharge planning if patient needs post-hospital services based on physician order or complex needs related to functional status, cognitive ability or social support system     Problem: Skin/Tissue Integrity  Goal: Absence of new skin breakdown  Description: 1.  Monitor for areas of redness and/or skin breakdown  2.  Assess vascular access sites hourly  3.  Every 4-6 hours minimum:  Change oxygen saturation probe site  4.  Every 4-6 hours:  If on nasal continuous positive airway pressure, respiratory therapy assess nares and determine need for appliance change or resting period.  11/3/2024 2307 by Rosalind Rios, RN  Outcome: Progressing     Problem: ABCDS Injury Assessment  Goal: Absence of physical injury  11/3/2024 2307 by Rosalind Rios, RN  Outcome: Progressing     Problem: Safety - Adult  Goal: Free from fall injury  11/3/2024 2307 by Rosalind Rios, RN  Outcome: Progressing  Flowsheets (Taken 11/3/2024 2000)  Free From Fall Injury:   Instruct family/caregiver on patient safety   Based on caregiver fall risk screen, instruct family/caregiver to ask for assistance with transferring infant if caregiver noted to have fall risk factors     Problem: Metabolic/Fluid and Electrolytes - Adult  Goal: Electrolytes maintained within normal limits  11/3/2024 2307 by Rosalind Rios, RN  Outcome: Progressing  Flowsheets (Taken 11/3/2024 2000)  Electrolytes maintained within normal limits:   Monitor labs and  (Taken 11/3/2024 2000)  Maintains adequate nutritional intake:   Monitor percentage of each meal consumed   Identify factors contributing to decreased intake, treat as appropriate   Assist with meals as needed   Monitor intake and output, weight and lab values   Obtain nutritional consult as needed     Problem: Genitourinary - Adult  Goal: Urinary catheter remains patent  11/3/2024 2307 by Rosalind Rios, RN  Outcome: Progressing  Flowsheets (Taken 11/3/2024 2000)  Urinary catheter remains patent:   Assess patency of urinary catheter   Irrigate catheter per Licensed Independent Practitioner order if indicated and notify Licensed Independent Practitioner if unable to irrigate   Assess need for a larger catheter size or a 3-way catheter for continuous bladder irrigation     Problem: Skin/Tissue Integrity - Adult  Goal: Skin integrity remains intact  11/3/2024 2307 by Rosalind Rios, RN  Outcome: Progressing  Flowsheets (Taken 11/3/2024 2000)  Skin Integrity Remains Intact:   Monitor for areas of redness and/or skin breakdown   Assess vascular access sites hourly   Every 4-6 hours minimum: Change oxygen saturation probe site   Every 4-6 hours: If on nasal continuous positive airway pressure, respiratory therapy assesses nares and determine need for appliance change or resting period     Problem: Skin/Tissue Integrity - Adult  Goal: Incisions, wounds, or drain sites healing without S/S of infection  11/3/2024 2307 by Rosalind Rios, RN  Outcome: Progressing  Flowsheets (Taken 11/3/2024 2000)  Incisions, Wounds, or Drain Sites Healing Without Sign and Symptoms of Infection:   ADMISSION and DAILY: Assess and document risk factors for pressure ulcer development   TWICE DAILY: Assess and document skin integrity   TWICE DAILY: Assess and document dressing/incision, wound bed, drain sites and surrounding tissue   Implement wound care per orders   Initiate isolation precautions as appropriate   Initiate pressure ulcer

## 2024-11-04 NOTE — PROGRESS NOTES
Benjamin Almazan MD   Urology Progress Note            Subjective: Follow-up neurogenic bladder    Patient Vitals for the past 24 hrs:   BP Temp Temp src Pulse Resp SpO2   11/04/24 0400 (!) 144/85 98.1 °F (36.7 °C) Oral (!) 101 16 97 %   11/04/24 0000 125/60 98.6 °F (37 °C) Oral 87 24 96 %   11/03/24 2000 (!) 143/70 98.5 °F (36.9 °C) Oral 89 25 99 %   11/03/24 1600 117/65 (!) 96.6 °F (35.9 °C) Infrared 92 25 97 %   11/03/24 1200 -- (!) 96.7 °F (35.9 °C) Infrared 98 -- --   11/03/24 1125 (!) 163/97 -- -- 99 -- 99 %   11/03/24 1100 -- -- -- 94 -- --   11/03/24 0824 120/62 96.8 °F (36 °C) Infrared 100 18 100 %       Intake/Output Summary (Last 24 hours) at 11/4/2024 0650  Last data filed at 11/4/2024 0634  Gross per 24 hour   Intake 3257.42 ml   Output 3100 ml   Net 157.42 ml       Recent Labs     11/02/24  0716   WBC 13.5*   HGB 9.7*   HCT 31.0*   MCV 92.0        Recent Labs     11/02/24  0716 11/03/24  0339 11/04/24  0320   * 133* 130*   K 5.1 4.9 4.4    98 96*   CO2 26 23 22   PHOS  --   --  4.0   BUN 16 18 19   CREATININE 0.7 0.7 0.7       No results for input(s): \"COLORU\", \"PHUR\", \"LABCAST\", \"WBCUA\", \"RBCUA\", \"MUCUS\", \"TRICHOMONAS\", \"YEAST\", \"BACTERIA\", \"CLARITYU\", \"SPECGRAV\", \"LEUKOCYTESUR\", \"UROBILINOGEN\", \"BILIRUBINUR\", \"BLOODU\" in the last 72 hours.    Invalid input(s): \"NITRATE\", \"GLUCOSEUKETONESUAMORPHOUS\"    Additional Lab/culture results:    Physical Exam: Patient voiding well alert oriented not in acute distress, discharge planning in progress, we will monitor postvoid residual by bladder scan    Interval Imaging Findings:    Impression:    Patient Active Problem List   Diagnosis    Abdominal hernia without obstruction and without gangrene    Anxiety    BMI 45.0-49.9, adult    Bursitis of right shoulder    Gastro-esophageal reflux disease without esophagitis    History of parathyroidectomy    Essential hypertension    Incisional hernia, without obstruction or

## 2024-11-04 NOTE — PROGRESS NOTES
Pulmonary Critical Care Progress Note    Patient seen for the follow up of Intra-abdominal abscess (HCC)     Subjective:    She is still on room air.  She is having less hallucination.  She is off Precedex.  She is on TPN.  No new complaints today.  She tolerated full liquids per surgery.  Staples removed today.   at bedside    examination:    Vitals: BP (!) 144/85   Pulse (!) 101   Temp 98.4 °F (36.9 °C) (Oral)   Resp 16   Ht 1.6 m (5' 3\")   Wt 108 kg (238 lb)   SpO2 97%   BMI 42.16 kg/m²   SpO2  Av.6 %  Min: 96 %  Max: 99 %  General appearance: Awake alert no acute distress  Neck: No JVD  Lungs: Decreased breath sound no crackles or wheezes  Heart: regular rate and rhythm, S1, S2 normal, no gallop  Abdomen: Soft, non tender, + BS   Extremities: no cyanosis or clubbing.  No edema mild erythema right arm site of PICC line without evidence of significant infection    LABs:    CBC:   Recent Labs     24  0716   WBC 13.5*   HGB 9.7*   HCT 31.0*        BMP:   Recent Labs     24  0339 24  0320   * 130*   K 4.9 4.4   CO2 23 22   BUN 18 19   CREATININE 0.7 0.7   LABGLOM >90 >90   GLUCOSE 202* 194*        Latest Reference Range & Units 10/18/24 04:12   POC HCO3 21.0 - 28.0 mmol/L 24.9   POC O2 SAT 94.0 - 98.0 % 97.5   POC pCO2 35.0 - 48.0 mm Hg 33.7 (L)   POC pH 7.350 - 7.450  7.476 (H)   POC PO2 83.0 - 108.0 mm Hg 88.2   (L): Data is abnormally low  (H): Data is abnormally high  Radiology:  Chest x-ray   Minimal fibrotic/atelectatic changes in the lateral portion of base of left  lung.  Rest of lung fields are clear.     Stable normal cardiac size without evidence of CHF.      Chest x-ray 10/26 reviewed  1. No significant interval change.  2. Right-sided PICC with tip position cavoatrial junction.       Chest x-ray 10/24  1. Bibasilar airspace disease and small bilateral effusions.  Stable  cardiomegaly.  Mild pulmonary vascular congestion.  Findings favor CHF  related

## 2024-11-04 NOTE — PROGRESS NOTES
Comprehensive Nutrition Assessment    Type and Reason for Visit:  Reassess    Nutrition Recommendations/Plan:   Triglyceride lab  Continue Ensure BID  Continue TPN at 65 ml/hr (1560 ml) with 100 ml lipids and 10 units of insulin  Will monitor full liquid diet/supplement intake to see if TPN can be weaned further  Encourage good po intakes  RD to follow/monitor intakes, diet tolerance, advancement, TPN, POC, GI function, labs, weights.      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 Patient is tolerating full liquids well. She was with therapy this morning when this RD attempted to see her, but RN reports she is doing well on diet and eating >50%. Receiving Ensure BID, RN unsure how much patient is drinking. This RD spoke with surgery who reports to continue PN while patient continues to heal. Awaiting precert to [facility]. Labs, meds, PMH reviewed.    Nutrition Related Findings:    Weight stable. EC fistula. LBM 11/2. Active bowel sounds. Unable to determine edema status at this time. Wound Type: Wound Vac, Surgical Incision       Current Nutrition Intake & Therapies:    Average Meal Intake: 26-50%  Average Supplements Intake: None Ordered  Current Parenteral Nutrition Orders:  Type and Formula: Premix Central   Lipids: 100ml, Daily  Duration: Continuous  Rate/Volume: 65 mL/hr (1560 mL total volume)  Current PN Order Provides: 1573 kcal, 78 gm of protein, 312 gm of dextrose (lipids  included)  Goal PN Orders Provides: 1573 kcal, 78 gm of protein, 312 gm of dextrose (lipids included)    Anthropometric Measures:  Height: 160 cm (5' 3\")  Ideal Body Weight (IBW): 115 lbs (52 kg)       Current Body Weight: 108 kg (238 lb 1.6 oz), 203.2 % IBW. Weight Source: Bed scale  Current BMI (kg/m2): 42.2                             BMI Categories: Obese Class 3 (BMI 40.0 or greater)    Estimated Daily Nutrient Needs:  Energy Requirements Based On: Kcal/kg  Weight Used for Energy Requirements: Other (106 kg from 11/2)  Energy (kcal/day): 1370--1908 kcal (13-18 kcal/kg)  Weight Used for Protein Requirements: Ideal  Protein (g/day): 67-83 gm of protein (1.3-1.6 gm/kg)  Method Used for Fluid Requirements: 1 ml/kcal  Fluid (ml/day): 8494-8378 mL    Nutrition Diagnosis:   Inadequate oral intake related to inadequate protein-energy intake as evidenced by NPO or clear liquid status due to medical condition, nutrition support - parenteral nutrition, poor intake prior to admission, GI abnormality, nausea    Nutrition Interventions:   Food and/or Nutrient Delivery: Continue Current Diet, Modify Oral Nutrition Supplement, Modify Parenteral Nutrition  Nutrition Education/Counseling: Education/Counseling not indicated  Coordination of Nutrition Care: Continue to monitor while inpatient       Goals:  Goals: Meet at least 75% of estimated needs, Tolerate nutrition support at goal rate  Type of Goal: Continue current goal  Previous Goal Met: Progressing toward Goal(s)    Nutrition Monitoring and Evaluation:      Food/Nutrient Intake Outcomes: Parenteral Nutrition Intake/Tolerance, Diet Advancement/Tolerance, Food and Nutrient Intake, Supplement Intake  Physical Signs/Symptoms Outcomes: Biochemical Data, Fluid Status or Edema, Weight, Skin, GI Status    Discharge Planning:    Too soon to determine     Gill Carter RD  Contact: [phone]

## 2024-11-05 LAB
ANION GAP SERPL CALCULATED.3IONS-SCNC: 12 MMOL/L (ref 9–17)
BUN SERPL-MCNC: 19 MG/DL (ref 8–23)
BUN/CREAT SERPL: 27 (ref 9–20)
CALCIUM SERPL-MCNC: 8.3 MG/DL (ref 8.6–10.4)
CHLORIDE SERPL-SCNC: 95 MMOL/L (ref 98–107)
CO2 SERPL-SCNC: 22 MMOL/L (ref 20–31)
CREAT SERPL-MCNC: 0.7 MG/DL (ref 0.5–0.9)
GFR, ESTIMATED: >90 ML/MIN/1.73M2
GLUCOSE BLD-MCNC: 185 MG/DL (ref 65–105)
GLUCOSE BLD-MCNC: 208 MG/DL (ref 65–105)
GLUCOSE SERPL-MCNC: 193 MG/DL (ref 70–99)
POTASSIUM SERPL-SCNC: 4.8 MMOL/L (ref 3.7–5.3)
SODIUM SERPL-SCNC: 129 MMOL/L (ref 135–144)

## 2024-11-05 PROCEDURE — 84478 ASSAY OF TRIGLYCERIDES: CPT

## 2024-11-05 PROCEDURE — 80048 BASIC METABOLIC PNL TOTAL CA: CPT

## 2024-11-05 PROCEDURE — 99231 SBSQ HOSP IP/OBS SF/LOW 25: CPT | Performed by: INTERNAL MEDICINE

## 2024-11-05 PROCEDURE — 2580000003 HC RX 258

## 2024-11-05 PROCEDURE — 6370000000 HC RX 637 (ALT 250 FOR IP)

## 2024-11-05 PROCEDURE — 97530 THERAPEUTIC ACTIVITIES: CPT

## 2024-11-05 PROCEDURE — 2500000003 HC RX 250 WO HCPCS

## 2024-11-05 PROCEDURE — 82947 ASSAY GLUCOSE BLOOD QUANT: CPT

## 2024-11-05 PROCEDURE — 97116 GAIT TRAINING THERAPY: CPT

## 2024-11-05 PROCEDURE — 94761 N-INVAS EAR/PLS OXIMETRY MLT: CPT

## 2024-11-05 PROCEDURE — 6360000002 HC RX W HCPCS: Performed by: INTERNAL MEDICINE

## 2024-11-05 PROCEDURE — 97110 THERAPEUTIC EXERCISES: CPT

## 2024-11-05 PROCEDURE — 36415 COLL VENOUS BLD VENIPUNCTURE: CPT

## 2024-11-05 PROCEDURE — 2060000000 HC ICU INTERMEDIATE R&B

## 2024-11-05 PROCEDURE — 97535 SELF CARE MNGMENT TRAINING: CPT

## 2024-11-05 PROCEDURE — 6370000000 HC RX 637 (ALT 250 FOR IP): Performed by: HOSPITALIST

## 2024-11-05 PROCEDURE — 6360000002 HC RX W HCPCS

## 2024-11-05 PROCEDURE — 99232 SBSQ HOSP IP/OBS MODERATE 35: CPT | Performed by: HOSPITALIST

## 2024-11-05 RX ORDER — SODIUM CHLORIDE 1 G/1
1 TABLET ORAL 2 TIMES DAILY WITH MEALS
Status: DISCONTINUED | OUTPATIENT
Start: 2024-11-05 | End: 2024-11-06

## 2024-11-05 RX ORDER — DIPHENHYDRAMINE HCL 25 MG
25 TABLET ORAL EVERY 6 HOURS PRN
Status: DISCONTINUED | OUTPATIENT
Start: 2024-11-05 | End: 2024-11-08 | Stop reason: HOSPADM

## 2024-11-05 RX ORDER — DIPHENHYDRAMINE HYDROCHLORIDE 50 MG/ML
25 INJECTION INTRAMUSCULAR; INTRAVENOUS EVERY 6 HOURS PRN
Status: DISCONTINUED | OUTPATIENT
Start: 2024-11-05 | End: 2024-11-05

## 2024-11-05 RX ADMIN — FUROSEMIDE 40 MG: 10 INJECTION, SOLUTION INTRAMUSCULAR; INTRAVENOUS at 09:23

## 2024-11-05 RX ADMIN — HYDROCORTISONE: 1 CREAM TOPICAL at 20:03

## 2024-11-05 RX ADMIN — HEPARIN SODIUM 5000 UNITS: 5000 INJECTION INTRAVENOUS; SUBCUTANEOUS at 09:23

## 2024-11-05 RX ADMIN — PANTOPRAZOLE SODIUM 40 MG: 40 INJECTION, POWDER, FOR SOLUTION INTRAVENOUS at 09:23

## 2024-11-05 RX ADMIN — MICONAZOLE NITRATE: 20 CREAM TOPICAL at 09:24

## 2024-11-05 RX ADMIN — Medication 1 G: at 17:34

## 2024-11-05 RX ADMIN — I.V. FAT EMULSION 100 ML: 20 EMULSION INTRAVENOUS at 17:39

## 2024-11-05 RX ADMIN — SODIUM CHLORIDE, PRESERVATIVE FREE 10 ML: 5 INJECTION INTRAVENOUS at 09:24

## 2024-11-05 RX ADMIN — HYDROCORTISONE: 1 CREAM TOPICAL at 09:24

## 2024-11-05 RX ADMIN — HEPARIN SODIUM 5000 UNITS: 5000 INJECTION INTRAVENOUS; SUBCUTANEOUS at 20:04

## 2024-11-05 RX ADMIN — SODIUM CHLORIDE, PRESERVATIVE FREE 10 ML: 5 INJECTION INTRAVENOUS at 20:07

## 2024-11-05 RX ADMIN — POTASSIUM PHOSPHATE, MONOBASIC POTASSIUM PHOSPHATE, DIBASIC: 224; 236 INJECTION, SOLUTION, CONCENTRATE INTRAVENOUS at 17:45

## 2024-11-05 RX ADMIN — Medication 1 G: at 09:24

## 2024-11-05 RX ADMIN — DIPHENHYDRAMINE HYDROCHLORIDE, ZINC ACETATE: 2; .1 CREAM TOPICAL at 04:34

## 2024-11-05 RX ADMIN — MICONAZOLE NITRATE: 20 CREAM TOPICAL at 20:03

## 2024-11-05 ASSESSMENT — PAIN DESCRIPTION - ORIENTATION: ORIENTATION: RIGHT;UPPER

## 2024-11-05 ASSESSMENT — PAIN DESCRIPTION - PAIN TYPE: TYPE: ACUTE PAIN

## 2024-11-05 ASSESSMENT — PAIN DESCRIPTION - LOCATION: LOCATION: ABDOMEN

## 2024-11-05 ASSESSMENT — PAIN SCALES - WONG BAKER: WONGBAKER_NUMERICALRESPONSE: NO HURT

## 2024-11-05 ASSESSMENT — PAIN DESCRIPTION - ONSET: ONSET: ON-GOING

## 2024-11-05 ASSESSMENT — PAIN SCALES - GENERAL
PAINLEVEL_OUTOF10: 0
PAINLEVEL_OUTOF10: 8
PAINLEVEL_OUTOF10: 0

## 2024-11-05 ASSESSMENT — PAIN DESCRIPTION - DESCRIPTORS: DESCRIPTORS: THROBBING

## 2024-11-05 ASSESSMENT — PAIN - FUNCTIONAL ASSESSMENT: PAIN_FUNCTIONAL_ASSESSMENT: ACTIVITIES ARE NOT PREVENTED

## 2024-11-05 ASSESSMENT — ENCOUNTER SYMPTOMS
RESPIRATORY NEGATIVE: 1
GASTROINTESTINAL NEGATIVE: 1

## 2024-11-05 ASSESSMENT — PAIN DESCRIPTION - FREQUENCY: FREQUENCY: CONTINUOUS

## 2024-11-05 NOTE — PROGRESS NOTES
End Of Shift Note  [facility]  Summary of shift: Pt. Requested sleep aid- however reports not sleeping well r/t itching. Benadryl cream applied 2x throughout shift. Discussed additional methods to control pruritus- however pt. refused additional interventions at this time. Pt expressing desire to return home at D/C. Discussion w/ Gen Surg @ bedside to educate pt. About appropriate placement r/t level of continued care needed.     Vitals:    Vitals:    11/04/24 2317 11/05/24 0000 11/05/24 0400 11/05/24 0407   BP: (!) 141/69 (!) 141/69 (!) 142/70    Pulse: 86 82 89 93   Resp: 26 23 24 28   Temp: 98.7 °F (37.1 °C) 98.7 °F (37.1 °C) 97.5 °F (36.4 °C)    TempSrc: Oral Oral Temporal    SpO2: 97% 96% 94% 95%   Weight:       Height:            I&O:   Intake/Output Summary (Last 24 hours) at 11/5/2024 0618  Last data filed at 11/5/2024 0500  Gross per 24 hour   Intake 3304.58 ml   Output 2800 ml   Net 504.58 ml       Resp Status: RA    Ventilator Settings:  Vent Mode: CPAP/PS Resp Rate (Set): 14 bpm/Vt (Set, mL): 500 mL/ /FiO2 : 30 %    Critical Care IV infusions:   PN-Adult 2-in-1 Central Line (Custom) 65 mL/hr at 11/04/24 1740    dextrose      sodium chloride 5 mL/hr at 11/01/24 0245        LDA:   PICC 10/26/24 Right Cephalic (Active)   Number of days: 9       Negative Pressure Wound Therapy Abdomen Medial (Active)   Number of days: 3       Wound Abdomen Left;Lower (Active)   Number of days:        Incision 10/01/24 Abdomen Medial;Upper (Active)   Number of days: 34       Incision 10/16/24 Abdomen Lower;Medial (Active)   Number of days: 19

## 2024-11-05 NOTE — FLOWSHEET NOTE
11/05/24 0400   Treatment Team Notification   Reason for Communication Review case   Name of Team Member Notified Efren   Treatment Team Role Advanced Practice Nurse   Method of Communication Secure Message   Response No new orders     Notified provider of inability to draw from pt. PICC line.  Provider indicated that lab should be notified to complete daily labs.  Status changed to LAB draw in EPIC

## 2024-11-05 NOTE — CARE COORDINATION
Discharge planning    Waiting on precert. Should have answer tomorrow. [facility] [place]. Still on TPN.

## 2024-11-05 NOTE — PLAN OF CARE
Problem: Discharge Planning  Goal: Discharge to home or other facility with appropriate resources  Outcome: Progressing  Flowsheets (Taken 11/4/2024 2000)  Discharge to home or other facility with appropriate resources:   Identify barriers to discharge with patient and caregiver   Arrange for needed discharge resources and transportation as appropriate   Identify discharge learning needs (meds, wound care, etc)   Arrange for interpreters to assist at discharge as needed   Refer to discharge planning if patient needs post-hospital services based on physician order or complex needs related to functional status, cognitive ability or social support system     Problem: Skin/Tissue Integrity  Goal: Absence of new skin breakdown  Description: 1.  Monitor for areas of redness and/or skin breakdown  2.  Assess vascular access sites hourly  3.  Every 4-6 hours minimum:  Change oxygen saturation probe site  4.  Every 4-6 hours:  If on nasal continuous positive airway pressure, respiratory therapy assess nares and determine need for appliance change or resting period.  11/5/2024 0123 by Chely Harkins, RN  Outcome: Progressing  11/4/2024 1707 by Albina Dillard, RN  Note: Wound vac changed today     Problem: ABCDS Injury Assessment  Goal: Absence of physical injury  Outcome: Progressing     Problem: Safety - Adult  Goal: Free from fall injury  Outcome: Progressing     Problem: Metabolic/Fluid and Electrolytes - Adult  Goal: Electrolytes maintained within normal limits  Outcome: Progressing  Flowsheets (Taken 11/4/2024 2000)  Electrolytes maintained within normal limits:   Monitor labs and assess patient for signs and symptoms of electrolyte imbalances   Administer electrolyte replacement as ordered   Monitor response to electrolyte replacements, including repeat lab results as appropriate   Fluid restriction as ordered   Instruct patient on fluid and nutrition restrictions as appropriate  Goal: Hemodynamic stability and

## 2024-11-05 NOTE — PROGRESS NOTES
Surgical Progress Note      Subjective:  Patient seen examined at bedside, no overnight events.  Wound VAC in place with minimal output, replaced yesterday by wound ostomy.  Tolerating FLD.  Good UOP.  Pain is controlled.  VSS, afebrile.  Patient very anxious about discharge and wishes to go home rather than to SNF.      Vitals:    11/05/24 0407   BP:    Pulse: 93   Resp: 28   Temp:    SpO2: 95%            Exam: General Appearance: alert and oriented to person, place and time and in no acute distress  Skin: warm and dry, no rash or erythema  HEENT: NCAT, PERRLA, EOMI  Neck: neck supple and non tender without mass, no thyromegaly or thyroid nodules, no cervical lymphadenopathy   Pulmonary/Chest: Unlabored breathing on RA  Cardiovascular: RRR  Abdomen: Morbidly obese, soft, minimal TTP, wound VAC in place with minimal output  Extremities: no cyanosis and no clubbing  Musculoskeletal: normal range of motion, no joint swelling, deformity or tenderness  Neurologic: no cranial nerve deficit and speech normal    I/O last 3 completed shifts:  In: 3257.4 [P.O.:360; I.V.:20]  Out: 3850 [Urine:3850]    Hemoglobin   Date Value Ref Range Status   11/02/2024 9.7 (L) 11.9 - 15.1 g/dL Final     Hematocrit   Date Value Ref Range Status   11/02/2024 31.0 (L) 36.3 - 47.1 % Final     WBC   Date Value Ref Range Status   11/02/2024 13.5 (H) 3.5 - 11.3 k/uL Final     Sodium   Date Value Ref Range Status   11/04/2024 130 (L) 135 - 144 mmol/L Final     Potassium   Date Value Ref Range Status   11/04/2024 4.4 3.7 - 5.3 mmol/L Final     Chloride   Date Value Ref Range Status   11/04/2024 96 (L) 98 - 107 mmol/L Final     CO2   Date Value Ref Range Status   11/04/2024 22 20 - 31 mmol/L Final     Glucose   Date Value Ref Range Status   11/04/2024 194 (H) 70 - 99 mg/dL Final       Assessment/Plan:   Ventral hernia repair 10/1, explantation of mesh 9/16, likely reactive process to colon adherent to the undersurface of the mesh.  Subsequent  enterocutaneous fistula     EC fistula is closing after explantation of mesh   Continue wound VAC, replaced by wound ostomy M/W/F  Diet: FLD.  If patient has increased EC fistula output, return to n.p.o. with TPN  Dispo: Okay for discharge from a general surgery perspective to rehab with ongoing wound VAC changes.  Had a lengthy discussion with the patient this morning regarding discharge.  Recommendations are to go to rehab versus SNF.  Patient reports she would much rather go home, her biggest concern is pruritus caused by sensitivity to linens here at the hospital and the patient is concerned we will continue at the facility.  Discussed with her the current level of care she still needs with regard to her TPN and wound VAC in addition to her deconditioning.  Discussed with her considerations for safety versus inconvenience.  Recommend PM&R evaluation for discharge needs  Patient follow-up with us in 2 weeks.        ZACKARY Simpson DO - PGY5  Surgery Department

## 2024-11-05 NOTE — PROGRESS NOTES
Spiritual Health History and Assessment/Progress Note  [facility]    (P) Spiritual/Emotional Needs,  ,  ,      Name: Mandie Bustamante MRN: [id number]    Age: 69 y.o.     Sex: female   Language: English   Jew: Spiritism   Intra-abdominal abscess (HCC)     Date: 11/5/2024            Total Time Calculated: (P) 23 min              Spiritual Assessment continued in [facility]        Referral/Consult From: (P) Rounding   Encounter Overview/Reason: (P) Spiritual/Emotional Needs  Service Provided For: (P) Patient    Patient values prayer.    Elaine, Belief, Meaning:   Patient has beliefs or practices that help with coping during difficult times  Family/Friends No family/friends present      Importance and Influence:  Patient has spiritual/personal beliefs that influence decisions regarding their health  Family/Friends No family/friends present    Community:  Patient feels well-supported. Support system includes: Spouse/Partner  Family/Friends No family/friends present    Assessment and Plan of Care:     Patient Interventions include: Facilitated expression of thoughts and feelings, Explored spiritual coping/struggle/distress, and Affirmed coping skills/support systems  Family/Friends Interventions include: No family/friends present    Patient Plan of Care: Spiritual Care available upon further referral  Family/Friends Plan of Care: Spiritual Care available upon further referral    Electronically signed by Chaplain Ashly on 11/5/2024 at 3:40 PM

## 2024-11-05 NOTE — PROGRESS NOTES
[facility]  Office: [phone]  Keenan Foster DO, Kevin Siegel DO, Carlos A Orta DO, Taye Morales DO, Maxx Madison MD, Meena Eckert MD, Efren Vo MD, Makayla Last MD,  Jerman Christian MD, Gina Bourgeois MD, Gladys Fry MD,  Anu Murillo DO, Jennifer Blanca MD, Oral Duncan MD, Harlan Foster DO, Katya Simmons MD,  Filiberto Slade DO, Elizabeth Alves MD, Samantha Pinto MD, Arlene Bryant MD, Bandar Barrios MD,  Kulwant Rodrigez MD, Michelle Dennison MD, Jinny Steel MD, Kathia Vargas MD, Gerardo Alvarez MD, Richard Johnson MD, Demetri Ibarra DO, Benjie Alvarenga MD, Shirley Waterhouse, CNP,  Georgette Villarreal CNP, Demetri Toure, CATHY,  Silvina Castano, CARINA, Tatiana Mckeon, CNP, Angelina Harper, CNP, Alma Rosa Peng, CNP, Rashmi Mccartney, CNP, Kimberley Grant PA-C, Eleanor Garg PA-C, Stephanie Kaminski, CATHY, Tono Yee, CNP,  Chrissie Schwartz, CNP, Sheri Jones, CNP,  Nadiya Rick, CNP, Cindi Lam, CNP         [facility]   IN-PATIENT SERVICE   [facility]    Progress Note    11/5/2024    12:24 PM    Name:   Mandie Bustamante  MRN:     [id number]     Acct:      [id number]   Room:   2033/2033-01  IP Day:  26  Admit Date:  10/10/2024  6:11 PM    PCP:   No primary care provider on file.  Code Status:  Full Code    Subjective:     Patient seen in follow-up for abdominal pain secondary to abdominal abscess with multiple enterocutaneous fistulas.  Patient states \"I am itchy from the sheets\"    Patient complains of generalized itching associated with her sheets.  She has multiple allergies.  Benadryl is ordered nightly as needed however I will adjusted to every 6 hours to help with her itching.     is at the bedside and I had a long discussion with both patient as well as her  regarding disposition.  The patient has been hospitalized for prolonged period of time and is eager to leave the hospital.  They did query as to whether or not the patient could be

## 2024-11-05 NOTE — PROGRESS NOTES
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   Acute respiratory failure required intubation, extubated  Persistent fever-resolved  Morbid obesity    Status post explantation of mesh, likely reactive process to colon adherent to the undersurface of the mesh.   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     Recommendations:   The patient has had multiple courses of antibiotics that include:  piperacillin/tazobactam 10/10 through 10/17/2024  Cefepime and metronidazole 10/17 through 10/26/2024   currently on Rocephin, metronidazole and fluconazole since 10/29/2024 through current  The patient's antibiotic therapy was discontinued 11/2/2024   The patient continues to do well clinically and is hoping that she can go home with rehabilitation instead of going to a facility    Infection Control Recommendations:   Universal precaution    Discharge Planning:   Patient will need Midline Catheter Insertion/ PICC line Insertion: No  Patient will need: Home IV , Infusion Center,  SNF,  LTAC: Undetermined  Patient willneed outpatient wound care: No    Medical Decision making / Summary of Stay:   Mandie Bustamante is a 69 y.o.-year-old female was seen at the ICU, intubated, sedated, unable to provide history that was obtained from chart review and nursing staff.  She is

## 2024-11-05 NOTE — PROGRESS NOTES
Comprehensive Nutrition Assessment    Type and Reason for Visit:  Reassess    Nutrition Recommendations/Plan:   Continue TPN at 65 ml/hr, 1560 ml, and 100 ml lipids to provide 1573 calories.  Patient requesting Ensure Clear, modified ONS; TID, berry flavor only  Would like to lower patients rate to aid in sodium levels, however it is imperative to provide patient with adequate protein while she is healing; increased sodium in clinimix. TPN only providing 14 ml/kg of fluid at this time.   10 units insulin in TPN  Awaiting TG lab  RD to follow/monitor closely     Malnutrition Assessment:  Malnutrition Status:  Moderate malnutrition (10/17/24 1147)    Context:  Acute Illness     Findings of the 6 clinical characteristics of malnutrition:  Energy Intake:  50% or less of estimated energy requirements for 5 or more days  Weight Loss:  Unable to assess     Body Fat Loss:  Unable to assess     Muscle Mass Loss:  Unable to assess    Fluid Accumulation:  Moderate to Severe     Strength:       Nutrition Assessment:    Patient continues to improve, EC fistula and wound vac remains in place. Wound care changing dressing on MWF. Patient is tolerating full liquids, she does crave real food. She reports not liking the Ensure shakes (although she tries to drink them), but states she likes the Ensure Clears, especially joseph. RN reports patient drinking a lot, sodium is low, on lasix. Patient wants to go home at discharge. Labs, meds, PMH reviewed.    Nutrition Related Findings:    Weight up today, possibly fluid related. Na 129, LBM 11/4, +1 LE edema. Wound Type: Wound Vac, Surgical Incision       Current Nutrition Intake & Therapies:    Average Meal Intake: 26-50%  Average Supplements Intake: None Ordered  Current Parenteral Nutrition Orders:  Type and Formula: Premix Central   Lipids: 100ml, Daily  Duration: Continuous  Rate/Volume: 65 mL/hr (1560 mL total volume)  Current PN Order Provides: 1573 kcal, 78 gm of protein, 312 gm

## 2024-11-05 NOTE — PROGRESS NOTES
Occupational Therapy  Facility/Department: [facility]  Rehabilitation Occupational Therapy Daily Treatment Note    Date: 24  Patient Name: Mandie Bustamante       Room:   MRN: [id number]  Account: [id number]   : 1955  (69 y.o.) Gender: female      DOMINGO Myers reports patient is medically stable for therapy treatment this date. Chart reviewed prior to treatment and patient is agreeable for therapy.  All lines intact and patient positioned comfortably at end of treatment.  All patient needs addressed prior to ending therapy session.      Pt currently functioning below baseline.  Recommend daily inpatient skilled therapy at time of discharge to maximize long term outcomes and prevent re-admission. Please refer to AM-PAC score for current level of function.               Past Medical History:  has a past medical history of Allergic rhinitis, Anemia, Arthritis, Autoimmune disorder (HCC), Cataracts, bilateral, GERD (gastroesophageal reflux disease), Headache, Hypercalcemia, Hyperparathyroidism (HCC), Hypertension, Obesity, and Osteoarthritis.  Past Surgical History:   has a past surgical history that includes Parathyroid gland surgery (2023); Total vaginal hysterectomy (); Appendectomy; Urethra surgery (); Foot surgery (Left, ); shoulder surgery (Right, 10/2000); Foot surgery (Left, 2010); Shoulder arthroscopy (Right, 2011); Shoulder arthroscopy (Right, 2012); Shoulder arthroscopy (Left, 2012); Cholecystectomy; Bunionectomy (Left, 2016); Foot surgery (Left, 2017); parathyroidectomy (2022); hernia repair (2022); Umbilical hernia repair; Hysterectomy, total abdominal (1986); Hysterectomy, vaginal (1986); Upper gastrointestinal endoscopy (); ventral hernia repair (N/A, 10/1/2024); laparoscopy (N/A, 10/16/2024); and laparotomy (N/A, 10/18/2024).    Restrictions  Restrictions/Precautions: General Precautions, Up as Tolerated, Fall Risk  Other  lip breathing, upright posture, pacing self, and awareness/assist with lines.  Bed Mobility  Overall Assistance Level: Minimal Assistance  Additional Factors: Head of bed raised;With handrails;Head of bed flat;Increased time to complete;Verbal cues  Sit to Supine  Assistance Level: Minimal assistance  Skilled Clinical Factors: assist for guiding BLEs back into bed  Supine to Sit  Assistance Level: Stand by assist  Scooting  Assistance Level: Stand by assist  Skilled Clinical Factors: scooting fully to EOB  Transfers  Surface: From bed;To bed  Device: Walker  Sit to Stand  Assistance Level: Contact guard assist  Skilled Clinical Factors: VC's for reaching back prior to sitting, controlled sit/stand, pursed lip breathing, and awareness/assist with lines.  Stand to Sit  Assistance Level: Contact guard assist   OT Exercises  A/AROM Exercises: While sitting EOB pt completed UB AROM for 1 set x 10 reps in all planes to maintain UB ROM/joint integrity for increased IND with ADL tasks. Good return to visual/verbal demo with Min rest breaks. No reports of pain throughout.     Assessment  Assessment  Assessment: Pt progressing toward goals ambulating well w/RW and able to complete toileting on own only needing some assist with holding hospital gown out of her way. Pt also tolerated UB AROM well. Skilled OT warranted to promote I/safety to return pt. to prior living arrangement with assist as needed.  Activity Tolerance: Patient limited by endurance;Patient limited by fatigue;Patient tolerated treatment well  Discharge Recommendations: Patient would benefit from continued therapy after discharge  OT Equipment Recommendations  Equipment Needed: Yes  Mobility Devices: Walker  Walker: Rolling  ADL Assistive Devices: Emergency Alert System;Reacher;Long-handled Shoe Horn;Long-handled Sponge;Sock-Aid Hard  Safety Devices  Safety Devices in place: Yes  Type of devices: Nurse notified;Call light within reach;Patient at risk for

## 2024-11-05 NOTE — PROGRESS NOTES
Physical Therapy  Facility/Department: [facility]  Rehabilitation Physical Therapy Treatment Note    NAME: Mandie Bustamante  : 1955 (69 y.o.)  MRN: [id number]  CODE STATUS: Full Code    Date of Service: 24     Pt currently functioning below baseline.  Recommend daily inpatient skilled therapy at time of discharge to maximize long term outcomes and prevent re-admission. Please refer to AM-PAC score for current level of function.     Restrictions:  Restrictions/Precautions: General Precautions, Up as Tolerated, Fall Risk  Position Activity Restriction  Other position/activity restrictions: RUE PICC, telemetry, cont SPO2 moniter, 46 BMI     SUBJECTIVE  Subjective  Subjective: Patient was up in bathroom upon therapists arrival with PCT.  Patient agreeable to PT treatment this date. RN states patient is appropriate for PT treatment. Patient with HR and SpO2 within normal limits throughout treatment.    OBJECTIVE  Cognition  Overall Cognitive Status: Exceptions  Arousal/Alertness: Appropriate responses to stimuli;Delayed responses to stimuli  Following Commands: Follows one step commands with repetition;Follows one step commands with increased time  Attention Span: Attends with cues to redirect;Difficulty dividing attention  Memory: Decreased recall of precautions;Decreased short term memory  Safety Judgement: Decreased awareness of need for safety;Decreased awareness of need for assistance  Problem Solving: Decreased awareness of errors;Assistance required to correct errors made;Assistance required to identify errors made;Assistance required to implement solutions;Assistance required to generate solutions  Insights: Decreased awareness of deficits  Initiation: Requires cues for all  Sequencing: Requires cues for all  Orientation  Overall Orientation Status: Within Functional Limits  Orientation Level: Oriented X4    Functional Mobility  Bed Mobility  Overall Assistance Level:  (unable to assess bed mobility tasks  as patient is up to toilet upon arrival and retires to recliner upon completion of PT treatment.)  Balance  Sitting Balance: Supervision  Standing Balance: Contact guard assistance  Standing Balance  Time: 2 minutes  Activity: Working on core control and stability in stance before progression to ambulation, Standing in front of toilet x2 minutes for sarita care,  Transfers  Surface: Standard toilet;To chair with arms  Additional Factors: Verbal cues;Hand placement cues  Device: Walker  Sit to Stand  Assistance Level: Contact guard assist  Skilled Clinical Factors: vc's for pushing off of solid surface into stance  Stand to Sit  Assistance Level: Contact guard assist  Skilled Clinical Factors: vc's for slow controled descent into seated posture to maximize eccentric quad control.  Bed To/From Chair  Technique: Stand step  Assistance Level: Contact guard assist;Minimal assistance  Skilled Clinical Factors: vc's for hand placement and progression with assistive device for safety awareness. Dependent for line management skills      Environmental Mobility  Ambulation  Surface: Level surface  Device: Rolling walker  Distance: 20 feet x2  Activity: Within Room  Activity Comments: Seated 7 minute rest break between attempts  Additional Factors: Verbal cues;Set-up;Hand placement cues;Increased time to complete  Assistance Level: Contact guard assist  Gait Deviations: Decreased step length bilateral    NDT Treatment: Sitting;Standing    PT Exercises  A/AROM Exercises: Education on NILS LE AROM in seated posture  Circulation/Endurance Exercises: ankle pumps and circles      ASSESSMENT/PROGRESS TOWARDS GOALS     AM-PAC Inpatient Mobility Raw Score 15   AM-PAC Inpatient T-Scale Score 39.45   Mobility Inpatient CMS 0-100% Score 57.7   Mobility Inpatient CMS G-Code Modifier CK     Assessment  Assessment: Patient with decreased aerobic capacity and requires frequent standing rest breaks throughout ambulation for recovery. Patient

## 2024-11-05 NOTE — PROGRESS NOTES
End Of Shift Note  [facility]  Summary of shift: Uneventful shift. Pt up to chair and bathroom multiple times throughout shift with one assist and a walker. Pt tolerating full liquid diet well and wanting more solid food. Plan is [facility].     Vitals:    Vitals:    11/05/24 0600 11/05/24 0800 11/05/24 1200 11/05/24 1748   BP:  (!) 131/57 128/68 126/61   Pulse:  89 (!) 102 98   Resp:  26 25 18   Temp:  96.8 °F (36 °C) 97.1 °F (36.2 °C) 97 °F (36.1 °C)   TempSrc:  Temporal Oral Temporal   SpO2:  98% 96% 99%   Weight: 111.4 kg (245 lb 9.6 oz)      Height:            I&O:   Intake/Output Summary (Last 24 hours) at 11/5/2024 1820  Last data filed at 11/5/2024 1101  Gross per 24 hour   Intake 427.16 ml   Output 2550 ml   Net -2122.84 ml       Resp Status: RA    Ventilator Settings:  Vent Mode: CPAP/PS Resp Rate (Set): 14 bpm/Vt (Set, mL): 500 mL/ /FiO2 : 30 %    Critical Care IV infusions:   PN-Adult 2-in-1 Central Line (Custom) 65 mL/hr at 11/05/24 1745    dextrose      sodium chloride 5 mL/hr at 11/01/24 0245        LDA:   PICC 10/26/24 Right Cephalic (Active)   Number of days: 10       Negative Pressure Wound Therapy Abdomen Medial (Active)   Number of days: 4       Wound Abdomen Left;Lower (Active)   Number of days:        Incision 10/01/24 Abdomen Medial;Upper (Active)   Number of days: 35       Incision 10/16/24 Abdomen Lower;Medial (Active)   Number of days: 20

## 2024-11-05 NOTE — PROGRESS NOTES
Pulmonary Critical Care Progress Note    Patient seen for the follow up of Intra-abdominal abscess (HCC)     Subjective:    She is still on room air.  She is having less hallucination.  She is off Precedex.  She is on TPN.  No new complaints today.  She tolerated full liquids per surgery.   at bedside    examination:    Vitals: /68   Pulse 89   Temp 97.1 °F (36.2 °C) (Oral)   Resp 26   Ht 1.6 m (5' 3\")   Wt 111.4 kg (245 lb 9.6 oz)   SpO2 98%   BMI 43.51 kg/m²   SpO2  Av %  Min: 94 %  Max: 100 %  General appearance: Awake alert no acute distress  Neck: No JVD  Lungs: Decreased breath sound no crackles or wheezes  Heart: regular rate and rhythm, S1, S2 normal, no gallop  Abdomen: Soft, non tender, + BS   Extremities: no cyanosis or clubbing.  No edema     LABs:    CBC:   No results for input(s): \"WBC\", \"HGB\", \"HCT\", \"PLT\" in the last 72 hours.    BMP:   Recent Labs     24  0320 24  0602   * 129*   K 4.4 4.8   CO2 22 22   BUN 19 19   CREATININE 0.7 0.7   LABGLOM >90 >90   GLUCOSE 194* 193*        Latest Reference Range & Units 10/18/24 04:12   POC HCO3 21.0 - 28.0 mmol/L 24.9   POC O2 SAT 94.0 - 98.0 % 97.5   POC pCO2 35.0 - 48.0 mm Hg 33.7 (L)   POC pH 7.350 - 7.450  7.476 (H)   POC PO2 83.0 - 108.0 mm Hg 88.2   (L): Data is abnormally low  (H): Data is abnormally high  Radiology:  CXR     Right PICC line terminates at the distal SVC.       Chest x-ray   Minimal fibrotic/atelectatic changes in the lateral portion of base of left  lung.  Rest of lung fields are clear.     Stable normal cardiac size without evidence of CHF.      Chest x-ray 10/26 reviewed  1. No significant interval change.  2. Right-sided PICC with tip position cavoatrial junction.       Chest x-ray 10/24  1. Bibasilar airspace disease and small bilateral effusions.  Stable  cardiomegaly.  Mild pulmonary vascular congestion.  Findings favor CHF  related changes with underlying infiltrate not excluded.

## 2024-11-06 LAB
ALBUMIN SERPL-MCNC: 2.8 G/DL (ref 3.5–5.2)
ALP SERPL-CCNC: 100 U/L (ref 35–104)
ALT SERPL-CCNC: 11 U/L (ref 10–35)
ANION GAP SERPL CALCULATED.3IONS-SCNC: 13 MMOL/L (ref 9–16)
AST SERPL-CCNC: 19 U/L (ref 10–35)
BILIRUB DIRECT SERPL-MCNC: 0.2 MG/DL (ref 0–0.2)
BILIRUB INDIRECT SERPL-MCNC: ABNORMAL MG/DL
BILIRUB SERPL-MCNC: 0.2 MG/DL (ref 0–1.2)
BUN SERPL-MCNC: 18 MG/DL (ref 8–23)
CALCIUM SERPL-MCNC: 8 MG/DL (ref 8.8–10.2)
CHLORIDE SERPL-SCNC: 99 MMOL/L (ref 98–107)
CO2 SERPL-SCNC: 19 MMOL/L (ref 20–31)
CREAT SERPL-MCNC: 0.6 MG/DL (ref 0.5–0.9)
GFR, ESTIMATED: >90 ML/MIN/1.73M2
GLUCOSE BLD-MCNC: 139 MG/DL (ref 65–105)
GLUCOSE BLD-MCNC: 166 MG/DL (ref 65–105)
GLUCOSE BLD-MCNC: 182 MG/DL (ref 65–105)
GLUCOSE SERPL-MCNC: 191 MG/DL (ref 82–115)
MAGNESIUM SERPL-MCNC: 2.1 MG/DL (ref 1.6–2.4)
PHOSPHATE SERPL-MCNC: 3.7 MG/DL (ref 2.5–4.5)
POTASSIUM SERPL-SCNC: 4 MMOL/L (ref 3.7–5.3)
PROT SERPL-MCNC: 5.9 G/DL (ref 6.6–8.7)
SODIUM SERPL-SCNC: 131 MMOL/L (ref 136–145)

## 2024-11-06 PROCEDURE — 6360000002 HC RX W HCPCS: Performed by: INTERNAL MEDICINE

## 2024-11-06 PROCEDURE — 6360000002 HC RX W HCPCS

## 2024-11-06 PROCEDURE — 80076 HEPATIC FUNCTION PANEL: CPT

## 2024-11-06 PROCEDURE — 36415 COLL VENOUS BLD VENIPUNCTURE: CPT

## 2024-11-06 PROCEDURE — 97535 SELF CARE MNGMENT TRAINING: CPT

## 2024-11-06 PROCEDURE — 6370000000 HC RX 637 (ALT 250 FOR IP)

## 2024-11-06 PROCEDURE — 97605 NEG PRS WND THER DME<=50SQCM: CPT

## 2024-11-06 PROCEDURE — 80048 BASIC METABOLIC PNL TOTAL CA: CPT

## 2024-11-06 PROCEDURE — 97530 THERAPEUTIC ACTIVITIES: CPT

## 2024-11-06 PROCEDURE — 99232 SBSQ HOSP IP/OBS MODERATE 35: CPT | Performed by: INTERNAL MEDICINE

## 2024-11-06 PROCEDURE — 99232 SBSQ HOSP IP/OBS MODERATE 35: CPT | Performed by: NURSE PRACTITIONER

## 2024-11-06 PROCEDURE — 2580000003 HC RX 258

## 2024-11-06 PROCEDURE — 2500000003 HC RX 250 WO HCPCS

## 2024-11-06 PROCEDURE — 97110 THERAPEUTIC EXERCISES: CPT

## 2024-11-06 PROCEDURE — 83735 ASSAY OF MAGNESIUM: CPT

## 2024-11-06 PROCEDURE — 2060000000 HC ICU INTERMEDIATE R&B

## 2024-11-06 PROCEDURE — 97116 GAIT TRAINING THERAPY: CPT

## 2024-11-06 PROCEDURE — 84100 ASSAY OF PHOSPHORUS: CPT

## 2024-11-06 RX ADMIN — HYDROMORPHONE HYDROCHLORIDE 0.5 MG: 1 INJECTION, SOLUTION INTRAMUSCULAR; INTRAVENOUS; SUBCUTANEOUS at 14:50

## 2024-11-06 RX ADMIN — PANTOPRAZOLE SODIUM 40 MG: 40 INJECTION, POWDER, FOR SOLUTION INTRAVENOUS at 10:52

## 2024-11-06 RX ADMIN — SODIUM CHLORIDE, PRESERVATIVE FREE 10 ML: 5 INJECTION INTRAVENOUS at 10:59

## 2024-11-06 RX ADMIN — HEPARIN SODIUM 5000 UNITS: 5000 INJECTION INTRAVENOUS; SUBCUTANEOUS at 10:52

## 2024-11-06 RX ADMIN — HYDROCORTISONE: 1 CREAM TOPICAL at 20:06

## 2024-11-06 RX ADMIN — MICONAZOLE NITRATE: 20 CREAM TOPICAL at 20:06

## 2024-11-06 RX ADMIN — POTASSIUM PHOSPHATE, MONOBASIC POTASSIUM PHOSPHATE, DIBASIC: 224; 236 INJECTION, SOLUTION, CONCENTRATE INTRAVENOUS at 17:58

## 2024-11-06 RX ADMIN — HEPARIN SODIUM 5000 UNITS: 5000 INJECTION INTRAVENOUS; SUBCUTANEOUS at 20:06

## 2024-11-06 RX ADMIN — I.V. FAT EMULSION 100 ML: 20 EMULSION INTRAVENOUS at 17:58

## 2024-11-06 ASSESSMENT — PAIN DESCRIPTION - DESCRIPTORS: DESCRIPTORS: THROBBING

## 2024-11-06 ASSESSMENT — PAIN SCALES - GENERAL
PAINLEVEL_OUTOF10: 0
PAINLEVEL_OUTOF10: 6
PAINLEVEL_OUTOF10: 6
PAINLEVEL_OUTOF10: 0

## 2024-11-06 ASSESSMENT — PAIN DESCRIPTION - ONSET: ONSET: ON-GOING

## 2024-11-06 ASSESSMENT — PAIN SCALES - WONG BAKER: WONGBAKER_NUMERICALRESPONSE: NO HURT

## 2024-11-06 ASSESSMENT — PAIN DESCRIPTION - ORIENTATION
ORIENTATION: MID
ORIENTATION: RIGHT;UPPER

## 2024-11-06 ASSESSMENT — ENCOUNTER SYMPTOMS: RESPIRATORY NEGATIVE: 1

## 2024-11-06 ASSESSMENT — PAIN - FUNCTIONAL ASSESSMENT: PAIN_FUNCTIONAL_ASSESSMENT: ACTIVITIES ARE NOT PREVENTED

## 2024-11-06 ASSESSMENT — PAIN DESCRIPTION - PAIN TYPE: TYPE: ACUTE PAIN

## 2024-11-06 ASSESSMENT — PAIN DESCRIPTION - LOCATION
LOCATION: ABDOMEN
LOCATION: ABDOMEN

## 2024-11-06 ASSESSMENT — PAIN DESCRIPTION - FREQUENCY: FREQUENCY: INTERMITTENT

## 2024-11-06 NOTE — PROGRESS NOTES
Surgical Progress Note      Subjective:  Patient seen examined at bedside, no overnight events.  Wound VAC in place with minimal output, replaced yesterday by wound ostomy.  Tolerating FLD.  Good UOP.  Pain is controlled.  VSS, afebrile.        Vitals:    11/06/24 0400   BP: (!) 142/63   Pulse: 87   Resp: 28   Temp: 97.3 °F (36.3 °C)   SpO2: 96%            Exam: General Appearance: alert and oriented to person, place and time and in no acute distress  Skin: warm and dry, no rash or erythema  HEENT: NCAT, PERRLA, EOMI  Neck: neck supple and non tender without mass, no thyromegaly or thyroid nodules, no cervical lymphadenopathy   Pulmonary/Chest: Unlabored breathing on RA  Cardiovascular: RRR  Abdomen: Morbidly obese, soft, minimal TTP, wound VAC in place with minimal output  Extremities: no cyanosis and no clubbing  Musculoskeletal: normal range of motion, no joint swelling, deformity or tenderness  Neurologic: no cranial nerve deficit and speech normal    I/O last 3 completed shifts:  In: 427.2 [P.O.:360; I.V.:10]  Out: 3300 [Urine:3300]    Hemoglobin   Date Value Ref Range Status   11/02/2024 9.7 (L) 11.9 - 15.1 g/dL Final     Hematocrit   Date Value Ref Range Status   11/02/2024 31.0 (L) 36.3 - 47.1 % Final     WBC   Date Value Ref Range Status   11/02/2024 13.5 (H) 3.5 - 11.3 k/uL Final     Sodium   Date Value Ref Range Status   11/05/2024 129 (L) 135 - 144 mmol/L Final     Potassium   Date Value Ref Range Status   11/05/2024 4.8 3.7 - 5.3 mmol/L Final     Chloride   Date Value Ref Range Status   11/05/2024 95 (L) 98 - 107 mmol/L Final     CO2   Date Value Ref Range Status   11/05/2024 22 20 - 31 mmol/L Final     Glucose   Date Value Ref Range Status   11/05/2024 193 (H) 70 - 99 mg/dL Final       Assessment/Plan:   Ventral hernia repair 10/1, explantation of mesh 9/16, likely reactive process to colon adherent to the undersurface of the mesh.  Subsequent enterocutaneous fistula     EC fistula is closing after

## 2024-11-06 NOTE — PROGRESS NOTES
[facility]  Office: [phone]  Keenan Foster DO, Kevin Siegel DO, Carlos A Orta DO, Taye Morales DO, Maxx Madison MD, Meena Eckert MD, Efren Vo MD, Makayla Last MD,  Jerman Christian MD, Gina Bourgeois MD, Gladys Fry MD,  Anu Murillo DO, Jennifer Blanca MD, Oral Duncan MD, Harlan Foster DO, Katya Simmons MD,  Filiberto Slade DO, Elizabeth Alves MD, Samantha Pinto MD, Arlene Bryant MD, Bandar Barrios MD,  Kulwant Rodrigez MD, Michelle Dennison MD, Jinny Steel MD, Kathia Vargas MD, Gerardo Alvarez MD, Richard Johnson MD, Demetri Ibarra DO, Benjie Alvarenga MD, Shirley Waterhouse, CNP,  Georgette Villarreal CNP, Demetri Toure, CATHY,  Silvina Castano, CARINA, Tatiana Mckeon, CNP, Angelina Harper, CNP, Alma Rosa Peng, CNP, Rashmi Mccartney, CNP, IMAN ErvinC, IMAN ArmentaC, Stephanie Kaminski, CNP, Tono Yee, CNP,  Chrissie Schwartz, CNP, Sheri Jones, CNP,  Nadiya Rick, CNP, Cindi Lam, CNP         [facility]   IN-PATIENT SERVICE   [facility]    Progress Note    11/6/2024    7:13 AM    Name:   Mandie Bustamante  MRN:     [id number]     Acct:      [id number]   Room:   2033/2033-01  IP Day:  27  Admit Date:  10/10/2024  6:11 PM    PCP:   No primary care provider on file.  Code Status:  Full Code    Subjective:     C/C: Abdominal pain    Interval History Status: improved.     Patient much more alert.  Denies any complaints of chest pain, shortness of breath, nausea vomiting, fevers chills or acute complaints    Brief History:     Per prior documentation  \"Patient is a 69-year-old female who recently underwent an out patient hernia repair with mesh on 10/1/2024, she presents to the ED with abdominal pain on 10/10/2024. Work up in the ED revealed severe hyponatremia of 109, leukocytosis, and  CT abdomen pelvis without contrast was suggestive of large abscess with air-fluid collection seen along the peritoneum just below the rectus muscle. There was  Headache, Hypercalcemia, Hyperparathyroidism (HCC), Hypertension, Obesity, and Osteoarthritis.    Social History:   reports that she has never smoked. She has never used smokeless tobacco. She reports current alcohol use of about 1.0 standard drink of alcohol per week. She reports that she does not use drugs.     Family History:   Family History   Problem Relation Age of Onset    High Cholesterol Mother     Colon Cancer Mother     Osteoporosis Mother     Alzheimer's Disease Mother     Migraines Mother     Neuropathy Mother     Cancer Mother     Obesity Mother     High Cholesterol Father     High Blood Pressure Father     Heart Disease Father     Migraines Father     Diabetes Father     Arthritis Father     Hearing Loss Father     Stroke Father     Heart Attack Father     Rheum Arthritis Father     Migraines Sister     Diabetes Sister     Arthritis Sister         N/A    Rheum Arthritis Sister     Diabetes Brother     Arthritis Brother     Tuberculosis Maternal Grandmother     Tuberculosis Maternal Grandfather     Diabetes Paternal Grandmother     Obesity Paternal Grandmother     Obesity Maternal Aunt         N/A    Obesity Paternal Aunt        Vitals:  BP (!) 142/63   Pulse 87   Temp 97.3 °F (36.3 °C) (Temporal)   Resp 28   Ht 1.6 m (5' 3\")   Wt 116.3 kg (256 lb 8 oz)   SpO2 96%   BMI 45.44 kg/m²   Temp (24hrs), Av.5 °F (36.4 °C), Min:96.8 °F (36 °C), Max:98.8 °F (37.1 °C)    Recent Labs     24  1851 24  0001 24  0736 24  2354   POCGLU 190* 185* 208* 166*       I/O (24Hr):    Intake/Output Summary (Last 24 hours) at 2024 0713  Last data filed at 2024 0553  Gross per 24 hour   Intake 94.65 ml   Output 2000 ml   Net -1905.35 ml       Labs:  Hematology:No results for input(s): \"WBC\", \"RBC\", \"HGB\", \"HCT\", \"MCV\", \"MCH\", \"MCHC\", \"RDW\", \"PLT\", \"MPV\", \"SEDRATE\", \"CRP\", \"INR\", \"DDIMER\", \"CL2OGINW\", \"LABABSO\" in the last 72 hours.    Invalid input(s): \"PT\"  Chemistry:  Recent Labs

## 2024-11-06 NOTE — PROGRESS NOTES
Wound Ostomy Continence Nursing  Follow Up Visit      NAME:  Mandie Bustamante  MEDICAL RECORD NUMBER:  [id number]  AGE: 69 y.o.   GENDER: female  : 1955  TODAY'S DATE:  2024    Subjective        [facility] nursing visit today for wound VAC dressing change as planned.  The patient is awake and alert and had just received her IV pain medication prior to dressing change.   present during visit.  Awaiting prior authorization for LTAC for discharge.    Review of Systems:  Constitutional: negative for chills and fevers  Respiratory: negative for cough and shortness of breath  Cardiovascular: negative for chest pain and palpitations  Gastrointestinal: negative for abdominal pain and vomiting. NPO.   Genitourinary:negative  Integument: abdominal wound  Endocrine: negative  Musculoskeletal:negative  Behavioral/Psych: negative  Pain: negative      Objective     Vitals:  BP (!) 150/68   Pulse 91   Temp 96.9 °F (36.1 °C) (Temporal)   Resp 24   Ht 1.6 m (5' 3\")   Wt 116.3 kg (256 lb 8 oz)   SpO2 97%   BMI 45.44 kg/m²    TEMPERATURE:  Current - Temp: 96.9 °F (36.1 °C); Max - Temp  Av.6 °F (36.4 °C)  Min: 96.9 °F (36.1 °C)  Max: 98.8 °F (37.1 °C)    Rod Risk Score Rod Scale Score: 19    INTAKE/OUTPUT:      Intake/Output Summary (Last 24 hours) at 2024 1530  Last data filed at 2024 0553  Gross per 24 hour   Intake 94.65 ml   Output 1000 ml   Net -905.35 ml                 Physical Exam:  General Appearance: Intubated and sedated on ventilator, well-developed and well-nourished, in no acute distress  Head: normocephalic and atraumatic  Pulmonary/Chest: normal air movement, no respiratory distress with mechanical ventilation  Skin: Open abdominal mid abdomen area. The wound depth appears to be filling in with pink and granular tissue. There is minimal feculent material found in the base of the wound. This was irrigated well with saline.     Extremities: no cyanosis and no clubbing  Musculoskeletal:  Volume (cm^3) 147.168 cm^3 11/01/24 1654   Wound Assessment Pink/red;Granulation tissue 11/06/24 1527   Drainage Amount Moderate (25-50%) 11/06/24 1527   Drainage Description Serosanguinous;Brown 11/06/24 1527   Odor Fecal 11/06/24 1527   Lola-wound Assessment Blanchable erythema;Intact 11/06/24 1527   Margins Attached edges;Defined edges 11/06/24 1527   Wound Thickness Description not for Pressure Injury Full thickness 11/06/24 1527   Number of days:        Assessment       Patient Active Problem List   Diagnosis    Abdominal hernia without obstruction and without gangrene    Anxiety    BMI 45.0-49.9, adult    Bursitis of right shoulder    Gastro-esophageal reflux disease without esophagitis    History of parathyroidectomy    Essential hypertension    Incisional hernia, without obstruction or gangrene    Incisional pain    Mixed dyslipidemia    Morbid (severe) obesity due to excess calories    Other abnormal glucose    Pain in bone fixation device (HCC)    Primary hyperparathyroidism (HCC)    Primary insomnia    Right shoulder pain    Seasonal allergic rhinitis    Sensitive skin    Vitamin D insufficiency    Elevated fasting glucose    Hyponatremia    Hypokalemia    Elevated brain natriuretic peptide (BNP) level    Leukocytosis    DJD (degenerative joint disease), ankle and foot, right    Prediabetes    Intra-abdominal abscess (HCC)    Edema    Colocutaneous fistula    Abdominal wall ulcer, with fat layer exposed (HCC)    Acute metabolic encephalopathy       Successful wound VAC dressing change with use of white foam in the base of wound and attempt to close the colocutaneous fistula      Plan       Wound VAC dressing change using white foam.  Therapy at 100 mmHg.  Anticipated discharge to LTAC, [facility] ready  Next wound VAC dressing change on Friday if the patient remains hospitalized.  For concerns, the WO nursing team can be reached via [employer] by searching \"wound ostomy\" under groups and choosing the St

## 2024-11-06 NOTE — FLOWSHEET NOTE
11/05/24 2200   Pain Assessment   Pain Assessment 0-10  (offered pt pain medications for throbbing pain; pt refused any pain medications at this time. writer encouraged pain medications, but pt still refused stating pt thinks its the sodium pill that is causing her pain. Ice pack applied.)   Pain Level 8   Patient's Stated Pain Goal 2   Pain Location Abdomen   Pain Orientation Right;Upper   Pain Descriptors Throbbing   Functional Pain Assessment Activities are not prevented   Pain Type Acute pain   Pain Frequency Continuous   Pain Onset On-going   Non-Pharmaceutical Pain Intervention(s) Cold pack

## 2024-11-06 NOTE — PROGRESS NOTES
Nutrition Assessment     Type and Reason for Visit: Reassess    Nutrition Recommendations/Plan:   Full liquid diet  Central, custom 5/20 TPN at 65 mL/hr (1560 mL total volume) and 100 mL of lipids  Monitor p.o intakes, diet tolerance, fistula status and labs.      Malnutrition Assessment:  Malnutrition Status: Moderate malnutrition    Nutrition Assessment:  Patient remains on a Full liquid diet with Ensure Clear oral nutrition supplements at each meals. EC fistula is continuing to heal and wound vac is in place. Patient would like to have solid foods but diet continues to be Full liquid. Patient unable to get any other yogurt flavor beside vanilla due to fruit pieces in other options. Patient is currently awaiting pre-cert to [facility]. Continue current diet. Weight is trending up but is likely inaccurate. Monitor p.o intakes, diet tolerance, fistula status and labs. If fistula output is increased make patient NPO and continue TPN per physician note.    Estimated Daily Nutrient Needs:  Energy (kcal):  1370--1908 kcal (13-18 kcal/kg) Weight Used for Energy Requirements: Other (106 kg from 11/2)     Protein (g):  67-83 gm of protein (1.3-1.6 gm/kg) Weight Used for Protein Requirements: Ideal        Fluid (ml/day):  7576-5889 mL Method Used for Fluid Requirements: 1 ml/kcal    Nutrition Related Findings:   Edema: +1 pitting BLE. Na: 131 (L). Wound vac dressings changed on Mon, Wed, Fri Wound Type: Wound Vac, Surgical Incision    Current Nutrition Therapies:    ADULT ORAL NUTRITION SUPPLEMENT; Breakfast, Dinner, Lunch; Clear Liquid Oral Supplement  ADULT DIET; Full Liquid  PN-Adult 2-in-1 Central Line (Custom)  PN-Adult 2-in-1 Central Line (Custom)    Anthropometric Measures:  Height: 160 cm (5' 3\")  Current Body Wt: 116.3 kg (256 lb 6.3 oz)   BMI: 45.4        Nutrition Diagnosis:   Inadequate oral intake related to inadequate protein-energy intake as evidenced by NPO or clear liquid status due to

## 2024-11-06 NOTE — PLAN OF CARE
2205 by Hamilton, Alma Rosa, RN  Outcome: Progressing  Flowsheets (Taken 11/5/2024 2000)  Maintains optimal cardiac output and hemodynamic stability:   Monitor blood pressure and heart rate   Monitor urine output and notify Licensed Independent Practitioner for values outside of normal range   Assess for signs of decreased cardiac output   Administer fluid and/or volume expanders as ordered   Administer vasoactive medications as ordered   For PPHN infants, administer sedation as ordered and minimize all controllable stressors.  11/5/2024 1820 by Lucia Richard RN  Outcome: Progressing  Goal: Absence of cardiac dysrhythmias or at baseline  11/5/2024 2205 by Alma Rosa Hamilton RN  Outcome: Progressing  Flowsheets (Taken 11/5/2024 2000)  Absence of cardiac dysrhythmias or at baseline:   Monitor cardiac rate and rhythm   Assess for signs of decreased cardiac output   Administer antiarrhythmia medication and electrolyte replacement as ordered  11/5/2024 1820 by Lucia Richard RN  Outcome: Progressing     Problem: Respiratory - Adult  Goal: Achieves optimal ventilation and oxygenation  11/5/2024 2205 by Alma Rosa Hamilton RN  Outcome: Progressing  Flowsheets (Taken 11/5/2024 2000)  Achieves optimal ventilation and oxygenation:   Assess for changes in respiratory status   Assess for changes in mentation and behavior   Position to facilitate oxygenation and minimize respiratory effort   Oxygen supplementation based on oxygen saturation or arterial blood gases   Encourage broncho-pulmonary hygiene including cough, deep breathe, incentive spirometry   Assess the need for suctioning and aspirate as needed   Initiate smoking cessation protocol as indicated   Assess and instruct to report shortness of breath or any respiratory difficulty   Respiratory therapy support as indicated  11/5/2024 1820 by Lucia Richard RN  Outcome: Progressing     Problem: Gastrointestinal - Adult  Goal: Maintains or returns to baseline bowel function  11/5/2024 2205  and determine need for appliance change or resting period  11/5/2024 1820 by Lucia Richard RN  Outcome: Progressing  Goal: Incisions, wounds, or drain sites healing without S/S of infection  11/5/2024 2205 by Alma Rosa Hamilton RN  Outcome: Progressing  Flowsheets (Taken 11/5/2024 2000)  Incisions, Wounds, or Drain Sites Healing Without Sign and Symptoms of Infection:   TWICE DAILY: Assess and document skin integrity   TWICE DAILY: Assess and document dressing/incision, wound bed, drain sites and surrounding tissue   Implement wound care per orders   Initiate isolation precautions as appropriate   Initiate pressure ulcer prevention bundle as indicated  11/5/2024 1820 by Lucia Richard RN  Outcome: Progressing     Problem: Hematologic - Adult  Goal: Maintains hematologic stability  11/5/2024 2205 by Alma Rosa Hamilton RN  Outcome: Progressing  Flowsheets (Taken 11/5/2024 2000)  Maintains hematologic stability:   Assess for signs and symptoms of bleeding or hemorrhage   Monitor labs for bleeding or clotting disorders   Administer blood products/factors as ordered  11/5/2024 1820 by Lucia Richard RN  Outcome: Progressing     Problem: Musculoskeletal - Adult  Goal: Return mobility to safest level of function  11/5/2024 2205 by Alma Rosa Hamilton RN  Outcome: Progressing  Flowsheets (Taken 11/5/2024 2000)  Return Mobility to Safest Level of Function:   Assess patient stability and activity tolerance for standing, transferring and ambulating with or without assistive devices   Assist with transfers and ambulation using safe patient handling equipment as needed   Ensure adequate protection for wounds/incisions during mobilization   Obtain physical therapy/occupational therapy consults as needed   Apply continuous passive motion per provider or physical therapy orders to increase flexion toward goal   Instruct patient/family in ordered activity level  11/5/2024 1820 by Lucia Richard, RN  Outcome: Progressing     Problem: Nutrition

## 2024-11-06 NOTE — PROGRESS NOTES
Pulmonary Critical Care Progress Note    Patient seen for the follow up of Intra-abdominal abscess (HCC)     Subjective:    She is still on room air.  She is having less hallucination.  She is off Precedex.  She is on TPN.  No new complaints today.  She tolerated diet.    examination:    Vitals: BP (!) 150/68   Pulse 91   Temp 96.9 °F (36.1 °C) (Temporal)   Resp (!) 33   Ht 1.6 m (5' 3\")   Wt 116.3 kg (256 lb 8 oz)   SpO2 97%   BMI 45.44 kg/m²   SpO2  Av.2 %  Min: 96 %  Max: 100 %  General appearance: Awake alert no acute distress  Neck: No JVD  Lungs: Decreased breath sound no crackles or wheezes  Heart: regular rate and rhythm, S1, S2 normal, no gallop  Abdomen: Soft, non tender, + BS   Extremities: no cyanosis or clubbing.  No edema     LABs:    CBC:   No results for input(s): \"WBC\", \"HGB\", \"HCT\", \"PLT\" in the last 72 hours.    BMP:   Recent Labs     24  0602 24  0550   * 131*   K 4.8 4.0   CO2 22 19*   BUN 19 18   CREATININE 0.7 0.6   LABGLOM >90 >90   GLUCOSE 193* 191*        Latest Reference Range & Units 10/18/24 04:12   POC HCO3 21.0 - 28.0 mmol/L 24.9   POC O2 SAT 94.0 - 98.0 % 97.5   POC pCO2 35.0 - 48.0 mm Hg 33.7 (L)   POC pH 7.350 - 7.450  7.476 (H)   POC PO2 83.0 - 108.0 mm Hg 88.2   (L): Data is abnormally low  (H): Data is abnormally high  Radiology:  CXR     Right PICC line terminates at the distal SVC.       Chest x-ray   Minimal fibrotic/atelectatic changes in the lateral portion of base of left  lung.  Rest of lung fields are clear.     Stable normal cardiac size without evidence of CHF.      Chest x-ray 10/26 reviewed  1. No significant interval change.  2. Right-sided PICC with tip position cavoatrial junction.       Chest x-ray 10/24  1. Bibasilar airspace disease and small bilateral effusions.  Stable  cardiomegaly.  Mild pulmonary vascular congestion.  Findings favor CHF  related changes with underlying infiltrate not excluded.  Follow-up is  recommended

## 2024-11-06 NOTE — PLAN OF CARE
Problem: Discharge Planning  Goal: Discharge to home or other facility with appropriate resources  Outcome: Progressing     Problem: Skin/Tissue Integrity  Goal: Absence of new skin breakdown  Description: 1.  Monitor for areas of redness and/or skin breakdown  2.  Assess vascular access sites hourly  3.  Every 4-6 hours minimum:  Change oxygen saturation probe site  4.  Every 4-6 hours:  If on nasal continuous positive airway pressure, respiratory therapy assess nares and determine need for appliance change or resting period.  Outcome: Progressing     Problem: ABCDS Injury Assessment  Goal: Absence of physical injury  Outcome: Progressing     Problem: Safety - Adult  Goal: Free from fall injury  Outcome: Progressing     Problem: Metabolic/Fluid and Electrolytes - Adult  Goal: Electrolytes maintained within normal limits  Outcome: Progressing     Problem: Metabolic/Fluid and Electrolytes - Adult  Goal: Hemodynamic stability and optimal renal function maintained  Outcome: Progressing     Problem: Metabolic/Fluid and Electrolytes - Adult  Goal: Glucose maintained within prescribed range  Outcome: Progressing     Problem: Neurosensory - Adult  Goal: Achieves stable or improved neurological status  Outcome: Progressing     Problem: Neurosensory - Adult  Goal: Absence of seizures  Outcome: Progressing     Problem: Neurosensory - Adult  Goal: Remains free of injury related to seizures activity  Outcome: Progressing     Problem: Cardiovascular - Adult  Goal: Maintains optimal cardiac output and hemodynamic stability  Outcome: Progressing     Problem: Cardiovascular - Adult  Goal: Absence of cardiac dysrhythmias or at baseline  Outcome: Progressing     Problem: Respiratory - Adult  Goal: Achieves optimal ventilation and oxygenation  Outcome: Progressing     Problem: Gastrointestinal - Adult  Goal: Maintains or returns to baseline bowel function  Outcome: Progressing     Problem: Gastrointestinal - Adult  Goal: Maintains  adequate nutritional intake  Outcome: Progressing     Problem: Genitourinary - Adult  Goal: Urinary catheter remains patent  Outcome: Progressing     Problem: Skin/Tissue Integrity - Adult  Goal: Skin integrity remains intact  Outcome: Progressing     Problem: Skin/Tissue Integrity - Adult  Goal: Incisions, wounds, or drain sites healing without S/S of infection  Outcome: Progressing     Problem: Hematologic - Adult  Goal: Maintains hematologic stability  Outcome: Progressing     Problem: Musculoskeletal - Adult  Goal: Return mobility to safest level of function  Outcome: Progressing     Problem: Nutrition Deficit:  Goal: Optimize nutritional status  Outcome: Progressing     Problem: Pain  Goal: Verbalizes/displays adequate comfort level or baseline comfort level  Outcome: Progressing     Problem: Chronic Conditions and Co-morbidities  Goal: Patient's chronic conditions and co-morbidity symptoms are monitored and maintained or improved  Outcome: Progressing

## 2024-11-06 NOTE — PROGRESS NOTES
Occupational Therapy  [facility]  Rehabilitation Occupational Therapy Daily Treatment Note    Date: 24  Patient Name: Mandie Bustamante       Room:   MRN: [id number]  Account: [id number]   : 1955  (69 y.o.) Gender: female                    Past Medical History:  has a past medical history of Allergic rhinitis, Anemia, Arthritis, Autoimmune disorder (HCC), Cataracts, bilateral, GERD (gastroesophageal reflux disease), Headache, Hypercalcemia, Hyperparathyroidism (HCC), Hypertension, Obesity, and Osteoarthritis.  Past Surgical History:   has a past surgical history that includes Parathyroid gland surgery (2023); Total vaginal hysterectomy (); Appendectomy; Urethra surgery (); Foot surgery (Left, ); shoulder surgery (Right, 10/2000); Foot surgery (Left, 2010); Shoulder arthroscopy (Right, 2011); Shoulder arthroscopy (Right, 2012); Shoulder arthroscopy (Left, 2012); Cholecystectomy; Bunionectomy (Left, 2016); Foot surgery (Left, 2017); parathyroidectomy (2022); hernia repair (2022); Umbilical hernia repair; Hysterectomy, total abdominal (1986); Hysterectomy, vaginal (1986); Upper gastrointestinal endoscopy (); ventral hernia repair (N/A, 10/1/2024); laparoscopy (N/A, 10/16/2024); and laparotomy (N/A, 10/18/2024).    Restrictions  Restrictions/Precautions: General Precautions, Up as Tolerated, Fall Risk  Other position/activity restrictions: RUE PICC, telemetry, cont SPO2 moniter, 46 BMI, ABD wound vac, ABD prec  Required Braces or Orthoses?: No    Subjective  Subjective: Writer received message from PTA that pt needed to be seen right away for OT. Writer went to see pt immediately. Pt sitting in chair upon arrival. Writer stated she was here to see pt for OT per her request to which pt replied \"I didn't ask for therapy, I'm waiting for my PCT to give me a bath.\" Pt agreeable to let writer assist her with  assist  Skilled Clinical Factors: VC's for reaching back prior to sitting, controlled sit/stand, pursed lip breathing, and awareness/assist with lines.  Stand to Sit  Assistance Level: Stand by assist   Neuromuscular Education  Neuromuscular education: Yes  NDT Treatment: Sitting;Standing     Assessment  Assessment  Assessment: Pt tolerated session well and is progressing towards goals. Pt req'd Min-Mod A with UB CARE and MAX/DEP with LB care. Pt sat in chair and completed simple grooming tasks with set up. STSs from chair during ADLs req'd SBA for safety. Pt remains limited by global weakness, decreased activity tolerance, cognitive deficits and increased need for assist with self care tasks. Skilled OT warranted to promote I/safety to return pt. to prior living arrangement with assist as needed.  Activity Tolerance: Patient tolerated treatment well;Patient limited by endurance  Discharge Recommendations: Patient would benefit from continued therapy after discharge  Safety Devices  Safety Devices in place: Yes  Type of devices: Nurse notified;Call light within reach;Patient at risk for falls;All fall risk precautions in place;Left in chair    Patient Education  Education  Education Given To: Patient  Education Provided: Role of Therapy;Transfer Training;Energy Conservation;Safety;ADL Function;Equipment  Education Method: Verbal  Barriers to Learning: Cognition  Education Outcome: Verbalized understanding;Demonstrated understanding;Continued education needed    Plan  Occupational Therapy Plan  Times Per Week: 4-5x/wk 1x/day as pia  Current Treatment Recommendations: Strengthening;Balance training;Cognitive reorientation;Safety education & training;Patient/Caregiver education & training;Equipment evaluation, education, & procurement;Self-Care / ADL;Cognitive/Perceptual training;Co-Treatment;Pain management;ROM;Functional mobility training;Endurance training;Positioning    Goals  Patient Goals   Patient goals : Go

## 2024-11-06 NOTE — PROGRESS NOTES
End Of Shift Note  [facility]  Summary of shift: Pt had an restless night, around 2200 last night pt had throbbing abd pain that has been intermittent, primary notified. See previous note. Pt VSS, pt tolerating getting up with walker 1 assist well. Pt tolerating Full liquid diet as well.      Vitals:    Vitals:    11/05/24 2200 11/06/24 0000 11/06/24 0023 11/06/24 0400   BP:  (!) 154/68  (!) 142/63   Pulse: 90   87   Resp: 28   28   Temp:  98.8 °F (37.1 °C)  97.3 °F (36.3 °C)   TempSrc:  Oral  Temporal   SpO2: 100%   96%   Weight:   116.3 kg (256 lb 8 oz)    Height:            I&O:   Intake/Output Summary (Last 24 hours) at 11/6/2024 0420  Last data filed at 11/6/2024 0200  Gross per 24 hour   Intake --   Output 2550 ml   Net -2550 ml       Resp Status: RA    Ventilator Settings:  Vent Mode: CPAP/PS Resp Rate (Set): 14 bpm/Vt (Set, mL): 500 mL/ /FiO2 : 30 %    Critical Care IV infusions:   PN-Adult 2-in-1 Central Line (Custom) 65 mL/hr at 11/05/24 1745    dextrose      sodium chloride 5 mL/hr at 11/01/24 0245        LDA:   PICC 10/26/24 Right Cephalic (Active)   Number of days: 10       Negative Pressure Wound Therapy Abdomen Medial (Active)   Number of days: 4       Wound Abdomen Left;Lower (Active)   Number of days:        Incision 10/01/24 Abdomen Medial;Upper (Active)   Number of days: 35       Incision 10/16/24 Abdomen Lower;Medial (Active)   Number of days: 20

## 2024-11-06 NOTE — PROGRESS NOTES
Physical Therapy  Facility/Department: [facility]  Rehabilitation Physical Therapy Treatment Note    NAME: Mandie Bustamante  : 1955 (69 y.o.)  MRN: [id number]  CODE STATUS: Full Code    Date of Service: 24       Restrictions:  Restrictions/Precautions: General Precautions, Up as Tolerated, Fall Risk  Position Activity Restriction  Other position/activity restrictions: RUE PICC, telemetry, cont SPO2 moniter, 46 BMI     SUBJECTIVE  Subjective  Subjective: pt in bedside lazyboy chair upon arrival agreeable to PT requesting assist to bathroom         OBJECTIVE  Cognition  Overall Cognitive Status: Exceptions  Arousal/Alertness: Appropriate responses to stimuli;Delayed responses to stimuli  Following Commands: Follows one step commands with repetition;Follows one step commands with increased time  Attention Span: Attends with cues to redirect;Difficulty dividing attention  Memory: Decreased recall of precautions;Decreased short term memory  Safety Judgement: Decreased awareness of need for safety;Decreased awareness of need for assistance  Problem Solving: Decreased awareness of errors;Assistance required to correct errors made;Assistance required to identify errors made;Assistance required to implement solutions;Assistance required to generate solutions  Insights: Decreased awareness of deficits  Initiation: Requires cues for all  Sequencing: Requires cues for all  Cognition Comment: Pt. required positive encouragement for each task with verbal cues on .  Orientation  Overall Orientation Status: Within Functional Limits  Orientation Level: Oriented X4    Functional Mobility  Bed Mobility  Additional Factors: Set-up;Verbal cues;Increased time to complete;Head of bed raised;With handrails  Roll Left  Assistance Level: Contact guard assist  Skilled Clinical Factors: vc's for hand placement  Roll Right  Assistance Level: Contact guard assist  Sit to Supine  Skilled Clinical Factors: unable to assess as  distnace and tolerance this session, able to increase amb. distance to 40 ft using RW and CGA/min A  without rest break. pt would benefit from continued skilled PT treatment to maximize return to PLOF  Activity Tolerance: Patient limited by endurance;Patient limited by fatigue;Patient tolerated treatment well  Discharge Recommendations: Patient would benefit from continued therapy after discharge    Goals  Patient Goals   Patient Goals : To feel better, to go home, to return to PLOF  Short Term Goals  Time Frame for Short Term Goals: 12 visits  Short Term Goal 1: Patient will perfom bed mobility with SBA.  Short Term Goal 2: Patient will demo good seated balance.  Short Term Goal 3: Pt to actively participate in at least 30 minutes of physical therapy for ther act & ther ex  Short Term Goal 4: Pt to be indep w/ pressure relief techniques in order to maintain skin integrity and prevent pressure injuries  Short Term Goal 5: Patient will perform transfers with min assist and RW.  Short Term Goal 6: Patient will amb 20 feet with RW and mod assist.    PLAN OF CARE/SAFETY  Physical Therapy Plan  General Plan: 5-7 times per week  Current Treatment Recommendations: Strengthening;ROM;Patient/Caregiver education & training;Cognitive reorientation;Positioning;Balance training;Transfer training;Endurance training;Gait training;Neuromuscular re-education;Home exercise program;Safety education & training;Equipment evaluation, education, & procurement;Co-Treatment;Therapeutic activities  Safety Devices  Type of Devices: Call light within reach;Nurse notified;All fall risk precautions in place;Left in chair;Chair alarm in place;Gait belt    EDUCATION  Education  Education Given To: Patient  Education Provided: Role of Therapy;Mobility Training;Fall Prevention Strategies;Plan of Care;Transfer Training;Visual Perceptual Function;Energy Conservation;Home Exercise Program  Education Method: Verbal  Barriers to Learning: None  Education

## 2024-11-06 NOTE — PROGRESS NOTES
Benjamin Almazan MD   Urology Progress Note            Subjective: Follow-up neurogenic bladder  Patient seen at the change of shift in conjunction with nursing staff  Patient Vitals for the past 24 hrs:   BP Temp Temp src Pulse Resp SpO2 Height Weight   11/06/24 1351 -- -- -- -- -- -- 1.6 m (5' 3\") --   11/06/24 1123 (!) 150/68 96.9 °F (36.1 °C) Temporal 91 (!) 33 97 % -- --   11/06/24 0812 -- 97.5 °F (36.4 °C) Temporal -- -- -- -- --   11/06/24 0400 (!) 142/63 97.3 °F (36.3 °C) Temporal 87 28 96 % -- --   11/06/24 0023 -- -- -- -- -- -- -- 116.3 kg (256 lb 8 oz)   11/06/24 0000 (!) 154/68 98.8 °F (37.1 °C) Oral -- -- -- -- --   11/05/24 2200 -- -- -- 90 28 100 % -- --   11/05/24 2000 (!) 154/68 97.9 °F (36.6 °C) Temporal 96 12 99 % -- --   11/05/24 1748 126/61 97 °F (36.1 °C) Temporal 98 18 99 % -- --       Intake/Output Summary (Last 24 hours) at 11/6/2024 1430  Last data filed at 11/6/2024 0553  Gross per 24 hour   Intake 94.65 ml   Output 1000 ml   Net -905.35 ml       No results for input(s): \"WBC\", \"HGB\", \"HCT\", \"MCV\", \"PLT\" in the last 72 hours.  Recent Labs     11/04/24  0320 11/05/24  0602 11/06/24  0550   * 129* 131*   K 4.4 4.8 4.0   CL 96* 95* 99   CO2 22 22 19*   PHOS 4.0  --  3.7   BUN 19 19 18   CREATININE 0.7 0.7 0.6       No results for input(s): \"COLORU\", \"PHUR\", \"LABCAST\", \"WBCUA\", \"RBCUA\", \"MUCUS\", \"TRICHOMONAS\", \"YEAST\", \"BACTERIA\", \"CLARITYU\", \"SPECGRAV\", \"LEUKOCYTESUR\", \"UROBILINOGEN\", \"BILIRUBINUR\", \"BLOODU\" in the last 72 hours.    Invalid input(s): \"NITRATE\", \"GLUCOSEUKETONESUAMORPHOUS\"    Additional Lab/culture results:    Physical Exam: Patient alert oriented not in acute distress, patient is ambulatory hematuria or dysuria  Discussed with primary service monitor postvoid residual  Interval Imaging Findings:    Impression:    Patient Active Problem List   Diagnosis    Abdominal hernia without obstruction and without gangrene    Anxiety    BMI 45.0-49.9,

## 2024-11-06 NOTE — PROGRESS NOTES
Benjamin Almazan MD   Urology Progress Note            Subjective: Follow-up neurogenic bladder    Patient Vitals for the past 24 hrs:   BP Temp Temp src Pulse Resp SpO2 Height Weight   11/06/24 1351 -- -- -- -- -- -- 1.6 m (5' 3\") --   11/06/24 1123 (!) 150/68 96.9 °F (36.1 °C) Temporal 91 (!) 33 97 % -- --   11/06/24 0812 -- 97.5 °F (36.4 °C) Temporal -- -- -- -- --   11/06/24 0400 (!) 142/63 97.3 °F (36.3 °C) Temporal 87 28 96 % -- --   11/06/24 0023 -- -- -- -- -- -- -- 116.3 kg (256 lb 8 oz)   11/06/24 0000 (!) 154/68 98.8 °F (37.1 °C) Oral -- -- -- -- --   11/05/24 2200 -- -- -- 90 28 100 % -- --   11/05/24 2000 (!) 154/68 97.9 °F (36.6 °C) Temporal 96 12 99 % -- --   11/05/24 1748 126/61 97 °F (36.1 °C) Temporal 98 18 99 % -- --       Intake/Output Summary (Last 24 hours) at 11/6/2024 1432  Last data filed at 11/6/2024 0553  Gross per 24 hour   Intake 94.65 ml   Output 1000 ml   Net -905.35 ml       No results for input(s): \"WBC\", \"HGB\", \"HCT\", \"MCV\", \"PLT\" in the last 72 hours.  Recent Labs     11/04/24  0320 11/05/24  0602 11/06/24  0550   * 129* 131*   K 4.4 4.8 4.0   CL 96* 95* 99   CO2 22 22 19*   PHOS 4.0  --  3.7   BUN 19 19 18   CREATININE 0.7 0.7 0.6       No results for input(s): \"COLORU\", \"PHUR\", \"LABCAST\", \"WBCUA\", \"RBCUA\", \"MUCUS\", \"TRICHOMONAS\", \"YEAST\", \"BACTERIA\", \"CLARITYU\", \"SPECGRAV\", \"LEUKOCYTESUR\", \"UROBILINOGEN\", \"BILIRUBINUR\", \"BLOODU\" in the last 72 hours.    Invalid input(s): \"NITRATE\", \"GLUCOSEUKETONESUAMORPHOUS\"    Additional Lab/culture results:    Physical Exam: Patient significantly improved from urology standpoint no difficulty voiding no dysuria no evidence of bladder distention  Discharge planning in progress patient will be transferred to LTAC  Interval Imaging Findings:    Impression:    Patient Active Problem List   Diagnosis    Abdominal hernia without obstruction and without gangrene    Anxiety    BMI 45.0-49.9, adult    Bursitis of right

## 2024-11-06 NOTE — CONSULTS
Intimate Partner Violence: Not At Risk (7/3/2024)    Humiliation, Afraid, Rape, and Kick questionnaire     Fear of Current or Ex-Partner: No     Emotionally Abused: No     Physically Abused: No     Sexually Abused: No   Housing Stability: Low Risk  (10/10/2024)    Housing Stability Vital Sign     Unable to Pay for Housing in the Last Year: No     Number of Times Moved in the Last Year: 1     Homeless in the Last Year: No         Physical Exam:  BP (!) 154/68   Pulse 90   Temp 97.9 °F (36.6 °C) (Temporal)   Resp 28   Ht 1.6 m (5' 3\")   Wt 111.4 kg (245 lb 9.6 oz)   SpO2 100%   BMI 43.51 kg/m²     Deferred - Record review only      Diagnostics:    CBC: No results for input(s): \"WBC\", \"RBC\", \"HGB\", \"HCT\", \"MCV\", \"RDW\", \"PLT\" in the last 72 hours.  BMP:   Recent Labs     11/03/24  0339 11/04/24  0320 11/05/24  0602   * 130* 129*   K 4.9 4.4 4.8   CL 98 96* 95*   CO2 23 22 22   PHOS  --  4.0  --    BUN 18 19 19   CREATININE 0.7 0.7 0.7   GLUCOSE 202* 194* 193*      HbA1c: No results found for: \"LABA1C\"  BNP: No results for input(s): \"BNP\" in the last 72 hours.  PT/INR: No results for input(s): \"PROTIME\", \"INR\" in the last 72 hours.  APTT: No results for input(s): \"APTT\" in the last 72 hours.  CARDIAC ENZYMES: No results for input(s): \"CKMB\", \"CKMBINDEX\", \"TROPONINT\" in the last 72 hours.    Invalid input(s): \"CKTOTAL;3\"  FASTING LIPID PANEL:  Lab Results   Component Value Date    TRIG 107 10/24/2024     LIVER PROFILE:   Recent Labs     11/04/24  0320   AST 11   ALT 9   BILIDIR 0.1   BILITOT 0.3   ALKPHOS 101        Radiology:  XR CHEST PORTABLE    Result Date: 11/4/2024  EXAMINATION: ONE XRAY VIEW OF THE CHEST 11/4/2024 11:40 am COMPARISON: 11/01/2024 HISTORY: ORDERING SYSTEM PROVIDED HISTORY: Edema PICC line position TECHNOLOGIST PROVIDED HISTORY: Edema PICC line position Reason for Exam: edema, PICC line placement hx of HTN FINDINGS: Right PICC line terminates at the distal SVC.  The cardiomediastinal  in previous site of hernia suggesting recurrent hernia and or phlegmon.     XR CHEST PORTABLE    Result Date: 10/10/2024  EXAMINATION: ONE XRAY VIEW OF THE CHEST 10/10/2024 12:26 pm COMPARISON: 09/01/2023 HISTORY: ORDERING SYSTEM PROVIDED HISTORY: shortness of breath TECHNOLOGIST PROVIDED HISTORY: shortness of breath Reason for Exam: sob FINDINGS: Mild cardiomegaly.  Shallow lung volumes without acute consolidation.     Shallow lung volumes without acute consolidation.       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  Per notes, the plan is for discharge to LTAC at [facility] at this time due to complex wound care needs, TPN, and ongoing hyponatremia.  DVT Prophylaxis: Heparin      It was my pleasure to evaluate the chart of Mandie Bustamante today.  Please call with questions.    Leslie Vaughan MD

## 2024-11-06 NOTE — PROGRESS NOTES
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  Status post exploratory laparotomy with removal of mesh and wound VAC application 10/16/2024  Subsequently underwent exploratory laparotomy, abdominal washout with wound VAC application 10/18/2024  Acute respiratory failure require intubation, now extubated  Persistent fever-resolved  Morbid obesity    Status post explantation of mesh, likely reactive process to: Adherent to the undersurface of the mesh  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 which is thankfully closing following explantation of the mesh    Recommendations:   Patient has had multiple courses of antibiotics that include:  Zosyn 10/10/24 through 10/17/2024  Cefepime and metronidazole 10/17/2024 through 10/26/2024  Ceftriaxone, metronidazole, fluconazole 10/28/2024 through 11/2/2024  Discharge planning    Infection Control Recommendations:   Universal precautions    Discharge Planning:     Patient will need Midline Catheter Insertion/ PICC line Insertion: No  Patient will need: Home IV , Infusion Center,  SNF,  LTAC: Undetermined  Patient willneed outpatient wound care: No    Medical Decision making / Summary of Stay:   Mandie Bustamante is a 69 y.o.-year-old female was seen at the ICU, intubated, sedated, unable to provide history that was obtained from chart review and nursing staff.  She is on 1 mcg/min of Levophed, right arm PICC line was placed 10/11/2024, on TPN.  Bar catheter in place with clear urine was placed on 10/10/2024  NG tube in place.  Status post robotic assisted laparoscopic repair

## 2024-11-07 LAB
ANION GAP SERPL CALCULATED.3IONS-SCNC: 12 MMOL/L (ref 9–16)
BUN SERPL-MCNC: 16 MG/DL (ref 8–23)
CALCIUM SERPL-MCNC: 7.9 MG/DL (ref 8.8–10.2)
CHLORIDE SERPL-SCNC: 100 MMOL/L (ref 98–107)
CO2 SERPL-SCNC: 21 MMOL/L (ref 20–31)
CREAT SERPL-MCNC: 0.7 MG/DL (ref 0.5–0.9)
GFR, ESTIMATED: >90 ML/MIN/1.73M2
GLUCOSE BLD-MCNC: 140 MG/DL (ref 65–105)
GLUCOSE BLD-MCNC: 146 MG/DL (ref 65–105)
GLUCOSE SERPL-MCNC: 182 MG/DL (ref 82–115)
POTASSIUM SERPL-SCNC: 4.2 MMOL/L (ref 3.7–5.3)
SODIUM SERPL-SCNC: 133 MMOL/L (ref 136–145)
TRIGL SERPL-MCNC: 297 MG/DL

## 2024-11-07 PROCEDURE — 94761 N-INVAS EAR/PLS OXIMETRY MLT: CPT

## 2024-11-07 PROCEDURE — 2060000000 HC ICU INTERMEDIATE R&B

## 2024-11-07 PROCEDURE — 97530 THERAPEUTIC ACTIVITIES: CPT

## 2024-11-07 PROCEDURE — 6360000002 HC RX W HCPCS: Performed by: INTERNAL MEDICINE

## 2024-11-07 PROCEDURE — 97116 GAIT TRAINING THERAPY: CPT

## 2024-11-07 PROCEDURE — 6370000000 HC RX 637 (ALT 250 FOR IP): Performed by: HOSPITALIST

## 2024-11-07 PROCEDURE — 2580000003 HC RX 258

## 2024-11-07 PROCEDURE — 6370000000 HC RX 637 (ALT 250 FOR IP)

## 2024-11-07 PROCEDURE — 99232 SBSQ HOSP IP/OBS MODERATE 35: CPT | Performed by: INTERNAL MEDICINE

## 2024-11-07 PROCEDURE — 36415 COLL VENOUS BLD VENIPUNCTURE: CPT

## 2024-11-07 PROCEDURE — 80048 BASIC METABOLIC PNL TOTAL CA: CPT

## 2024-11-07 PROCEDURE — 82947 ASSAY GLUCOSE BLOOD QUANT: CPT

## 2024-11-07 PROCEDURE — 99232 SBSQ HOSP IP/OBS MODERATE 35: CPT | Performed by: NURSE PRACTITIONER

## 2024-11-07 PROCEDURE — 6370000000 HC RX 637 (ALT 250 FOR IP): Performed by: INTERNAL MEDICINE

## 2024-11-07 RX ORDER — PANTOPRAZOLE SODIUM 40 MG/1
40 TABLET, DELAYED RELEASE ORAL
Status: DISCONTINUED | OUTPATIENT
Start: 2024-11-07 | End: 2024-11-08 | Stop reason: HOSPADM

## 2024-11-07 RX ORDER — IBUPROFEN 400 MG/1
400 TABLET, FILM COATED ORAL EVERY 6 HOURS PRN
Qty: 60 TABLET | Refills: 1 | Status: SHIPPED | OUTPATIENT
Start: 2024-11-07

## 2024-11-07 RX ORDER — PANTOPRAZOLE SODIUM 40 MG/1
40 TABLET, DELAYED RELEASE ORAL
Qty: 30 TABLET | Refills: 1 | Status: SHIPPED | OUTPATIENT
Start: 2024-11-07

## 2024-11-07 RX ORDER — DIPHENHYDRAMINE HCL 25 MG
25 TABLET ORAL EVERY 6 HOURS PRN
COMMUNITY
Start: 2024-11-07 | End: 2024-12-07

## 2024-11-07 RX ORDER — DOCUSATE SODIUM 100 MG/1
100 CAPSULE, LIQUID FILLED ORAL 2 TIMES DAILY
DISCHARGE
Start: 2024-11-07 | End: 2024-12-07

## 2024-11-07 RX ORDER — IBUPROFEN 200 MG
400 TABLET ORAL EVERY 6 HOURS PRN
Status: DISCONTINUED | OUTPATIENT
Start: 2024-11-07 | End: 2024-11-08 | Stop reason: HOSPADM

## 2024-11-07 RX ADMIN — HYDROCORTISONE: 1 CREAM TOPICAL at 11:08

## 2024-11-07 RX ADMIN — DIPHENHYDRAMINE HCL 25 MG: 25 TABLET ORAL at 23:19

## 2024-11-07 RX ADMIN — PANTOPRAZOLE SODIUM 40 MG: 40 TABLET, DELAYED RELEASE ORAL at 08:41

## 2024-11-07 RX ADMIN — MICONAZOLE NITRATE: 20 CREAM TOPICAL at 11:08

## 2024-11-07 RX ADMIN — SODIUM CHLORIDE, PRESERVATIVE FREE 10 ML: 5 INJECTION INTRAVENOUS at 20:53

## 2024-11-07 RX ADMIN — HEPARIN SODIUM 5000 UNITS: 5000 INJECTION INTRAVENOUS; SUBCUTANEOUS at 09:53

## 2024-11-07 RX ADMIN — DIPHENHYDRAMINE HYDROCHLORIDE, ZINC ACETATE: 2; .1 CREAM TOPICAL at 22:46

## 2024-11-07 RX ADMIN — ZOLPIDEM TARTRATE 2.5 MG: 5 TABLET ORAL at 20:53

## 2024-11-07 ASSESSMENT — PAIN SCALES - GENERAL
PAINLEVEL_OUTOF10: 0
PAINLEVEL_OUTOF10: 0

## 2024-11-07 ASSESSMENT — ENCOUNTER SYMPTOMS: RESPIRATORY NEGATIVE: 1

## 2024-11-07 NOTE — PLAN OF CARE
Mandie Bustamante was evaluated today and a DME order was entered for a wheeled walker because she requires this to successfully complete daily living tasks of ambulating.  A wheeled walker is necessary due to the patient's unsteady gait, upper body weakness, and inability to  an ambulation device; and she can ambulate only by pushing a walker instead of a lesser assistive device such as a cane, crutch, or standard walker.  The need for this equipment was discussed with the patient and she understands and is in agreement.    Mandie Bustamante requires a bedside commode due to being confined to one level of the home and there are no toilet facilities on that level, and is physically incapable of utilizing regular toilet facilities. Current body weight: Weight - Scale: 117.9 kg (259 lb 14.4 oz).

## 2024-11-07 NOTE — PLAN OF CARE
Problem: Discharge Planning  Goal: Discharge to home or other facility with appropriate resources  Outcome: Progressing     Problem: Skin/Tissue Integrity  Goal: Absence of new skin breakdown  Description: 1.  Monitor for areas of redness and/or skin breakdown  2.  Assess vascular access sites hourly  3.  Every 4-6 hours minimum:  Change oxygen saturation probe site  4.  Every 4-6 hours:  If on nasal continuous positive airway pressure, respiratory therapy assess nares and determine need for appliance change or resting period.  Outcome: Progressing     Problem: ABCDS Injury Assessment  Goal: Absence of physical injury  Outcome: Progressing     Problem: Safety - Adult  Goal: Free from fall injury  Outcome: Progressing     Problem: Metabolic/Fluid and Electrolytes - Adult  Goal: Electrolytes maintained within normal limits  Outcome: Progressing  Goal: Hemodynamic stability and optimal renal function maintained  Outcome: Progressing  Goal: Glucose maintained within prescribed range  Outcome: Progressing     Problem: Neurosensory - Adult  Goal: Achieves stable or improved neurological status  Outcome: Progressing  Flowsheets (Taken 11/7/2024 0800)  Achieves stable or improved neurological status: Assess for and report changes in neurological status  Goal: Absence of seizures  Outcome: Progressing  Flowsheets (Taken 11/7/2024 0800)  Absence of seizures: Monitor for seizure activity.  If seizure occurs, document type and location of movements and any associated apnea  Goal: Remains free of injury related to seizures activity  Outcome: Progressing  Flowsheets (Taken 11/7/2024 0800)  Remains free of injury related to seizure activity: Maintain airway, patient safety  and administer oxygen as ordered     Problem: Cardiovascular - Adult  Goal: Maintains optimal cardiac output and hemodynamic stability  Outcome: Progressing  Flowsheets (Taken 11/7/2024 0800)  Maintains optimal cardiac output and hemodynamic stability:

## 2024-11-07 NOTE — PROGRESS NOTES
Continue wound VAC, replaced by wound ostomy M/W/F  Diet: Advance to soft diet.  If patient tolerates, may d/c TPN  Dispo: Okay for discharge from a general surgery perspective.  Possibly 2 [facility] today  Patient follow-up with us in 2 weeks.      ZACKARY Simpson DO - PGY5  Surgery Department

## 2024-11-07 NOTE — PLAN OF CARE
Problem: Discharge Planning  Goal: Discharge to home or other facility with appropriate resources  11/7/2024 0206 by Alma Rosa Hamilton RN  Outcome: Progressing  Flowsheets (Taken 11/6/2024 2000)  Discharge to home or other facility with appropriate resources:   Identify barriers to discharge with patient and caregiver   Arrange for needed discharge resources and transportation as appropriate   Identify discharge learning needs (meds, wound care, etc)   Arrange for interpreters to assist at discharge as needed   Refer to discharge planning if patient needs post-hospital services based on physician order or complex needs related to functional status, cognitive ability or social support system  11/6/2024 1529 by Ligia Brown RN  Outcome: Progressing     Problem: Skin/Tissue Integrity  Goal: Absence of new skin breakdown  Description: 1.  Monitor for areas of redness and/or skin breakdown  2.  Assess vascular access sites hourly  3.  Every 4-6 hours minimum:  Change oxygen saturation probe site  4.  Every 4-6 hours:  If on nasal continuous positive airway pressure, respiratory therapy assess nares and determine need for appliance change or resting period.  11/7/2024 0206 by Alma Rosa Hamilton RN  Outcome: Progressing  11/6/2024 1529 by Ligia Brown RN  Outcome: Progressing     Problem: ABCDS Injury Assessment  Goal: Absence of physical injury  11/7/2024 0206 by Alma Rosa Hamilton RN  Outcome: Progressing  Flowsheets (Taken 11/6/2024 2000)  Absence of Physical Injury: Implement safety measures based on patient assessment  11/6/2024 1529 by Ligia Brown RN  Outcome: Progressing     Problem: Safety - Adult  Goal: Free from fall injury  11/7/2024 0206 by Alma Rosa Hamilton RN  Outcome: Progressing  Flowsheets (Taken 11/6/2024 2000)  Free From Fall Injury:   Instruct family/caregiver on patient safety   Based on caregiver fall risk screen, instruct family/caregiver to ask for assistance with transferring infant if caregiver  patient safety  and administer oxygen as ordered   If seizure occurs, turn patient to side and suction secretions as needed   Reorient patient post seizure   Seizure pads on all 4 side rails   Instruct patient/family to notify RN of any seizure activity   Instruct patient/family to call for assistance with activity based on assessment  11/6/2024 1529 by Ligia Brown RN  Outcome: Progressing  Flowsheets (Taken 11/6/2024 0800)  Remains free of injury related to seizure activity: Maintain airway, patient safety  and administer oxygen as ordered     Problem: Cardiovascular - Adult  Goal: Maintains optimal cardiac output and hemodynamic stability  11/7/2024 0206 by Alma Rosa Hamilton RN  Outcome: Progressing  Flowsheets (Taken 11/6/2024 2000)  Maintains optimal cardiac output and hemodynamic stability:   Monitor blood pressure and heart rate   Monitor urine output and notify Licensed Independent Practitioner for values outside of normal range   Assess for signs of decreased cardiac output   Administer fluid and/or volume expanders as ordered   Administer vasoactive medications as ordered   For PPHN infants, administer sedation as ordered and minimize all controllable stressors.  11/6/2024 1529 by Ligia Brown RN  Outcome: Progressing  Goal: Absence of cardiac dysrhythmias or at baseline  11/7/2024 0206 by Alma Rosa Hamilton RN  Outcome: Progressing  Flowsheets (Taken 11/6/2024 2000)  Absence of cardiac dysrhythmias or at baseline:   Monitor cardiac rate and rhythm   Assess for signs of decreased cardiac output   Administer antiarrhythmia medication and electrolyte replacement as ordered  11/6/2024 1529 by Ligia rBown RN  Outcome: Progressing     Problem: Respiratory - Adult  Goal: Achieves optimal ventilation and oxygenation  11/7/2024 0206 by Alma Rosa Hamilton RN  Outcome: Progressing  Flowsheets (Taken 11/6/2024 2000)  Achieves optimal ventilation and oxygenation:   Assess for changes in respiratory status   Assess

## 2024-11-07 NOTE — PROGRESS NOTES
Occupational Therapy  DATE: 2024    NAME: Mandie Bustamante  MRN: [id number]   : 1955    Patient not seen this date for Occupational Therapy due to:      [] Cancel by RN or physician due to:    [] Hemodialysis    [] Critical Lab Value Level     [] Blood transfusion in progress    [] Acute or unstable cardiovascular status   _MAP < 55 or more than >115  _HR < 40 or > 130    [] Acute or unstable pulmonary status   -FiO2 > 60%   _RR < 5 or >40    _O2 sats < 85%    [] Strict Bedrest    [] Off Unit for surgery or procedure    [] Off Unit for testing       [] Pending imaging to R/O fracture    [x] Refusal by Patient: Pt declining participation in OT session this afternoon. States she has been up multiple times today for meals, to use bathroom and with PT and that she just wants to sleep. Reporting she did not get any rest last night. DOMINGO Boles notified. OT will continue to follow.       [] Other      [] OT being discontinued at this time. Patient independent. No further needs.     [] OT being discontinued at this time as the patient has been transferred to hospice care. No further needs.      Shelly Vargas, OT

## 2024-11-07 NOTE — PROGRESS NOTES
End Of Shift Note  [facility]  Summary of shift: Pt had an uneventful shift. Tolerating full liquids, good urine output. Pt denies any pain. Pt advanced to soft & bite sized diet this AM. Plan is to d/c to [facility]     Vitals:    Vitals:    11/06/24 2000 11/06/24 2300 11/07/24 0306 11/07/24 0400   BP: (!) 149/59 (!) 127/59  129/61   Pulse: 96 87 82 79   Resp: 22 20  20   Temp: 97.7 °F (36.5 °C) 97.5 °F (36.4 °C)  97.5 °F (36.4 °C)   TempSrc: Temporal Temporal  Temporal   SpO2: 99% 99% 94% 96%   Weight:       Height:            I&O:   Intake/Output Summary (Last 24 hours) at 11/7/2024 0531  Last data filed at 11/6/2024 2300  Gross per 24 hour   Intake 94.65 ml   Output 1800 ml   Net -1705.35 ml       Resp Status: RA    Ventilator Settings:  Vent Mode: CPAP/PS Resp Rate (Set): 14 bpm/Vt (Set, mL): 500 mL/ /FiO2 : 30 %    Critical Care IV infusions:   PN-Adult 2-in-1 Central Line (Custom) 65 mL/hr at 11/06/24 1758    dextrose      sodium chloride 5 mL/hr at 11/01/24 0245        LDA:   PICC 10/26/24 Right Cephalic (Active)   Number of days: 11       Negative Pressure Wound Therapy Abdomen Mid (Active)   Number of days: 5       Wound Abdomen Left;Lower (Active)   Number of days:        Incision 10/01/24 Abdomen Medial;Upper (Active)   Number of days: 36

## 2024-11-07 NOTE — PROGRESS NOTES
Physical Therapy  Facility/Department: [facility]  Rehabilitation Physical Therapy Treatment Note    NAME: Mandie Bustamante  : 1955 (69 y.o.)  MRN: [id number]  CODE STATUS: Full Code    Date of Service: 24       Restrictions:  Restrictions/Precautions: General Precautions, Up as Tolerated, Fall Risk  Position Activity Restriction  Other position/activity restrictions: RUE PICC, telemetry, cont SPO2 moniter, 46 BMI, ABD wound vac, ABD prec     SUBJECTIVE  Subjective  Subjective: pt just back to bed with RN assist agreeable to to amb. if she can go back to bed at conclusion of treatment session. plan for rehab at discharge pt states she wants to go home instead pt's  present and states he can manage her at home encouraged pt to see how the next couple of days go and see what her doctors opinion is              OBJECTIVE  Cognition  Overall Cognitive Status: Exceptions  Arousal/Alertness: Appropriate responses to stimuli  Following Commands: Follows one step commands consistently  Attention Span: Attends with cues to redirect  Memory: Decreased recall of precautions;Decreased short term memory  Safety Judgement: Decreased awareness of need for safety;Decreased awareness of need for assistance  Problem Solving: Decreased awareness of errors;Assistance required to correct errors made;Assistance required to identify errors made;Assistance required to implement solutions;Assistance required to generate solutions  Insights: Decreased awareness of deficits  Initiation: Requires cues for some  Sequencing: Requires cues for some  Cognition Comment: Pt. required positive encouragement for each task with verbal cues on .  Orientation  Overall Orientation Status: Within Functional Limits  Orientation Level: Oriented X4    Functional Mobility  Bed Mobility  Additional Factors: Set-up;Verbal cues;With handrails  Roll Left  Assistance Level: Contact guard assist  Skilled Clinical Factors: vc's for hand

## 2024-11-07 NOTE — PROGRESS NOTES
Benjamin Almazan MD   Urology Progress Note            Subjective: Follow-up neurogenic bladder urinary retention    Patient Vitals for the past 24 hrs:   BP Temp Temp src Pulse Resp SpO2 Height Weight   11/07/24 0600 -- -- -- -- -- -- -- 117.9 kg (259 lb 14.4 oz)   11/07/24 0400 129/61 97.5 °F (36.4 °C) Temporal 79 20 96 % -- --   11/07/24 0306 -- -- -- 82 -- 94 % -- --   11/06/24 2300 (!) 127/59 97.5 °F (36.4 °C) Temporal 87 20 99 % -- --   11/06/24 2000 (!) 149/59 97.7 °F (36.5 °C) Temporal 96 22 99 % -- --   11/06/24 1536 (!) 125/56 98.9 °F (37.2 °C) Oral (!) 101 26 96 % -- --   11/06/24 1520 -- -- -- -- 24 -- -- --   11/06/24 1450 -- -- -- -- 28 -- -- --   11/06/24 1351 -- -- -- -- -- -- 1.6 m (5' 3\") --   11/06/24 1200 -- -- -- 91 -- -- -- --   11/06/24 1123 (!) 150/68 96.9 °F (36.1 °C) Temporal 91 (!) 33 97 % -- --   11/06/24 0812 -- 97.5 °F (36.4 °C) Temporal -- -- -- -- --       Intake/Output Summary (Last 24 hours) at 11/7/2024 0657  Last data filed at 11/7/2024 0600  Gross per 24 hour   Intake --   Output 2350 ml   Net -2350 ml       No results for input(s): \"WBC\", \"HGB\", \"HCT\", \"MCV\", \"PLT\" in the last 72 hours.  Recent Labs     11/05/24  0602 11/06/24  0550 11/07/24  0317   * 131* 133*   K 4.8 4.0 4.2   CL 95* 99 100   CO2 22 19* 21   PHOS  --  3.7  --    BUN 19 18 16   CREATININE 0.7 0.6 0.7       No results for input(s): \"COLORU\", \"PHUR\", \"LABCAST\", \"WBCUA\", \"RBCUA\", \"MUCUS\", \"TRICHOMONAS\", \"YEAST\", \"BACTERIA\", \"CLARITYU\", \"SPECGRAV\", \"LEUKOCYTESUR\", \"UROBILINOGEN\", \"BILIRUBINUR\", \"BLOODU\" in the last 72 hours.    Invalid input(s): \"NITRATE\", \"GLUCOSEUKETONESUAMORPHOUS\"    Additional Lab/culture results:    Physical Exam: Patient feeling much better voiding spontaneously no bladder distention significant improvement from general surgery perspective    Interval Imaging Findings:    Impression:    Patient Active Problem List   Diagnosis    Abdominal hernia without

## 2024-11-07 NOTE — PROGRESS NOTES
Comprehensive Nutrition Assessment    Type and Reason for Visit:  Reassess    Nutrition Recommendations/Plan:   Discontinue TPN; if something changes and patient requires TPN to restart, please send consult, pharmacy has been notified.  Continue Ensure Clear TID and encourage good po intakes as patient tolerates  RD to follow/monitor.      Malnutrition Assessment:  Malnutrition Status:  Moderate malnutrition (10/17/24 1147)    Context:  Acute Illness     Findings of the 6 clinical characteristics of malnutrition:  Energy Intake:  50% or less of estimated energy requirements for 5 or more days  Weight Loss:  Unable to assess     Body Fat Loss:  Unable to assess     Muscle Mass Loss:  Unable to assess    Fluid Accumulation:  Moderate to Severe     Strength:       Nutrition Assessment:    Patient has advanced to soft and bite sized diet, she has tolerated an adequate breakfast and lunch, RN has turned off TPN. Plan is to discharge to [facility] soon. Continues on Ensure Clear TID, she prefers the berry. Labs, meds, PMH reviewed.    Nutrition Related Findings:    +1 LE edema, LBM 11/5. Wound vac dressings changed on MWF. Na 133 Wound Type: Wound Vac, Surgical Incision       Current Nutrition Intake & Therapies:    Average Meal Intake: 26-50%  Average Supplements Intake: 26-50%  ADULT ORAL NUTRITION SUPPLEMENT; Breakfast, Dinner, Lunch; Clear Liquid Oral Supplement  ADULT DIET; Dysphagia - Soft and Bite Sized; 4 carb choices (60 gm/meal); Low Fiber    Anthropometric Measures:  Height: 160 cm (5' 3\")  Ideal Body Weight (IBW): 115 lbs (52 kg)       Current Body Weight: 117.5 kg (259 lb), 223 % IBW. Weight Source: Bed scale  Current BMI (kg/m2): 45.9                             BMI Categories: Obese Class 3 (BMI 40.0 or greater)    Estimated Daily Nutrient Needs:  Energy Requirements Based On: Kcal/kg  Weight Used for Energy Requirements: Other (106 kg from 11/2)  Energy (kcal/day): 1370--1908 kcal (13-18 kcal/kg)  Weight

## 2024-11-07 NOTE — CARE COORDINATION
Discharge planning    Per perfect serve conversation with Dr. Reena Simpson, Dr. Cummings will follow for surgical needs only. Not for PCP. Dr. Simpson did sign for wound vac approval.   Toya with [facility] Notified.

## 2024-11-07 NOTE — CARE COORDINATION
Discharge planning    Patient is going home with [facility]. They accepted MILAGRO needs signed.     TPN discontinued.     No PCP. [facility] will reach out to their visiting physicians that will contact the patient.    Dr Cummings to follow for surgical needs only.     Walker order faxed to [facility] and will be delivered to the hospital.     Order for the wound vac faxed to [facility]. Waiting on approval and will be delivered here, once approved. Will need to be switched out before discharge.

## 2024-11-07 NOTE — PROGRESS NOTES
document resolution.  2. Tubes and right-sided PICC as above.    Chest x-ray 10/21         Chest x-ray 10/19 reviewed      CT abdomen pelvis 10/29  1. Large soft tissue defect within the anterior abdominal wall with extensive  graying of the subcutaneous fat adjacent. There is no appreciable focal fluid  collection.  2. Mild wall thickening of the transverse colon as it traverses near the  surgical site of the anterior abdominal wall. There is a small focus of air  within the rectus musculature immediately adjacent to the transverse colon,  can not exclude possible small fistulous tract.  3. Fluid-filled rectosigmoid colon.  Findings may represent acute diarrheal  illness.       Impression/recommendations:  Acute hypoxic respiratory insufficiency /atelectasis/pulmonary edema  Oxygen by nasal cannula  Incentive spirometry every hour while awake  Lasix discontinue    Status post exploratory laparotomy with removal of mesh and wound VAC application/history of cholecystectomy  Surgery following/wound care  No plans for surgery for now  Diet advanced per surgery/TPN at 3 times a week given shortage of IV fluids per [facility] policy     Decreased hemoglobin   Monitor hemoglobin hematocrit  Heparin 5000 every 12 hours/consider increasing to 8 hours based on hemoglobin trend    sepsis/septic shock/abdominal abscess/Candida local infection  Off Rocephin Flagyl off Diflucan ID on consult  Monitor blood pressure off Levophed         Status post SHAI/urinary retention with history of suburethral sling  Nephrology on consult.  Urology on consult     Insomnia/obesity suspected sleep apnea   Outpatient Sleep evaluation  Ambien nightly as needed      Physical therapy/increase ambulation    Patient reports she wants to go home does not want rehab  Discussed with  at bedside    Peptic ulcer disease prophylaxis Protonix  DVT prophylaxis EPC started heparin 5000 every 12 hour        David Coyne MD, MD, [facility]  Pulmonary Critical  Care and Sleep Medicine,  [facility]  Cell: [phone]  Office: [phone]

## 2024-11-07 NOTE — PROGRESS NOTES
[facility]  Office: [phone]  Keenan Foster DO, Kevin Siegel DO, Carlos A Orta DO, Taye Morales DO, Maxx Madison MD, Meena Eckert MD, Efren Vo MD, Makayla Last MD,  Jerman Christian MD, Gina Bourgeois MD, Gladys Fry MD,  Anu Murillo DO, Jennifer Blanca MD, Oral Duncan MD, Harlan Foster DO, Katya Simmons MD,  Filiberto Slade DO, Elizabeth Alves MD, Samantha Pinto MD, Arlene Bryant MD, Bandar Barrios MD,  Kulwant Rodrigez MD, Michelle Dennison MD, Jinny Steel MD, Kathia Vargas MD, Gerardo Alvarez MD, Richard Johnson MD, Demetri Ibarra DO, Benjie Alvarenga MD, Shirley Waterhouse, CNP,  Georgette Villarreal CNP, Demetri Toure, CATHY,  Silvina Castano, CARINA, Tatiana Mckeon, CNP, Angelina Harper, CNP, Alma Rosa Peng, CNP, Rashmi Mccartney, CNP, IMAN ErvinC, IMAN ArmentaC, Stephanie Kaminski, CATHY, Tono Yee, CNP,  Chrissie Schwartz, CNP, Sheri Jones, CNP,  Nadiya Rick, CNP, Cindi Lam, CNP         [facility]   IN-PATIENT SERVICE   [facility]    Progress Note    11/7/2024    7:14 AM    Name:   Mandie Bustamante  MRN:     [id number]     Acct:      [id number]   Room:   2033/2033-01  IP Day:  28  Admit Date:  10/10/2024  6:11 PM    PCP:   No primary care provider on file.  Code Status:  Full Code    Subjective:     C/C: Abdominal pain    Interval History Status: improved.     Patient is resting in bed, denies any complaints of chest pain, shortness of breath, nausea or vomiting, fevers or chills.  Diet advance further per surgery this morning.    Brief History:     Per prior documentation  \"Patient is a 69-year-old female who recently underwent an out patient hernia repair with mesh on 10/1/2024, she presents to the ED with abdominal pain on 10/10/2024. Work up in the ED revealed severe hyponatremia of 109, leukocytosis, and  CT abdomen pelvis without contrast was suggestive of large abscess with air-fluid collection seen along the peritoneum just below

## 2024-11-07 NOTE — PROGRESS NOTES
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  Status post exploratory laparotomy with removal of mesh and wound VAC application 10/16/2024  Subsequently underwent exploratory laparotomy, abdominal washout with wound VAC application 10/18/2024  Acute respiratory failure require intubation, now extubated  Persistent fever-resolved  Morbid obesity    Status post explantation of mesh, likely reactive process to: Adherent to the undersurface of the mesh  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 which is thankfully closing following explantation of the mesh    Recommendations:   Patient has had multiple courses of antibiotics that include:  Zosyn 10/10/24 through 10/17/2024  Cefepime and metronidazole 10/17/2024 through 10/26/2024  Ceftriaxone, metronidazole, fluconazole 10/28/2024 through 11/2/2024  Discharge planning    Infection Control Recommendations:   Universal precautions    Discharge Planning:     Patient will need Midline Catheter Insertion/ PICC line Insertion: No  Patient will need: Home IV , Infusion Center,  SNF,  LTAC: Undetermined  Patient willneed outpatient wound care: No    Medical Decision making / Summary of Stay:   Mandie Bustamante is a 69 y.o.-year-old female was seen at the ICU, intubated, sedated, unable to provide history that was obtained from chart review and nursing staff.  She is on 1 mcg/min of Levophed, right arm PICC line was placed 10/11/2024, on TPN.  Bar catheter in place with clear urine was placed on 10/10/2024  NG tube in place.  Status post robotic assisted laparoscopic repair  CNP  Perfect [employer] messaging  OFFICE: [phone]    Thank you for allowing us to participate in the care of this patient. Please call with questions.    This note is created with the assistance of a speech recognition program.  While intending to generate a document that actually reflects the content of the visit, the document can still have some errors including those of syntax and sound a like substitutions which may escape proof reading.  In such instances, actual meaning can be extrapolated by contextual diversion.

## 2024-11-07 NOTE — PROGRESS NOTES
End Of Shift Note  [facility]  Summary of shift: Patient had uneventful shift today. Was able to work with PT/OT, get up to bathroom multiple times and had her wound vac changed by wound care.  at bedside most of the shift.     Vitals:    Vitals:    11/06/24 1351 11/06/24 1450 11/06/24 1520 11/06/24 1536   BP:    (!) 125/56   Pulse:    (!) 101   Resp:  28 24 26   Temp:    98.9 °F (37.2 °C)   TempSrc:    Oral   SpO2:    96%   Weight:       Height: 1.6 m (5' 3\")           I&O:   Intake/Output Summary (Last 24 hours) at 11/6/2024 1903  Last data filed at 11/6/2024 1536  Gross per 24 hour   Intake 94.65 ml   Output 1800 ml   Net -1705.35 ml       Resp Status: room air    Ventilator Settings:  Vent Mode: CPAP/PS Resp Rate (Set): 14 bpm/Vt (Set, mL): 500 mL/ /FiO2 : 30 %    Critical Care IV infusions:   PN-Adult 2-in-1 Central Line (Custom) 65 mL/hr at 11/06/24 1758    dextrose      sodium chloride 5 mL/hr at 11/01/24 0245        LDA:   PICC 10/26/24 Right Cephalic (Active)   Number of days: 11       Negative Pressure Wound Therapy Abdomen Mid (Active)   Number of days: 5       Wound Abdomen Left;Lower (Active)   Number of days:        Incision 10/01/24 Abdomen Medial;Upper (Active)   Number of days: 36

## 2024-11-07 NOTE — DISCHARGE SUMMARY
[facility]  Office: [phone]  Keenan Foster DO, Kevin Siegel DO, Carlos A Orta DO, Taye Morales DO, Maxx Madison MD, Meena Eckert MD, Efren Vo MD, Makayla Last MD,  Jerman Christian MD, Gina Bourgeois MD, Gladys Fry MD,  Anu Murillo DO, Jennifer Blanca MD, Oral Duncan MD, Harlan Foster DO, Katya Simmons MD,  Filiberto Slade DO, Elizabeth Alves MD, Samantha Pinto MD, Arlene Bryant MD, Bandar Barrios MD,  Kulwant Rodrigez MD, Michelle Dennison MD, Jinny Steel MD, Kathia Vargas MD, Gerardo Alvarez MD, Richard Johnson MD, Demetri Ibarra DO, Benjie Alvarenga MD, Shirley Waterhouse, CNP,  Georgette Villarreal CNP, Demetri Toure, CATHY,  Silvina Castano, CARINA, Tatiana Mckeon, CNP, Angelina Harper, CNP, Alma Rosa Peng, CNP, Rashmi Mccartney, CNP, Kimberley Grant PA-C, Eleanor Garg PA-C, Stephanie Kaminski, CNP, Tono Yee, CNP,  Chrissie Schwartz, CNP, Sheri Jones, CNP,  Nadiya Rick, CNP, Cindi Lam, CNP         [facility]   IN-PATIENT SERVICE   [facility]    Discharge Summary     Patient ID: Mandie Bustamante  :  1955   MRN: [id number]     ACCOUNT:  [id number]   Patient's PCP: No primary care provider on file.  Admit Date: 10/10/2024   Discharge Date: 2024     Length of Stay: 29  Code Status:  Full Code  Admitting Physician: Carlos A Orta DO  Discharge Physician: Carlos A Orta DO     Active Discharge Diagnoses:     Hospital Problem Lists:  Principal Problem:    Intra-abdominal abscess (HCC)  Active Problems:    Anxiety    BMI 45.0-49.9, adult    Gastro-esophageal reflux disease without esophagitis    Essential hypertension    Hyponatremia    Hypokalemia    Elevated brain natriuretic peptide (BNP) level    Leukocytosis    Prediabetes    Edema    Colocutaneous fistula    Abdominal wall ulcer, with fat layer exposed (HCC)    Acute metabolic encephalopathy  Resolved Problems:    * No resolved hospital problems. *      Admission Condition:   tablet  Commonly known as: TYLENOL #3  Take 1 tablet by mouth every 4 hours as needed for Pain for up to 5 days. Max Daily Amount: 6 tablets  What changed: when to take this            CONTINUE taking these medications      docusate sodium 100 MG capsule  Commonly known as: COLACE  Take 1 capsule by mouth 2 times daily     irbesartan 150 MG tablet  Commonly known as: AVAPRO     Systane 0.4-0.3 % ophthalmic solution  Generic drug: polyethyl glycol-propyl glycol 0.4-0.3 %     zolpidem 5 MG tablet  Commonly known as: AMBIEN            STOP taking these medications      cyclobenzaprine 5 MG tablet  Commonly known as: FLEXERIL     loratadine 10 MG capsule  Commonly known as: CLARITIN     promethazine 25 MG tablet  Commonly known as: PHENERGAN     tacrolimus 0.1 % ointment  Commonly known as: PROTOPIC               Where to Get Your Medications        These medications were sent to [id number] - [place], [place] - [address] [address] [phone] - F [phone] [address] [facility] [address]      Phone: [phone]   ibuprofen 400 MG tablet  pantoprazole 40 MG tablet       You can get these medications from any pharmacy    Bring a paper prescription for each of these medications  acetaminophen-codeine 300-30 MG per tablet  You don't need a prescription for these medications  diphenhydrAMINE 25 MG tablet       Information about where to get these medications is not yet available    Ask your nurse or doctor about these medications  docusate sodium 100 MG capsule         Discharge Procedure Orders   DME Order for Bedside Commode as OP   Order Comments: You must complete the order parameters below and add the medical necessity documentation for this DME in a separate note.     Commode Chair with Fixed Arms    Current patient weight: Weight - Scale: 117.9 kg (259 lb 14.4 oz)  Current patient height: Height: 160 cm (5' 3\")  Diagnosis: generalized weakness   Duration: Purchase     DME Order for Walker as OP   Order

## 2024-11-08 VITALS
RESPIRATION RATE: 18 BRPM | DIASTOLIC BLOOD PRESSURE: 58 MMHG | BODY MASS INDEX: 43.77 KG/M2 | WEIGHT: 247 LBS | HEART RATE: 92 BPM | SYSTOLIC BLOOD PRESSURE: 125 MMHG | OXYGEN SATURATION: 98 % | HEIGHT: 63 IN | TEMPERATURE: 98 F

## 2024-11-08 LAB
ALBUMIN SERPL-MCNC: 2.8 G/DL (ref 3.5–5.2)
ALBUMIN/GLOB SERPL: 0.9 {RATIO} (ref 1–2.5)
ALP SERPL-CCNC: 103 U/L (ref 35–104)
ALT SERPL-CCNC: 17 U/L (ref 10–35)
ANION GAP SERPL CALCULATED.3IONS-SCNC: 13 MMOL/L (ref 9–16)
AST SERPL-CCNC: 21 U/L (ref 10–35)
BILIRUB DIRECT SERPL-MCNC: 0.2 MG/DL (ref 0–0.2)
BILIRUB INDIRECT SERPL-MCNC: 0.2 MG/DL
BILIRUB SERPL-MCNC: 0.4 MG/DL (ref 0–1.2)
BUN SERPL-MCNC: 16 MG/DL (ref 8–23)
CALCIUM SERPL-MCNC: 8 MG/DL (ref 8.8–10.2)
CHLORIDE SERPL-SCNC: 104 MMOL/L (ref 98–107)
CO2 SERPL-SCNC: 20 MMOL/L (ref 20–31)
CREAT SERPL-MCNC: 0.7 MG/DL (ref 0.5–0.9)
GFR, ESTIMATED: >90 ML/MIN/1.73M2
GLUCOSE SERPL-MCNC: 144 MG/DL (ref 82–115)
MAGNESIUM SERPL-MCNC: 2 MG/DL (ref 1.6–2.4)
PHOSPHATE SERPL-MCNC: 4 MG/DL (ref 2.5–4.5)
POTASSIUM SERPL-SCNC: 3.9 MMOL/L (ref 3.7–5.3)
PROT SERPL-MCNC: 5.8 G/DL (ref 6.6–8.7)
SODIUM SERPL-SCNC: 136 MMOL/L (ref 136–145)

## 2024-11-08 PROCEDURE — 97116 GAIT TRAINING THERAPY: CPT

## 2024-11-08 PROCEDURE — 97606 NEG PRS WND THER DME>50 SQCM: CPT

## 2024-11-08 PROCEDURE — 80076 HEPATIC FUNCTION PANEL: CPT

## 2024-11-08 PROCEDURE — 99232 SBSQ HOSP IP/OBS MODERATE 35: CPT | Performed by: INTERNAL MEDICINE

## 2024-11-08 PROCEDURE — 6360000002 HC RX W HCPCS: Performed by: INTERNAL MEDICINE

## 2024-11-08 PROCEDURE — 97530 THERAPEUTIC ACTIVITIES: CPT

## 2024-11-08 PROCEDURE — 99231 SBSQ HOSP IP/OBS SF/LOW 25: CPT | Performed by: INTERNAL MEDICINE

## 2024-11-08 PROCEDURE — 84100 ASSAY OF PHOSPHORUS: CPT

## 2024-11-08 PROCEDURE — 6370000000 HC RX 637 (ALT 250 FOR IP): Performed by: INTERNAL MEDICINE

## 2024-11-08 PROCEDURE — 83735 ASSAY OF MAGNESIUM: CPT

## 2024-11-08 PROCEDURE — 36415 COLL VENOUS BLD VENIPUNCTURE: CPT

## 2024-11-08 PROCEDURE — 80048 BASIC METABOLIC PNL TOTAL CA: CPT

## 2024-11-08 RX ORDER — ACETAMINOPHEN AND CODEINE PHOSPHATE 300; 30 MG/1; MG/1
1 TABLET ORAL EVERY 4 HOURS PRN
Qty: 25 TABLET | Status: SHIPPED | OUTPATIENT
Start: 2024-11-08 | End: 2024-11-13

## 2024-11-08 RX ADMIN — HYDROCORTISONE: 1 CREAM TOPICAL at 09:17

## 2024-11-08 RX ADMIN — HYDROMORPHONE HYDROCHLORIDE 0.5 MG: 1 INJECTION, SOLUTION INTRAMUSCULAR; INTRAVENOUS; SUBCUTANEOUS at 11:28

## 2024-11-08 RX ADMIN — MICONAZOLE NITRATE: 20 CREAM TOPICAL at 09:18

## 2024-11-08 RX ADMIN — PANTOPRAZOLE SODIUM 40 MG: 40 TABLET, DELAYED RELEASE ORAL at 05:38

## 2024-11-08 ASSESSMENT — PAIN SCALES - GENERAL
PAINLEVEL_OUTOF10: 0
PAINLEVEL_OUTOF10: 7
PAINLEVEL_OUTOF10: 1
PAINLEVEL_OUTOF10: 0

## 2024-11-08 ASSESSMENT — PAIN DESCRIPTION - ORIENTATION: ORIENTATION: MID

## 2024-11-08 ASSESSMENT — ENCOUNTER SYMPTOMS
RESPIRATORY NEGATIVE: 1
GASTROINTESTINAL NEGATIVE: 1

## 2024-11-08 ASSESSMENT — PAIN DESCRIPTION - LOCATION: LOCATION: ABDOMEN

## 2024-11-08 ASSESSMENT — PAIN DESCRIPTION - DESCRIPTORS: DESCRIPTORS: OTHER (COMMENT)

## 2024-11-08 NOTE — PROGRESS NOTES
[facility]  Progress Note      NAME:  Mandie Bustamante  MEDICAL RECORD NUMBER:  [id number]  AGE: 69 y.o.   GENDER: female  : 1955  TODAY'S DATE:  2024    [facility] nurse visit this day for NPWT dressing change. Wound vac dressing changed without incident, white foam to base of wound to attempt to close fistula.   Patient plan to discharge to home this day with home care to manage NPWT dressing changes, next change due 2024      Measurements:  Negative Pressure Wound Therapy Abdomen Mid (Active)   $ Standard NPWT >50 sq cm PER TX $ Yes 24 1231   Wound Type Surgical 24 1231   Unit Type [employer] 24 1231   Dressing Type White Foam;Black Foam 24 1231   Number of pieces used 3 24 1231   Cycle Continuous 24 1231   Target Pressure (mmHg) 100 24 1231   Canister changed? No 24 1231   Dressing Status New dressing applied 24 1231   Dressing Changed Changed/New 24 1231   Drainage Amount Small 24 1231   Drainage Description Serosanguinous;Brown 24 1231   Dressing Change Due 24 1231   Wound Assessment Bleeding;Granulation tissue 24 1031   Lola-wound Assessment Dry/flaky;Warm;Intact 24 1031   Number of days: 6       Wound Abdomen Left;Lower (Active)   Wound Image    24 1527   Wound Etiology Surgical 24 1231   Dressing Status New dressing applied 24 1231   Wound Cleansed Irrigated with saline 24 1231   Dressing/Treatment Negative pressure wound therapy 24 1231   Dressing Change Due 24 1231   Wound Length (cm) 2.8 cm 24 1654   Wound Width (cm) 14.6 cm 24 1654   Wound Depth (cm) 3.6 cm 24 1654   Wound Surface Area (cm^2) 40.88 cm^2 24 1654   Change in Wound Size % (l*w) 14.83 24 1654   Wound Volume (cm^3) 147.168 cm^3 24 1654   Wound Assessment Bleeding;Granulation tissue 24 1231   Drainage Amount Moderate

## 2024-11-08 NOTE — PROGRESS NOTES
End Of Shift Note  [facility]  Summary of shift: pt had diet advanced to ETC and bite sized this morning and patient tolerated well. BM in AM. Pt had TPN off in afternoon. Pt to go home tomorrow when wound vac is replaced, and delivered and walker is delivered. VSS throughout day.    Vitals:    Vitals:    11/07/24 0715 11/07/24 1154 11/07/24 1500 11/07/24 1750   BP: (!) 140/65 139/62  137/64   Pulse: 82   100   Resp: 20 20     Temp: 98.6 °F (37 °C) 97.1 °F (36.2 °C) 97.3 °F (36.3 °C)    TempSrc: Temporal Temporal Temporal    SpO2:  97%  98%   Weight:       Height:            I&O:   Intake/Output Summary (Last 24 hours) at 11/7/2024 1911  Last data filed at 11/7/2024 1337  Gross per 24 hour   Intake --   Output 2150 ml   Net -2150 ml       Resp Status: RA    Ventilator Settings:  Vent Mode: CPAP/PS Resp Rate (Set): 14 bpm/Vt (Set, mL): 500 mL/ /FiO2 : 30 %    Critical Care IV infusions:   dextrose      sodium chloride 5 mL/hr at 11/01/24 0245        LDA:   PICC 10/26/24 Right Cephalic (Active)   Number of days: 12       Negative Pressure Wound Therapy Abdomen Mid (Active)   Number of days: 6       Wound Abdomen Left;Lower (Active)   Number of days:        Incision 10/01/24 Abdomen Medial;Upper (Active)   Number of days: 37

## 2024-11-08 NOTE — PROGRESS NOTES
Physical Therapy  Facility/Department: [facility]  Rehabilitation Physical Therapy Treatment Note    NAME: Mandie Bustamante  : 1955 (69 y.o.)  MRN: [id number]  CODE STATUS: Full Code    Date of Service: 24       Restrictions:  Restrictions/Precautions: General Precautions, Up as Tolerated, Fall Risk  Position Activity Restriction  Other position/activity restrictions: RUE PICC, telemetry, cont SPO2 moniter, 46 BMI, ABD wound vac, ABD prec     SUBJECTIVE  Subjective  Subjective: pt in bedside chair agreeable to PT          OBJECTIVE  Cognition  Overall Cognitive Status: Exceptions  Arousal/Alertness: Appropriate responses to stimuli  Following Commands: Follows one step commands consistently  Attention Span: Attends with cues to redirect  Memory: Decreased recall of precautions;Decreased short term memory  Safety Judgement: Decreased awareness of need for safety;Decreased awareness of need for assistance  Problem Solving: Decreased awareness of errors;Assistance required to correct errors made;Assistance required to identify errors made;Assistance required to implement solutions;Assistance required to generate solutions  Insights: Appears intact  Initiation: Requires cues for some  Sequencing: Requires cues for some  Orientation  Overall Orientation Status: Within Functional Limits  Orientation Level: Oriented X4    Functional Mobility  Bed Mobility  Overall Assistance Level: Minimal Assistance  Roll Left  Assistance Level: Contact guard assist  Roll Right  Assistance Level: Contact guard assist  Sit to Supine  Assistance Level: Minimal assistance;Moderate assistance  Scooting  Assistance Level: Moderate assistance;Maximum assistance;Requires x 2 assistance  Skilled Clinical Factors: pt demo good log roll technique, pt required MAX A x 2 to boost up in bed  Transfers  Surface: From chair with arms;To bed  Additional Factors: Verbal cues;Hand placement cues  Device: Walker  Sit to Stand  Assistance Level:

## 2024-11-08 NOTE — PROGRESS NOTES
End Of Shift Note  [facility]  Summary of shift: Pt no longer on TPN. Refused POC blood glucose testing. Explained importance and she did allow a POC test last night and was able to check with morning labs. No insulin needed. Wound vac in place. Issue with battery not charging overnight, and switched to new wound vac. No complaints of pain. Plan for pt to discharge home today with [facility]. Pt waiting for DME to arrive prior to discharge.    Vitals:    Vitals:    11/07/24 2000 11/08/24 0000 11/08/24 0400 11/08/24 0600   BP: (!) 131/55 (!) 116/56 (!) 123/54    Pulse: 95 86 82    Resp:  18 16    Temp: 97.7 °F (36.5 °C) 97.5 °F (36.4 °C) 97.3 °F (36.3 °C)    TempSrc: Temporal Temporal Temporal    SpO2: 97% 98%     Weight:    112 kg (247 lb)   Height:            I&O:   Intake/Output Summary (Last 24 hours) at 11/8/2024 0639  Last data filed at 11/8/2024 0305  Gross per 24 hour   Intake --   Output 1900 ml   Net -1900 ml       Resp Status: RA    Ventilator Settings:  Vent Mode: CPAP/PS Resp Rate (Set): 14 bpm/Vt (Set, mL): 500 mL/ /FiO2 : 30 %    Critical Care IV infusions:   dextrose      sodium chloride 5 mL/hr at 11/01/24 0245        LDA:   PICC 10/26/24 Right Cephalic (Active)   Number of days: 12       Negative Pressure Wound Therapy Abdomen Mid (Active)   Number of days: 6       Wound Abdomen Left;Lower (Active)   Number of days:        Incision 10/01/24 Abdomen Medial;Upper (Active)   Number of days: 37

## 2024-11-08 NOTE — PROGRESS NOTES
Pulmonary Critical Care Progress Note    Patient seen for the follow up of Intra-abdominal abscess (HCC)     Subjective:    She is still on room air.  She is having less hallucination.  She is off Precedex.  She is on TPN.  No new complaints today.  She tolerated diet.    examination:    Vitals: BP (!) 123/54   Pulse 92   Temp 97.8 °F (36.6 °C) (Temporal)   Resp 18   Ht 1.6 m (5' 3\")   Wt 112 kg (247 lb)   SpO2 98%   BMI 43.75 kg/m²   SpO2  Av.7 %  Min: 97 %  Max: 98 %  General appearance: Awake alert no acute distress  Neck: No JVD  Lungs: Decreased breath sound no crackles or wheezes  Heart: regular rate and rhythm, S1, S2 normal, no gallop  Abdomen: Soft, non tender, + BS   Extremities: no cyanosis or clubbing.  No edema     LABs:    CBC:   No results for input(s): \"WBC\", \"HGB\", \"HCT\", \"PLT\" in the last 72 hours.    BMP:   Recent Labs     24  0317 24  0324   * 136   K 4.2 3.9   CO2 21 20   BUN 16 16   CREATININE 0.7 0.7   LABGLOM >90 >90   GLUCOSE 182* 144*        Latest Reference Range & Units 10/18/24 04:12   POC HCO3 21.0 - 28.0 mmol/L 24.9   POC O2 SAT 94.0 - 98.0 % 97.5   POC pCO2 35.0 - 48.0 mm Hg 33.7 (L)   POC pH 7.350 - 7.450  7.476 (H)   POC PO2 83.0 - 108.0 mm Hg 88.2   (L): Data is abnormally low  (H): Data is abnormally high  Radiology:  CXR     Right PICC line terminates at the distal SVC.       Chest x-ray   Minimal fibrotic/atelectatic changes in the lateral portion of base of left  lung.  Rest of lung fields are clear.     Stable normal cardiac size without evidence of CHF.      Chest x-ray 10/26 reviewed  1. No significant interval change.  2. Right-sided PICC with tip position cavoatrial junction.       Chest x-ray 10/24  1. Bibasilar airspace disease and small bilateral effusions.  Stable  cardiomegaly.  Mild pulmonary vascular congestion.  Findings favor CHF  related changes with underlying infiltrate not excluded.  Follow-up is  recommended to document

## 2024-11-08 NOTE — PLAN OF CARE
Problem: Discharge Planning  Goal: Discharge to home or other facility with appropriate resources  11/7/2024 2342 by Marleen Mars RN  Outcome: Progressing  Flowsheets (Taken 11/7/2024 2000)  Discharge to home or other facility with appropriate resources:   Identify barriers to discharge with patient and caregiver   Arrange for needed discharge resources and transportation as appropriate   Identify discharge learning needs (meds, wound care, etc)   Refer to discharge planning if patient needs post-hospital services based on physician order or complex needs related to functional status, cognitive ability or social support system  11/7/2024 1751 by Christian Stovall RN  Outcome: Progressing     Problem: Skin/Tissue Integrity  Goal: Absence of new skin breakdown  Description: 1.  Monitor for areas of redness and/or skin breakdown  2.  Assess vascular access sites hourly  3.  Every 4-6 hours minimum:  Change oxygen saturation probe site  4.  Every 4-6 hours:  If on nasal continuous positive airway pressure, respiratory therapy assess nares and determine need for appliance change or resting period.  11/7/2024 2342 by Marleen Mars RN  Outcome: Progressing  11/7/2024 1751 by Christian Stovall RN  Outcome: Progressing     Problem: ABCDS Injury Assessment  Goal: Absence of physical injury  11/7/2024 2342 by Marleen Mars RN  Outcome: Progressing  Flowsheets (Taken 11/7/2024 2000)  Absence of Physical Injury: Implement safety measures based on patient assessment  11/7/2024 1751 by Christian Stovall RN  Outcome: Progressing     Problem: Safety - Adult  Goal: Free from fall injury  11/7/2024 2342 by Marleen Mars RN  Outcome: Progressing  Flowsheets (Taken 11/7/2024 2000)  Free From Fall Injury:   Instruct family/caregiver on patient safety   Based on caregiver fall risk screen, instruct family/caregiver to ask for assistance with transferring infant if caregiver noted to have fall risk factors  11/7/2024 1751  pain and pain management   Notify Licensed Independent Practitioner if interventions unsuccessful or patient reports new pain  11/7/2024 1751 by Christian Stovall RN  Outcome: Progressing     Problem: Chronic Conditions and Co-morbidities  Goal: Patient's chronic conditions and co-morbidity symptoms are monitored and maintained or improved  11/7/2024 2342 by Marleen Mars RN  Outcome: Progressing  Flowsheets (Taken 11/7/2024 2000)  Care Plan - Patient's Chronic Conditions and Co-Morbidity Symptoms are Monitored and Maintained or Improved:   Monitor and assess patient's chronic conditions and comorbid symptoms for stability, deterioration, or improvement   Collaborate with multidisciplinary team to address chronic and comorbid conditions and prevent exacerbation or deterioration   Update acute care plan with appropriate goals if chronic or comorbid symptoms are exacerbated and prevent overall improvement and discharge  11/7/2024 1751 by Christian Stovall RN  Outcome: Progressing

## 2024-11-08 NOTE — PROGRESS NOTES
Surgical Progress Note      Subjective:  Patient seen examined at bedside, no overnight events.  Wound VAC in place with 200 cc output, replaced overnight due to low battery, the wound VAC machine was switched.  Tolerating regular diet.  Good UOP.  Pain is controlled.  VSS, afebrile.        Vitals:    11/08/24 0400   BP: (!) 123/54   Pulse: 82   Resp: 16   Temp: 97.3 °F (36.3 °C)   SpO2:             Exam: General Appearance: alert and oriented to person, place and time and in no acute distress  Skin: warm and dry, no rash or erythema  HEENT: NCAT, PERRLA, EOMI  Neck: neck supple and non tender without mass, no thyromegaly or thyroid nodules, no cervical lymphadenopathy   Pulmonary/Chest: Unlabored breathing on RA  Cardiovascular: RRR  Abdomen: Morbidly obese, soft, minimal TTP, wound VAC in place with minimal output  Extremities: no cyanosis and no clubbing  Musculoskeletal: normal range of motion, no joint swelling, deformity or tenderness  Neurologic: no cranial nerve deficit and speech normal    I/O last 3 completed shifts:  In: -   Out: 2950 [Urine:2950]    Hemoglobin   Date Value Ref Range Status   11/02/2024 9.7 (L) 11.9 - 15.1 g/dL Final     Hematocrit   Date Value Ref Range Status   11/02/2024 31.0 (L) 36.3 - 47.1 % Final     WBC   Date Value Ref Range Status   11/02/2024 13.5 (H) 3.5 - 11.3 k/uL Final     Sodium   Date Value Ref Range Status   11/08/2024 136 136 - 145 mmol/L Final     Potassium   Date Value Ref Range Status   11/08/2024 3.9 3.7 - 5.3 mmol/L Final     Chloride   Date Value Ref Range Status   11/08/2024 104 98 - 107 mmol/L Final     CO2   Date Value Ref Range Status   11/08/2024 20 20 - 31 mmol/L Final     Glucose   Date Value Ref Range Status   11/08/2024 144 (H) 82 - 115 mg/dL Final       Assessment/Plan:   Ventral hernia repair 10/1, explantation of mesh 9/16, likely reactive process to colon adherent to the undersurface of the mesh.  Subsequent enterocutaneous fistula     EC fistula is  closing after explantation of mesh   Continue wound VAC, replaced by wound ostomy M/W/F  Diet: Advance to soft diet.  If patient tolerates, may d/c TPN  Dispo: Okay for discharge from a general surgery perspective.  Possibly 2 [facility] today  Patient follow-up with us in 2 weeks.    Electronically signed by Diogo Christian DO on 11/8/2024 at 6:03 AM  General Surgery Resident, PGY-3    Attending Physician Statement  I have discussed the case, including pertinent history and exam findings with the resident. I agree with the assessment, plan and orders as documented by the resident.    Monitor Wound Vac output  OK for discharge

## 2024-11-08 NOTE — PROGRESS NOTES
placed 10/11/2024, on TPN.  Bar catheter in place with clear urine was placed on 10/10/2024  NG tube in place.  Status post robotic assisted laparoscopic repair of recurrent incisional hernia with mesh placement 10/1/2024 was complicated by fluid collection status post aspiration on 10/11/2024 with no growth on culture.  Status post exploratory laparotomy with removal of mesh and wound VAC application 10/16/2024 subsequently underwent exploratory laparotomy abdominal washout with wound VAC application 10/18/2024.  She had a fever with a temperature max of 103 on 10/18/2024    Current evaluation:2024    BP (!) 123/54   Pulse 92   Temp 97.8 °F (36.6 °C) (Temporal)   Resp 18   Ht 1.6 m (5' 3\")   Wt 112 kg (247 lb)   SpO2 98%   BMI 43.75 kg/m²     Temperature Range: Temp: 97.8 °F (36.6 °C) Temp  Av.6 °F (36.4 °C)  Min: 97.3 °F (36.3 °C)  Max: 97.8 °F (36.6 °C)  The patient is seen and evaluated at bedside and is awake and alert in no acute distress.  No subjective fever or chills.  No abdominal pain nausea vomiting or diarrhea.  The patient has a wound VAC in place which will be changed today.    Review of Systems   Constitutional: Negative.    Respiratory: Negative.     Cardiovascular: Negative.    Gastrointestinal: Negative.    Genitourinary: Negative.    Musculoskeletal: Negative.    Skin:  Positive for wound.   Neurological: Negative.    Psychiatric/Behavioral: Negative.         Physical Examination :     Physical Exam  Constitutional:       Appearance: She is well-developed. She is obese.   HENT:      Head: Normocephalic and atraumatic.   Cardiovascular:      Rate and Rhythm: Regular rhythm.      Heart sounds: Normal heart sounds.   Pulmonary:      Effort: Pulmonary effort is normal.      Breath sounds: Normal breath sounds.   Abdominal:      General: Bowel sounds are normal.      Palpations: Abdomen is soft.      Comments: There is a wound VAC in the anterior abdominal wall   Musculoskeletal:     within the anterior abdominal wall with extensive  graying of the subcutaneous fat adjacent. There is no appreciable focal fluid  collection.  2. Mild wall thickening of the transverse colon as it traverses near the  surgical site of the anterior abdominal wall. There is a small focus of air  within the rectus musculature immediately adjacent to the transverse colon,  can not exclude possible small fistulous tract.  3. Fluid-filled rectosigmoid colon.  Findings may represent acute diarrheal  illness.              Exam Ended: 10/29/24 20:28 EDT Last Resulted: 10/29/24 21:40 EDT        Cultures:   Culture and Sensitivities:  No results for input(s): \"SPECDESC\", \"SPECIAL\", \"CULTURE\", \"STATUS\", \"ORG\", \"CDIFFTOXPCR\", \"CAMPYLOBPCR\", \"SALMONELLAPC\", \"SHIGAPCR\", \"SHIGELLAPCR\" in the last 72 hours.    Procedure Component Value Units Date/Time   Culture, Blood 1 [[id number]] Collected: 10/18/24 1732   Order Status: Completed Specimen: Blood Updated: 10/23/24 2234    Specimen Description .BLOOD    Special Requests Site: Blood    Culture NO GROWTH 5 DAYS   Culture, Blood 1 [[id number]] Collected: 10/18/24 1732   Order Status: Completed Specimen: Blood Updated: 10/23/24 2233    Specimen Description .BLOOD    Special Requests Site: Blood    Culture NO GROWTH 5 DAYS   Culture, Blood 1 [[id number]] Collected: 10/16/24 0929   Order Status: Completed Specimen: Blood Updated: 10/21/24 1313    Specimen Description .BLOOD    Special Requests LT AC 10ML    Culture NO GROWTH 5 DAYS   Culture, Blood 2 [[id number]] Collected: 10/16/24 0948   Order Status: Completed Specimen: Blood Updated: 10/21/24 1313    Specimen Description .BLOOD    Special Requests Site: Blood    Culture NO GROWTH 5 DAYS   Culture, Respiratory (with Gram Stain) [[id number]] Collected: 10/19/24 1925   Order Status: Completed Specimen: Respiratory from Endotracheal Updated: 10/21/24 1033    Specimen Description .ENDOTRACHEAL    Special Requests Site: Respiratory

## 2024-11-08 NOTE — PLAN OF CARE
Problem: Discharge Planning  Goal: Discharge to home or other facility with appropriate resources  Outcome: Adequate for Discharge     Problem: Skin/Tissue Integrity  Goal: Absence of new skin breakdown  Description: 1.  Monitor for areas of redness and/or skin breakdown  2.  Assess vascular access sites hourly  3.  Every 4-6 hours minimum:  Change oxygen saturation probe site  4.  Every 4-6 hours:  If on nasal continuous positive airway pressure, respiratory therapy assess nares and determine need for appliance change or resting period.  Outcome: Adequate for Discharge     Problem: ABCDS Injury Assessment  Goal: Absence of physical injury  Outcome: Adequate for Discharge     Problem: Safety - Adult  Goal: Free from fall injury  Outcome: Adequate for Discharge     Problem: Metabolic/Fluid and Electrolytes - Adult  Goal: Electrolytes maintained within normal limits  Outcome: Adequate for Discharge  Goal: Hemodynamic stability and optimal renal function maintained  Outcome: Adequate for Discharge  Goal: Glucose maintained within prescribed range  Outcome: Adequate for Discharge     Problem: Neurosensory - Adult  Goal: Achieves stable or improved neurological status  Outcome: Adequate for Discharge  Goal: Absence of seizures  Outcome: Adequate for Discharge  Goal: Remains free of injury related to seizures activity  Outcome: Adequate for Discharge     Problem: Cardiovascular - Adult  Goal: Maintains optimal cardiac output and hemodynamic stability  Outcome: Adequate for Discharge  Goal: Absence of cardiac dysrhythmias or at baseline  Outcome: Adequate for Discharge     Problem: Respiratory - Adult  Goal: Achieves optimal ventilation and oxygenation  Outcome: Adequate for Discharge     Problem: Gastrointestinal - Adult  Goal: Maintains or returns to baseline bowel function  Outcome: Adequate for Discharge  Goal: Maintains adequate nutritional intake  Outcome: Adequate for Discharge     Problem: Genitourinary -

## 2024-11-08 NOTE — PROGRESS NOTES
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   Acute respiratory failure required intubation, extubated  Persistent fever-resolved  Morbid obesity    Status post explantation of mesh, likely reactive process to colon adherent to the undersurface of the mesh.   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     Recommendations:   The patient has had multiple courses of antibiotics that include:  piperacillin/tazobactam 10/10 through 10/17/2024  Cefepime and metronidazole 10/17 through 10/26/2024   currently on Rocephin, metronidazole and fluconazole since 10/29/2024 through current  The patient's antibiotic therapy was discontinued 11/2/2024   The patient continues to do well clinically and is hoping that she can go home with rehabilitation instead of going to a facility    Infection Control Recommendations:   Universal precaution    Discharge Planning:   Patient will need Midline Catheter Insertion/ PICC line Insertion: No  Patient will need: Home IV , Infusion Center,  SNF,  LTAC: Undetermined  Patient willneed outpatient wound care: No    Medical Decision making / Summary of Stay:   Mandie Bustamante is a 69 y.o.-year-old female was seen at the ICU, intubated, sedated, unable to provide history that was obtained from chart review and nursing staff.  She is

## 2024-11-08 NOTE — PROGRESS NOTES
[facility]  Office: [phone]  Keenan Foster DO, Kevin Siegel DO, Carlos A Orta DO, Taye Morales DO, Maxx Madison MD, Meena Eckert MD, Efren Vo MD, Makayla Last MD,  Jerman Christian MD, Gina Bourgeois MD, Gladys Fry MD,  Anu Murillo DO, Jennifer Blanca MD, Oral Duncan MD, Harlan Foster DO, Katya Simmons MD,  Filiberto Slade DO, Elizabeth Alves MD, Samantha Pinto MD, Arlene Bryant MD, Bandar Barrios MD,  Kulwant Rodrigez MD, Michelle Dennison MD, Jinny Steel MD, Kathia Vargas MD, Gerardo Alvarez MD, Richard Johnson MD, Demetri Ibarra DO, Benjie Alvarenga MD, Shirley Waterhouse, CNP,  Georgette Villarreal CNP, Demetri Toure, CATHY,  Silvina Castano, CARINA, Tatiana Mckeon, CNP, Angelina Harper, CNP, Alma Rosa Peng, CNP, Rashmi Mccartney, CNP, Kimberley Grant PA-C, IMAN ArmentaC, Stephanie Kaminski, CATHY, Tono Yee, CNP,  Chrissie Schwartz, CNP, Sheri Jones, CNP,  Nadiya Rick, CNP, Cindi Lam, CNP         [facility]   IN-PATIENT SERVICE   [facility]    Progress Note    11/8/2024    7:09 AM    Name:   Mandie Bustamante  MRN:     [id number]     Acct:      [id number]   Room:   2033/2033-01  IP Day:  29  Admit Date:  10/10/2024  6:11 PM    PCP:   No primary care provider on file.  Code Status:  Full Code    Subjective:     C/C: Abdominal pain    Interval History Status: improved.     Patient is resting in bed and denies any complaints of chest pain, shortness of breath, nausea vomiting, fevers chills or acute complaints    Brief History:     Per prior documentation  \"Patient is a 69-year-old female who recently underwent an out patient hernia repair with mesh on 10/1/2024, she presents to the ED with abdominal pain on 10/10/2024. Work up in the ED revealed severe hyponatremia of 109, leukocytosis, and  CT abdomen pelvis without contrast was suggestive of large abscess with air-fluid collection seen along the peritoneum just below the rectus muscle. There was      Family History:   Family History   Problem Relation Age of Onset    High Cholesterol Mother     Colon Cancer Mother     Osteoporosis Mother     Alzheimer's Disease Mother     Migraines Mother     Neuropathy Mother     Cancer Mother     Obesity Mother     High Cholesterol Father     High Blood Pressure Father     Heart Disease Father     Migraines Father     Diabetes Father     Arthritis Father     Hearing Loss Father     Stroke Father     Heart Attack Father     Rheum Arthritis Father     Migraines Sister     Diabetes Sister     Arthritis Sister         N/A    Rheum Arthritis Sister     Diabetes Brother     Arthritis Brother     Tuberculosis Maternal Grandmother     Tuberculosis Maternal Grandfather     Diabetes Paternal Grandmother     Obesity Paternal Grandmother     Obesity Maternal Aunt         N/A    Obesity Paternal Aunt        Vitals:  BP (!) 123/54   Pulse 82   Temp 97.3 °F (36.3 °C) (Temporal)   Resp 16   Ht 1.6 m (5' 3\")   Wt 112 kg (247 lb)   SpO2 98%   BMI 43.75 kg/m²   Temp (24hrs), Av.6 °F (36.4 °C), Min:97.1 °F (36.2 °C), Max:98.6 °F (37 °C)    Recent Labs     24  1159 24  2300 24  1451 24  2044   POCGLU 182* 139* 146* 140*       I/O (24Hr):    Intake/Output Summary (Last 24 hours) at 2024 0709  Last data filed at 2024 0305  Gross per 24 hour   Intake --   Output 1900 ml   Net -1900 ml       Labs:  Hematology:No results for input(s): \"WBC\", \"RBC\", \"HGB\", \"HCT\", \"MCV\", \"MCH\", \"MCHC\", \"RDW\", \"PLT\", \"MPV\", \"SEDRATE\", \"CRP\", \"INR\", \"DDIMER\", \"WW2NDBWI\", \"LABABSO\" in the last 72 hours.    Invalid input(s): \"PT\"  Chemistry:  Recent Labs     24  0550 24  0317 24  0324   * 133* 136   K 4.0 4.2 3.9   CL 99 100 104   CO2 19* 21 20   GLUCOSE 191* 182* 144*   BUN 18 16 16   CREATININE 0.6 0.7 0.7   MG 2.1  --  2.0   ANIONGAP 13 12 13   LABGLOM >90 >90 >90   CALCIUM 8.0* 7.9* 8.0*   PHOS 3.7  --  4.0     Recent Labs     24  0736

## 2025-03-21 LAB
AEROBIC CULTURE: ABNORMAL
AMPICILLIN: ABNORMAL
CEFOXITIN SCREEN: ABNORMAL
CIPROFLOXACIN: ABNORMAL
CLINDAMYCIN: ABNORMAL
CULTURE RESULT: ABNORMAL
DAPTOMYCIN: ABNORMAL
DOXYCYCLINE: ABNORMAL
ERYTHROMYCIN: ABNORMAL
GENTAMICIN,HIGH LEVEL: ABNORMAL
GENTAMICIN: ABNORMAL
INDUCIBLE CLINDAMYCIN RESISTANCE: ABNORMAL
LEVOFLOXACIN: ABNORMAL
LINEZOLID: ABNORMAL
MOXIFLOXACIN: ABNORMAL
NITROFURANTOIN: ABNORMAL
OXACILLIN: ABNORMAL
RIFAMPIN: ABNORMAL
STREPTOMYCIN,HI LEVEL: ABNORMAL
TETRACYCLINE: ABNORMAL
TIGECYCLINE: ABNORMAL
TRIMETHOPRIM + SULFAMETHOXAZOLE: ABNORMAL
VANCOMYCIN: ABNORMAL

## 2025-06-03 ENCOUNTER — TRANSCRIBE ORDERS (OUTPATIENT)
Dept: ADMINISTRATIVE | Age: 70
End: 2025-06-03

## 2025-06-03 DIAGNOSIS — T81.31XA WOUND DISRUPTION, POST-OP, SKIN, INITIAL ENCOUNTER: Primary | ICD-10-CM

## 2025-06-13 ENCOUNTER — HOSPITAL ENCOUNTER (OUTPATIENT)
Dept: CT IMAGING | Age: 70
Discharge: HOME | End: 2025-06-15
Payer: COMMERCIAL

## 2025-06-13 DIAGNOSIS — T81.31XA WOUND DISRUPTION, POST-OP, SKIN, INITIAL ENCOUNTER: ICD-10-CM

## 2025-06-13 PROCEDURE — 74176 CT ABD & PELVIS W/O CONTRAST: CPT

## (undated) DEVICE — SUTURE PERMAHAND SZ 3-0 L18IN NONABSORBABLE BLK L26MM SH C013D

## (undated) DEVICE — 450 ML BOTTLE OF 0.05% CHLORHEXIDINE GLUCONATE IN 99.95% STERILE WATER FOR IRRIGATION, USP AND APPLICATOR.: Brand: IRRISEPT ANTIMICROBIAL WOUND LAVAGE

## (undated) DEVICE — SUTURE VICRYL + SZ 4-0 L18IN ABSRB VLT L26MM SH 1/2 CIR VCP773D

## (undated) DEVICE — DRESSING NEG PRSS M 18X12.5X3.3CM POLYUR FOR WND THER VAC

## (undated) DEVICE — TROCAR: Brand: KII FIOS FIRST ENTRY

## (undated) DEVICE — NEPTUNE E-SEP 165MM SUCTION SLEEVE: Brand: NEPTUNE E-SEP

## (undated) DEVICE — CANISTER NEG PRSS 1000ML W/ GEL INFOVAC

## (undated) DEVICE — DRESSING NEG PRSS L W15XL10CM D1CM POLYVI ALC WHT FOAM VAC

## (undated) DEVICE — GOWN,REINFORCE,POLY,SIRUS,XLNG/XLG: Brand: MEDLINE

## (undated) DEVICE — STAZ MAJOR BASIN: Brand: MEDLINE INDUSTRIES, INC.

## (undated) DEVICE — SUTURE PDS II SZ 0 L36IN ABSRB VLT L36MM CT-1 1/2 CIR Z346H

## (undated) DEVICE — DRESSING NEG PRSS L W15XH3.3XL26CM BLK POLYUR DRP PD

## (undated) DEVICE — LAPAROSCOPIC SCISSORS: Brand: EPIX LAPAROSCOPIC SCISSORS

## (undated) DEVICE — GLOVE SURG SZ 7 CRM LTX FREE POLYISOPRENE POLYMER BEAD ANTI

## (undated) DEVICE — BLADE,CARBON-STEEL,15,STRL,DISPOSABLE,TB: Brand: MEDLINE

## (undated) DEVICE — GLOVE SURG SZ 75 CRM LTX FREE POLYISOPRENE POLYMER BEAD ANTI

## (undated) DEVICE — 4-PORT MANIFOLD: Brand: NEPTUNE 2

## (undated) DEVICE — SUTURE MONOCRYL SZ 4-0 L18IN ABSRB UD L16MM PC-3 3/8 CIR PRIM Y845G

## (undated) DEVICE — SUTURE PDS II SZ 0 L27IN ABSRB VLT UR-6 L26MM 1/2 CIR D7185

## (undated) DEVICE — HYPODERMIC SAFETY NEEDLE: Brand: MAGELLAN

## (undated) DEVICE — SUTURE VICRYL + SZ 0 L27IN ABSRB VLT L26MM UR-6 5/8 CIR VCP603H

## (undated) DEVICE — SUTURE PROL SZ 1 L30IN NONABSORBABLE BLU L36MM CT-1 1/2 CIR 8425H

## (undated) DEVICE — SUTURE VICRYL + SZ 2-0 L27IN ABSRB WHT SH 1/2 CIR TAPERCUT VCP417H

## (undated) DEVICE — Device

## (undated) DEVICE — SKIN PREP TRAY W/CHG: Brand: MEDLINE INDUSTRIES, INC.

## (undated) DEVICE — GOWN,SIRUS,NONRNF,SETINSLV,XL,20/CS: Brand: MEDLINE

## (undated) DEVICE — TROCARS: Brand: KII® BALLOON BLUNT TIP SYSTEM

## (undated) DEVICE — APPLIER CLP M L L11.4IN DIA10MM ENDOSCP ROT MULT FOR LIG

## (undated) DEVICE — SWABSTICK MEDICATED 10% POVIDONE IOD PVP SGL ANTISEP SAT

## (undated) DEVICE — GAUZE,SPONGE,FLUFF,6"X6.75",STRL,5/TRAY: Brand: MEDLINE